# Patient Record
Sex: MALE | Race: ASIAN | NOT HISPANIC OR LATINO | URBAN - METROPOLITAN AREA
[De-identification: names, ages, dates, MRNs, and addresses within clinical notes are randomized per-mention and may not be internally consistent; named-entity substitution may affect disease eponyms.]

---

## 2020-08-14 ENCOUNTER — INPATIENT (INPATIENT)
Facility: HOSPITAL | Age: 75
LOS: 18 days | Discharge: NURSING, REHAB OR LONG-TERM CARE | DRG: 24 | End: 2020-09-02
Attending: NEUROLOGICAL SURGERY | Admitting: PSYCHIATRY & NEUROLOGY
Payer: MEDICAID

## 2020-08-14 VITALS
TEMPERATURE: 99 F | HEIGHT: 67 IN | DIASTOLIC BLOOD PRESSURE: 67 MMHG | HEART RATE: 73 BPM | WEIGHT: 139.99 LBS | SYSTOLIC BLOOD PRESSURE: 198 MMHG | OXYGEN SATURATION: 96 % | RESPIRATION RATE: 18 BRPM

## 2020-08-14 DIAGNOSIS — I63.9 CEREBRAL INFARCTION, UNSPECIFIED: ICD-10-CM

## 2020-08-14 LAB
ALBUMIN SERPL ELPH-MCNC: 4.6 G/DL — SIGNIFICANT CHANGE UP (ref 3.3–5)
ALP SERPL-CCNC: 64 U/L — SIGNIFICANT CHANGE UP (ref 40–120)
ALT FLD-CCNC: 15 U/L — SIGNIFICANT CHANGE UP (ref 10–45)
ANION GAP SERPL CALC-SCNC: 13 MMOL/L — SIGNIFICANT CHANGE UP (ref 5–17)
APPEARANCE UR: CLEAR — SIGNIFICANT CHANGE UP
APTT BLD: 33.6 SEC — SIGNIFICANT CHANGE UP (ref 27.5–35.5)
AST SERPL-CCNC: 15 U/L — SIGNIFICANT CHANGE UP (ref 10–40)
BACTERIA # UR AUTO: 0 — SIGNIFICANT CHANGE UP
BASOPHILS # BLD AUTO: 0.04 K/UL — SIGNIFICANT CHANGE UP (ref 0–0.2)
BASOPHILS NFR BLD AUTO: 0.4 % — SIGNIFICANT CHANGE UP (ref 0–2)
BILIRUB SERPL-MCNC: 0.9 MG/DL — SIGNIFICANT CHANGE UP (ref 0.2–1.2)
BILIRUB UR-MCNC: NEGATIVE — SIGNIFICANT CHANGE UP
BUN SERPL-MCNC: 14 MG/DL — SIGNIFICANT CHANGE UP (ref 7–23)
CALCIUM SERPL-MCNC: 10 MG/DL — SIGNIFICANT CHANGE UP (ref 8.4–10.5)
CHLORIDE SERPL-SCNC: 98 MMOL/L — SIGNIFICANT CHANGE UP (ref 96–108)
CO2 SERPL-SCNC: 22 MMOL/L — SIGNIFICANT CHANGE UP (ref 22–31)
COLOR SPEC: SIGNIFICANT CHANGE UP
CREAT SERPL-MCNC: 0.89 MG/DL — SIGNIFICANT CHANGE UP (ref 0.5–1.3)
DIFF PNL FLD: ABNORMAL
EOSINOPHIL # BLD AUTO: 0.04 K/UL — SIGNIFICANT CHANGE UP (ref 0–0.5)
EOSINOPHIL NFR BLD AUTO: 0.4 % — SIGNIFICANT CHANGE UP (ref 0–6)
EPI CELLS # UR: 0 /HPF — SIGNIFICANT CHANGE UP
GLUCOSE SERPL-MCNC: 115 MG/DL — HIGH (ref 70–99)
GLUCOSE UR QL: NEGATIVE — SIGNIFICANT CHANGE UP
HCT VFR BLD CALC: 46.8 % — SIGNIFICANT CHANGE UP (ref 39–50)
HGB BLD-MCNC: 15.8 G/DL — SIGNIFICANT CHANGE UP (ref 13–17)
IMM GRANULOCYTES NFR BLD AUTO: 0.3 % — SIGNIFICANT CHANGE UP (ref 0–1.5)
INR BLD: 1.04 RATIO — SIGNIFICANT CHANGE UP (ref 0.88–1.16)
KETONES UR-MCNC: NEGATIVE — SIGNIFICANT CHANGE UP
LEUKOCYTE ESTERASE UR-ACNC: NEGATIVE — SIGNIFICANT CHANGE UP
LYMPHOCYTES # BLD AUTO: 28.7 % — SIGNIFICANT CHANGE UP (ref 13–44)
LYMPHOCYTES # BLD AUTO: 3.19 K/UL — SIGNIFICANT CHANGE UP (ref 1–3.3)
MCHC RBC-ENTMCNC: 30 PG — SIGNIFICANT CHANGE UP (ref 27–34)
MCHC RBC-ENTMCNC: 33.8 GM/DL — SIGNIFICANT CHANGE UP (ref 32–36)
MCV RBC AUTO: 89 FL — SIGNIFICANT CHANGE UP (ref 80–100)
MONOCYTES # BLD AUTO: 0.95 K/UL — HIGH (ref 0–0.9)
MONOCYTES NFR BLD AUTO: 8.5 % — SIGNIFICANT CHANGE UP (ref 2–14)
NEUTROPHILS # BLD AUTO: 6.88 K/UL — SIGNIFICANT CHANGE UP (ref 1.8–7.4)
NEUTROPHILS NFR BLD AUTO: 61.7 % — SIGNIFICANT CHANGE UP (ref 43–77)
NITRITE UR-MCNC: NEGATIVE — SIGNIFICANT CHANGE UP
NRBC # BLD: 0 /100 WBCS — SIGNIFICANT CHANGE UP (ref 0–0)
PH UR: 6.5 — SIGNIFICANT CHANGE UP (ref 5–8)
PLATELET # BLD AUTO: 284 K/UL — SIGNIFICANT CHANGE UP (ref 150–400)
POTASSIUM SERPL-MCNC: 3.9 MMOL/L — SIGNIFICANT CHANGE UP (ref 3.5–5.3)
POTASSIUM SERPL-SCNC: 3.9 MMOL/L — SIGNIFICANT CHANGE UP (ref 3.5–5.3)
PROT SERPL-MCNC: 8.2 G/DL — SIGNIFICANT CHANGE UP (ref 6–8.3)
PROT UR-MCNC: NEGATIVE — SIGNIFICANT CHANGE UP
PROTHROM AB SERPL-ACNC: 12.4 SEC — SIGNIFICANT CHANGE UP (ref 10.6–13.6)
RBC # BLD: 5.26 M/UL — SIGNIFICANT CHANGE UP (ref 4.2–5.8)
RBC # FLD: 12.2 % — SIGNIFICANT CHANGE UP (ref 10.3–14.5)
RBC CASTS # UR COMP ASSIST: 1 /HPF — SIGNIFICANT CHANGE UP (ref 0–4)
SARS-COV-2 RNA SPEC QL NAA+PROBE: SIGNIFICANT CHANGE UP
SODIUM SERPL-SCNC: 133 MMOL/L — LOW (ref 135–145)
SP GR SPEC: 1.01 — LOW (ref 1.01–1.02)
UROBILINOGEN FLD QL: NEGATIVE — SIGNIFICANT CHANGE UP
WBC # BLD: 11.13 K/UL — HIGH (ref 3.8–10.5)
WBC # FLD AUTO: 11.13 K/UL — HIGH (ref 3.8–10.5)
WBC UR QL: 0 /HPF — SIGNIFICANT CHANGE UP (ref 0–5)

## 2020-08-14 PROCEDURE — 93010 ELECTROCARDIOGRAM REPORT: CPT

## 2020-08-14 PROCEDURE — 71045 X-RAY EXAM CHEST 1 VIEW: CPT | Mod: 26

## 2020-08-14 PROCEDURE — 70496 CT ANGIOGRAPHY HEAD: CPT | Mod: 26

## 2020-08-14 PROCEDURE — 70498 CT ANGIOGRAPHY NECK: CPT | Mod: 26

## 2020-08-14 PROCEDURE — 99285 EMERGENCY DEPT VISIT HI MDM: CPT

## 2020-08-14 RX ORDER — ATORVASTATIN CALCIUM 80 MG/1
80 TABLET, FILM COATED ORAL AT BEDTIME
Refills: 0 | Status: DISCONTINUED | OUTPATIENT
Start: 2020-08-14 | End: 2020-09-02

## 2020-08-14 NOTE — ED PROVIDER NOTE - NS ED ROS FT
CONST: no fevers, no chills  EYES: no pain, no vision changes  ENT: no sore throat, no ear pain, no change in hearing  CV: no chest pain, no leg swelling  RESP: no shortness of breath, no cough  ABD: no abdominal pain, no nausea, no vomiting, no diarrhea  : no dysuria, no flank pain, no hematuria  MSK: no back pain, no extremity pain  NEURO: + headache or additional neurologic complaints  HEME: no easy bleeding  SKIN:  no rash

## 2020-08-14 NOTE — ED PROVIDER NOTE - ATTENDING CONTRIBUTION TO CARE
Attending MD Chen: I personally have seen and examined this patient.  Resident note reviewed and agree on plan of care and except where noted.  See below for details.     Seen in [place] 1/16    75M with PMH/PSH including HTN not on meds presents to the ED with headache and     TO BE COMPLETED Attending MD Chen: I personally have seen and examined this patient.  Resident note reviewed and agree on plan of care and except where noted.  See below for details.     Seen in [place] 1/16    75M with PMH/PSH including HTN not on meds presents to the ED with headache and AMS.  Reports symptoms began yesterday.  Friend reported change in mental status, confusion at around 5pm yesterday.  Patient reports HA at top mid head without radiation. Patient reports weakness in RLE. Denies dysuria, hematuria, change in urinary habits including frequency, urgency. Denies abdominal pain, nausea, vomiting, diarrhea, blood in stools. Denies chest pain, shortness of breath, palpitations. Denies neck pain, back pain.  Denies change in vision, double vision, sudden loss of vision. A ten (10) point review of systems was negative other than as stated in the HPI or elsewhere in the chart.     Exam:   General: NAD, AAOx3  HENT: head NCAT, airway patent with moist mucous membranes  Eyes: PERRL  Lungs: lungs CTAB with good inspiratory effort, no wheezing, no rhonchi, no rales  Cardiac: +S1S2, no m/r/g  GI: abdomen soft with +BS, NT, ND  : no CVAT  MSK: FROM at neck, no tenderness to midline palpation, no stepoffs along length of spine, no calf tenderness, swelling, erythema or warmth  Neuro: mild dysarthria, CN 2-12 grossly intact, +L pronator drift, moving all extremities with 5/5 strength R upper and lower extremities, 4/5 on left, +L slower finger to nose, sensory grossly intact, no gross neuro deficits, ambulating independently  Psych: normal mood and affect      A/P: 75M with confusion, headache, with some neuro findings, DDx includes ICH, will send for CTH/CTAH/N, will obtain labs, EKG, will consult neuro,reassess

## 2020-08-14 NOTE — ED PROVIDER NOTE - CLINICAL SUMMARY MEDICAL DECISION MAKING FREE TEXT BOX
74 y/o male with hx of HTN resents to the ED c/o headache and AMS onset yesterday >24 hours PTA. Vitals stable. Exam with pertinent LUE weakness, +pronator drift on left, +left sided facial droop. Concerning for CVA however patient outside window and code stroke not called. Will obtain imaging of head including CT and CTA head/neck. Check labs and EKG. Consult Neuro. Mikayla Gorman DO

## 2020-08-14 NOTE — CONSULT NOTE ADULT - SUBJECTIVE AND OBJECTIVE BOX
HPI:      Home Medications:      PAST MEDICAL & SURGICAL HISTORY:  HTN (hypertension)      Review of Systems:   CONSTITUTIONAL: No fever  EYES: No eye pain or visual disturbances  ENMT:  No gross difficulty hearing,  NECK: No pain or stiffness  GENITOURINARY: No dysuria, frequency, hematuria, or incontinence  NEUROLOGICAL: No headaches, memory loss, loss of strength, numbness, or tremors  SKIN: No itching, burning, rashes, or lesions   LYMPH NODES: No enlarged glands  ENDOCRINE: No heat or cold intolerance; No hair loss  MUSCULOSKELETAL: No joint pain or swelling; No muscle, back, or extremity pain  PSYCHIATRIC: No depression, anxiety, mood swings, or difficulty sleeping  HEME/LYMPH: No easy bruising, or bleeding gums  ALLERY AND IMMUNOLOGIC: No hives or eczema    Allergies    No Known Allergies    Intolerances        Social History:     FAMILY HISTORY:   Noncontributory    MEDICATIONS  (STANDING):    MEDICATIONS  (PRN):      Vital Signs Last 24 Hrs  T(C): 36.7 (14 Aug 2020 21:07), Max: 37 (14 Aug 2020 19:27)  T(F): 98 (14 Aug 2020 21:07), Max: 98.6 (14 Aug 2020 19:27)  HR: 69 (14 Aug 2020 21:07) (69 - 73)  BP: 187/73 (14 Aug 2020 21:07) (187/73 - 198/67)  BP(mean): --  RR: 20 (14 Aug 2020 21:07) (18 - 20)  SpO2: 98% (14 Aug 2020 21:07) (96% - 98%)  CAPILLARY BLOOD GLUCOSE      POCT Blood Glucose.: 111 mg/dL (14 Aug 2020 19:31)        PHYSICAL EXAM:  GENERAL: NAD, well-developed  HEAD:  Atraumatic, Normocephalic  EYES: EOMI, PERRLA, conjunctiva and sclera clear  NECK: Supple, No JVD  CHEST/LUNG: Clear to auscultation bilaterally; No wheeze  HEART: Regular rate and rhythm; No murmurs, rubs, or gallops  ABDOMEN: Soft, Nontender, Nondistended; Bowel sounds present  EXTREMITIES:  2+ Peripheral Pulses, No clubbing, cyanosis, or edema  PSYCH: AAOx3  NEUROLOGY: non-focal  SKIN: No rashes or lesions    LABS:                        15.8   11.13 )-----------( 284      ( 14 Aug 2020 20:26 )             46.8     08-14    133<L>  |  98  |  14  ----------------------------<  115<H>  3.9   |  22  |  0.89    Ca    10.0      14 Aug 2020 20:26    TPro  8.2  /  Alb  4.6  /  TBili  0.9  /  DBili  x   /  AST  15  /  ALT  15  /  AlkPhos  64  08-14    PT/INR - ( 14 Aug 2020 20:26 )   PT: 12.4 sec;   INR: 1.04 ratio         PTT - ( 14 Aug 2020 20:26 )  PTT:33.6 sec      Urinalysis Basic - ( 14 Aug 2020 20:26 )    Color: Light Yellow / Appearance: Clear / S.008 / pH: x  Gluc: x / Ketone: Negative  / Bili: Negative / Urobili: Negative   Blood: x / Protein: Negative / Nitrite: Negative   Leuk Esterase: Negative / RBC: 1 /hpf / WBC 0 /HPF   Sq Epi: x / Non Sq Epi: 0 /hpf / Bacteria: 0.0          RADIOLOGY & ADDITIONAL TESTS:    Imaging Personally Reviewed:    Consultant(s) Notes Reviewed:      Care Discussed with Consultants/Other Providers: HPI: Opal Matthew is a 74 y/o man with a PMH of HTN who presented to the ED with an acute one day history of headache and altered mental status. Per his friend, the pt suddenly became confused yesterday around 5pm and complained of a new onset headache.The pt complains about some weakness in his right lower extremity and endorses a sharp headache localized to the top/middle of his hairline that does not radiate anywhere. The pt denies photophobia, recent fever or flu like illness, changes in hearing, changes in vision, recent falls or trauma, or pain other than the headache, difficulty swallowing, dysuria, diarrhea, numbness/tingling anywhere. No history of headaches.     Per ED,  The friend stated that the patient's speech was "off" and that his face "did not look normal". The pt did not want to go to the ER at the time, but came today because the headache persisted throughout the night and today he began to feel lightheaded. The pt stated that he ambulates and talks normally at baseline and that he had never experienced these symptoms before yesterday.       Home Medications: None, per patient      PAST MEDICAL & SURGICAL HISTORY:  HTN (hypertension)    FHx: no pertinent fhx    Social History: No history of smoking or alcohol use    Review of Systems:   CONSTITUTIONAL: No fever  EYES: No eye pain or visual disturbances  ENMT:  No gross difficulty hearing,  NECK: No pain or stiffness  GENITOURINARY: No dysuria, frequency, hematuria, or incontinence  NEUROLOGICAL: As stated in HPI  MUSCULOSKELETAL: No joint pain or swelling; No muscle, back, or extremity pain      Vital Signs Last 24 Hrs  T(C): 36.7 (14 Aug 2020 21:07), Max: 37 (14 Aug 2020 19:27)  T(F): 98 (14 Aug 2020 21:07), Max: 98.6 (14 Aug 2020 19:27)  HR: 69 (14 Aug 2020 21:07) (69 - 73)  BP: 187/73 (14 Aug 2020 21:07) (187/73 - 198/67)  BP(mean): --  RR: 20 (14 Aug 2020 21:07) (18 - 20)  SpO2: 98% (14 Aug 2020 21:07) (96% - 98%)  CAPILLARY BLOOD GLUCOSE      POCT Blood Glucose.: 111 mg/dL (14 Aug 2020 19:31)        PHYSICAL EXAM:  GENERAL: NAD, well-developed  MUSCULOSKELETAL: Negative Brudzinski and Kernig signs  Neurologic:  - Mental Status:  Alert, awake, oriented to person, place, and time; Speech is mildly dysarthric with otherwise fluent speech with intact naming, repetition, and comprehension; crosses midline, no extinction or neglect noted;  Immediate recall is 3/3 words; Able to spell WORLD forwards  - Cranial Nerves II-XII:  VFF, EOMI, PERRL (3mm OD, OS), V1-V3 intact, some nasolabial flattening on the left side of face, t/p midline, SCM/trap intact.  - Fundoscopic examination: upon fundoscopic examination grossly clear margins of optic disc & cup  - Motor: Pronator drift present on the left side. demonstrates orbiting around the left arm. Strength left arm 4+/5 with  strength 4/5. Left leg 4+/5 hip flexors/extensors with 5/5 knee flexion/ext dorsi/plantarflexion. right arm and leg 5/5. Normal muscle bulk and tone throughout. Neck flexion/extension 5/5 without neck stiffness  - Sensory:  Intact to light touch and PP bilaterally throughout.  - Coordination:  FTN demonstrated mild dysmetria in proportion to weakness on left arm, intact on right  - Gait: patient able to stand up on his own, ambulate several steps without wide based gait, not requiring assistance.    LABS:                        15.8   11.13 )-----------( 284      ( 14 Aug 2020 20:26 )             46.8     08-14    133<L>  |  98  |  14  ----------------------------<  115<H>  3.9   |  22  |  0.89    Ca    10.0      14 Aug 2020 20:26    TPro  8.2  /  Alb  4.6  /  TBili  0.9  /  DBili  x   /  AST  15  /  ALT  15  /  AlkPhos  64  08-14    PT/INR - ( 14 Aug 2020 20:26 )   PT: 12.4 sec;   INR: 1.04 ratio         PTT - ( 14 Aug 2020 20:26 )  PTT:33.6 sec    Urinalysis + Microscopic Examination (08.14.20 @ 20:26)    Urine Appearance: Clear    Urobilinogen: Negative    Specific Gravity: 1.008    Protein, Urine: Negative    Nitrite: Negative    Ketone - Urine: Negative    Bilirubin: Negative    pH Urine: 6.5    Leukocyte Esterase Concentration: Negative    Color: Light Yellow    Glucose Qualitative, Urine: Negative    Blood, Urine: Moderate    Red Blood Cell - Urine: 1 /hpf    White Blood Cell - Urine: 0 /HPF    Epithelial Cells: 0 /hpf    Bacteria: 0.0          RADIOLOGY & ADDITIONAL TESTS:    awaiting CT Head HPI: Opal Matthew is a 74 y/o RH man with a PMH of HTN not on antithrombotics who presented to the ED with an acute one day history of headache and altered mental status. Per his friend, the pt suddenly became confused yesterday around 5pm and complained of a new onset headache.The pt complains about some weakness in his right lower extremity and endorses a sharp headache localized to the top/middle of his hairline that does not radiate anywhere. The pt denies photophobia, recent fever or flu like illness, changes in hearing, changes in vision, recent falls or trauma, or pain other than the headache, difficulty swallowing, dysuria, diarrhea, numbness/tingling anywhere. No history of headaches.     Per ED,  The friend stated that the patient's speech was "off" and that his face "did not look normal". The pt did not want to go to the ER at the time, but came today because the headache persisted throughout the night and today he began to feel lightheaded. The pt stated that he ambulates and talks normally at baseline and that he had never experienced these symptoms before yesterday.       Home Medications: None, per patient      PAST MEDICAL & SURGICAL HISTORY:  HTN (hypertension)    FHx: no pertinent fhx    Social History: No history of smoking or alcohol use    Review of Systems:   CONSTITUTIONAL: No fever  EYES: No eye pain or visual disturbances  ENMT:  No gross difficulty hearing,  NECK: No pain or stiffness  GENITOURINARY: No dysuria, frequency, hematuria, or incontinence  NEUROLOGICAL: As stated in HPI  MUSCULOSKELETAL: No joint pain or swelling; No muscle, back, or extremity pain      Vital Signs Last 24 Hrs  T(C): 36.7 (14 Aug 2020 21:07), Max: 37 (14 Aug 2020 19:27)  T(F): 98 (14 Aug 2020 21:07), Max: 98.6 (14 Aug 2020 19:27)  HR: 69 (14 Aug 2020 21:07) (69 - 73)  BP: 187/73 (14 Aug 2020 21:07) (187/73 - 198/67)  BP(mean): --  RR: 20 (14 Aug 2020 21:07) (18 - 20)  SpO2: 98% (14 Aug 2020 21:07) (96% - 98%)  CAPILLARY BLOOD GLUCOSE      POCT Blood Glucose.: 111 mg/dL (14 Aug 2020 19:31)        PHYSICAL EXAM:  GENERAL: NAD, well-developed  MUSCULOSKELETAL: Negative Brudzinski and Kernig signs  Neurologic:  - Mental Status:  Alert, awake, oriented to person, place, and time; Speech is mildly dysarthric with otherwise fluent speech with intact naming, repetition, and comprehension; crosses midline, no extinction or neglect noted;  Immediate recall is 3/3 words; Able to spell WORLD forwards  - Cranial Nerves II-XII:  VFF, EOMI, PERRL (3mm OD, OS), V1-V3 intact, some nasolabial flattening on the left side of face, t/p midline, SCM/trap intact.  - Fundoscopic examination: upon fundoscopic examination grossly clear margins of optic disc & cup  - Motor: Pronator drift present on the left side. demonstrates orbiting around the left arm. Strength left arm 4+/5 with  strength 4/5. Left leg 4+/5 hip flexors/extensors with 5/5 knee flexion/ext dorsi/plantarflexion. right arm and leg 5/5. Normal muscle bulk and tone throughout. Neck flexion/extension 5/5 without neck stiffness  - Sensory:  Intact to light touch and PP bilaterally throughout.  - Coordination:  FTN demonstrated mild dysmetria in proportion to weakness on left arm, intact on right  - Gait: patient able to stand up on his own, ambulate several steps without wide based gait, not requiring assistance.    LABS:                        15.8   11.13 )-----------( 284      ( 14 Aug 2020 20:26 )             46.8     08-14    133<L>  |  98  |  14  ----------------------------<  115<H>  3.9   |  22  |  0.89    Ca    10.0      14 Aug 2020 20:26    TPro  8.2  /  Alb  4.6  /  TBili  0.9  /  DBili  x   /  AST  15  /  ALT  15  /  AlkPhos  64  08-14    PT/INR - ( 14 Aug 2020 20:26 )   PT: 12.4 sec;   INR: 1.04 ratio         PTT - ( 14 Aug 2020 20:26 )  PTT:33.6 sec    Urinalysis + Microscopic Examination (08.14.20 @ 20:26)    Urine Appearance: Clear    Urobilinogen: Negative    Specific Gravity: 1.008    Protein, Urine: Negative    Nitrite: Negative    Ketone - Urine: Negative    Bilirubin: Negative    pH Urine: 6.5    Leukocyte Esterase Concentration: Negative    Color: Light Yellow    Glucose Qualitative, Urine: Negative    Blood, Urine: Moderate    Red Blood Cell - Urine: 1 /hpf    White Blood Cell - Urine: 0 /HPF    Epithelial Cells: 0 /hpf    Bacteria: 0.0          RADIOLOGY & ADDITIONAL TESTS:    < from: CT Angio Neck w/ IV Cont (08.14.20 @ 21:54) >  IMPRESSION:    CT HEAD: No acute intracranial bleeding.    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    CTA NECK: Patent cervical vasculature. No flow limiting stenosis or occlusion.    < end of copied text >

## 2020-08-14 NOTE — ED PROVIDER NOTE - PHYSICAL EXAMINATION
GENERAL: Awake, alert, mildly confused  HEENT: NC/AT, moist mucous membranes, PERRL, EOMI, mild left sided facial droop  LUNGS: CTAB, no wheezes or crackles   CARDIAC: RRR, no m/r/g  ABDOMEN: Soft, normal BS, non tender, non distended, no rebound, no guarding  BACK: No midline spinal tenderness, no CVA tenderness  EXT: No edema, no calf tenderness, 2+ DP pulses bilaterally, no deformities.  NEURO: A&Ox3. CN 2-12 intact. 4/5 strength LUE, 5/5 strength RUE, 5/5 strength LLE and RLE, + pronator drift on the left, normal finger to nose, no DDK  SKIN: Warm and dry. No rash.

## 2020-08-14 NOTE — ED PROVIDER NOTE - OBJECTIVE STATEMENT
76 y/o male with hx of HTN presents to the ED c/o headache and AMS onset yesterday. Per friend, patient seemed confused around 5pm yesterday and was complaining of headache. They noticed his speech seemed off and his face "did not look normal"/ Patient did not want to go to the ER at that time. Patient states the headache lasted a few hours and today he began to feel lightheaded. Denies nausea, vomiting, fever, chills, chest pain, shortness of breath. No smoking or alcohol.

## 2020-08-14 NOTE — ED ADULT TRIAGE NOTE - CHIEF COMPLAINT QUOTE
c/o sudden headache w/ AMS per family friend. PT A&OX4, slow to respond, slight L arm drift. Last known well yesterday morning, symptom onset yesterday afternoon

## 2020-08-14 NOTE — ED ADULT NURSE NOTE - OBJECTIVE STATEMENT
2036 pt 75ym aox2 states starts w/ha since yesterday afternoon, bought in by family tonight, able to say his name and where he is, speaks English and understands at this time, noted w/ lft facial slight droop and minimal strength change on the left side, states he has htn but does not take meds, pt able to   verbalize concerns, pt noted very poor historian/

## 2020-08-14 NOTE — CONSULT NOTE ADULT - ASSESSMENT
Assessment: 74 y/o man with a PMH of HTN who presented to the ED with an acute one day history of headache and altered mental status. Neurological examination demonstrates mild dysarthria with mild left nasolabial flattening with left arm weakness. CT/CTA per Neuroradiology demonstrates stenosis vs occlusion R M1.     Impression: headache with change in mental status and left hemiparesis 2/2 right brain dysfunction with CT/CTA demonstrating ?chronic R frontal infarct with R M1 stenosis versus occlusion 2/2 ESUS (embolic stroke of unknown source) with elevated blood pressure and mental status change likely in the setting of hypertensive encephalopathy    Recommendations:  [ ] CT/CTA - follow up final read  [ ] blood pressure mgmt - modified permissive HTN - blood pressure should go no higher than 180/100, lower limit tolerable 160/90  [ ] rest of management to be put into note shortly    mgmt to be discussed with Stroke Fellow Dr. Beard, Stroke Attending Dr. Libman Assessment: 74 y/o RH man with a PMH of HTN not on antithrombotics who presented to the ED with an acute one day history of headache and altered mental status. Neurological examination demonstrates mild dysarthria with mild left nasolabial flattening with left arm weakness. CT/CTA per Neuroradiology demonstrates stenosis vs occlusion R M1. Patient out of window for tpa. NIHSS too low for mechanical thrombectomy.    Impression: headache with change in mental status and left hemiparesis 2/2 right brain dysfunction with CT/CTA demonstrating ?chronic R frontal infarct with R M1 stenosis versus occlusion 2/2 ESUS (embolic stroke of unknown source) with elevated blood pressure and mental status change likely in the setting of hypertensive encephalopathy    NIHSS: 2  pre-MRS: 0    Recommendations:  [x] CT/CTA - follow up final read  [ ] MRI head w/o contrast  [ ] blood pressure mgmt - modified permissive HTN - blood pressure should go no higher than 180/100, lower limit tolerable 160/90, given concern for HTN encephalopathy  [ ] asp 81mg daily & plavix 75mg daily per [facility] protocol  medications to be adjusted based off of MRI  [ ] atorvastatin 80mg daily to be titrated for LDL < 70  [ ] a1c, fasting lipid profile  [ ] telemetry monitoring  [ ] echo w/ bubble  [ ] q4h neuro checks  [ ] speech/swallow  [ ] PT/OT  [ ] Upon discharge, please follow up at [address] [place] [address] with Amy Gomez NP  [phone]  Please email [email or web address] to schedule an appointment with the stroke NP for 1 week after discharge    mgmt discussed with Stroke Fellow Dr. Beard, Stroke Attending Dr. Libman

## 2020-08-15 DIAGNOSIS — I63.9 CEREBRAL INFARCTION, UNSPECIFIED: ICD-10-CM

## 2020-08-15 DIAGNOSIS — I95.9 HYPOTENSION, UNSPECIFIED: ICD-10-CM

## 2020-08-15 LAB
A1C WITH ESTIMATED AVERAGE GLUCOSE RESULT: 5.8 % — HIGH (ref 4–5.6)
ANION GAP SERPL CALC-SCNC: 14 MMOL/L — SIGNIFICANT CHANGE UP (ref 5–17)
ANION GAP SERPL CALC-SCNC: 17 MMOL/L — SIGNIFICANT CHANGE UP (ref 5–17)
APTT BLD: 34.4 SEC — SIGNIFICANT CHANGE UP (ref 27.5–35.5)
BUN SERPL-MCNC: 12 MG/DL — SIGNIFICANT CHANGE UP (ref 7–23)
BUN SERPL-MCNC: 15 MG/DL — SIGNIFICANT CHANGE UP (ref 7–23)
CALCIUM SERPL-MCNC: 9.9 MG/DL — SIGNIFICANT CHANGE UP (ref 8.4–10.5)
CALCIUM SERPL-MCNC: 9.9 MG/DL — SIGNIFICANT CHANGE UP (ref 8.4–10.5)
CHLORIDE SERPL-SCNC: 100 MMOL/L — SIGNIFICANT CHANGE UP (ref 96–108)
CHLORIDE SERPL-SCNC: 99 MMOL/L — SIGNIFICANT CHANGE UP (ref 96–108)
CHOLEST SERPL-MCNC: 220 MG/DL — HIGH (ref 10–199)
CK MB BLD-MCNC: 1.8 % — SIGNIFICANT CHANGE UP (ref 0–3.5)
CK MB CFR SERPL CALC: 2.9 NG/ML — SIGNIFICANT CHANGE UP (ref 0–6.7)
CK SERPL-CCNC: 164 U/L — SIGNIFICANT CHANGE UP (ref 30–200)
CO2 SERPL-SCNC: 18 MMOL/L — LOW (ref 22–31)
CO2 SERPL-SCNC: 22 MMOL/L — SIGNIFICANT CHANGE UP (ref 22–31)
CREAT SERPL-MCNC: 0.82 MG/DL — SIGNIFICANT CHANGE UP (ref 0.5–1.3)
CREAT SERPL-MCNC: 1 MG/DL — SIGNIFICANT CHANGE UP (ref 0.5–1.3)
ESTIMATED AVERAGE GLUCOSE: 120 MG/DL — HIGH (ref 68–114)
GLUCOSE BLDC GLUCOMTR-MCNC: 111 MG/DL — HIGH (ref 70–99)
GLUCOSE SERPL-MCNC: 110 MG/DL — HIGH (ref 70–99)
GLUCOSE SERPL-MCNC: 130 MG/DL — HIGH (ref 70–99)
HCT VFR BLD CALC: 46.2 % — SIGNIFICANT CHANGE UP (ref 39–50)
HCT VFR BLD CALC: 46.7 % — SIGNIFICANT CHANGE UP (ref 39–50)
HDLC SERPL-MCNC: 44 MG/DL — SIGNIFICANT CHANGE UP
HGB BLD-MCNC: 15.5 G/DL — SIGNIFICANT CHANGE UP (ref 13–17)
HGB BLD-MCNC: 15.9 G/DL — SIGNIFICANT CHANGE UP (ref 13–17)
INR BLD: 1.02 RATIO — SIGNIFICANT CHANGE UP (ref 0.88–1.16)
LACTATE SERPL-SCNC: 2.1 MMOL/L — HIGH (ref 0.7–2)
LIPID PNL WITH DIRECT LDL SERPL: 156 MG/DL — HIGH
MCHC RBC-ENTMCNC: 30 PG — SIGNIFICANT CHANGE UP (ref 27–34)
MCHC RBC-ENTMCNC: 30.2 PG — SIGNIFICANT CHANGE UP (ref 27–34)
MCHC RBC-ENTMCNC: 33.5 GM/DL — SIGNIFICANT CHANGE UP (ref 32–36)
MCHC RBC-ENTMCNC: 34 GM/DL — SIGNIFICANT CHANGE UP (ref 32–36)
MCV RBC AUTO: 88.6 FL — SIGNIFICANT CHANGE UP (ref 80–100)
MCV RBC AUTO: 89.5 FL — SIGNIFICANT CHANGE UP (ref 80–100)
NRBC # BLD: 0 /100 WBCS — SIGNIFICANT CHANGE UP (ref 0–0)
NRBC # BLD: 0 /100 WBCS — SIGNIFICANT CHANGE UP (ref 0–0)
NT-PROBNP SERPL-SCNC: 43 PG/ML — SIGNIFICANT CHANGE UP (ref 0–300)
PLATELET # BLD AUTO: 252 K/UL — SIGNIFICANT CHANGE UP (ref 150–400)
PLATELET # BLD AUTO: 289 K/UL — SIGNIFICANT CHANGE UP (ref 150–400)
POTASSIUM SERPL-MCNC: 4 MMOL/L — SIGNIFICANT CHANGE UP (ref 3.5–5.3)
POTASSIUM SERPL-MCNC: 4.1 MMOL/L — SIGNIFICANT CHANGE UP (ref 3.5–5.3)
POTASSIUM SERPL-SCNC: 4 MMOL/L — SIGNIFICANT CHANGE UP (ref 3.5–5.3)
POTASSIUM SERPL-SCNC: 4.1 MMOL/L — SIGNIFICANT CHANGE UP (ref 3.5–5.3)
PROTHROM AB SERPL-ACNC: 12.1 SEC — SIGNIFICANT CHANGE UP (ref 10.6–13.6)
RBC # BLD: 5.16 M/UL — SIGNIFICANT CHANGE UP (ref 4.2–5.8)
RBC # BLD: 5.27 M/UL — SIGNIFICANT CHANGE UP (ref 4.2–5.8)
RBC # FLD: 12.2 % — SIGNIFICANT CHANGE UP (ref 10.3–14.5)
RBC # FLD: 12.3 % — SIGNIFICANT CHANGE UP (ref 10.3–14.5)
SARS-COV-2 IGG SERPL QL IA: NEGATIVE — SIGNIFICANT CHANGE UP
SARS-COV-2 IGM SERPL IA-ACNC: 0.4 RATIO — SIGNIFICANT CHANGE UP
SODIUM SERPL-SCNC: 134 MMOL/L — LOW (ref 135–145)
SODIUM SERPL-SCNC: 136 MMOL/L — SIGNIFICANT CHANGE UP (ref 135–145)
TOTAL CHOLESTEROL/HDL RATIO MEASUREMENT: 5 RATIO — SIGNIFICANT CHANGE UP (ref 3.4–9.6)
TRIGL SERPL-MCNC: 98 MG/DL — SIGNIFICANT CHANGE UP (ref 10–149)
TROPONIN T, HIGH SENSITIVITY RESULT: 7 NG/L — SIGNIFICANT CHANGE UP (ref 0–51)
WBC # BLD: 10.74 K/UL — HIGH (ref 3.8–10.5)
WBC # BLD: 9.43 K/UL — SIGNIFICANT CHANGE UP (ref 3.8–10.5)
WBC # FLD AUTO: 10.74 K/UL — HIGH (ref 3.8–10.5)
WBC # FLD AUTO: 9.43 K/UL — SIGNIFICANT CHANGE UP (ref 3.8–10.5)

## 2020-08-15 PROCEDURE — 93010 ELECTROCARDIOGRAM REPORT: CPT

## 2020-08-15 PROCEDURE — 99223 1ST HOSP IP/OBS HIGH 75: CPT

## 2020-08-15 PROCEDURE — 71045 X-RAY EXAM CHEST 1 VIEW: CPT | Mod: 26

## 2020-08-15 PROCEDURE — 70551 MRI BRAIN STEM W/O DYE: CPT | Mod: 26

## 2020-08-15 RX ORDER — SODIUM CHLORIDE 9 MG/ML
1000 INJECTION INTRAMUSCULAR; INTRAVENOUS; SUBCUTANEOUS
Refills: 0 | Status: DISCONTINUED | OUTPATIENT
Start: 2020-08-15 | End: 2020-08-15

## 2020-08-15 RX ORDER — SODIUM CHLORIDE 9 MG/ML
1000 INJECTION INTRAMUSCULAR; INTRAVENOUS; SUBCUTANEOUS ONCE
Refills: 0 | Status: COMPLETED | OUTPATIENT
Start: 2020-08-15 | End: 2020-08-15

## 2020-08-15 RX ORDER — HYDRALAZINE HCL 50 MG
5 TABLET ORAL EVERY 6 HOURS
Refills: 0 | Status: DISCONTINUED | OUTPATIENT
Start: 2020-08-15 | End: 2020-08-15

## 2020-08-15 RX ORDER — AMLODIPINE BESYLATE 2.5 MG/1
2.5 TABLET ORAL DAILY
Refills: 0 | Status: DISCONTINUED | OUTPATIENT
Start: 2020-08-15 | End: 2020-08-15

## 2020-08-15 RX ORDER — SODIUM CHLORIDE 9 MG/ML
500 INJECTION INTRAMUSCULAR; INTRAVENOUS; SUBCUTANEOUS ONCE
Refills: 0 | Status: DISCONTINUED | OUTPATIENT
Start: 2020-08-15 | End: 2020-08-15

## 2020-08-15 RX ORDER — CLOPIDOGREL BISULFATE 75 MG/1
75 TABLET, FILM COATED ORAL DAILY
Refills: 0 | Status: DISCONTINUED | OUTPATIENT
Start: 2020-08-14 | End: 2020-09-02

## 2020-08-15 RX ORDER — SODIUM CHLORIDE 9 MG/ML
1000 INJECTION INTRAMUSCULAR; INTRAVENOUS; SUBCUTANEOUS
Refills: 0 | Status: DISCONTINUED | OUTPATIENT
Start: 2020-08-15 | End: 2020-08-18

## 2020-08-15 RX ORDER — ASPIRIN/CALCIUM CARB/MAGNESIUM 324 MG
81 TABLET ORAL DAILY
Refills: 0 | Status: DISCONTINUED | OUTPATIENT
Start: 2020-08-14 | End: 2020-08-16

## 2020-08-15 RX ORDER — ACETAMINOPHEN 500 MG
650 TABLET ORAL EVERY 6 HOURS
Refills: 0 | Status: DISCONTINUED | OUTPATIENT
Start: 2020-08-15 | End: 2020-09-02

## 2020-08-15 RX ORDER — ENOXAPARIN SODIUM 100 MG/ML
40 INJECTION SUBCUTANEOUS DAILY
Refills: 0 | Status: DISCONTINUED | OUTPATIENT
Start: 2020-08-14 | End: 2020-08-29

## 2020-08-15 RX ADMIN — SODIUM CHLORIDE 1000 MILLILITER(S): 9 INJECTION INTRAMUSCULAR; INTRAVENOUS; SUBCUTANEOUS at 16:26

## 2020-08-15 RX ADMIN — SODIUM CHLORIDE 125 MILLILITER(S): 9 INJECTION INTRAMUSCULAR; INTRAVENOUS; SUBCUTANEOUS at 22:49

## 2020-08-15 RX ADMIN — Medication 650 MILLIGRAM(S): at 05:15

## 2020-08-15 RX ADMIN — SODIUM CHLORIDE 100 MILLILITER(S): 9 INJECTION INTRAMUSCULAR; INTRAVENOUS; SUBCUTANEOUS at 19:45

## 2020-08-15 RX ADMIN — Medication 81 MILLIGRAM(S): at 13:41

## 2020-08-15 RX ADMIN — SODIUM CHLORIDE 1000 MILLILITER(S): 9 INJECTION INTRAMUSCULAR; INTRAVENOUS; SUBCUTANEOUS at 14:45

## 2020-08-15 RX ADMIN — ENOXAPARIN SODIUM 40 MILLIGRAM(S): 100 INJECTION SUBCUTANEOUS at 13:40

## 2020-08-15 RX ADMIN — CLOPIDOGREL BISULFATE 75 MILLIGRAM(S): 75 TABLET, FILM COATED ORAL at 13:41

## 2020-08-15 NOTE — PHYSICAL THERAPY INITIAL EVALUATION ADULT - ADDITIONAL COMMENTS
RH Pt is RH and states he lives in a private house with 5-6 steps to enter; bedroom is on the main floor. Pt was fully independent, driving and was taking care of his aunt PTA.    CHEST XRAY 8/14: Clear lungs; CT HEAD: No acute intracranial bleeding. 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 CTA NECK 8/14: Patent cervical vasculature. No flow limiting stenosis or occlusion.

## 2020-08-15 NOTE — OCCUPATIONAL THERAPY INITIAL EVALUATION ADULT - PRECAUTIONS/LIMITATIONS, REHAB EVAL
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

## 2020-08-15 NOTE — H&P ADULT - HISTORY OF PRESENT ILLNESS
Opal Matthew is a 74 y/o RH man with a PMH of HTN not on antithrombotics who presented to the ED with an acute one day history of headache and altered mental status. Per his friend, the pt suddenly became confused yesterday around 5pm and complained of a new onset headache.The pt complains about some weakness in his right lower extremity and endorses a sharp headache localized to the top/middle of his hairline that does not radiate anywhere. The pt denies photophobia, recent fever or flu like illness, changes in hearing, changes in vision, recent falls or trauma, or pain other than the headache, difficulty swallowing, dysuria, diarrhea, numbness/tingling anywhere. No history of headaches.     Per ED,  The friend stated that the patient's speech was "off" and that his face "did not look normal". The pt did not want to go to the ER at the time, but came today because the headache persisted throughout the night and today he began to feel lightheaded. The pt stated that he ambulates and talks normally at baseline and that he had never experienced these symptoms before yesterday.

## 2020-08-15 NOTE — OCCUPATIONAL THERAPY INITIAL EVALUATION ADULT - PERSONAL SAFETY AND JUDGMENT, REHAB EVAL
Pt did not notive loss of balance while walking or significant deficits in cognition or motor planning/impaired

## 2020-08-15 NOTE — H&P ADULT - ATTENDING COMMENTS
75-year-old right-handed gentleman first evaluated at [facility] on 8/15/2020 with left hemiparesis.  History and exam as above, with minor changes.  ROS otherwise negative.  Alert; probably mild left hemineglect; left homonymous hemianopia; mild left central facial palsy; mild dysarthria; left arm and leg-drift; manual motor testing with variable effort probably due to motor neglect but overall left hemiparesis 4/5; no left-sided ataxia; sensory-extinction on left; gait not tested; remainder of of neurologic exam was nonfocal.  CT head (8/14/2020) to my eye showed moderate-severe periventricular chronic ischemic changes.  There was a possible subacute right corona radiata infarct, perhaps more likely part and parcel of the chronic ischemic changes.  CTA neck and head (8/14/2020) to my eye showed severe right M1 stenosis.  The left vertebral artery was dominant.  Incidentally noted were emphysematous lung changes.  Impression.  On 8/14/2020 he developed some type of change in mental status along with headache, and had been dropping objects, most likely due to left hemiparesis.  His presentation is consistent with right hemispheric dysfunction, most likely acute ischemic stroke, probably related to severe right M1 stenosis.  The right M1 stenosis is most likely due to large artery atherosclerosis, although an embolus with partial lysis cannot be ruled out with certainty (embolic stroke of undetermined source).  After my initial examination, his blood pressure and heart rate decreased with mildly decreased level of consciousness and significantly decreased verbal output and interaction with the examiner, along with worsening of his left hemiparesis, with minimal effort versus gravity in the left arm.  Most likely, this worsening was due to cerebral perfusion insufficiency related to decreased cardiac output in the setting of intracranial stenosis.  Plan.  Repeat CT head; MRI brain; TTE; ILR versus 30-day Holter; Plavix 75 mg daily for 3 months; aspirin 81 mg daily, indefinitely; atorvastatin 80 mg daily, target LDL less than 70; permissive hypertension with IV hydration and Trendelenburg position as needed; cardiology consultation; pulmonary consultation for emphysematous changes on imaging; PT/OT/speech therapy.

## 2020-08-15 NOTE — CONSULT NOTE ADULT - PROBLEM SELECTOR RECOMMENDATION 2
Gentle IVF   Check thyroid panel   TTE   Trops   If remains hypotensive, start Midodrine 10 tid   Check blood cultures

## 2020-08-15 NOTE — PHYSICAL THERAPY INITIAL EVALUATION ADULT - ACTIVE RANGE OF MOTION EXAMINATION, REHAB EVAL
Left Active assistive ROM was WNL/RLE Active ROM was WNL (within normal limits)/bilateral  lower extremity Active ROM was WFL (within functional limits)

## 2020-08-15 NOTE — OCCUPATIONAL THERAPY INITIAL EVALUATION ADULT - FINE MOTOR COORDINATION TRAINING, OT EVAL
GOAL: Pt will increase in hand manipulation via zipping task with LUE 3/4 trials to increase participation in ADLs in 4 weeks

## 2020-08-15 NOTE — PROVIDER CONTACT NOTE (CHANGE IN STATUS NOTIFICATION) - ACTION/TREATMENT ORDERED:
telemetry tech call with pt in junctional rhythm  1L fluid bolus given with pt in Trendelenburg position in bed  CBC, BMP, coag, lactate, EKG, FS performed    After further evaluation by Stroke attending, pt to be moved to stroke unit for further monitoring

## 2020-08-15 NOTE — PHYSICAL THERAPY INITIAL EVALUATION ADULT - DISCHARGE DISPOSITION, PT EVAL
Acute rehab Acute rehab, if home, pt would required 24/7 assist with all ADL's and functional mobility and home PT.

## 2020-08-15 NOTE — CONSULT NOTE ADULT - ASSESSMENT
74 y/o RH man with a PMH of HTN not on antithrombotics who presented to the ED with an acute one day history of headache and altered mental status. Neurological examination demonstrates mild dysarthria with mild left nasolabial flattening with left arm weakness. CT/CTA per Neuroradiology demonstrates stenosis vs occlusion R M1. Patient out of window for tpa. NIHSS too low for mechanical thrombectomy.

## 2020-08-15 NOTE — CONSULT NOTE ADULT - SUBJECTIVE AND OBJECTIVE BOX
CHIEF COMPLAINT:    HISTORY OF PRESENT ILLNESS:  74 y/o RH man with a PMH of HTN not on antithrombotics who presented to the ED with an acute one day history of headache and altered mental status. Per his friend, the pt suddenly became confused yesterday around 5pm and complained of a new onset headache.The pt complains about some weakness in his right lower extremity and endorses a sharp headache localized to the top/middle of his hairline that does not radiate anywhere. The pt denies photophobia, recent fever or flu like illness, changes in hearing, changes in vision, recent falls or trauma, or pain other than the headache, difficulty swallowing, dysuria, diarrhea, numbness/tingling anywhere. No history of headaches.     Per ED,  The friend stated that the patient's speech was "off" and that his face "did not look normal". The pt did not want to go to the ER at the time, but came today because the headache persisted throughout the night and today he began to feel lightheaded. The pt stated that he ambulates and talks normally at baseline and that he had never experienced these symptoms before yesterday.         PAST MEDICAL & SURGICAL HISTORY:  HTN (hypertension)          MEDICATIONS:  aspirin enteric coated 81 milliGRAM(s) Oral daily  clopidogrel Tablet 75 milliGRAM(s) Oral daily  enoxaparin Injectable 40 milliGRAM(s) SubCutaneous daily        acetaminophen   Tablet .. 650 milliGRAM(s) Oral every 6 hours PRN      atorvastatin 80 milliGRAM(s) Oral at bedtime    sodium chloride 0.9%. 1000 milliLiter(s) IV Continuous <Continuous>      FAMILY HISTORY:      SOCIAL HISTORY:    [ ] Non-smoker  [ ] Smoker  [ ] Alcohol    Allergies    No Known Allergies    Intolerances    	    REVIEW OF SYSTEMS:  CONSTITUTIONAL: No fever, weight loss, or fatigue  EYES: No eye pain, visual disturbances, or discharge  ENMT:  No difficulty hearing, tinnitus, vertigo; No sinus or throat pain  NECK: No pain or stiffness  RESPIRATORY: No cough, wheezing, chills or hemoptysis; No Shortness of Breath  CARDIOVASCULAR: No chest pain, palpitations, passing out, dizziness, or leg swelling  GASTROINTESTINAL: No abdominal or epigastric pain. No nausea, vomiting, or hematemesis; No diarrhea or constipation. No melena or hematochezia.  GENITOURINARY: No dysuria, frequency, hematuria, or incontinence  NEUROLOGICAL: No headaches, memory loss, loss of strength, numbness, or tremors  SKIN: No itching, burning, rashes, or lesions   LYMPH Nodes: No enlarged glands  ENDOCRINE: No heat or cold intolerance; No hair loss  MUSCULOSKELETAL: No joint pain or swelling; No muscle, back, or extremity pain  PSYCHIATRIC: No depression, anxiety, mood swings, or difficulty sleeping  HEME/LYMPH: No easy bruising, or bleeding gums  ALLERY AND IMMUNOLOGIC: No hives or eczema	    [ ] All others negative	  [ ] Unable to obtain    PHYSICAL EXAM:  T(C): 37.1 (08-15-20 @ 20:00), Max: 37.3 (08-15-20 @ 00:34)  HR: 83 (08-15-20 @ 21:00) (65 - 84)  BP: 150/80 (08-15-20 @ 21:00) (115/58 - 177/80)  RR: 20 (08-15-20 @ 21:00) (13 - 22)  SpO2: 96% (08-15-20 @ 21:00) (95% - 99%)  Wt(kg): --  I&O's Summary    15 Aug 2020 07:01  -  15 Aug 2020 21:51  --------------------------------------------------------  IN: 1300 mL / OUT: 700 mL / NET: 600 mL        Appearance: NAD	  HEENT:   Dry  oral mucosa, PERRL, EOMI	  Lymphatic: No lymphadenopathy  Cardiovascular: Normal S1 S2, No JVD, No murmurs, No edema  Respiratory: Lungs clear to auscultation	  Psychiatry: A & O x 3, Mood & affect appropriate  Gastrointestinal:  Soft, Non-tender, + BS	  Skin: No rashes, No ecchymoses, No cyanosis	  Extremities: Normal range of motion, No clubbing, cyanosis or edema  Vascular: Peripheral pulses palpable 2+ bilaterally  - Cranial Nerves II-XII:  VFF, EOMI, PERRL (3mm OD, OS), V1-V3 intact, some nasolabial flattening on the left side of face, t/p midline, SCM/trap intact.  	- Fundoscopic examination: upon fundoscopic examination grossly clear margins of optic disc & cup  	- Motor: Pronator drift present on the left side. demonstrates orbiting around the left arm. Strength left arm 4+/5 with  strength 4/5. Left leg 4+/5 hip flexors/extensors with 5/5 knee flexion/ext dorsi/plantarflexion. right arm and leg 5/5. Normal muscle bulk and tone throughout. Neck flexion/extension 5/5 without neck stiffness  	- Sensory:  Intact to light touch and PP bilaterally throughout.  	- Coordination:  FTN demonstrated mild dysmetria in proportion to weakness on left arm, intact on right  - Gait: patient able to stand up on his own, ambulate several steps without wide based gait, not requiring assistance.      TELEMETRY: 	SR    ECG:  	SR, first degree AVB   RADIOLOGY:  < from: CT Angio Neck w/ IV Cont (08.14.20 @ 21:54) >    EXAM:  CT BRAIN                          EXAM:  CT ANGIO NECK (W)AW IC                          EXAM:  CT ANGIO BRAIN (W)AW IC                            PROCEDURE DATE:  08/14/2020            INTERPRETATION:  HISTORY: Headache, altered mental status.    COMPARISON: None    TECHNIQUE: Noncontrast CT head and CT angiogram of the neck and brain was performed after administration of intravenous contrast. MIP and 3D reconstructions were performed.    Total of 70 cc Omnipaque 300 intravenous contrast were administered without complication.    FINDINGS:    CT HEAD:    There is no acute intracranial hemorrhage. No focal edema or evidence of acute, transcortical infarct. No hydrocephalus, midline shift or mass effect.    Senescent changes are in keeping with the patient's age.    Scalp and imaged facial soft tissues are unremarkable. Visualized paranasal sinuses and mastoid air cells are clear. Calvarium is intact.    CTA BRAIN    INTERNAL CAROTID ARTERIES:  The intracranial segments of the ICAare patent without hemodynamically significant stenosis, occlusion, or aneurysm. Mild to moderate atherosclerotic changes are noted in the cavernous segments. The ICA bifurcations are unremarkable.    ANTERIOR CEREBRAL ARTERIES: No flow-limiting stenosis or occlusion. There is an additional tiny vessel coming off the anterior communicating artery, anatomic variant.    MIDDLE CEREBRAL ARTERIES: A 6 mm in length severe stenosis/near occlusion of the M1 segment of the Right MCA with reconstitution at the bifurcation.([phone]). No other proximal flow-limiting stenosis or occlusion. MCA bifurcations are unremarkable without aneurysm.    POSTERIOR CEREBRAL ARTERIES: No proximal flow-limiting stenosis or occlusion. Posterior communicating arteriesare not well seen bilaterally.    VERTEBROBASILAR SYSTEM: No flow-limiting stenosis or occlusion. Basilar tip is unremarkable.    CTA NECK    RIGHT CAROTID SYSTEM: Normal in course and caliber without flow-limiting stenosis or occlusion. Atherosclerotic plaque at the carotid bulb extends into the origins of the internal and external carotid arteries.    LEFT CAROTID SYSTEM: Normal in course and caliber without flow-limiting stenosis or occlusion.    VERTEBRAL SYSTEM: Left dominant vertebral artery. Normal in course and caliber  without flow-limiting stenosis or occlusion. Origin of the vertebral arteries are unremarkable.    AORTIC ARCH: Atherosclerotic changes. 3 vessel aortic arch. Origin of the great vessels are unremarkable.    OTHER: Emphysema.    IMPRESSION:    CT HEAD: No acute intracranial bleeding.    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    CTA NECK: Patent cervical vasculature. No flow limiting stenosis or occlusion.    These findings were discussed with Dr. HILDA SEVERINO on 8/14/2020 at 10:01 PM by Dr. Martínez with read back confirmation.    RU MARTÍNEZ M.D., RADIOLOGY RESIDENT  This document has been electronically signed.  ALBINA FLORENTINO M.D., ATTENDING RADIOLOGIST  This document has been electronically signed. Aug 14 2020 10:13PM                OTHER: 	  	  LABS:	 	    CARDIAC MARKERS:                                  15.5   10.74 )-----------( 289      ( 15 Aug 2020 14:55 )             46.2     08-15    134<L>  |  99  |  15  ----------------------------<  130<H>  4.1   |  18<L>  |  1.00    Ca    9.9      15 Aug 2020 14:55    TPro  8.2  /  Alb  4.6  /  TBili  0.9  /  DBili  x   /  AST  15  /  ALT  15  /  AlkPhos  64  08-14    proBNP: Serum Pro-Brain Natriuretic Peptide: 43 pg/mL (08-15 @ 14:55)    Lipid Profile:   HgA1c:   TSH:

## 2020-08-15 NOTE — PROVIDER CONTACT NOTE (OTHER) - ASSESSMENT
Pt Aox3, denies dizziness/lightheadness. /58, HR 66, Bladder scan 650cc Pt Aox3, denies dizziness/lightheadness. /58, HR 66, currently in Trendelenburg position in bed. Bladder scan 650cc

## 2020-08-15 NOTE — H&P ADULT - NSHPLABSRESULTS_GEN_ALL_CORE
15.8   11.13 )-----------( 284      ( 14 Aug 2020 20:26 )             46.8     08-14    133<L>  |  98  |  14  ----------------------------<  115<H>  3.9   |  22  |  0.89    Ca    10.0      14 Aug 2020 20:26    TPro  8.2  /  Alb  4.6  /  TBili  0.9  /  DBili  x   /  AST  15  /  ALT  15  /  AlkPhos  64  08-14    < from: CT Angio Neck w/ IV Cont (08.14.20 @ 21:54) >    IMPRESSION:    CT HEAD: No acute intracranial bleeding.    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    CTA NECK: Patent cervical vasculature. No flow limiting stenosis or occlusion.    < end of copied text >

## 2020-08-15 NOTE — H&P ADULT - NSHPPHYSICALEXAM_GEN_ALL_CORE
PHYSICAL EXAM:  GENERAL: NAD, well-developed  MUSCULOSKELETAL: Negative Brudzinski and Kernig signs  Neurologic:  - Mental Status:  Alert, awake, oriented to person, place, and time; Speech is mildly dysarthric with otherwise fluent speech with intact naming, repetition, and comprehension; crosses midline, no extinction or neglect noted;  Immediate recall is 3/3 words; Able to spell WORLD forwards  - Cranial Nerves II-XII:  VFF, EOMI, PERRL (3mm OD, OS), V1-V3 intact, some nasolabial flattening on the left side of face, t/p midline, SCM/trap intact.  - Fundoscopic examination: upon fundoscopic examination grossly clear margins of optic disc & cup  - Motor: Pronator drift present on the left side. demonstrates orbiting around the left arm. Strength left arm 4+/5 with  strength 4/5. Left leg 4+/5 hip flexors/extensors with 5/5 knee flexion/ext dorsi/plantarflexion. right arm and leg 5/5. Normal muscle bulk and tone throughout. Neck flexion/extension 5/5 without neck stiffness  - Sensory:  Intact to light touch and PP bilaterally throughout.  - Coordination:  FTN demonstrated mild dysmetria in proportion to weakness on left arm, intact on right  - Gait: patient able to stand up on his own, ambulate several steps without wide based gait, not requiring assistance.

## 2020-08-15 NOTE — OCCUPATIONAL THERAPY INITIAL EVALUATION ADULT - DIAGNOSIS, OT EVAL
Pt p/w decreased balance, strength, motor planning, cognition, and safety awareness impacting all ADLs.

## 2020-08-15 NOTE — PHYSICAL THERAPY INITIAL EVALUATION ADULT - MANUAL MUSCLE TESTING RESULTS, REHAB EVAL
Muscles of LUE fair+; RUE good; BLE good at least Muscles of LUE fair+; RUE good; BLE fair+ to good-

## 2020-08-15 NOTE — PHYSICAL THERAPY INITIAL EVALUATION ADULT - PERTINENT HX OF CURRENT PROBLEM, REHAB EVAL
75M PMH of HTN not on antithrombotics p/w acute 1 day h/o headache & AMS. Per his friend, pt suddenly became confused yesterday around 5pm & c/o new onset headache. Pt c/o RLE weakness & endorses sharp headache localized to the top/middle of his hairline that does not radiate. Pt denies photophobia, recent fever or flu like illness, changes in hearing, changes in vision, recent falls or trauma, or pain other than the headache, difficulty swallowing, dysuria, diarrhea, numbness/tingling anywhere.

## 2020-08-15 NOTE — OCCUPATIONAL THERAPY INITIAL EVALUATION ADULT - PLANNED THERAPY INTERVENTIONS, OT EVAL
fine motor coordination training/cognitive, visual perceptual/motor coordination training/ADL retraining/balance training

## 2020-08-15 NOTE — OCCUPATIONAL THERAPY INITIAL EVALUATION ADULT - NS ASR FOLLOW COMMAND OT EVAL
Pt requiring additional verbal and visual cues to attend, presents with decreased insight into deficits, motor planning deficits, and increased processing time/75% of the time/able to follow single-step instructions

## 2020-08-15 NOTE — OCCUPATIONAL THERAPY INITIAL EVALUATION ADULT - LIVES WITH, PROFILE
Pt states he lives with friend in a pvt home, 5 NORM and none inside. Pt was I in all ADLs, +working and driving PTA.

## 2020-08-15 NOTE — H&P ADULT - ASSESSMENT
Assessment: 74 y/o RH man with a PMH of HTN not on antithrombotics who presented to the ED with an acute one day history of headache and altered mental status. Neurological examination demonstrates mild dysarthria with mild left nasolabial flattening with left arm weakness. CT/CTA per Neuroradiology demonstrates stenosis vs occlusion R M1. Patient out of window for tpa. NIHSS too low for mechanical thrombectomy.    Impression: headache with change in mental status and left hemiparesis 2/2 right brain dysfunction with CT/CTA demonstrating ?chronic R frontal infarct with R M1 stenosis versus occlusion 2/2 ESUS (embolic stroke of unknown source) with elevated blood pressure and mental status change likely in the setting of hypertensive encephalopathy    NIHSS: 2  pre-MRS: 0    Recommendations:  [x] CT/CTA - follow up final read  [ ] MRI head w/o contrast  [ ] blood pressure mgmt - modified permissive HTN - blood pressure should go no higher than 180/100, lower limit tolerable 160/90, given concern for HTN encephalopathy  [ ] asp 81mg daily & plavix 75mg daily per [facility] protocol  medications to be adjusted based off of MRI  [ ] atorvastatin 80mg daily to be titrated for LDL < 70  [ ] a1c, fasting lipid profile  [ ] telemetry monitoring  [ ] echo w/ bubble  [ ] q4h neuro checks  [ ] speech/swallow  [ ] PT/OT  [ ] Upon discharge, please follow up at [address] [place] [address] with Amy Gomez NP  [phone]  Please email [email or web address] to schedule an appointment with the stroke NP for 1 week after discharge    mgmt discussed with Stroke Fellow Dr. Beard, to d/w Stroke Attending Dr. Libman

## 2020-08-15 NOTE — OCCUPATIONAL THERAPY INITIAL EVALUATION ADULT - PERTINENT HX OF CURRENT PROBLEM, REHAB EVAL
75M PMH of HTN not on antithrombotics who presented to the ED with an acute one day history of headache and altered mental status. Change in speech and facial droop noted. The pt did not want to go to the ER at the time, but came today because the headache persisted throughout the night and today he began to feel lightheaded. The pt stated that he ambulates and talks normally at baseline and that he had never experienced these symptoms before yesterday.

## 2020-08-16 DIAGNOSIS — R49.0 DYSPHONIA: ICD-10-CM

## 2020-08-16 LAB
ANION GAP SERPL CALC-SCNC: 12 MMOL/L — SIGNIFICANT CHANGE UP (ref 5–17)
APPEARANCE UR: CLEAR — SIGNIFICANT CHANGE UP
BACTERIA # UR AUTO: NEGATIVE — SIGNIFICANT CHANGE UP
BILIRUB UR-MCNC: NEGATIVE — SIGNIFICANT CHANGE UP
BUN SERPL-MCNC: 11 MG/DL — SIGNIFICANT CHANGE UP (ref 7–23)
CALCIUM SERPL-MCNC: 8.6 MG/DL — SIGNIFICANT CHANGE UP (ref 8.4–10.5)
CHLORIDE SERPL-SCNC: 105 MMOL/L — SIGNIFICANT CHANGE UP (ref 96–108)
CK MB CFR SERPL CALC: 4.5 NG/ML — SIGNIFICANT CHANGE UP (ref 0–6.7)
CO2 SERPL-SCNC: 20 MMOL/L — LOW (ref 22–31)
COLOR SPEC: SIGNIFICANT CHANGE UP
CREAT SERPL-MCNC: 0.69 MG/DL — SIGNIFICANT CHANGE UP (ref 0.5–1.3)
DIFF PNL FLD: ABNORMAL
EPI CELLS # UR: 1 /HPF — SIGNIFICANT CHANGE UP
GLUCOSE SERPL-MCNC: 102 MG/DL — HIGH (ref 70–99)
GLUCOSE UR QL: NEGATIVE — SIGNIFICANT CHANGE UP
HCT VFR BLD CALC: 43.6 % — SIGNIFICANT CHANGE UP (ref 39–50)
HGB BLD-MCNC: 15.1 G/DL — SIGNIFICANT CHANGE UP (ref 13–17)
HYALINE CASTS # UR AUTO: 0 /LPF — SIGNIFICANT CHANGE UP (ref 0–2)
KETONES UR-MCNC: ABNORMAL
LACTATE SERPL-SCNC: 1.2 MMOL/L — SIGNIFICANT CHANGE UP (ref 0.7–2)
LEUKOCYTE ESTERASE UR-ACNC: ABNORMAL
MCHC RBC-ENTMCNC: 30.9 PG — SIGNIFICANT CHANGE UP (ref 27–34)
MCHC RBC-ENTMCNC: 34.6 GM/DL — SIGNIFICANT CHANGE UP (ref 32–36)
MCV RBC AUTO: 89.2 FL — SIGNIFICANT CHANGE UP (ref 80–100)
NITRITE UR-MCNC: NEGATIVE — SIGNIFICANT CHANGE UP
NRBC # BLD: 0 /100 WBCS — SIGNIFICANT CHANGE UP (ref 0–0)
NT-PROBNP SERPL-SCNC: 70 PG/ML — SIGNIFICANT CHANGE UP (ref 0–300)
PH UR: 6.5 — SIGNIFICANT CHANGE UP (ref 5–8)
PLATELET # BLD AUTO: 250 K/UL — SIGNIFICANT CHANGE UP (ref 150–400)
POTASSIUM SERPL-MCNC: 3.9 MMOL/L — SIGNIFICANT CHANGE UP (ref 3.5–5.3)
POTASSIUM SERPL-SCNC: 3.9 MMOL/L — SIGNIFICANT CHANGE UP (ref 3.5–5.3)
PROCALCITONIN SERPL-MCNC: 0.05 NG/ML — SIGNIFICANT CHANGE UP (ref 0.02–0.1)
PROT UR-MCNC: NEGATIVE — SIGNIFICANT CHANGE UP
RBC # BLD: 4.89 M/UL — SIGNIFICANT CHANGE UP (ref 4.2–5.8)
RBC # FLD: 12.2 % — SIGNIFICANT CHANGE UP (ref 10.3–14.5)
RBC CASTS # UR COMP ASSIST: 12 /HPF — HIGH (ref 0–4)
SODIUM SERPL-SCNC: 137 MMOL/L — SIGNIFICANT CHANGE UP (ref 135–145)
SP GR SPEC: 1.01 — SIGNIFICANT CHANGE UP (ref 1.01–1.02)
T3 SERPL-MCNC: 72 NG/DL — LOW (ref 80–200)
T4 AB SER-ACNC: 5.5 UG/DL — SIGNIFICANT CHANGE UP (ref 4.6–12)
TROPONIN T, HIGH SENSITIVITY RESULT: 8 NG/L — SIGNIFICANT CHANGE UP (ref 0–51)
TSH SERPL-MCNC: 2.55 UIU/ML — SIGNIFICANT CHANGE UP (ref 0.27–4.2)
UROBILINOGEN FLD QL: NEGATIVE — SIGNIFICANT CHANGE UP
WBC # BLD: 13.54 K/UL — HIGH (ref 3.8–10.5)
WBC # FLD AUTO: 13.54 K/UL — HIGH (ref 3.8–10.5)
WBC UR QL: 10 /HPF — HIGH (ref 0–5)

## 2020-08-16 PROCEDURE — 93010 ELECTROCARDIOGRAM REPORT: CPT

## 2020-08-16 PROCEDURE — 93306 TTE W/DOPPLER COMPLETE: CPT | Mod: 26

## 2020-08-16 PROCEDURE — 93010 ELECTROCARDIOGRAM REPORT: CPT | Mod: 77

## 2020-08-16 PROCEDURE — 99233 SBSQ HOSP IP/OBS HIGH 50: CPT

## 2020-08-16 PROCEDURE — 71250 CT THORAX DX C-: CPT | Mod: 26

## 2020-08-16 RX ORDER — SODIUM CHLORIDE 9 MG/ML
500 INJECTION INTRAMUSCULAR; INTRAVENOUS; SUBCUTANEOUS ONCE
Refills: 0 | Status: COMPLETED | OUTPATIENT
Start: 2020-08-16 | End: 2020-08-16

## 2020-08-16 RX ORDER — ASPIRIN/CALCIUM CARB/MAGNESIUM 324 MG
81 TABLET ORAL DAILY
Refills: 0 | Status: DISCONTINUED | OUTPATIENT
Start: 2020-08-16 | End: 2020-08-27

## 2020-08-16 RX ORDER — CEFTRIAXONE 500 MG/1
1000 INJECTION, POWDER, FOR SOLUTION INTRAMUSCULAR; INTRAVENOUS EVERY 24 HOURS
Refills: 0 | Status: DISCONTINUED | OUTPATIENT
Start: 2020-08-16 | End: 2020-08-20

## 2020-08-16 RX ORDER — ASPIRIN/CALCIUM CARB/MAGNESIUM 324 MG
300 TABLET ORAL DAILY
Refills: 0 | Status: DISCONTINUED | OUTPATIENT
Start: 2020-08-16 | End: 2020-08-16

## 2020-08-16 RX ORDER — MIDODRINE HYDROCHLORIDE 2.5 MG/1
10 TABLET ORAL THREE TIMES A DAY
Refills: 0 | Status: DISCONTINUED | OUTPATIENT
Start: 2020-08-16 | End: 2020-08-26

## 2020-08-16 RX ADMIN — CEFTRIAXONE 100 MILLIGRAM(S): 500 INJECTION, POWDER, FOR SOLUTION INTRAMUSCULAR; INTRAVENOUS at 09:41

## 2020-08-16 RX ADMIN — SODIUM CHLORIDE 125 MILLILITER(S): 9 INJECTION INTRAMUSCULAR; INTRAVENOUS; SUBCUTANEOUS at 12:22

## 2020-08-16 RX ADMIN — MIDODRINE HYDROCHLORIDE 10 MILLIGRAM(S): 2.5 TABLET ORAL at 12:21

## 2020-08-16 RX ADMIN — ATORVASTATIN CALCIUM 80 MILLIGRAM(S): 80 TABLET, FILM COATED ORAL at 22:22

## 2020-08-16 RX ADMIN — ENOXAPARIN SODIUM 40 MILLIGRAM(S): 100 INJECTION SUBCUTANEOUS at 12:21

## 2020-08-16 RX ADMIN — SODIUM CHLORIDE 1000 MILLILITER(S): 9 INJECTION INTRAMUSCULAR; INTRAVENOUS; SUBCUTANEOUS at 03:10

## 2020-08-16 RX ADMIN — CLOPIDOGREL BISULFATE 75 MILLIGRAM(S): 75 TABLET, FILM COATED ORAL at 12:20

## 2020-08-16 RX ADMIN — SODIUM CHLORIDE 1000 MILLILITER(S): 9 INJECTION INTRAMUSCULAR; INTRAVENOUS; SUBCUTANEOUS at 02:16

## 2020-08-16 RX ADMIN — MIDODRINE HYDROCHLORIDE 10 MILLIGRAM(S): 2.5 TABLET ORAL at 18:00

## 2020-08-16 RX ADMIN — Medication 81 MILLIGRAM(S): at 12:22

## 2020-08-16 RX ADMIN — SODIUM CHLORIDE 1000 MILLILITER(S): 9 INJECTION INTRAMUSCULAR; INTRAVENOUS; SUBCUTANEOUS at 23:27

## 2020-08-16 NOTE — CONSULT NOTE ADULT - SUBJECTIVE AND OBJECTIVE BOX
CC:    HPI:       PAST MEDICAL & SURGICAL HISTORY:  HTN (hypertension)    Allergies    No Known Allergies    Intolerances      MEDICATIONS  (STANDING):  aspirin  chewable 81 milliGRAM(s) Oral daily  atorvastatin 80 milliGRAM(s) Oral at bedtime  cefTRIAXone   IVPB 1000 milliGRAM(s) IV Intermittent every 24 hours  clopidogrel Tablet 75 milliGRAM(s) Oral daily  enoxaparin Injectable 40 milliGRAM(s) SubCutaneous daily  midodrine. 10 milliGRAM(s) Oral three times a day  sodium chloride 0.9%. 1000 milliLiter(s) (125 mL/Hr) IV Continuous <Continuous>    MEDICATIONS  (PRN):  acetaminophen   Tablet .. 650 milliGRAM(s) Oral every 6 hours PRN Temp greater or equal to 38C (100.4F), Mild Pain (1 - 3), Moderate Pain (4 - 6), Severe Pain (7 - 10)      Social History: **??**    Family history: **??**    ROS:   ENT: all negative except as noted in HPI   CV: denies palpitations  Pulm: denies SOB, cough, hemoptysis  GI: denies change in appetite, indigestion, n/v  : denies pertinent urinary symptoms, urgency  Neuro: denies numbness/tingling, loss of sensation  Psych: denies anxiety  MS: denies muscle weakness, instability  Heme: denies easy bruising or bleeding  Endo: denies heat/cold intolerance, excessive sweating  Vascular: denies LE edema    Vital Signs Last 24 Hrs  T(C): 37.1 (16 Aug 2020 08:00), Max: 37.2 (16 Aug 2020 06:00)  T(F): 98.8 (16 Aug 2020 08:00), Max: 99 (16 Aug 2020 06:00)  HR: 65 (16 Aug 2020 10:00) (57 - 89)  BP: 138/64 (16 Aug 2020 10:00) (115/58 - 167/78)  BP(mean): 86 (16 Aug 2020 10:00) (75 - 121)  RR: 16 (16 Aug 2020 10:00) (13 - 24)  SpO2: 96% (16 Aug 2020 10:00) (94% - 99%)                          15.1   13.54 )-----------( 250      ( 16 Aug 2020 05:23 )             43.6    08-16    137  |  105  |  11  ----------------------------<  102<H>  3.9   |  20<L>  |  0.69    Ca    8.6      16 Aug 2020 05:18    TPro  8.2  /  Alb  4.6  /  TBili  0.9  /  DBili  x   /  AST  15  /  ALT  15  /  AlkPhos  64  08-14   PT/INR - ( 15 Aug 2020 14:55 )   PT: 12.1 sec;   INR: 1.02 ratio         PTT - ( 15 Aug 2020 14:55 )  PTT:34.4 sec    PHYSICAL EXAM:  Gen: NAD  Skin: No rashes, bruises, or lesions  Head: Normocephalic, Atraumatic  Face: no edema, erythema, or fluctuance. Parotid glands soft without mass  Eyes: no scleral injection  Nose: Nares bilaterally patent, no discharge  Mouth: No Stridor / Drooling / Trismus.  Mucosa moist, tongue/uvula midline, oropharynx clear  Neck: Flat, supple, no lymphadenopathy, trachea midline, no masses  Lymphatic: No lymphadenopathy  Resp: breathing easily, no stridor  CV: no peripheral edema/cyanosis  GI: nondistended   Peripheral vascular: no JVD or edema  Neuro: facial nerve intact, no facial droop      Diagnostic Nasal Endoscopy: (Scope #2 used)    Fiberoptic Indirect laryngoscopy:  (Scope #2 used)    IMAGING/ADDITIONAL STUDIES: CC: Dysphonia    HPI: 76 y/o male with a PMH of HTN who presented to the ED with an acute one day history of headache and altered mental status. Per his friend, the pt suddenly became confused and complained of a new onset headache with some weakness in his right lower extremity, slurred speech and facial paralysis. He was ultimately found to have  acute ischemic stroke likely related to severe right M1 stenosis and was started on AC. He is note to have some residual left hemiparesis He ENT consulted to evaluate hoarseness and hypophonia. Pt and son report hoarseness preceded acute stroke and feel his voice has been hoarse for as long as they can remember. He passed bedside dysphagia screen but notes some difficulty swallowing solids and liquids- now on pureed diet. He hasn't seen an ENT in the past for the hoarseness. He denies any acute vocal changes, cough, SOB, throat pain, facial pain, nasal congestion, nausea, vomiting, fever and chills.       PAST MEDICAL & SURGICAL HISTORY:  HTN (hypertension)    Allergies    No Known Allergies    Intolerances      MEDICATIONS  (STANDING):  aspirin  chewable 81 milliGRAM(s) Oral daily  atorvastatin 80 milliGRAM(s) Oral at bedtime  cefTRIAXone   IVPB 1000 milliGRAM(s) IV Intermittent every 24 hours  clopidogrel Tablet 75 milliGRAM(s) Oral daily  enoxaparin Injectable 40 milliGRAM(s) SubCutaneous daily  midodrine. 10 milliGRAM(s) Oral three times a day  sodium chloride 0.9%. 1000 milliLiter(s) (125 mL/Hr) IV Continuous <Continuous>    MEDICATIONS  (PRN):  acetaminophen   Tablet .. 650 milliGRAM(s) Oral every 6 hours PRN Temp greater or equal to 38C (100.4F), Mild Pain (1 - 3), Moderate Pain (4 - 6), Severe Pain (7 - 10)      Social History: Non-smoker    Family history: No pertinent family history in first degree relative      ROS:   ENT: all negative except as noted in HPI   CV: denies palpitations  Pulm: denies SOB, cough, hemoptysis  GI: denies change in appetite, indigestion, n/v  : denies pertinent urinary symptoms, urgency  Neuro: denies numbness/tingling, loss of sensation  Psych: denies anxiety  MS: denies muscle weakness, instability  Heme: denies easy bruising or bleeding  Endo: denies heat/cold intolerance, excessive sweating  Vascular: denies LE edema    Vital Signs Last 24 Hrs  T(C): 37.1 (16 Aug 2020 08:00), Max: 37.2 (16 Aug 2020 06:00)  T(F): 98.8 (16 Aug 2020 08:00), Max: 99 (16 Aug 2020 06:00)  HR: 65 (16 Aug 2020 10:00) (57 - 89)  BP: 138/64 (16 Aug 2020 10:00) (115/58 - 167/78)  BP(mean): 86 (16 Aug 2020 10:00) (75 - 121)  RR: 16 (16 Aug 2020 10:00) (13 - 24)  SpO2: 96% (16 Aug 2020 10:00) (94% - 99%)                          15.1   13.54 )-----------( 250      ( 16 Aug 2020 05:23 )             43.6    08-16    137  |  105  |  11  ----------------------------<  102<H>  3.9   |  20<L>  |  0.69    Ca    8.6      16 Aug 2020 05:18    TPro  8.2  /  Alb  4.6  /  TBili  0.9  /  DBili  x   /  AST  15  /  ALT  15  /  AlkPhos  64  08-14   PT/INR - ( 15 Aug 2020 14:55 )   PT: 12.1 sec;   INR: 1.02 ratio         PTT - ( 15 Aug 2020 14:55 )  PTT:34.4 sec    PHYSICAL EXAM:  Gen: NAD  Skin: No rashes, bruises, or lesions  Head: Normocephalic, Atraumatic  Face: no edema, erythema, or fluctuance. Parotid glands soft without mass  Eyes: no scleral injection  Nose: Nares bilaterally patent, no discharge  Mouth: No Stridor / Drooling / Trismus.  Mucosa moist, tongue/uvula midline, oropharynx clear  Neck: Flat, supple, no lymphadenopathy, trachea midline, no masses  Lymphatic: No lymphadenopathy  Resp: breathing easily, no stridor  CV: no peripheral edema/cyanosis  GI: nondistended   Peripheral vascular: no JVD or edema  Neuro: facial nerve intact, no facial droop      Fiberoptic Indirect laryngoscopy:  (Scope #2 used)  Pending CC: Dysphonia    HPI: 74 y/o male with a PMH of HTN who presented to the ED with an acute one day history of headache and altered mental status. Per his friend, the pt suddenly became confused and complained of a new onset headache with some weakness in his right lower extremity, slurred speech and facial paralysis. He was ultimately found to have  acute ischemic stroke likely related to severe right M1 stenosis and was started on AC. He is note to have some residual left hemiparesis He ENT consulted to evaluate hoarseness and hypophonia. Pt and son report hoarseness preceded acute stroke and feel his voice has been hoarse for as long as they can remember. He passed bedside dysphagia screen but notes some difficulty swallowing solids and liquids- now on pureed diet. He hasn't seen an ENT in the past for the hoarseness. He denies any acute vocal changes, cough, SOB, throat pain, facial pain, nasal congestion, nausea, vomiting, fever and chills.       PAST MEDICAL & SURGICAL HISTORY:  HTN (hypertension)    Allergies    No Known Allergies    Intolerances      MEDICATIONS  (STANDING):  aspirin  chewable 81 milliGRAM(s) Oral daily  atorvastatin 80 milliGRAM(s) Oral at bedtime  cefTRIAXone   IVPB 1000 milliGRAM(s) IV Intermittent every 24 hours  clopidogrel Tablet 75 milliGRAM(s) Oral daily  enoxaparin Injectable 40 milliGRAM(s) SubCutaneous daily  midodrine. 10 milliGRAM(s) Oral three times a day  sodium chloride 0.9%. 1000 milliLiter(s) (125 mL/Hr) IV Continuous <Continuous>    MEDICATIONS  (PRN):  acetaminophen   Tablet .. 650 milliGRAM(s) Oral every 6 hours PRN Temp greater or equal to 38C (100.4F), Mild Pain (1 - 3), Moderate Pain (4 - 6), Severe Pain (7 - 10)      Social History: Non-smoker    Family history: No pertinent family history in first degree relative      ROS:   ENT: all negative except as noted in HPI   CV: denies palpitations  Pulm: denies SOB, cough, hemoptysis  GI: denies change in appetite, indigestion, n/v  : denies pertinent urinary symptoms, urgency  Neuro: denies numbness/tingling, loss of sensation  Psych: denies anxiety  MS: denies muscle weakness, instability  Heme: denies easy bruising or bleeding  Endo: denies heat/cold intolerance, excessive sweating  Vascular: denies LE edema    Vital Signs Last 24 Hrs  T(C): 37.1 (16 Aug 2020 08:00), Max: 37.2 (16 Aug 2020 06:00)  T(F): 98.8 (16 Aug 2020 08:00), Max: 99 (16 Aug 2020 06:00)  HR: 65 (16 Aug 2020 10:00) (57 - 89)  BP: 138/64 (16 Aug 2020 10:00) (115/58 - 167/78)  BP(mean): 86 (16 Aug 2020 10:00) (75 - 121)  RR: 16 (16 Aug 2020 10:00) (13 - 24)  SpO2: 96% (16 Aug 2020 10:00) (94% - 99%)                          15.1   13.54 )-----------( 250      ( 16 Aug 2020 05:23 )             43.6    08-16    137  |  105  |  11  ----------------------------<  102<H>  3.9   |  20<L>  |  0.69    Ca    8.6      16 Aug 2020 05:18    TPro  8.2  /  Alb  4.6  /  TBili  0.9  /  DBili  x   /  AST  15  /  ALT  15  /  AlkPhos  64  08-14   PT/INR - ( 15 Aug 2020 14:55 )   PT: 12.1 sec;   INR: 1.02 ratio         PTT - ( 15 Aug 2020 14:55 )  PTT:34.4 sec    PHYSICAL EXAM:  Gen: NAD  Skin: No rashes, bruises, or lesions  Head: Normocephalic, Atraumatic  Face: no edema, erythema, or fluctuance. Parotid glands soft without mass  Eyes: no scleral injection  Nose: Nares bilaterally patent, no discharge  Mouth: No Stridor / Drooling / Trismus.  Mucosa moist, tongue/uvula midline, oropharynx clear  Neck: Flat, supple, no lymphadenopathy, trachea midline, no masses  Lymphatic: No lymphadenopathy  Resp: breathing easily, no stridor  CV: no peripheral edema/cyanosis  GI: nondistended   Peripheral vascular: no JVD or edema  Neuro: facial nerve intact, no facial droop      Fiberoptic Indirect laryngoscopy:  (Scope #2 used)  Reason for Laryngoscopy:    Patient was unable to cooperate with mirror.  Nasopharynx, oropharynx, and hypopharynx clear, no bleeding. Tongue base, posterior pharyngeal wall, vallecula, epiglottis, and subglottis appear normal. No erythema, edema, pooling of secretions, masses or lesions. Airway patent, no foreign body visualized. No glottic/supraglottic edema. True vocal cords, arytenoids, vestibular folds, ventricles, pyriform sinuses, and aryepiglottic folds appear normal bilaterally. Vocal cords mobile with good contact b/l- possible small glottic gap inferiorly.

## 2020-08-16 NOTE — PROGRESS NOTE ADULT - ASSESSMENT
ASSESSMENT: 75-year-old right-handed gentleman first evaluated at [facility] on 8/15/2020 with left hemiparesis.  History and exam as above, with minor changes.  ROS otherwise negative.  Alert; probably mild left hemineglect; left homonymous hemianopia; mild left central facial palsy; mild dysarthria; left arm and leg-drift; manual motor testing with variable effort probably due to motor neglect but overall left hemiparesis 4/5; no left-sided ataxia; sensory-extinction on left; gait not tested; remainder of of neurologic exam was nonfocal.  CT head (8/14/2020) to my eye showed moderate-severe periventricular chronic ischemic changes.  There was a possible subacute right corona radiata infarct, perhaps more likely part and parcel of the chronic ischemic changes.  CTA neck and head (8/14/2020) to my eye showed severe right M1 stenosis.  The left vertebral artery was dominant.  Incidentally noted were emphysematous lung changes.     Impression: Headache, left hemiparesis likely due to right hemispheric dysfunction likely due to acute ischemic infarct 2/2 severe right M1 stenosis 2/2 large artery atherosclerosis (although an embolus with partial lysis cannot be ruled out with certainty - ESUS)     NEURO: No acute neurological change. Continue close monitoring for neurologic deterioration, permissive HTN with gentle IVF as tolerated, LDL - , titrate statin to goal <70, CTH (08/14/20) moderate to severe periventricular chronic ischemic changes, possible subacute right corona radiata infarct, likely chronic, MRI Brain w/o results noted above.  Physical therapy/OT recommended acute rehab.     ANTITHROMBOTIC THERAPY: ASA and Plavix for 3 months per SAMPRISS protocol, then ASA indefinitely     PULMONARY: CXR clear, protecting airway, saturating well on room air. Incidental finding on CTA H/N emphysematous changes. Will consult pulmonary for further evaluation     CARDIOVASCULAR: TTE pending, continue cardiac monitoring on telemetry, frequent episodes of hypotension with one time episode of diaphoresis assoicated with SBP 80s mmHg, STAT Troponin neg, Cardiac enzymes neg, EKG 8/16 prolonged LA interval possible First degree AV block otherwise normal sinus rhythm without evidence of STEMI or NSTEMI. Will repeat EKG 8/16, Cardiology, Dr. Aguirre consulted for hypotensive episodes and possible ILR. Per Dr. Aguirre - continue with gentle IVF hydration, if remains hypotensive start Midodrine 10 mg TID, trendelenburg position as needed.                              SBP goal: permissive HTN     GASTROINTESTINAL:  initial dysphagia screen passed but patient was witnessed to be drooling and pocketing meals while eating. Due to high risk of aspiration from possible hemineglect, made NPO. Consulted S&S, plan to evaluate 8/16     Diet: NPO    RENAL: BUN/Cr without acute change, good urine output, continue with adequate IV hydration      Na Goal: Greater than 135     Lee: no    HEMATOLOGY: H/H without acute change , Platelets 250     DVT ppx: LMWH [x]     ID: afebrile, mild leukocytosis currently uptrending without known source of possible infection. UA pending, CXR pending read, blood cultures pending, will continue to monitor for si/sx of infection     OTHER: Plan endorsed to patient and family member at bedside. Patient gives permission to disclose information to family member. All questions and concerns addressed.     DISPOSITION: Acute Rehab once stable and workup is complete      CORE MEASURES:        Admission NIHSS: 2     TPA: [] YES [x] NO      LDL/HDL:     Depression Screen: p     Statin Therapy: yes     Dysphagia Screen: [x] PASS [] FAIL     Smoking [] YES [x] NO      Afib [] YES [x] NO     Stroke Education [x] YES [] NO    Obtain screening lower extremity venous ultrasound in patients who meet 1 or more of the following criteria as patient is high risk for DVT/PE on admission:   [] History of DVT/PE  []Hypercoagulable states (Factor V Leiden, Cancer, OCP, etc. )  []Prolonged immobility (hemiplegia/hemiparesis/post operative or any other extended immobilization)  [] Transferred from outside facility (Rehab or Long term care)  [] Age </= to 50 ASSESSMENT: 75-year-old right-handed gentleman first evaluated at [facility] on 8/15/2020 with left hemiparesis.  History and exam as above, with minor changes.  ROS otherwise negative.  Alert; probably mild left hemineglect; left homonymous hemianopia; mild left central facial palsy; mild dysarthria; left arm and leg-drift; manual motor testing with variable effort probably due to motor neglect but overall left hemiparesis 4/5; no left-sided ataxia; sensory-extinction on left; gait not tested; remainder of of neurologic exam was nonfocal.  CT head (8/14/2020) to my eye showed moderate-severe periventricular chronic ischemic changes.  There was a possible subacute right corona radiata infarct, perhaps more likely part and parcel of the chronic ischemic changes.  CTA neck and head (8/14/2020) to my eye showed severe right M1 stenosis.  The left vertebral artery was dominant.  Incidentally noted were emphysematous lung changes.     Impression: Headache, left hemiparesis likely due to right hemispheric dysfunction likely due to acute ischemic infarct 2/2 severe right M1 stenosis 2/2 large artery atherosclerosis (although an embolus with partial lysis cannot be ruled out with certainty - ESUS)     NEURO: No acute neurological change, overall improved and back to baseline vs. change on 8/15/20. Continue close monitoring for neurologic deterioration, permissive HTN with gentle IVF as tolerated continue on midodrine to induce HTN, hyperperfusion >160 mmHg systolic for neurological stability, patient with hypotensive event on 8/15/20 resulting in worsening neurological deficits but improvement to baseline once given hydration and BP raised, LDL - , titrate statin to goal <70, CTH (08/14/20) moderate to severe periventricular chronic ischemic changes, possible subacute right corona radiata infarct, likely chronic, MRI Brain w/o results noted above.  Physical therapy/OT recommended acute rehab.     ANTITHROMBOTIC THERAPY: ASA and Plavix for 3 months per SAMPRISS protocol, then ASA indefinitely     PULMONARY: CXR clear, protecting airway, saturating well on room air. Incidental finding on CTA H/N emphysematous changes. Pulmonary consulted, Dr. Zelaya following - recommended CT Chest for further evaluation. CT Chest 8/16 showed apical paraseptal emphysema, can consider Duoneb for wheezing or shortness of breath as needed     CARDIOVASCULAR: TTE (08/16/20): EF 73%, minimal MR, negative PFO, continue cardiac monitoring on telemetry, frequent episodes of hypotension with one time episode of diaphoresis associated with SBP 80s mmHg, STAT Troponin neg, Cardiac enzymes neg, EKG 8/16 prolonged MO interval possible First degree AV block otherwise normal sinus rhythm without evidence of STEMI or NSTEMI. Will repeat EKG 8/16, Cardiology, Dr. Aguirre consulted for hypotensive episodes and possible ILR. Per Dr. Aguirre - continue with gentle IVF hydration, if remains hypotensive start Midodrine 10 mg TID, Trendelenburg position as needed.                              SBP goal: permissive HTN     GASTROINTESTINAL:  initial dysphagia screen passed but patient was witnessed to be drooling and pocketing meals while eating. Due to high risk of aspiration from possible hemineglect, made NPO. Consulted S&S, evaluation 8/16 recommended pureed diet with full assistance, recommend MBS 8/17 to r/o silent aspiration      Diet: Pureed diet     RENAL: BUN/Cr without acute change, continue with adequate IV hydration      Na Goal: Greater than 135     Lee: no    HEMATOLOGY: H/H without acute change , Platelets 250     DVT ppx: LMWH [x]     ID: afebrile, mild leukocytosis currently uptrending without known source of possible infection. UA positive UTI, started on ceftriaxone for 5 days, CXR clear lungs, blood cultures pending, will continue to monitor for si/sx of infection     OTHER: ENT consulted as recommendations per S&S and Pulm for hoarse voice r/o vocal cord paralysis. ENT bedside FOE revealed patent airway and vocal cords mobile with good contact b/l - possible small glottic endy inferiorly, no further recommendations, no further ENT intervention required. Plan endorsed to patient and family member at bedside. Patient gives permission to disclose information to family member. All questions and concerns addressed.     DISPOSITION: Acute Rehab once stable and workup is complete      CORE MEASURES:        Admission NIHSS: 2     TPA: [] YES [x] NO      LDL/HDL:     Depression Screen: p     Statin Therapy: yes     Dysphagia Screen: [x] PASS [] FAIL     Smoking [] YES [x] NO      Afib [] YES [x] NO     Stroke Education [x] YES [] NO    Obtain screening lower extremity venous ultrasound in patients who meet 1 or more of the following criteria as patient is high risk for DVT/PE on admission:   [] History of DVT/PE  []Hypercoagulable states (Factor V Leiden, Cancer, OCP, etc. )  []Prolonged immobility (hemiplegia/hemiparesis/post operative or any other extended immobilization)  [] Transferred from outside facility (Rehab or Long term care)  [] Age </= to 50 ASSESSMENT: 75-year-old right-handed gentleman first evaluated at [facility] on 8/15/2020 with left hemiparesis.  CT head (8/14/2020) to my eye showed moderate-severe periventricular chronic ischemic changes.  There was a possible subacute right corona radiata infarct, perhaps more likely part and parcel of the chronic ischemic changes.  CTA neck and head (8/14/2020) to my eye showed severe right M1 stenosis.  The left vertebral artery was dominant.  Incidentally noted were emphysematous lung changes.     Impression: Headache, left hemiparesis due to acute right hemisphere bordezone ischemic infarct 2/2 severe right M1 stenosis 2/2 large artery atherosclerosis (although an embolus with partial lysis cannot be ruled out with certainty - ESUS)     NEURO: No acute neurological change, overall improved and back to baseline vs. change on 8/15/20. Continue close monitoring for neurologic deterioration, permissive HTN with gentle IVF as tolerated continue on midodrine to induce HTN, SBP >160 mmHg systolic for neurological stability, patient with hypotensive event on 8/15/20 resulting in worsening neurological deficits but improvement to baseline once given hydration and BP raised, LDL - , titrate statin to goal <70, CTH (08/14/20) moderate to severe periventricular chronic ischemic changes, possible subacute right corona radiata infarct, likely chronic, MRI Brain w/o showed moderate sized right corona radiata/centrum semiovale internal borderzone infarct.  Physical therapy/OT recommended acute rehab.     ANTITHROMBOTIC THERAPY: ASA and Plavix for 3 months per SAMPRISS protocol, then ASA indefinitely     PULMONARY: CXR clear, protecting airway, saturating well on room air. Incidental finding on CTA H/N emphysematous changes. Pulmonary consulted, Dr. Zelaya following - recommended CT Chest for further evaluation. CT Chest 8/16 showed apical paraseptal emphysema, can consider Duoneb for wheezing or shortness of breath as needed     CARDIOVASCULAR: TTE (08/16/20): EF 73%, minimal MR, negative PFO, continue cardiac monitoring on telemetry, frequent episodes of hypotension with one time episode of diaphoresis associated with SBP 80s mmHg, STAT Troponin neg, Cardiac enzymes neg, EKG 8/16 prolonged NC interval possible First degree AV block otherwise normal sinus rhythm without evidence of STEMI or NSTEMI. Will repeat EKG 8/16, Cardiology, Dr. Aguirre consulted for hypotensive episodes and possible ILR. Per Dr. Aguirre - continue with gentle IVF hydration, if remains hypotensive start Midodrine 10 mg TID, Trendelenburg position as needed.                              SBP goal: permissive HTN     GASTROINTESTINAL:  initial dysphagia screen passed but patient was witnessed to be drooling and pocketing meals while eating. Due to high risk of aspiration from possible hemineglect, made NPO. Consulted S&S, evaluation 8/16 recommended pureed diet with full assistance, recommend MBS 8/17 to r/o silent aspiration      Diet: Pureed diet     RENAL: BUN/Cr without acute change, continue with adequate IV hydration      Na Goal: Greater than 135     Lee: no    HEMATOLOGY: H/H without acute change , Platelets 250     DVT ppx: LMWH [x]     ID: afebrile, mild leukocytosis currently uptrending without known source of possible infection. UA positive UTI, started on ceftriaxone for 5 days, CXR clear lungs, blood cultures pending, will continue to monitor for si/sx of infection     OTHER: ENT consulted as recommendations per S&S and Pulm for hoarse voice r/o vocal cord paralysis. ENT bedside FOE revealed patent airway and vocal cords mobile with good contact b/l - possible small glottic endy inferiorly, no further recommendations, no further ENT intervention required. Plan endorsed to patient and family member at bedside. Patient gives permission to disclose information to family member. All questions and concerns addressed.     DISPOSITION: Acute Rehab once stable and workup is complete      CORE MEASURES:        Admission NIHSS: 2     TPA: [] YES [x] NO      LDL/HDL:     Depression Screen: p     Statin Therapy: yes     Dysphagia Screen: [x] PASS [] FAIL     Smoking [] YES [x] NO      Afib [] YES [x] NO     Stroke Education [x] YES [] NO    Obtain screening lower extremity venous ultrasound in patients who meet 1 or more of the following criteria as patient is high risk for DVT/PE on admission:   [] History of DVT/PE  []Hypercoagulable states (Factor V Leiden, Cancer, OCP, etc. )  []Prolonged immobility (hemiplegia/hemiparesis/post operative or any other extended immobilization)  [] Transferred from outside facility (Rehab or Long term care)  [] Age </= to 50

## 2020-08-16 NOTE — SPEECH LANGUAGE PATHOLOGY EVALUATION - SLP DIAGNOSIS
Pt presents with cognitive-linguistic deficits characterized by reduced insight and awareness into deficits, impaired short term/working memory, visual deficits, reduced mental flexibility, impaired organization, decreased task comprehension, reduced cognitive endurance. Speech dysarthric and vocal quality dysphonic.

## 2020-08-16 NOTE — CHART NOTE - NSCHARTNOTEFT_GEN_A_CORE
Obtain screening lower extremity ultrasound in patients who meet 1 or more of the following criteria as patient is high risk for DVT/PE on admission:   [] History of DVT/PE  []Hypercoagulable states (Factor V Leiden, Cancer, OCP, etc. )  []Prolonged immobility (hemiplegia/hemiparesis/post operative or any other extended immobilization  [] Transferred from outside facility (Rehab or Long term care)

## 2020-08-16 NOTE — PROVIDER CONTACT NOTE (OTHER) - ASSESSMENT
Pt neurologically similar, denies dizziness/lightheadedness; currently in Trendelenburg position. /62 HR 60, repeat /61 HR 61

## 2020-08-16 NOTE — SWALLOW BEDSIDE ASSESSMENT ADULT - SWALLOW EVAL: DIAGNOSIS
Pt presents with an oropharyngeal dysphagia. Pt presents with an oral and suspected pharyngeal dysphagia characterized by left anterior spillage of less viscous consistencies, reduced stripping of utensil, reduced A-P transport with moderate to severe oral residue (most notable in right lateral sulci) post swallow of puree textures. Pt with inconsistent sensation for oral residue and this is cleared with cued and uncued repeat swallow and liquid wash. Pt with suspected delay in trigger of the pharyngeal swallow. No overt s/s laryngeal penetration/aspiration with administered consistencies. Chewables were not attempted 2/2 oral and cognitive deficits at this time.  Pt also noted to have dysphonic vocal quality. Pt presents with an oral and suspected pharyngeal dysphagia characterized by left anterior spillage of less viscous consistencies, reduced stripping of utensil, reduced A-P transport with moderate to severe oral residue (most notable in right lateral sulci) post swallow of puree textures. Pt with inconsistent sensation for oral residue and this is cleared with cued and uncued repeat swallow and liquid wash. Pt with suspected delay in trigger of the pharyngeal swallow. No overt s/s laryngeal penetration/aspiration with administered consistencies. Chewables were not attempted 2/2 oral and cognitive deficits at this time.  Pt also noted to have dysphonic vocal quality. Speech dysarthric.

## 2020-08-16 NOTE — CONSULT NOTE ADULT - PROBLEM SELECTOR RECOMMENDATION 9
Orders per Stroke unit team   Neuro checks   Check TTE '  Monitor on Tele
- Case and laryngoscopy findings discussed with Dr. Benjie Montez  - Diet and recommendations per S/S  - No further ENT intervention required

## 2020-08-16 NOTE — PROGRESS NOTE ADULT - ASSESSMENT
76 y/o RH man with a PMH of HTN not on antithrombotics who presented to the ED with an acute one day history of headache and altered mental status. Neurological examination demonstrates mild dysarthria with mild left nasolabial flattening with left arm weakness. CT/CTA per Neuroradiology demonstrates stenosis vs occlusion R M1. Patient out of window for tpa. NIHSS too low for mechanical thrombectomy.

## 2020-08-16 NOTE — SWALLOW BEDSIDE ASSESSMENT ADULT - COMMENTS
HX cont: Per ED,  The friend stated that the patient's speech was "off" and that his face "did not look normal". The pt did not want to go to the ER at the time, but came today because the headache persisted throughout the night and today he began to feel lightheaded. The pt stated that he ambulates and talks normally at baseline and that he had never experienced these symptoms before yesterday.   Neuro exam: Mental Status:  Alert, awake, oriented to person, place, and time; Speech is mildly dysarthric with otherwise fluent speech with intact naming, repetition, and comprehension; crosses midline, no extinction or neglect noted;  Immediate recall is 3/3 words; Able to spell WORLD forwards. Cranial Nerves II-XII:  VFF, EOMI, PERRL (3mm OD, OS), V1-V3 intact, some nasolabial flattening on the left side of face, t/p midline, SCM/trap intact. CT/CTA per Neuroradiology demonstrates stenosis vs occlusion R M1. Patient out of window for tpa. NIHSS too low for mechanical thrombectomy. Impression: headache with change in mental status and left hemiparesis 2/2 right brain dysfunction with CT/CTA demonstrating ?chronic R frontal infarct with R M1 stenosis versus occlusion 2/2 ESUS (embolic stroke of unknown source) with elevated blood pressure and mental status change likely in the setting of hypertensive encephalopathy. NIHSS: 2. Per neuro attending, Alert; probably mild left hemineglect; left homonymous hemianopia; mild left central facial palsy; mild dysarthria; left arm and leg-drift; manual motor testing with variable effort probably due to motor neglect but overall left hemiparesis 4/5; no left-sided ataxia; sensory-extinction on left; HX cont:    Neuro exam: Mental Status:  Alert, awake, oriented to person, place, and time; Speech is mildly dysarthric with otherwise fluent speech with intact naming, repetition, and comprehension; crosses midline, no extinction or neglect noted;  Immediate recall is 3/3 words; Able to spell WORLD forwards. Cranial Nerves II-XII:  VFF, EOMI, PERRL (3mm OD, OS), V1-V3 intact, some nasolabial flattening on the left side of face, t/p midline, SCM/trap intact. CT/CTA per Neuroradiology demonstrates stenosis vs occlusion R M1. Patient out of window for tpa. NIHSS too low for mechanical thrombectomy. Impression: headache with change in mental status and left hemiparesis 2/2 right brain dysfunction with CT/CTA demonstrating ?chronic R frontal infarct with R M1 stenosis versus occlusion 2/2 ESUS (embolic stroke of unknown source) with elevated blood pressure and mental status change likely in the setting of hypertensive encephalopathy. NIHSS: 2. Per neuro attending--> Alert; probably mild left hemineglect; left homonymous hemianopia; mild left central facial palsy; mild dysarthria; left arm and leg-drift; manual motor testing with variable effort probably due to motor neglect but overall left hemiparesis 4/5; no left-sided ataxia; sensory-extinction on left.  CT head (8/14/2020) to my eye showed moderate-severe periventricular chronic ischemic changes.  There was a possible subacute right corona radiata infarct, perhaps more likely part and parcel of the chronic ischemic changes.  CTA neck and head (8/14/2020) showed severe right M1 stenosis.  The left vertebral artery was dominant.  Incidentally noted were emphysematous lung changes->pulmonary consult for further evaluation. Initial dysphagia screen passed but patient was witnessed to be drooling and pocketing meals while eating. Due to high risk of aspiration from possible hemineglect, made NPO. Consulted S&S. Mild leukocytosis currently uptrending without known source of possible infection.   Hx cont below:

## 2020-08-16 NOTE — SWALLOW BEDSIDE ASSESSMENT ADULT - ORAL PREPARATORY PHASE
Reduced stripping of utensil anterior spillage of material during vocalization significant amount of left anterior loss via cup, improved davina presented via spoon or straw significant amount of left anterior loss via cup, improved when presented via spoon or straw

## 2020-08-16 NOTE — CONSULT NOTE ADULT - ASSESSMENT
76 y/o male with recent acute ischemic stroke with residual left hemiparesis with hoarseness and hypophonia which preceded stroke. Bedside FOE revealed--pending 74 y/o male with recent acute ischemic stroke with residual left hemiparesis with hoarseness and hypophonia which preceded stroke. Bedside FOE revealed patent airway and vocal cords mobile with good contact b/l- possible small glottic gap inferiorly.

## 2020-08-16 NOTE — SWALLOW BEDSIDE ASSESSMENT ADULT - SWALLOW EVAL: RECOMMENDED FEEDING/EATING TECHNIQUES
liquid via straw/check mouth frequently for oral residue/pocketing/position upright (90 degrees)/allow for swallow between intakes/crush medication (when feasible)/small sips/bites/alternate food with liquid/maintain upright posture during/after eating for 30 mins

## 2020-08-16 NOTE — SWALLOW BEDSIDE ASSESSMENT ADULT - SLP PERTINENT HISTORY OF CURRENT PROBLEM
Opal Matthew is a 76 y/o RH man with a PMH of HTN not on antithrombotics who presented to the ED with an acute one day history of headache and altered mental status. Per his friend, the pt suddenly became confused yesterday around 5pm and complained of a new onset headache.The pt complains about some weakness in his right lower extremity and endorses a sharp headache localized to the top/middle of his hairline that does not radiate anywhere. The pt denies photophobia, recent fever or flu like illness, changes in hearing, changes in vision, recent falls or trauma, or pain other than the headache, difficulty swallowing, dysuria, diarrhea, numbness/tingling anywhere. No history of headaches. Opal Matthew is a 74 y/o RH man with a PMH of HTN not on antithrombotics who presented to the ED with an acute one day history of headache and altered mental status. Per his friend, the pt suddenly became confused yesterday around 5pm and complained of a new onset headache.The pt complains about some weakness in his right lower extremity and endorses a sharp headache localized to the top/middle of his hairline that does not radiate anywhere. Per ED, The friend stated that the patient's speech was "off" and that his face "did not look normal". The pt did not want to go to the ER at the time, but came today because the headache persisted throughout the night and today he began to feel lightheaded. The pt stated that he ambulates and talks normally at baseline and that he had never experienced these symptoms before yesterday.

## 2020-08-16 NOTE — CONSULT NOTE ADULT - SUBJECTIVE AND OBJECTIVE BOX
Patient is a 75y old  Male who presents with a chief complaint of R M1 occlusion vs stenosis (16 Aug 2020 06:29)      HPI:  Opal Matthew is a 76 y/o RH man with a PMH of HTN not on antithrombotics who presented to the ED with an acute one day history of headache and altered mental status. Per his friend, the pt suddenly became confused yesterday around 5pm and complained of a new onset headache.The pt complains about some weakness in his right lower extremity and endorses a sharp headache localized to the top/middle of his hairline that does not radiate anywhere. The pt denies photophobia, recent fever or flu like illness, changes in hearing, changes in vision, recent falls or trauma, or pain other than the headache, difficulty swallowing, dysuria, diarrhea, numbness/tingling anywhere. No history of headaches.     Per ED,  The friend stated that the patient's speech was "off" and that his face "did not look normal". The pt did not want to go to the ER at the time, but came today because the headache persisted throughout the night and today he began to feel lightheaded. The pt stated that he ambulates and talks normally at baseline and that he had never experienced these symptoms before yesterday. (15 Aug 2020 00:11)    pt does understand english : but called his friend too:   but no response:    to me pt says he never smoked        ?FOLLOWING PRESENT  [unk ] Hx of PE/DVT, [ unk ] Hx COPD, [unk  ] Hx of Asthma, [ unk ] Hx of Hospitalization, [ unk ]  Hx of BiPAP/CPAP use, [unk  ] Hx of ADITYA    Allergies    No Known Allergies    Intolerances        PAST MEDICAL & SURGICAL HISTORY:  HTN (hypertension)      FAMILY HISTORY:      Social History: [ never ] TOBACCO                  [ unk  ] ETOH                                 [ unk  ] IVDA/DRUGS    REVIEW OF SYSTEMS    cant obtain much   General:	    Skin/Breast:  	  Ophthalmologic:  	  ENMT:	    Respiratory and Thorax: no obvious resp distress  	  Cardiovascular:	    Gastrointestinal:	    Genitourinary:	    Musculoskeletal:	    Neurological:	    Psychiatric:	    Hematology/Lymphatics:	    Endocrine:	    Allergic/Immunologic:	    MEDICATIONS  (STANDING):  aspirin Suppository 300 milliGRAM(s) Rectal daily  atorvastatin 80 milliGRAM(s) Oral at bedtime  cefTRIAXone   IVPB 1000 milliGRAM(s) IV Intermittent every 24 hours  clopidogrel Tablet 75 milliGRAM(s) Oral daily  enoxaparin Injectable 40 milliGRAM(s) SubCutaneous daily  midodrine. 10 milliGRAM(s) Oral three times a day  sodium chloride 0.9%. 1000 milliLiter(s) (125 mL/Hr) IV Continuous <Continuous>    MEDICATIONS  (PRN):  acetaminophen   Tablet .. 650 milliGRAM(s) Oral every 6 hours PRN Temp greater or equal to 38C (100.4F), Mild Pain (1 - 3), Moderate Pain (4 - 6), Severe Pain (7 - 10)       Vital Signs Last 24 Hrs  T(C): 37.1 (16 Aug 2020 08:00), Max: 37.2 (16 Aug 2020 06:00)  T(F): 98.8 (16 Aug 2020 08:00), Max: 99 (16 Aug 2020 06:00)  HR: 78 (16 Aug 2020 09:00) (57 - 89)  BP: 160/68 (16 Aug 2020 09:00) (115/58 - 167/78)  BP(mean): 87 (16 Aug 2020 09:00) (75 - 121)  RR: 15 (16 Aug 2020 09:00) (13 - 24)  SpO2: 94% (16 Aug 2020 09:00) (94% - 99%)        I&O's Summary    15 Aug 2020 07:01  -  16 Aug 2020 07:00  --------------------------------------------------------  IN: 3425 mL / OUT: 3100 mL / NET: 325 mL        Physical Exam:   GENERAL: NAD, well-groomed, well-developed  HEENT: DAMIAN/   Atraumatic, Normocephalic  ENMT: No tonsillar erythema, exudates, or enlargement; Moist mucous membranes, Good dentition, No lesions  NECK: Supple, No JVD, Normal thyroid  CHEST/LUNG: Clear to auscultation bilaterally  CVS: Regular rate and rhythm; No murmurs, rubs, or gallops  GI: : Soft, Nontender, Nondistended; Bowel sounds present  NERVOUS SYSTEM:  Alert & Oriented X3  EXTREMITIES:  - edema  LYMPH: No lymphadenopathy noted  SKIN: No rashes or lesions  ENDOCRINOLOGY: No Thyromegaly  PSYCH: Appropriate    Labs:    CARDIAC MARKERS ( 16 Aug 2020 05:18 )  x     / x     / x     / x     / 4.5 ng/mL  CARDIAC MARKERS ( 15 Aug 2020 14:55 )  x     / x     / 164 U/L / x     / 2.9 ng/mL                            15.1   13.54 )-----------( 250      ( 16 Aug 2020 05:23 )             43.6                         15.5   10.74 )-----------( 289      ( 15 Aug 2020 14:55 )             46.2                         15.9   9.43  )-----------( 252      ( 15 Aug 2020 05:26 )             46.7                         15.8   11.13 )-----------( 284      ( 14 Aug 2020 20:26 )             46.8     08-16    137  |  105  |  11  ----------------------------<  102<H>  3.9   |  20<L>  |  0.69  08-15    134<L>  |  99  |  15  ----------------------------<  130<H>  4.1   |  18<L>  |  1.00  08-15    136  |  100  |  12  ----------------------------<  110<H>  4.0   |  22  |  0.82  08-14    133<L>  |  98  |  14  ----------------------------<  115<H>  3.9   |  22  |  0.89    Ca    8.6      16 Aug 2020 05:18  Ca    9.9      15 Aug 2020 14:55  Ca    9.9      15 Aug 2020 05:26  Ca    10.0      14 Aug 2020 20:26    TPro  8.2  /  Alb  4.6  /  TBili  0.9  /  DBili  x   /  AST  15  /  ALT  15  /  AlkPhos  64  08-14    CAPILLARY BLOOD GLUCOSE      POCT Blood Glucose.: 111 mg/dL (15 Aug 2020 14:31)    LIVER FUNCTIONS - ( 14 Aug 2020 20:26 )  Alb: 4.6 g/dL / Pro: 8.2 g/dL / ALK PHOS: 64 U/L / ALT: 15 U/L / AST: 15 U/L / GGT: x           PT/INR - ( 15 Aug 2020 14:55 )   PT: 12.1 sec;   INR: 1.02 ratio         PTT - ( 15 Aug 2020 14:55 )  PTT:34.4 sec  Urinalysis Basic - ( 16 Aug 2020 08:09 )    Color: Light Yellow / Appearance: Clear / S.014 / pH: x  Gluc: x / Ketone: Moderate  / Bili: Negative / Urobili: Negative   Blood: x / Protein: Negative / Nitrite: Negative   Leuk Esterase: Large / RBC: 12 /hpf / WBC 10 /HPF   Sq Epi: x / Non Sq Epi: 1 /hpf / Bacteria: Negative      D DImerLactate, Blood: 2.1 mmol/L (08-15 @ 14:55)    Procalcitonin, Serum: 0.05 ng/mL ( @ 05:23)  Serum Pro-Brain Natriuretic Peptide: 70 pg/mL ( @ 05:23)  Serum Pro-Brain Natriuretic Peptide: 43 pg/mL (08-15 @ 14:55)      Studies  Chest X-RAY  CT SCAN Chest   CT Abdomen  Venous Dopplers: LE:   Others      < from: Xray Chest 1 View- PORTABLE-Urgent (08.15.20 @ 15:50) >  EXAM:  XR CHEST PORTABLE URGENT 1V                            PROCEDURE DATE:  08/15/2020            INTERPRETATION:  Indication: Shortness of breath.    Technique: Single portable view of the chest.    Comparison: 2020    Findings: The cardiac silhouette is normal in size. There are no focal consolidations or pleural effusions. The hilar and mediastinal structures appear unremarkable.    Impression: Clear lungs.          < from: CT Angio Neck w/ IV Cont (20 @ 21:54) >  Origin of the vertebral arteries are unremarkable.    AORTIC ARCH: Atherosclerotic changes. 3 vessel aortic arch. Origin of the great vessels are unremarkable.    OTHER: Emphysema.    IMPRESSION:    CT HEAD: No acute intracranial bleeding.    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    CTA NECK: Patent cervical vasculature. No flow limiting stenosis or occlusion.    These findings were discussed with Dr. HILDA SEVERINO on 2020 at 10:01 PM by Dr. Martínez with read back confirmation.                RU MARTÍNEZ M.D., RADIOLOGY RESIDENT  This document has been electronically signed.  ALBINA FLORENTINO M.D., ATTENDING RADIOLOGIST  This document has been electronically signed. Aug 14 2020 10:13PM        < end of copied text >          PAUL NESS M.D., ATTENDING RADIOLOGIST  This document has been electronically signed. Aug 16 2020  8:48AM              < end of copied text >

## 2020-08-16 NOTE — SWALLOW BEDSIDE ASSESSMENT ADULT - SLP GENERAL OBSERVATIONS
Pt received in bed. Grossly A&Ox2 (to self, month, year and place). +cognitive deficits. +left sided facial droop. + command following. Able to make wants/needs known. Speech dysarthric. Vocal quality dysphonic. Pt received in bed. Grossly A&Ox2 (to self, month, year and place). +cognitive deficits. +left sided facial droop. + command following. Able to make wants/needs known. Speech dysarthric. Vocal quality dysphonic-->pt denies change in vocal quality.

## 2020-08-16 NOTE — CONSULT NOTE ADULT - ASSESSMENT
Assessment: 76 y/o RH man with a PMH of HTN not on antithrombotics who presented to the ED with an acute one day history of headache and altered mental status. Neurological examination demonstrates mild dysarthria with mild left nasolabial flattening with left arm weakness. CT/CTA per Neuroradiology demonstrates stenosis vs occlusion R M1. Patient out of window for tpa. NIHSS too low for mechanical thrombectomy.    Impression: headache with change in mental status and left hemiparesis 2/2 right brain dysfunction with CT/CTA demonstrating ?chronic R frontal infarct with R M1 stenosis versus occlusion 2/2 ESUS (embolic stroke of unknown source) with elevated blood pressure and mental status change likely in the setting of hypertensive encephalopathy    NIHSS: 2  pre-MRS: 0    stroke:  Emphysema: incidentally noted on ct neck :  HTN    Patient has no clear history of smoking:   I was not able to contact his friend   to me the pt told me he never smoked and was not using any inhalers at home:   incidentally noted to have emphysema on ct neck :  would do ct chest non contrast: can use Duoneb prn for wheezing or SOB : he is satting at 95% on room air:   DVT prophylaxis: Stroke care per neurology :   ANGELA neurology PA

## 2020-08-16 NOTE — SWALLOW BEDSIDE ASSESSMENT ADULT - ASR SWALLOW ASPIRATION MONITOR
fever/upper respiratory infection/Monitor for s/s aspiration/laryngeal penetration. If noted:  D/C p.o. intake, provide non-oral nutrition/hydration/meds, and contact this service @ [phone]/throat clearing/pneumonia/change of breathing pattern/cough/gurgly voice

## 2020-08-16 NOTE — CONSULT NOTE ADULT - SUBJECTIVE AND OBJECTIVE BOX
Opal Matthew is a 76 y/o RH man with a PMH of HTN not on antithrombotics who presented to the ED with an acute one day history of headache and altered mental status. Per his friend, the pt suddenly became confused yesterday around 5pm and complained of a new onset headache.The pt complains about some weakness in his right lower extremity and endorses a sharp headache localized to the top/middle of his hairline that does not radiate anywhere. The pt denies photophobia, recent fever or flu like illness, changes in hearing, changes in vision, recent falls or trauma, or pain other than the headache, difficulty swallowing, dysuria, diarrhea, numbness/tingling anywhere. No history of headaches.     Per ED,  The friend stated that the patient's speech was "off" and that his face "did not look normal". The pt did not want to go to the ER at the time, but came today because the headache persisted throughout the night and today he began to feel lightheaded. The pt stated that he ambulates and talks normally at baseline and that he had never experienced these symptoms before yesterday. (15 Aug 2020 00:11)      PAST MEDICAL & SURGICAL HISTORY:  HTN (hypertension)      Review of Systems:   CONSTITUTIONAL: No fever, weight loss, or fatigue  EYES: No eye pain, visual disturbances, or discharge  ENMT:  No difficulty hearing, tinnitus, vertigo; No sinus or throat pain  NECK: No pain or stiffness  BREASTS: No pain, masses, or nipple discharge  RESPIRATORY: No cough, wheezing, chills or hemoptysis; No shortness of breath  CARDIOVASCULAR: No chest pain, palpitations, dizziness, or leg swelling  GASTROINTESTINAL: No abdominal or epigastric pain. No nausea, vomiting, or hematemesis; No diarrhea or constipation. No melena or hematochezia.  GENITOURINARY: No dysuria, frequency, hematuria, or incontinence  NEUROLOGICAL: as above  SKIN: No itching, burning, rashes, or lesions   LYMPH NODES: No enlarged glands  ENDOCRINE: No heat or cold intolerance; No hair loss  MUSCULOSKELETAL: No joint pain or swelling; No muscle, back, or extremity pain  PSYCHIATRIC: No depression, anxiety, mood swings, or difficulty sleeping  HEME/LYMPH: No easy bruising, or bleeding gums  ALLERY AND IMMUNOLOGIC: No hives or eczema    Allergies    No Known Allergies    Intolerances        Social History:     FAMILY HISTORY:      MEDICATIONS  (STANDING):  aspirin Suppository 300 milliGRAM(s) Rectal daily  atorvastatin 80 milliGRAM(s) Oral at bedtime  cefTRIAXone   IVPB 1000 milliGRAM(s) IV Intermittent every 24 hours  clopidogrel Tablet 75 milliGRAM(s) Oral daily  enoxaparin Injectable 40 milliGRAM(s) SubCutaneous daily  midodrine. 10 milliGRAM(s) Oral three times a day  sodium chloride 0.9%. 1000 milliLiter(s) (125 mL/Hr) IV Continuous <Continuous>    MEDICATIONS  (PRN):  acetaminophen   Tablet .. 650 milliGRAM(s) Oral every 6 hours PRN Temp greater or equal to 38C (100.4F), Mild Pain (1 - 3), Moderate Pain (4 - 6), Severe Pain (7 - 10)      Vital Signs Last 24 Hrs  T(C): 37.1 (16 Aug 2020 08:00), Max: 37.2 (16 Aug 2020 06:00)  T(F): 98.8 (16 Aug 2020 08:00), Max: 99 (16 Aug 2020 06:00)  HR: 65 (16 Aug 2020 10:00) (57 - 89)  BP: 138/64 (16 Aug 2020 10:00) (115/58 - 167/78)  BP(mean): 86 (16 Aug 2020 10:00) (75 - 121)  RR: 16 (16 Aug 2020 10:00) (13 - 24)  SpO2: 96% (16 Aug 2020 10:00) (94% - 99%)  CAPILLARY BLOOD GLUCOSE      POCT Blood Glucose.: 111 mg/dL (15 Aug 2020 14:31)    I&O's Summary    15 Aug 2020 07:01  -  16 Aug 2020 07:00  --------------------------------------------------------  IN: 3425 mL / OUT: 3100 mL / NET: 325 mL        PHYSICAL EXAM:  GENERAL: NAD, well-developed  HEAD:  Atraumatic, Normocephalic  EYES: EOMI, PERRLA, conjunctiva and sclera clear  NECK: Supple, No JVD  CHEST/LUNG: Clear to auscultation bilaterally; No wheeze  HEART: Regular rate and rhythm; No murmurs, rubs, or gallops  ABDOMEN: Soft, Nontender, Nondistended; Bowel sounds present  EXTREMITIES:  2+ Peripheral Pulses, No clubbing, cyanosis, or edema  PSYCH: AAOx3  NEUROLOGY: left facial droop, left sided weakness  SKIN: No rashes or lesions    LABS:                        15.1   13.54 )-----------( 250      ( 16 Aug 2020 05:23 )             43.6     08-16    137  |  105  |  11  ----------------------------<  102<H>  3.9   |  20<L>  |  0.69    Ca    8.6      16 Aug 2020 05:18    TPro  8.2  /  Alb  4.6  /  TBili  0.9  /  DBili  x   /  AST  15  /  ALT  15  /  AlkPhos  64  08-14    PT/INR - ( 15 Aug 2020 14:55 )   PT: 12.1 sec;   INR: 1.02 ratio         PTT - ( 15 Aug 2020 14:55 )  PTT:34.4 sec  CARDIAC MARKERS ( 16 Aug 2020 05:18 )  x     / x     / x     / x     / 4.5 ng/mL  CARDIAC MARKERS ( 15 Aug 2020 14:55 )  x     / x     / 164 U/L / x     / 2.9 ng/mL      Urinalysis Basic - ( 16 Aug 2020 08:09 )    Color: Light Yellow / Appearance: Clear / S.014 / pH: x  Gluc: x / Ketone: Moderate  / Bili: Negative / Urobili: Negative   Blood: x / Protein: Negative / Nitrite: Negative   Leuk Esterase: Large / RBC: 12 /hpf / WBC 10 /HPF   Sq Epi: x / Non Sq Epi: 1 /hpf / Bacteria: Negative        RADIOLOGY & ADDITIONAL TESTS:    Imaging Personally Reviewed:    Consultant(s) Notes Reviewed:      Care Discussed with Consultants/Other Providers:

## 2020-08-16 NOTE — SPEECH LANGUAGE PATHOLOGY EVALUATION - SLP CRITERIA FOR SKILLED THERAPEUTIC INTERVENTIONS MET
skilled criteria for intervention met This service will follow in house as schedule permits. Pt would benefit from continued tx post discharge./skilled criteria for intervention met

## 2020-08-16 NOTE — SWALLOW BEDSIDE ASSESSMENT ADULT - SWALLOW EVAL: PROGNOSIS
Hx cont: 8/14, pt blood pressure and heart rate decreased with mildly decreased level of consciousness and significantly decreased verbal output and interaction with the examiner, along with worsening of his left hemiparesis, with minimal effort versus gravity in the left arm.  Most likely, this worsening was due to cerebral perfusion insufficiency related to decreased cardiac output in the setting of intracranial stenosis per neuro. Hx cont: 8/14, pt blood pressure and heart rate decreased with mildly decreased level of consciousness and significantly decreased verbal output and interaction with the examiner, along with worsening of his left hemiparesis, with minimal effort versus gravity in the left arm.  Most likely, this worsening was due to cerebral perfusion insufficiency related to decreased cardiac output in the setting of intracranial stenosis per neuro. 8/15 MRI Head: Acute infarct in the right MCA territory somewhat patchy involving the right lateral temporal lobe as well as the right corona radiata/centrum semiovale ovale region in a somewhat junctional pattern, deep internal border zone type pattern. Clinical correlation recommended. No significant mass effect. No associated hemorrhage

## 2020-08-16 NOTE — SWALLOW BEDSIDE ASSESSMENT ADULT - SWALLOW EVAL: RECOMMENDED DIET
Puree texture diet with 100%supervision/assistance Puree texture diet with 100%supervision/assistance. Liquid via small, single straw sips

## 2020-08-16 NOTE — CONSULT NOTE ADULT - ASSESSMENT
74 y/o RH man with a PMH of HTN not on antithrombotics who presented to the ED with an acute one day history of headache and altered mental status. Neurological examination demonstrates mild dysarthria with mild left nasolabial flattening with left arm weakness. CT/CTA per Neuroradiology demonstrates stenosis vs occlusion R M1. Patient out of window for tpa.    CVA  - workup and plan as per neurology  - ASA/plavix  - lipitor  - PT/OT  - swallow eval  - permissive hypertension    hypotension  - r/o sepsis /UTI  - follow up urine and blood cultures  - cont ceftriaxone for now  - cont iv fluids  - midodrine    HLD  -   - c/w lipitor    emphysema  - duonebs prn  - CT chest without contrast  - pulm following    DVT px  - lovenox

## 2020-08-16 NOTE — PROGRESS NOTE ADULT - SUBJECTIVE AND OBJECTIVE BOX
Subjective: Patient seen and examined. No new events except as noted.   REmains in Stroke unit   no cp or sob  Labile BP   REVIEW OF SYSTEMS:    CONSTITUTIONAL: + weakness, fevers or chills  EYES/ENT: No visual changes;  No vertigo or throat pain   NECK: No pain or stiffness  RESPIRATORY: No cough, wheezing, hemoptysis; No shortness of breath  CARDIOVASCULAR: No chest pain or palpitations  GASTROINTESTINAL: No abdominal or epigastric pain. No nausea, vomiting, or hematemesis; No diarrhea or constipation. No melena or hematochezia.  GENITOURINARY: No dysuria, frequency or hematuria  NEUROLOGICAL: +numbness or weakness  SKIN: No itching, burning, rashes, or lesions   All other review of systems is negative unless indicated above.    MEDICATIONS:  MEDICATIONS  (STANDING):  aspirin Suppository 300 milliGRAM(s) Rectal daily  atorvastatin 80 milliGRAM(s) Oral at bedtime  cefTRIAXone   IVPB 1000 milliGRAM(s) IV Intermittent every 24 hours  clopidogrel Tablet 75 milliGRAM(s) Oral daily  enoxaparin Injectable 40 milliGRAM(s) SubCutaneous daily  midodrine. 10 milliGRAM(s) Oral three times a day  sodium chloride 0.9%. 1000 milliLiter(s) (125 mL/Hr) IV Continuous <Continuous>      PHYSICAL EXAM:  T(C): 37.1 (08-16-20 @ 08:00), Max: 37.2 (08-16-20 @ 06:00)  HR: 65 (08-16-20 @ 10:00) (57 - 89)  BP: 138/64 (08-16-20 @ 10:00) (115/58 - 167/78)  RR: 16 (08-16-20 @ 10:00) (13 - 24)  SpO2: 96% (08-16-20 @ 10:00) (94% - 99%)  Wt(kg): --  I&O's Summary    15 Aug 2020 07:01  -  16 Aug 2020 07:00  --------------------------------------------------------  IN: 3425 mL / OUT: 3100 mL / NET: 325 mL          Appearance: NAD	  HEENT:   Dry  oral mucosa, PERRL, EOMI	  Lymphatic: No lymphadenopathy  Cardiovascular: Normal S1 S2, No JVD, No murmurs, No edema  Respiratory: Lungs clear to auscultation	  Psychiatry: A & O x 3, Mood & affect appropriate  Gastrointestinal:  Soft, Non-tender, + BS	  Skin: No rashes, No ecchymoses, No cyanosis	  Extremities: Normal range of motion, No clubbing, cyanosis or edema  Vascular: Peripheral pulses palpable 2+ bilaterally  - Cranial Nerves II-XII:  VFF, EOMI, PERRL (3mm OD, OS), V1-V3 intact, some nasolabial flattening on the left side of face, t/p midline, SCM/trap intact.  	- Fundoscopic examination: upon fundoscopic examination grossly clear margins of optic disc & cup  	- Motor: Pronator drift present on the left side. demonstrates orbiting around the left arm. Strength left arm 4+/5 with  strength 4/5. Left leg 4+/5 hip flexors/extensors with 5/5 knee flexion/ext dorsi/plantarflexion. right arm and leg 5/5. Normal muscle bulk and tone throughout. Neck flexion/extension 5/5 without neck stiffness  	- Sensory:  Intact to light touch and PP bilaterally throughout.  	- Coordination:  FTN demonstrated mild dysmetria in proportion to weakness on left arm, intact on right  - Gait: patient able to stand up on his own, ambulate several steps without wide based gait, not requiring assistance.  LABS:    CARDIAC MARKERS:  CARDIAC MARKERS ( 16 Aug 2020 05:18 )  x     / x     / x     / x     / 4.5 ng/mL  CARDIAC MARKERS ( 15 Aug 2020 14:55 )  x     / x     / 164 U/L / x     / 2.9 ng/mL                                15.1   13.54 )-----------( 250      ( 16 Aug 2020 05:23 )             43.6     08-16    137  |  105  |  11  ----------------------------<  102<H>  3.9   |  20<L>  |  0.69    Ca    8.6      16 Aug 2020 05:18    TPro  8.2  /  Alb  4.6  /  TBili  0.9  /  DBili  x   /  AST  15  /  ALT  15  /  AlkPhos  64  08-14    proBNP: Serum Pro-Brain Natriuretic Peptide: 70 pg/mL (08-16 @ 05:23)  Serum Pro-Brain Natriuretic Peptide: 43 pg/mL (08-15 @ 14:55)    Lipid Profile:   HgA1c:   TSH: Thyroid Stimulating Hormone, Serum: 2.55 uIU/mL (08-16 @ 07:53)              TELEMETRY: 	SR    ECG:  	  RADIOLOGY: < from: MR Head No Cont (08.15.20 @ 18:42) >    EXAM:  MR BRAIN                            PROCEDURE DATE:  08/15/2020            INTERPRETATION:  INDICATIONS:  cva    TECHNIQUE:  Multiplanar imaging was performed using T1 weighted, T2 weighted and FLAIR sequences.  Diffusion weighted and SWI images were obtained.    COMPARISON EXAMINATION: CT CTA 8/14/2020    FINDINGS:  Ventricles and sulci: Ventricles and sulci within normal limits for age  Intra-axial:  Subtle mass effect on the right lateral ventricle on the right related to the acute right MCA territory infarct. Extensive microvascular ischemic changes involving the periventricular and subcortical white matter. Susceptibility reveals question of thrombus in association with the right MCA. No associated hemorrhage with the infarct.  Extra-axial:  No mass or collection.  Visualized sinuses: Ethmoid mucosal thickening.  Visualized mastoids:  Normal.  Calvarium:  Normal.  Carotid flow voids:  Present.  Miscellaneous:  None.    IMPRESSION:  Acute infarct in the right MCA territory somewhat patchy involving the right lateral temporal lobe as well as the right corona radiata/centrum semiovale ovale region in a somewhat junctional pattern, deep internal border zone type pattern. Clinical correlation recommended. No significant mass effect. No associated hemorrhage                    ALLIE MINOR M.D., ATTENDING RADIOLOGIST  This document has been electronically signed. Aug 16 2020 10:39AM                < end of copied text >    DIAGNOSTIC TESTING:  [ ] Echocardiogram:  [ ]  Catheterization:  [ ] Stress Test:    OTHER: 	    Urinalysis (08.16.20 @ 08:09)    pH Urine: 6.5    Glucose Qualitative, Urine: Negative    Blood, Urine: Small    Color: Light Yellow    Urine Appearance: Clear    Bilirubin: Negative    Ketone - Urine: Moderate    Specific Gravity: 1.014    Protein, Urine: Negative    Urobilinogen: Negative    Nitrite: Negative    Leukocyte Esterase Concentration: Large

## 2020-08-16 NOTE — SPEECH LANGUAGE PATHOLOGY EVALUATION - SLP PERTINENT HISTORY OF CURRENT PROBLEM
Opal Matthew is a 76 y/o RH man with a PMH of HTN not on antithrombotics who presented to the ED with an acute one day history of headache and altered mental status. Per his friend, the pt suddenly became confused yesterday around 5pm and complained of a new onset headache.The pt complains about some weakness in his right lower extremity and endorses a sharp headache localized to the top/middle of his hairline that does not radiate anywhere. Per ED, The friend stated that the patient's speech was "off" and that his face "did not look normal". The pt did not want to go to the ER at the time, but came today because the headache persisted throughout the night and today he began to feel lightheaded. The pt stated that he ambulates and talks normally at baseline and that he had never experienced these symptoms before yesterday.

## 2020-08-16 NOTE — SWALLOW BEDSIDE ASSESSMENT ADULT - ASR SWALLOW RECOMMEND DIAG
Defer at this time. This service will continue to follow. VFSS/MBS/Given site of lesion and dysphonic vocal quality would recommend objective swallow assessment to r/o silent aspiration. MD, please write order for Modified Barium Swallow Study. Will schedule exam upon receipt of order in Radiology.

## 2020-08-16 NOTE — SWALLOW BEDSIDE ASSESSMENT ADULT - ORAL PHASE
moderate to severe oral residue post swallow, diffuse throughout oral cavity but most notable on right side->possibly behavioral component confounding oral weakness as pt with aversion to taste. Oral residue eliminated with cued and uncued repeat swallows (pt with inconsistent awareness) and a liquid wash/Decreased anterior-posterior movement of the bolus oral residue post swallow most notable on the right side, eliminated with cued and uncued repeat swallows/Decreased anterior-posterior movement of the bolus Stasis in lateral sulci/Decreased anterior-posterior movement of the bolus moderate to severe oral residue post swallow, diffuse throughout oral cavity but most notable on right side->2/2 oral weakness but possible confounded by behavioral component as pt with aversion to taste and facial grimacing. Oral residue eliminated with cued and uncued repeat swallows (pt with inconsistent awareness) and a liquid wash/Decreased anterior-posterior movement of the bolus oral residue post swallow most notable on the right side, eliminated with cued and uncued repeat swallows. Possible behavioral component, + facial grimacing and report of aversion to taste/Decreased anterior-posterior movement of the bolus

## 2020-08-16 NOTE — SPEECH LANGUAGE PATHOLOGY EVALUATION - SLP SHORT TERM MEMORY
impaired/Immediate repetition of digits in sequence: 2/4xs (breakdown as complexity increases); General Wh- questions: 2/3xs

## 2020-08-16 NOTE — SPEECH LANGUAGE PATHOLOGY EVALUATION - COMMENTS
HX cont:    Neuro exam: Mental Status:  Alert, awake, oriented to person, place, and time; Speech is mildly dysarthric with otherwise fluent speech with intact naming, repetition, and comprehension; crosses midline, no extinction or neglect noted;  Immediate recall is 3/3 words; Able to spell WORLD forwards. Cranial Nerves II-XII:  VFF, EOMI, PERRL (3mm OD, OS), V1-V3 intact, some nasolabial flattening on the left side of face, t/p midline, SCM/trap intact. CT/CTA per Neuroradiology demonstrates stenosis vs occlusion R M1. Patient out of window for tpa. NIHSS too low for mechanical thrombectomy. Impression: headache with change in mental status and left hemiparesis 2/2 right brain dysfunction with CT/CTA demonstrating ?chronic R frontal infarct with R M1 stenosis versus occlusion 2/2 ESUS (embolic stroke of unknown source) with elevated blood pressure and mental status change likely in the setting of hypertensive encephalopathy. NIHSS: 2. Per neuro attending--> Alert; probably mild left hemineglect; left homonymous hemianopia; mild left central facial palsy; mild dysarthria; left arm and leg-drift; manual motor testing with variable effort probably due to motor neglect but overall left hemiparesis 4/5; no left-sided ataxia; sensory-extinction on left.  CT head (8/14/2020) to my eye showed moderate-severe periventricular chronic ischemic changes.  There was a possible subacute right corona radiata infarct, perhaps more likely part and parcel of the chronic ischemic changes.  CTA neck and head (8/14/2020) showed severe right M1 stenosis.  The left vertebral artery was dominant.  Incidentally noted were emphysematous lung changes->pulmonary consult for further evaluation. Initial dysphagia screen passed but patient was witnessed to be drooling and pocketing meals while eating. Due to high risk of aspiration from possible hemineglect, made NPO. Consulted S&S. Mild leukocytosis currently uptrending without known source of possible infection.   Hx cont below: Hx cont: 8/14, pt blood pressure and heart rate decreased with mildly decreased level of consciousness and significantly decreased verbal output and interaction with the examiner, along with worsening of his left hemiparesis, with minimal effort versus gravity in the left arm.  Most likely, this worsening was due to cerebral perfusion insufficiency related to decreased cardiac output in the setting of intracranial stenosis per neuro. 8/15 MRI Head: Acute infarct in the right MCA territory somewhat patchy involving the right lateral temporal lobe as well as the right corona radiata/centrum semiovale ovale region in a somewhat junctional pattern, deep internal border zone type pattern. Clinical correlation recommended. No significant mass effect. No associated hemorrhage not assessed - pt + fatigue as assessment continued Word fluency: 6 words for related category in one minute, of note, pt with eye closing during this task   Convergent namin/2xs Consider ENT consult to assess dysphonia.

## 2020-08-16 NOTE — PROGRESS NOTE ADULT - SUBJECTIVE AND OBJECTIVE BOX
THE PATIENT WAS SEEN AND EXAMINED BY ME WITH THE HOUSESTAFF AND STROKE TEAM DURING MORNING ROUNDS.   HPI:  Opal Matthew is a 76 y/o RH man with a PMH of HTN not on antithrombotics who presented to the ED with an acute one day history of headache and altered mental status. Per his friend, the pt suddenly became confused yesterday around 5pm and complained of a new onset headache.The pt complains about some weakness in his right lower extremity and endorses a sharp headache localized to the top/middle of his hairline that does not radiate anywhere. The pt denies photophobia, recent fever or flu like illness, changes in hearing, changes in vision, recent falls or trauma, or pain other than the headache, difficulty swallowing, dysuria, diarrhea, numbness/tingling anywhere. No history of headaches.     Per ED,  The friend stated that the patient's speech was "off" and that his face "did not look normal". The pt did not want to go to the ER at the time, but came today because the headache persisted throughout the night and today he began to feel lightheaded. The pt stated that he ambulates and talks normally at baseline and that he had never experienced these symptoms before yesterday. (15 Aug 2020 00:11)      SUBJECTIVE: No events overnight.  No new neurologic complaints.      acetaminophen   Tablet .. 650 milliGRAM(s) Oral every 6 hours PRN  aspirin Suppository 300 milliGRAM(s) Rectal daily  atorvastatin 80 milliGRAM(s) Oral at bedtime  clopidogrel Tablet 75 milliGRAM(s) Oral daily  enoxaparin Injectable 40 milliGRAM(s) SubCutaneous daily  sodium chloride 0.9%. 1000 milliLiter(s) IV Continuous <Continuous>      PHYSICAL EXAM:   Vital Signs Last 24 Hrs  T(C): 37.2 (16 Aug 2020 06:00), Max: 37.2 (16 Aug 2020 06:00)  T(F): 99 (16 Aug 2020 06:00), Max: 99 (16 Aug 2020 06:00)  HR: 57 (16 Aug 2020 06:00) (57 - 89)  BP: 146/65 (16 Aug 2020 06:00) (115/58 - 168/72)  BP(mean): 87 (16 Aug 2020 06:00) (75 - 121)  RR: 16 (16 Aug 2020 06:00) (13 - 24)  SpO2: 96% (16 Aug 2020 06:00) (94% - 99%)    General: No acute distress, sitting in chair   HEENT: EOM intact, Left homonymous hemianopia   Abdomen: Soft, nontender, nondistended   Extremities: No edema    NEUROLOGICAL EXAM:  Mental status: Awake, alert, oriented to name, place and time, speech fluent, mild left hemineglect, following commands   Cranial Nerves: Mild left central facial palsy, no nystagmus, mild dysarthria,  tongue midline  Motor exam: Normal tone, Right no drift, Left arm and leg drift, left hemiparesis 4/5 drift, right side 5/5   Sensation: intact to light touch on right but sensory-extinction on left  Coordination/ Gait: no ataxia bilaterally, gait not tested     LABS:                        15.1   13.54 )-----------( 250      ( 16 Aug 2020 05:23 )             43.6    08-16    137  |  105  |  11  ----------------------------<  102<H>  3.9   |  20<L>  |  0.69    Ca    8.6      16 Aug 2020 05:18    TPro  8.2  /  Alb  4.6  /  TBili  0.9  /  DBili  x   /  AST  15  /  ALT  15  /  AlkPhos  64  08-14  PT/INR - ( 15 Aug 2020 14:55 )   PT: 12.1 sec;   INR: 1.02 ratio         PTT - ( 15 Aug 2020 14:55 )  PTT:34.4 sec      IMAGING: Reviewed by me.     MRI Brain w/o (08/15/20):     CT Head No Cont. (08/14/20): There is no acute intracranial hemorrhage. No focal edema or evidence of acute, transcortical infarct. No hydrocephalus, midline shift or mass effect.    CT Angio Head w/IV Cont. (08/14/20): 6 mm in length severe stenosis/near occlusion of the M1 segment of the Right MCA with reconstitution at the bifurcation.    CT Angio Neck w/IV Cont. (08/14/20): Patent cervical vasculature. No flow limiting stenosis or occlusion. THE PATIENT WAS SEEN AND EXAMINED BY ME WITH THE HOUSESTAFF AND STROKE TEAM DURING MORNING ROUNDS.   HPI:  Oapl Matthew is a 74 y/o RH man with a PMH of HTN not on antithrombotics who presented to the ED with an acute one day history of headache and altered mental status. Per his friend, the pt suddenly became confused yesterday around 5pm and complained of a new onset headache.The pt complains about some weakness in his right lower extremity and endorses a sharp headache localized to the top/middle of his hairline that does not radiate anywhere. The pt denies photophobia, recent fever or flu like illness, changes in hearing, changes in vision, recent falls or trauma, or pain other than the headache, difficulty swallowing, dysuria, diarrhea, numbness/tingling anywhere. No history of headaches.     Per ED,  The friend stated that the patient's speech was "off" and that his face "did not look normal". The pt did not want to go to the ER at the time, but came today because the headache persisted throughout the night and today he began to feel lightheaded. The pt stated that he ambulates and talks normally at baseline and that he had never experienced these symptoms before yesterday. (15 Aug 2020 00:11)      SUBJECTIVE: Per patient, "feeling better today." No events overnight.  No new neurologic complaints.      acetaminophen   Tablet .. 650 milliGRAM(s) Oral every 6 hours PRN  aspirin Suppository 300 milliGRAM(s) Rectal daily  atorvastatin 80 milliGRAM(s) Oral at bedtime  clopidogrel Tablet 75 milliGRAM(s) Oral daily  enoxaparin Injectable 40 milliGRAM(s) SubCutaneous daily  sodium chloride 0.9%. 1000 milliLiter(s) IV Continuous <Continuous>      PHYSICAL EXAM:   Vital Signs Last 24 Hrs  T(C): 37.2 (16 Aug 2020 06:00), Max: 37.2 (16 Aug 2020 06:00)  T(F): 99 (16 Aug 2020 06:00), Max: 99 (16 Aug 2020 06:00)  HR: 57 (16 Aug 2020 06:00) (57 - 89)  BP: 146/65 (16 Aug 2020 06:00) (115/58 - 168/72)  BP(mean): 87 (16 Aug 2020 06:00) (75 - 121)  RR: 16 (16 Aug 2020 06:00) (13 - 24)  SpO2: 96% (16 Aug 2020 06:00) (94% - 99%)    General: No acute distress  HEENT: EOM intact, Left homonymous hemianopia   Abdomen: Soft, nontender, nondistended   Extremities: No edema    NEUROLOGICAL EXAM:  Mental status: Awake, alert, oriented to name, place and time, speech fluent, mild left hemineglect but looks over readily when prompted, following commands, unable to recall professional employment in the past   Cranial Nerves: Mild to moderate left facial palsy, no nystagmus, LHH, gaze crosses midline, mild dysarthria,  tongue midline  Motor exam: Normal tone, Right no drift, left motor neglect - requires prompting for motor assessment, Left arm and leg mild drift, left hemiparesis 4/5 drift, right side 5/5   Sensation: intact to light touch on right but sensory-extinction on left  Coordination/ Gait: no ataxia bilaterally, gait not tested     LABS:                        15.1   13.54 )-----------( 250      ( 16 Aug 2020 05:23 )             43.6    08-16    137  |  105  |  11  ----------------------------<  102<H>  3.9   |  20<L>  |  0.69    Ca    8.6      16 Aug 2020 05:18    TPro  8.2  /  Alb  4.6  /  TBili  0.9  /  DBili  x   /  AST  15  /  ALT  15  /  AlkPhos  64  08-14  PT/INR - ( 15 Aug 2020 14:55 )   PT: 12.1 sec;   INR: 1.02 ratio         PTT - ( 15 Aug 2020 14:55 )  PTT:34.4 sec      IMAGING: Reviewed by me.     MRI Brain w/o (08/15/20):     CT Head No Cont. (08/14/20): There is no acute intracranial hemorrhage. No focal edema or evidence of acute, transcortical infarct. No hydrocephalus, midline shift or mass effect.    CT Angio Head w/IV Cont. (08/14/20): 6 mm in length severe stenosis/near occlusion of the M1 segment of the Right MCA with reconstitution at the bifurcation.    CT Angio Neck w/IV Cont. (08/14/20): Patent cervical vasculature. No flow limiting stenosis or occlusion. THE PATIENT WAS SEEN AND EXAMINED BY ME WITH THE HOUSESTAFF AND STROKE TEAM DURING MORNING ROUNDS.   HPI:  Opal Matthew is a 76 y/o RH man with a PMH of HTN not on antithrombotics who presented to the ED with an acute one day history of headache and altered mental status. Per his friend, the pt suddenly became confused yesterday around 5pm and complained of a new onset headache.The pt complains about some weakness in his right lower extremity and endorses a sharp headache localized to the top/middle of his hairline that does not radiate anywhere. The pt denies photophobia, recent fever or flu like illness, changes in hearing, changes in vision, recent falls or trauma, or pain other than the headache, difficulty swallowing, dysuria, diarrhea, numbness/tingling anywhere. No history of headaches.     Per ED,  The friend stated that the patient's speech was "off" and that his face "did not look normal". The pt did not want to go to the ER at the time, but came today because the headache persisted throughout the night and today he began to feel lightheaded. The pt stated that he ambulates and talks normally at baseline and that he had never experienced these symptoms before yesterday. (15 Aug 2020 00:11)      SUBJECTIVE: Per patient, "feeling better today." No events overnight.  No new neurologic complaints.      acetaminophen   Tablet .. 650 milliGRAM(s) Oral every 6 hours PRN  aspirin Suppository 300 milliGRAM(s) Rectal daily  atorvastatin 80 milliGRAM(s) Oral at bedtime  clopidogrel Tablet 75 milliGRAM(s) Oral daily  enoxaparin Injectable 40 milliGRAM(s) SubCutaneous daily  sodium chloride 0.9%. 1000 milliLiter(s) IV Continuous <Continuous>      PHYSICAL EXAM:   Vital Signs Last 24 Hrs  T(C): 37.2 (16 Aug 2020 06:00), Max: 37.2 (16 Aug 2020 06:00)  T(F): 99 (16 Aug 2020 06:00), Max: 99 (16 Aug 2020 06:00)  HR: 57 (16 Aug 2020 06:00) (57 - 89)  BP: 146/65 (16 Aug 2020 06:00) (115/58 - 168/72)  BP(mean): 87 (16 Aug 2020 06:00) (75 - 121)  RR: 16 (16 Aug 2020 06:00) (13 - 24)  SpO2: 96% (16 Aug 2020 06:00) (94% - 99%)    General: No acute distress  HEENT: EOM intact, Left homonymous hemianopia  Abdomen: Soft, nontender, nondistended   Extremities: No edema    NEUROLOGICAL EXAM:  Mental status: Awake, alert, oriented to name, place and time, speech fluent, mild left hemineglect but looks over readily when prompted, following commands, unable to recall professional employment in the past   Cranial Nerves: Mild to moderate left facial palsy, no nystagmus, LHH, gaze crosses midline, mild dysarthria,  tongue midline  Motor exam: Normal tone, Right no drift, left motor neglect - requires prompting for motor assessment, Left arm and leg mild drift, left hemiparesis 4/5 drift, right side 5/5   Sensation: intact to light touch on right but sensory-extinction on left  Coordination/ Gait: no ataxia bilaterally, gait not tested     LABS:                        15.1   13.54 )-----------( 250      ( 16 Aug 2020 05:23 )             43.6    08-16    137  |  105  |  11  ----------------------------<  102<H>  3.9   |  20<L>  |  0.69    Ca    8.6      16 Aug 2020 05:18    TPro  8.2  /  Alb  4.6  /  TBili  0.9  /  DBili  x   /  AST  15  /  ALT  15  /  AlkPhos  64  08-14  PT/INR - ( 15 Aug 2020 14:55 )   PT: 12.1 sec;   INR: 1.02 ratio         PTT - ( 15 Aug 2020 14:55 )  PTT:34.4 sec      IMAGING: Reviewed by me.     MRI Brain w/o (08/15/20):     CT Head No Cont. (08/14/20): There is no acute intracranial hemorrhage. No focal edema or evidence of acute, transcortical infarct. No hydrocephalus, midline shift or mass effect.    CT Angio Head w/IV Cont. (08/14/20): 6 mm in length severe stenosis/near occlusion of the M1 segment of the Right MCA with reconstitution at the bifurcation.    CT Angio Neck w/IV Cont. (08/14/20): Patent cervical vasculature. No flow limiting stenosis or occlusion.

## 2020-08-17 LAB
ANION GAP SERPL CALC-SCNC: 7 MMOL/L — SIGNIFICANT CHANGE UP (ref 5–17)
BUN SERPL-MCNC: 9 MG/DL — SIGNIFICANT CHANGE UP (ref 7–23)
CALCIUM SERPL-MCNC: 8.8 MG/DL — SIGNIFICANT CHANGE UP (ref 8.4–10.5)
CHLORIDE SERPL-SCNC: 108 MMOL/L — SIGNIFICANT CHANGE UP (ref 96–108)
CO2 SERPL-SCNC: 19 MMOL/L — LOW (ref 22–31)
CREAT SERPL-MCNC: 0.73 MG/DL — SIGNIFICANT CHANGE UP (ref 0.5–1.3)
CULTURE RESULTS: NO GROWTH — SIGNIFICANT CHANGE UP
GLUCOSE SERPL-MCNC: 98 MG/DL — SIGNIFICANT CHANGE UP (ref 70–99)
HCT VFR BLD CALC: 41.6 % — SIGNIFICANT CHANGE UP (ref 39–50)
HGB BLD-MCNC: 14.1 G/DL — SIGNIFICANT CHANGE UP (ref 13–17)
MCHC RBC-ENTMCNC: 30.4 PG — SIGNIFICANT CHANGE UP (ref 27–34)
MCHC RBC-ENTMCNC: 33.9 GM/DL — SIGNIFICANT CHANGE UP (ref 32–36)
MCV RBC AUTO: 89.7 FL — SIGNIFICANT CHANGE UP (ref 80–100)
NRBC # BLD: 0 /100 WBCS — SIGNIFICANT CHANGE UP (ref 0–0)
PLATELET # BLD AUTO: 236 K/UL — SIGNIFICANT CHANGE UP (ref 150–400)
POTASSIUM SERPL-MCNC: 3.7 MMOL/L — SIGNIFICANT CHANGE UP (ref 3.5–5.3)
POTASSIUM SERPL-SCNC: 3.7 MMOL/L — SIGNIFICANT CHANGE UP (ref 3.5–5.3)
RBC # BLD: 4.64 M/UL — SIGNIFICANT CHANGE UP (ref 4.2–5.8)
RBC # FLD: 12.1 % — SIGNIFICANT CHANGE UP (ref 10.3–14.5)
SODIUM SERPL-SCNC: 134 MMOL/L — LOW (ref 135–145)
SPECIMEN SOURCE: SIGNIFICANT CHANGE UP
WBC # BLD: 12.31 K/UL — HIGH (ref 3.8–10.5)
WBC # FLD AUTO: 12.31 K/UL — HIGH (ref 3.8–10.5)

## 2020-08-17 PROCEDURE — 99222 1ST HOSP IP/OBS MODERATE 55: CPT | Mod: GC

## 2020-08-17 PROCEDURE — 99233 SBSQ HOSP IP/OBS HIGH 50: CPT

## 2020-08-17 PROCEDURE — 70496 CT ANGIOGRAPHY HEAD: CPT | Mod: 26

## 2020-08-17 RX ORDER — SODIUM CHLORIDE 9 MG/ML
250 INJECTION INTRAMUSCULAR; INTRAVENOUS; SUBCUTANEOUS ONCE
Refills: 0 | Status: COMPLETED | OUTPATIENT
Start: 2020-08-17 | End: 2020-08-17

## 2020-08-17 RX ADMIN — ATORVASTATIN CALCIUM 80 MILLIGRAM(S): 80 TABLET, FILM COATED ORAL at 21:48

## 2020-08-17 RX ADMIN — CEFTRIAXONE 100 MILLIGRAM(S): 500 INJECTION, POWDER, FOR SOLUTION INTRAMUSCULAR; INTRAVENOUS at 12:50

## 2020-08-17 RX ADMIN — MIDODRINE HYDROCHLORIDE 10 MILLIGRAM(S): 2.5 TABLET ORAL at 02:16

## 2020-08-17 RX ADMIN — SODIUM CHLORIDE 1000 MILLILITER(S): 9 INJECTION INTRAMUSCULAR; INTRAVENOUS; SUBCUTANEOUS at 00:24

## 2020-08-17 RX ADMIN — Medication 81 MILLIGRAM(S): at 12:50

## 2020-08-17 RX ADMIN — CLOPIDOGREL BISULFATE 75 MILLIGRAM(S): 75 TABLET, FILM COATED ORAL at 12:50

## 2020-08-17 RX ADMIN — MIDODRINE HYDROCHLORIDE 10 MILLIGRAM(S): 2.5 TABLET ORAL at 18:08

## 2020-08-17 RX ADMIN — ENOXAPARIN SODIUM 40 MILLIGRAM(S): 100 INJECTION SUBCUTANEOUS at 12:50

## 2020-08-17 RX ADMIN — MIDODRINE HYDROCHLORIDE 10 MILLIGRAM(S): 2.5 TABLET ORAL at 12:50

## 2020-08-17 NOTE — PROGRESS NOTE ADULT - ASSESSMENT
ASSESSMENT: 75-year-old right-handed gentleman first evaluated at [facility] on 8/15/2020 with left hemiparesis.  CT head (8/14/2020) to my eye showed moderate-severe periventricular chronic ischemic changes.  There was a possible subacute right corona radiata infarct, perhaps more likely part and parcel of the chronic ischemic changes.  CTA neck and head (8/14/2020) to my eye showed severe right M1 stenosis.  The left vertebral artery was dominant.  Incidentally noted were emphysematous lung changes.     Impression.  On 8/14/2020 he developed some type of change in mental status along with headache, and had been dropping objects, most likely due to left hemiparesis.  His presentation is consistent with right MCA infarction, most likely acute ischemic stroke, probably related to severe right M1 stenosis with borderzone pattern.  The right M1 stenosis is most likely due to large artery atherosclerosis, although an embolus with partial lysis cannot be ruled out with certainty (embolic stroke of undetermined source).       NEURO: No acute neurological change, overall improved and back to baseline vs. change on 8/15/20. Continue close monitoring for neurologic deterioration, permissive HTN with gentle IVF as tolerated continue on midodrine to induce HTN, SBP >160 mmHg systolic for neurological stability, patient with hypotensive event on 8/15/20 resulting in worsening neurological deficits but improvement to baseline once given hydration and BP raised, LDL - , titrate statin to goal <70, CTH (08/14/20) moderate to severe periventricular chronic ischemic changes, possible subacute right corona radiata infarct, likely chronic, MRI Brain w/o showed moderate sized right corona radiata/centrum semiovale internal borderzone infarct.  Physical therapy/OT recommended acute rehab.     ANTITHROMBOTIC THERAPY: ASA and Plavix for 3 months per SAMPRISS protocol, then ASA indefinitely from neurological standpoint     PULMONARY: CXR clear, protecting airway, saturating well on room air. Incidental finding on CTA H/N emphysematous changes. Pulmonary consulted, Dr. Zelaya following - recommended CT Chest for further evaluation. CT Chest 8/16 showed apical paraseptal emphysema, can consider Duoneb for wheezing or shortness of breath as needed     CARDIOVASCULAR: TTE (08/16/20): EF 73%, minimal MR, negative PFO, continue cardiac monitoring on telemetry, Cardiology, Dr. Aguirre consult appreciated - continue with gentle IVF hydration,  Midodrine  continued      SBP goal: permissive HTN     GASTROINTESTINAL:  initial dysphagia screen passed but patient was witnessed to be drooling and pocketing meals while eating. Due to high risk of aspiration from possible hemineglect, made NPO.  S&S, evaluation 8/16 recommended pureed diet with full assistance, recommend MBS 8/17 to r/o silent aspiration      Diet: Pureed diet     RENAL: BUN/Cr without acute change, continue with adequate IV hydration , continue close monitoring of hyponatremia which was present on admission as well      Na Goal: Greater than 135     Lee: no    HEMATOLOGY: H/H without acute change , Platelets 236     DVT ppx: LMWH [x]     ID: afebrile, mild leukocytosis currently with slight downtrend,  UA positive UTI, started on ceftriaxone for 5 days, CXR clear lungs, blood and urine cultures pending, will continue to monitor     OTHER: ENT consulted as recommendations per S&S and Pulm for hoarse voice r/o vocal cord paralysis. ENT bedside FOE revealed patent airway and vocal cords mobile with good contact b/l - possible small glottic endy inferiorly, no further recommendations, no further ENT intervention required. Plan endorsed to patient and family member at bedside. Patient gives permission to disclose information to family member. All questions and concerns addressed.     DISPOSITION: Acute Rehab once stable and workup is complete      CORE MEASURES:        Admission NIHSS: 2     TPA: [] YES [x] NO      LDL/HDL:     Depression Screen: p     Statin Therapy: yes     Dysphagia Screen: [x] PASS [] FAIL     Smoking [] YES [x] NO      Afib [] YES [x] NO     Stroke Education [x] YES [] NO    Obtain screening lower extremity venous ultrasound in patients who meet 1 or more of the following criteria as patient is high risk for DVT/PE on admission:   [] History of DVT/PE  []Hypercoagulable states (Factor V Leiden, Cancer, OCP, etc. )  []Prolonged immobility (hemiplegia/hemiparesis/post operative or any other extended immobilization)  [] Transferred from outside facility (Rehab or Long term care)  [] Age </= to 50 ASSESSMENT: 75-year-old right-handed gentleman first evaluated at [facility] on 8/15/2020 with left hemiparesis.  CT head (8/14/2020) to my eye showed moderate-severe periventricular chronic ischemic changes.  There was a possible subacute right corona radiata infarct, perhaps more likely part and parcel of the chronic ischemic changes.  CTA neck and head (8/14/2020) to my eye showed severe right M1 stenosis.  The left vertebral artery was dominant.  Incidentally noted were emphysematous lung changes.     Impression.  On 8/14/2020 he developed some type of change in mental status along with headache, and had been dropping objects, most likely due to left hemiparesis.  His presentation is consistent with right MCA infarction, most likely acute ischemic stroke, probably related to severe right M1 stenosis with borderzone pattern.  The right M1 stenosis is most likely due to large artery atherosclerosis, although an embolus with partial lysis cannot be ruled out with certainty (embolic stroke of undetermined source).       NEURO: No acute neurological change. Continue close monitoring for neurologic deterioration, permissive HTN with gentle IVF as tolerated continue on midodrine to induce HTN, SBP >160 mmHg systolic for neurological stability, patient with hypotensive event on 8/15/20 resulting in worsening neurological deficits but improvement to baseline once given hydration and BP raised, LDL - , titrate statin to goal <70, CTH (08/14/20) moderate to severe periventricular chronic ischemic changes, possible subacute right corona radiata infarct, likely chronic, MRI Brain w/o showed moderate sized right corona radiata/centrum semiovale internal borderzone infarct.  Physical therapy/OT recommended acute rehab.     ANTITHROMBOTIC THERAPY: ASA and Plavix for 3 months per SAMPRISS protocol, then ASA indefinitely from neurological standpoint     PULMONARY: CXR clear, protecting airway, saturating well on room air. Incidental finding on CTA H/N emphysematous changes. Pulmonary consulted, Dr. Zelaya following -. CT Chest 8/16 showed apical paraseptal emphysema,no need for bronchodilator at this time     CARDIOVASCULAR: TTE (08/16/20): EF 73%, minimal MR, negative PFO, continue cardiac monitoring on telemetry, Cardiology, Dr. Aguirre consult appreciated - continue with gentle IVF hydration,  Midodrine  continued , ILR to screen for occult arrhythmia.     SBP goal: permissive HTN     GASTROINTESTINAL:  initial dysphagia screen passed but patient was witnessed to be drooling and pocketing meals while eating. Due to high risk of aspiration from possible hemineglect, made NPO.  S&S, evaluation 8/16 recommended pureed diet with full assistance, recommend MBS 8/17 to r/o silent aspiration      Diet: Pureed diet     RENAL: BUN/Cr without acute change, continue with adequate IV hydration , continue close monitoring of hyponatremia which was present on admission as well      Na Goal: Greater than 135     Lee: no    HEMATOLOGY: H/H without acute change , Platelets 236, no si/sx of bleeding      DVT ppx: LMWH [x]     ID: afebrile, mild leukocytosis currently with slight downtrend,  UA positive UTI, started on ceftriaxone for 5 days, CXR clear lungs, blood and urine cultures pending, will continue to monitor     OTHER: ENT consulted as recommendations per S&S and Pulm for hoarse voice r/o vocal cord paralysis. ENT bedside FOE revealed patent airway and vocal cords mobile with good contact b/l - possible small glottic endy inferiorly, no further recommendations, no further ENT intervention required. Plan endorsed to patient and family member at bedside. Patient gives permission to disclose information to family member. All questions and concerns addressed.     DISPOSITION: Acute Rehab once stable and workup is complete      CORE MEASURES:        Admission NIHSS: 2     TPA: [] YES [x] NO      LDL/HDL:     Depression Screen: p     Statin Therapy: yes     Dysphagia Screen: [x] PASS [] FAIL     Smoking [] YES [x] NO      Afib [] YES [x] NO     Stroke Education [x] YES [] NO    Obtain screening lower extremity venous ultrasound in patients who meet 1 or more of the following criteria as patient is high risk for DVT/PE on admission:   [] History of DVT/PE  []Hypercoagulable states (Factor V Leiden, Cancer, OCP, etc. )  []Prolonged immobility (hemiplegia/hemiparesis/post operative or any other extended immobilization)  [] Transferred from outside facility (Rehab or Long term care)  [] Age </= to 50

## 2020-08-17 NOTE — PROVIDER CONTACT NOTE (CHANGE IN STATUS NOTIFICATION) - ASSESSMENT
pt with increase in dysarthria, L hemineglect, and increased drooling  NIHSS increase from 7 to 9  VSS / 59, HR 55, O2 96% on room air, RR 20

## 2020-08-17 NOTE — CONSULT NOTE ADULT - ATTENDING COMMENTS
Patient seen and examined and cased discussed with resident. Agree with recommendations.  75y Male with functional deficits after right MCA ischemic CVA.  transfers/ambulation today at mod assist, was previously at contact guard on 8/15.   recommend repeat CT  will continue to follow
Advanced care planning was discussed with patient and family.  Advanced care planning forms were reviewed and discussed as appropriate.  Differential diagnosis and plan of care discussed with patient after the evaluation.   Pain assessed and judicious use of narcotics when appropriate was discussed.  Importance of Fall prevention discussed.  Counseling on Smoking and Alcohol cessation was offered when appropriate.  Counseling on Diet, exercise, and medication compliance was done.
see attending attestation in H&P

## 2020-08-17 NOTE — CONSULT NOTE ADULT - SUBJECTIVE AND OBJECTIVE BOX
Patient is a 75y old  Male who presents with a chief complaint of R M1 occlusion vs stenosis (17 Aug 2020 12:35)      HPI:  Opal Matthew is a 76 y/o RH man with a PMH of HTN not on antithrombotics who presented to [facility] on 20 with a one day history of headache and altered mental status. Per his friend, the pt suddenly became confused yesterday around 5pm and complained of a new onset headache. The pt complained about some weakness in his right lower extremity and endorsed a sharp headache localized to the top/middle of his hairline that did not radiate anywhere.     Imaging revealed acute right MCA infarct, felt by neuro to be most likely as a result of large artery atherosclerosis, although an embolus with partial lysis could not be ruled out with certainty (embolic stroke of undetermined source).     Pt had hypotensive event on 8/10 and experienced worsening of neurological symptoms. Hypertension was induced with midodrine and IVF and neurological deterioration resolved.     Incidentally noted to have paraseptal emphysema on imaging. Seen by pulm who felt this was not clinically significant.     Pt also found to have UTI, treated with ceftriaxone -. Currently with evans.     Pt on pureed diet with full assistance at the time of this consult; MBS pending.     -------------------------------------------------    IMAGING:     MRI Brain w/o (08/15/20): Acute infarct in the right MCA territory somewhat patchy involving the right lateral temporal lobe as well as the right corona radiata/centrum semiovale ovale region in a somewhat junctional pattern, deep internal border zone type pattern. Clinical correlation recommended. No significant mass effect. No associated hemorrhage    CT Head No Cont. (20): There is no acute intracranial hemorrhage. No focal edema or evidence of acute, transcortical infarct. No hydrocephalus, midline shift or mass effect.    CT Angio Head w/IV Cont. (20): 6 mm in length severe stenosis/near occlusion of the M1 segment of the Right MCA with reconstitution at the bifurcation.    CT Angio Neck w/IV Cont. (20): Patent cervical vasculature. No flow limiting stenosis or occlusion        ------------------------------------      REVIEW OF SYSTEMS  Constitutional - No fever, No fatigue  HEENT - No visual disturbances, No difficulty hearing, No tinnitus, No vertigo  Respiratory - No cough, No wheezing, No shortness of breath  Cardiovascular - No chest pain, No palpitations  Gastrointestinal - No abdominal pain, No nausea, No vomiting, No diarrhea, No constipation  Genitourinary - No dysuria, No frequency, No hematuria, No incontinence  Neurological - No headaches, No memory loss, No loss of strength, No numbness  Skin -  No lesions   Musculoskeletal - No joint pain, No joint swelling, No muscle pain  Psychiatric - No depression, No anxiety    PAST MEDICAL & SURGICAL HISTORY  HTN (hypertension)      SOCIAL HISTORY  Smoking - Denied  EtOH - Denied   Drugs - Denied    FUNCTIONAL HISTORY  Pt is RH and states he lives in a private house with 5-6 steps to enter; bedroom is on the main floor. Pt was fully independent, driving and was taking care of his aunt PTA.    CURRENT FUNCTIONAL STATUS as of 8/15/20  Bed Mobility: Supine to Sit:     · Level of [place]	contact guard	  · Physical Assist/Nonphysical Assist	1 person assist; verbal cues	    Bed Mobility Analysis:     · Bed Mobility Limitations	decreased ability to use arms for pushing/pulling	  · Impairments Contributing to Impaired Bed Mobility	decreased strength	    Transfer: Sit to Stand:     · Level of [place]	contact guard	  · Physical Assist/Nonphysical Assist	1 person assist; verbal cues	  · Weight-Bearing Restrictions	full weight-bearing	    Transfer: Stand to Sit:     · Level of [place]	contact guard	  · Physical Assist/Nonphysical Assist	1 person assist; verbal cues	  · Weight-Bearing Restrictions	full weight-bearing	  · Assistive Device	rolling walker	    Sit/Stand Transfer Safety Analysis:     · Transfer Safety Concerns Noted	decreased safety awareness; losing balance	  · Impairments Contributing to Impaired Transfers	impaired balance; decreased strength	    Gait Skills:     · Level of [place]	minimum assist (75% patients effort)	  · Physical Assist/Nonphysical Assist	1 person assist; verbal cues	  · Weight-Bearing Restrictions	full weight-bearing	  · Assistive Device	rolling walker	  · Gait Distance	25 feet	    Gait Analysis:     · Gait Pattern Used	3-point gait	  · Gait Deviations Noted	decreased moises; footdrop; decreased velocity of limb motion; decreased step length	  · Impairments Contributing to Gait Deviations	impaired balance; decreased strength	    Stair Negotiation:     · Level of Export	TBA in rehab	      FAMILY HISTORY       ALLERGIES  No Known Allergies      MEDICATIONS   acetaminophen   Tablet .. 650 milliGRAM(s) Oral every 6 hours PRN  aspirin  chewable 81 milliGRAM(s) Oral daily  atorvastatin 80 milliGRAM(s) Oral at bedtime  cefTRIAXone   IVPB 1000 milliGRAM(s) IV Intermittent every 24 hours  clopidogrel Tablet 75 milliGRAM(s) Oral daily  enoxaparin Injectable 40 milliGRAM(s) SubCutaneous daily  midodrine. 10 milliGRAM(s) Oral three times a day  sodium chloride 0.9%. 1000 milliLiter(s) IV Continuous <Continuous>          VITALS  T(C): 36.8 (20 @ 07:00), Max: 37.2 (20 @ 20:00)  HR: 64 (20 @ 12:00) (51 - 79)  BP: 151/67 (20 @ 12:00) (110/44 - 172/67)  RR: 21 (20 @ 12:00) (15 - 25)  SpO2: 96% (20 @ 12:00) (94% - 97%)  Wt(kg): --    RECENT LABS/IMAGING  CBC Full  -  ( 17 Aug 2020 04:50 )  WBC Count : 12.31 K/uL  RBC Count : 4.64 M/uL  Hemoglobin : 14.1 g/dL  Hematocrit : 41.6 %  Platelet Count - Automated : 236 K/uL  Mean Cell Volume : 89.7 fl  Mean Cell Hemoglobin : 30.4 pg  Mean Cell Hemoglobin Concentration : 33.9 gm/dL  Auto Neutrophil # : x  Auto Lymphocyte # : x  Auto Monocyte # : x  Auto Eosinophil # : x  Auto Basophil # : x  Auto Neutrophil % : x  Auto Lymphocyte % : x  Auto Monocyte % : x  Auto Eosinophil % : x  Auto Basophil % : x        134<L>  |  108  |  9   ----------------------------<  98  3.7   |  19<L>  |  0.73    Ca    8.8      17 Aug 2020 04:50      Urinalysis Basic - ( 16 Aug 2020 08:09 )    Color: Light Yellow / Appearance: Clear / S.014 / pH: x  Gluc: x / Ketone: Moderate  / Bili: Negative / Urobili: Negative   Blood: x / Protein: Negative / Nitrite: Negative   Leuk Esterase: Large / RBC: 12 /hpf / WBC 10 /HPF   Sq Epi: x / Non Sq Epi: 1 /hpf / Bacteria: Negative      ----------------------------------------------------------------------------------------  PHYSICAL EXAM  Constitutional - NAD, Comfortable  HEENT - NCAT, EOMI  Neck - Supple, No limited ROM  Chest - Breathing comfortably, equal chest rise  Cardiovascular - well perfused  Abdomen - Soft   Extremities - No C/C/E, No calf tenderness   Neurologic Exam -                    Cognitive - Awake, Alert, AAO to self, place, date, year, situation     Communication - Fluent, No dysarthria     Cranial Nerves - +left facial droop     Motor -                     LEFT    UE - ShAB 5/5, EF 5/5, EE 5/5, WE 5/5,  5/5                    RIGHT UE - ShAB 5/5, EF 5/5, EE 5/5, WE 5/5,  5/5                    LEFT    LE - HF 5/5, KE 5/5, DF 5/5, PF 5/5                    RIGHT LE - HF 5/5, KE 5/5, DF 5/5, PF 5/5        Sensory - Intact to LT     Reflexes - DTR Intact, No primitive reflexive  Psychiatric - Mood stable, Affect WNL  ----------------------------------------------------------------------------------------  ASSESSMENT/PLAN  75y Male with functional deficits after right MCA ischemic CVA.  CVA - permissive HTN with midodrine/IVF per neuro. DAPT x3mos per SAMPRISS followed by ASA indefinitely.  c/w statin.   Dsyphagia - pureed diet for now; MBS pending  UTI - ceftriaxone; TOV when ambulating  Pain - tylenol   PT/OT - daily therapies for ROM, increased mobility, and decreased debility related to hospitalization.  Balance, gait training, ADLs, transfers, and bracing/assistive devices as needed.    DVT PPX - lovenox  Rehab -    When medically stable, recommend ACUTE inpatient rehabilitation for the functional deficits consisting of 3 hours of therapy/day & 24 hour RN/daily PMR physician for comorbid medical management. Will continue to follow for ongoing rehab needs and recommendations. Patient is a 75y old  Male who presents with a chief complaint of R M1 occlusion vs stenosis (17 Aug 2020 12:35)      HPI:  Opal Matthew is a 74 y/o RH man with a PMH of HTN not on antithrombotics who presented to [facility] on 20 with a one day history of headache and altered mental status. Per his friend, the pt suddenly became confused yesterday around 5pm and complained of a new onset headache. The pt complained about some weakness in his right lower extremity and endorsed a sharp headache localized to the top/middle of his hairline that did not radiate anywhere.     Imaging revealed acute right MCA infarct, felt by neuro to be most likely as a result of large artery atherosclerosis, although an embolus with partial lysis could not be ruled out with certainty (embolic stroke of undetermined source).     Pt had hypotensive event on 8/10 and experienced worsening of neurological symptoms. Hypertension was induced with midodrine and IVF and neurological deterioration resolved.     Incidentally noted to have paraseptal emphysema on imaging. Seen by pulm who felt this was not clinically significant.     Pt also found to have UTI, treated with ceftriaxone -. Currently with evans.     Pt on pureed diet with full assistance at the time of this consult; MBS scheduled for tomorrow.     -------------------------------------------------    IMAGING:     MRI Brain w/o (08/15/20): Acute infarct in the right MCA territory somewhat patchy involving the right lateral temporal lobe as well as the right corona radiata/centrum semiovale ovale region in a somewhat junctional pattern, deep internal border zone type pattern. Clinical correlation recommended. No significant mass effect. No associated hemorrhage    CT Head No Cont. (20): There is no acute intracranial hemorrhage. No focal edema or evidence of acute, transcortical infarct. No hydrocephalus, midline shift or mass effect.    CT Angio Head w/IV Cont. (20): 6 mm in length severe stenosis/near occlusion of the M1 segment of the Right MCA with reconstitution at the bifurcation.    CT Angio Neck w/IV Cont. (20): Patent cervical vasculature. No flow limiting stenosis or occlusion        ------------------------------------      REVIEW OF SYSTEMS  Constitutional - No fever, No fatigue  HEENT - No visual disturbances, No difficulty hearing, No tinnitus, No vertigo  Respiratory - No cough, No wheezing, No shortness of breath  Cardiovascular - No chest pain, No palpitations  Gastrointestinal - No abdominal pain, No nausea, No vomiting, No diarrhea, No constipation  Genitourinary - No dysuria, No frequency, No hematuria, No incontinence  Neurological - No headaches, No memory loss, No loss of strength, No numbness  Skin -  No lesions   Musculoskeletal - No joint pain, No joint swelling, No muscle pain  Psychiatric - No depression, No anxiety    PAST MEDICAL & SURGICAL HISTORY  HTN (hypertension)      SOCIAL HISTORY  Smoking - Denied  EtOH - Denied   Drugs - Denied    FUNCTIONAL HISTORY  Pt is RH and states he lives in a private house with 5-6 steps to enter; bedroom is on the main floor. Pt was fully independent, driving and was taking care of his aunt PTA. has lived in same home with his roommate/best friend x 20 years.     CURRENT FUNCTIONAL STATUS  SLP   Speech Language Pathology Recommendations: Puree texture diet with 100% supervision/assistanceaspiration precautions thin liquids via tsp or straw to minimize anterior loss    PT     Bed Mobility  Bed Mobility Training Supine-to-Sit: moderate assist (50% patient effort);  1 person assist;  nonverbal cues (demo/gestures);  verbal cues;  bed rails  Bed Mobility Training Limitations: decreased strength;  impaired balance;  decreased ability to use legs for bridging/pushing;  decreased ability to use arms for pushing/pulling;  impaired coordination;  impaired motor control;  cognitive, decreased safety awareness    Sit-Stand Transfer Training  Transfer Training Sit-to-Stand Transfer: moderate assist (50% patient effort);  1 person assist;  nonverbal cues (demo/gestures);  verbal cues;  full weight-bearing   rolling walker  Transfer Training Stand-to-Sit Transfer: moderate assist (50% patient effort);  1 person assist;  nonverbal cues (demo/gestures);  verbal cues;  full weight-bearing   rolling walker  Sit-to-Stand Transfer Training Transfer Safety Analysis: decreased balance;  decreased sequencing ability;  decreased step length;  decreased weight-shifting ability;  decreased strength;  impaired coordination;  impaired balance;  impaired motor control;  cognitive, decreased safety awareness;  rolling walker    Gait Training  Gait Training: moderate assist (50% patient effort);  1 person assist;  nonverbal cues (demo/gestures);  verbal cues;  full weight-bearing   rolling walker;  25 feet;  with chair follow  Gait Analysis: 3-point gait   decreased moises;  decreased step length;  decreased stride length;  decreased toe clearance;  shuffling;  decreased weight-shifting ability;  decreased strength;  impaired balance;  impaired coordination;  impaired motor control;  cognitive, decreased safety awareness;  25 feet;  rolling walker;  w/ chair follow    Therapeutic Exercise  Therapeutic Exercise Detail: Pt davy seated balance ~5 min with fair trunk control. Pt davy seated therex: marches, knee ext & calf raises, 1x10 each. Yellow foam given to pt by PT for LUE  strength.     8/15 OT    Bed Mobility: Supine to Sit:     · Level of [place]	contact guard    Bed Mobility Analysis:     · Impairments Contributing to Impaired Bed Mobility	cognition; impaired balance    Transfer: Bed to Chair:    Bed to Chair Transfer:    Transfer Skill: Bed to Chair   · Level of [place]	minimum assist (75% patients effort)  · Physical Assist/Nonphysical Assist	1 person assist; nonverbal cues (demo/gestures); verbal cues  · Assistive Device	rolling walker    Bed to Chair Safety Analysis:     · Impairments Contributing to Impaired Transfers	impaired balance; cognition; impaired coordination; impaired motor control  · Transfer Safety Concerns Noted	decreased weight-shifting ability; decreased safety awareness    Transfer: Sit to Stand:     · Level of [place]	contact guard  · Physical Assist/Nonphysical Assist	1 person assist; nonverbal cues (demo/gestures); verbal cues    Transfer: Stand to Sit:     · Level of [place]	contact guard  · Physical Assist/Nonphysical Assist	1 person assist; nonverbal cues (demo/gestures); verbal cues      FAMILY HISTORY   no pertinent history in primary relatives    ALLERGIES  No Known Allergies      MEDICATIONS   acetaminophen   Tablet .. 650 milliGRAM(s) Oral every 6 hours PRN  aspirin  chewable 81 milliGRAM(s) Oral daily  atorvastatin 80 milliGRAM(s) Oral at bedtime  cefTRIAXone   IVPB 1000 milliGRAM(s) IV Intermittent every 24 hours  clopidogrel Tablet 75 milliGRAM(s) Oral daily  enoxaparin Injectable 40 milliGRAM(s) SubCutaneous daily  midodrine. 10 milliGRAM(s) Oral three times a day  sodium chloride 0.9%. 1000 milliLiter(s) IV Continuous <Continuous>          VITALS  T(C): 36.8 (0817-20 @ 07:00), Max: 37.2 (20 @ 20:00)  HR: 64 (20 @ 12:00) (51 - 79)  BP: 151/67 (20 @ 12:00) (110/44 - 172/67)  RR: 21 (20 @ 12:00) (15 - 25)  SpO2: 96% (20 @ 12:00) (94% - 97%)  Wt(kg): --    RECENT LABS/IMAGING  CBC Full  -  ( 17 Aug 2020 04:50 )  WBC Count : 12.31 K/uL  RBC Count : 4.64 M/uL  Hemoglobin : 14.1 g/dL  Hematocrit : 41.6 %  Platelet Count - Automated : 236 K/uL  Mean Cell Volume : 89.7 fl  Mean Cell Hemoglobin : 30.4 pg  Mean Cell Hemoglobin Concentration : 33.9 gm/dL  Auto Neutrophil # : x  Auto Lymphocyte # : x  Auto Monocyte # : x  Auto Eosinophil # : x  Auto Basophil # : x  Auto Neutrophil % : x  Auto Lymphocyte % : x  Auto Monocyte % : x  Auto Eosinophil % : x  Auto Basophil % : x        134<L>  |  108  |  9   ----------------------------<  98  3.7   |  19<L>  |  0.73    Ca    8.8      17 Aug 2020 04:50      Urinalysis Basic - ( 16 Aug 2020 08:09 )    Color: Light Yellow / Appearance: Clear / S.014 / pH: x  Gluc: x / Ketone: Moderate  / Bili: Negative / Urobili: Negative   Blood: x / Protein: Negative / Nitrite: Negative   Leuk Esterase: Large / RBC: 12 /hpf / WBC 10 /HPF   Sq Epi: x / Non Sq Epi: 1 /hpf / Bacteria: Negative      ----------------------------------------------------------------------------------------  PHYSICAL EXAM  Constitutional - NAD, Comfortable, in bed   HEENT - NCAT, EOMI  Neck - Supple, No limited ROM  Chest - Breathing comfortably, equal chest rise  Cardiovascular - well perfused  Abdomen - Soft   Extremities - No C/C/E, No calf tenderness   Neurologic Exam -                    Cognitive - Awake, Alert, AAO to self, place, date, year, situation     Communication - Fluent, No dysarthria     Cranial Nerves - +left facial droop     Motor -                     LEFT    UE - ShAB 5/5, EF 5/5, EE 5/5, WE 5/5,  5/5                    RIGHT UE - ShAB 5/5, EF 5/5, EE 5/5, WE 5/5,  5/5                    LEFT    LE - HF 5/5, KE 5/5, DF 5/5, PF 5/5                    RIGHT LE - HF 5/5, KE 5/5, DF 5/5, PF 5/5        Sensory - Intact to LT     Reflexes - DTR Intact, No primitive reflexive  Psychiatric - Mood stable, Affect WNL  ----------------------------------------------------------------------------------------  ASSESSMENT/PLAN  75y Male with functional deficits after right MCA ischemic CVA.  CVA - permissive HTN with midodrine/IVF per neuro. DAPT x3mos per SAMPRISS followed by ASA indefinitely.  c/w statin.   Dsyphagia - pureed diet for now; MBS pending  UTI - ceftriaxone; TOV when ambulating  Pain - tylenol   PT/OT - daily therapies for ROM, increased mobility, and decreased debility related to hospitalization.  Balance, gait training, ADLs, transfers, and bracing/assistive devices as needed.    DVT PPX - lovenox  Rehab -    When medically stable, recommend ACUTE inpatient rehabilitation for the functional deficits consisting of 3 hours of therapy/day & 24 hour RN/daily PMR physician for comorbid medical management. Will continue to follow for ongoing rehab needs and recommendations.

## 2020-08-17 NOTE — PROGRESS NOTE ADULT - SUBJECTIVE AND OBJECTIVE BOX
THE PATIENT WAS SEEN AND EXAMINED BY ME WITH THE HOUSESTAFF AND STROKE TEAM DURING MORNING ROUNDS.   HPI:  Opal Matthew is a 74 y/o RH man with a PMH of HTN not on antithrombotics who presented to the ED with an acute one day history of headache and altered mental status. Per his friend, the pt suddenly became confused day prior to admission around 5pm and complained of a new onset headache. The pt complained about some weakness in his right lower extremity and endorses a sharp headache localized to the top/middle of his hairline that did not radiate anywhere. The pt denied  photophobia, recent fever or flu like illness, changes in hearing, changes in vision, recent falls or trauma, or pain other than the headache, difficulty swallowing, dysuria, diarrhea, numbness/tingling anywhere. No history of headaches.  Per ED,  The friend stated that the patient's speech was "off" and that his face "did not look normal". The pt did not want to go to the ER at the time, but came day of admission because the headache persisted throughout the night and today he began to feel lightheaded. The pt stated that he ambulates and talks normally at baseline and that he had never experienced these symptoms before day prior to admission.      SUBJECTIVE: No events overnight.  No new neurologic complaints.  ROS reported negative unless otherwise noted.    acetaminophen   Tablet .. 650 milliGRAM(s) Oral every 6 hours PRN  aspirin  chewable 81 milliGRAM(s) Oral daily  atorvastatin 80 milliGRAM(s) Oral at bedtime  cefTRIAXone   IVPB 1000 milliGRAM(s) IV Intermittent every 24 hours  clopidogrel Tablet 75 milliGRAM(s) Oral daily  enoxaparin Injectable 40 milliGRAM(s) SubCutaneous daily  midodrine. 10 milliGRAM(s) Oral three times a day  sodium chloride 0.9%. 1000 milliLiter(s) IV Continuous <Continuous>      PHYSICAL EXAM:   Vital Signs Last 24 Hrs  T(C): 36.8 (17 Aug 2020 07:00), Max: 37.2 (16 Aug 2020 20:00)  T(F): 98.2 (17 Aug 2020 07:00), Max: 98.9 (16 Aug 2020 20:00)  HR: 65 (17 Aug 2020 08:00) (51 - 79)  BP: 154/62 (17 Aug 2020 08:00) (110/44 - 172/67)  BP(mean): 88 (17 Aug 2020 08:00) (63 - 97)  RR: 21 (17 Aug 2020 08:00) (15 - 25)  SpO2: 95% (17 Aug 2020 08:00) (94% - 97%)    General: No acute distress  HEENT: EOM intact, Left homonymous hemianopia  Abdomen: Soft, nontender, nondistended   Extremities: No edema    NEUROLOGICAL EXAM:  Mental status: Awake, alert, oriented to name, place and time, speech fluent, mild left hemineglect but looks over readily when prompted, following commands, unable to recall professional employment in the past   Cranial Nerves: Mild to moderate left facial palsy, no nystagmus, LHH, gaze crosses midline, mild dysarthria,  tongue midline  Motor exam: Normal tone, Right no drift, left motor neglect - requires prompting for motor assessment, Left arm and leg mild drift, left hemiparesis 4/5 drift, right side 5/5   Sensation: intact to light touch on right but sensory-extinction on left  Coordination/ Gait: no ataxia bilaterally, gait not tested     LABS:                        14.1   12.31 )-----------( 236      ( 17 Aug 2020 04:50 )             41.6    08-17    134<L>  |  108  |  9   ----------------------------<  98  3.7   |  19<L>  |  0.73    Ca    8.8      17 Aug 2020 04:50    PT/INR - ( 15 Aug 2020 14:55 )   PT: 12.1 sec;   INR: 1.02 ratio         PTT - ( 15 Aug 2020 14:55 )  PTT:34.4 sec      IMAGING: Reviewed by me.   MRI Brain w/o (08/15/20): Acute infarct in the right MCA territory somewhat patchy involving the right lateral temporal lobe as well as the right corona radiata/centrum semiovale ovale region in a somewhat junctional pattern, deep internal border zone type pattern. Clinical correlation recommended. No significant mass effect. No associated hemorrhage    CT Head No Cont. (08/14/20): There is no acute intracranial hemorrhage. No focal edema or evidence of acute, transcortical infarct. No hydrocephalus, midline shift or mass effect.    CT Angio Head w/IV Cont. (08/14/20): 6 mm in length severe stenosis/near occlusion of the M1 segment of the Right MCA with reconstitution at the bifurcation.    CT Angio Neck w/IV Cont. (08/14/20): Patent cervical vasculature. No flow limiting stenosis or occlusion. THE PATIENT WAS SEEN AND EXAMINED BY ME WITH THE HOUSESTAFF AND STROKE TEAM DURING MORNING ROUNDS.   HPI:  Opal Matthew is a 76 y/o RH man with a PMH of HTN not on antithrombotics who presented to the ED with an acute one day history of headache and altered mental status. Per his friend, the pt suddenly became confused day prior to admission around 5pm and complained of a new onset headache. The pt complained about some weakness in his right lower extremity and endorses a sharp headache localized to the top/middle of his hairline that did not radiate anywhere. The pt denied  photophobia, recent fever or flu like illness, changes in hearing, changes in vision, recent falls or trauma, or pain other than the headache, difficulty swallowing, dysuria, diarrhea, numbness/tingling anywhere. No history of headaches.  Per ED,  The friend stated that the patient's speech was "off" and that his face "did not look normal". The pt did not want to go to the ER at the time, but came day of admission because the headache persisted throughout the night and today he began to feel lightheaded. The pt stated that he ambulates and talks normally at baseline and that he had never experienced these symptoms before day prior to admission.      SUBJECTIVE: No events overnight.  No new neurologic complaints.  ROS reported negative unless otherwise noted.    acetaminophen   Tablet .. 650 milliGRAM(s) Oral every 6 hours PRN  aspirin  chewable 81 milliGRAM(s) Oral daily  atorvastatin 80 milliGRAM(s) Oral at bedtime  cefTRIAXone   IVPB 1000 milliGRAM(s) IV Intermittent every 24 hours  clopidogrel Tablet 75 milliGRAM(s) Oral daily  enoxaparin Injectable 40 milliGRAM(s) SubCutaneous daily  midodrine. 10 milliGRAM(s) Oral three times a day  sodium chloride 0.9%. 1000 milliLiter(s) IV Continuous <Continuous>      PHYSICAL EXAM:   Vital Signs Last 24 Hrs  T(C): 36.8 (17 Aug 2020 07:00), Max: 37.2 (16 Aug 2020 20:00)  T(F): 98.2 (17 Aug 2020 07:00), Max: 98.9 (16 Aug 2020 20:00)  HR: 65 (17 Aug 2020 08:00) (51 - 79)  BP: 154/62 (17 Aug 2020 08:00) (110/44 - 172/67)  BP(mean): 88 (17 Aug 2020 08:00) (63 - 97)  RR: 21 (17 Aug 2020 08:00) (15 - 25)  SpO2: 95% (17 Aug 2020 08:00) (94% - 97%)    General: No acute distress  HEENT: EOM intact, Left homonymous hemianopia  Abdomen: Soft, nontender, nondistended   Extremities: No edema    NEUROLOGICAL EXAM:  Mental status: Awake, alert, oriented to name, place and time, speech fluent, mild left hemineglect but looks over readily when prompted, following commands, unable to recall professional employment in the past   Cranial Nerves: Mild to moderate left facial palsy, no nystagmus, LHH, gaze crosses midline, mild dysarthria,  tongue midline  Motor exam: Normal tone, right side moves against gravity, left when tested independent from right LUE 4/5, LLE 4+/5, when tested together appears 3/5 with drift  Sensation: intact to light touch on right but sensory-extinction on left  Coordination/ Gait: no ataxia bilaterally, gait not tested     LABS:                        14.1   12.31 )-----------( 236      ( 17 Aug 2020 04:50 )             41.6    08-17    134<L>  |  108  |  9   ----------------------------<  98  3.7   |  19<L>  |  0.73    Ca    8.8      17 Aug 2020 04:50    PT/INR - ( 15 Aug 2020 14:55 )   PT: 12.1 sec;   INR: 1.02 ratio         PTT - ( 15 Aug 2020 14:55 )  PTT:34.4 sec      IMAGING: Reviewed by me.   MRI Brain w/o (08/15/20): Acute infarct in the right MCA territory somewhat patchy involving the right lateral temporal lobe as well as the right corona radiata/centrum semiovale ovale region in a somewhat junctional pattern, deep internal border zone type pattern. Clinical correlation recommended. No significant mass effect. No associated hemorrhage    CT Head No Cont. (08/14/20): There is no acute intracranial hemorrhage. No focal edema or evidence of acute, transcortical infarct. No hydrocephalus, midline shift or mass effect.    CT Angio Head w/IV Cont. (08/14/20): 6 mm in length severe stenosis/near occlusion of the M1 segment of the Right MCA with reconstitution at the bifurcation.    CT Angio Neck w/IV Cont. (08/14/20): Patent cervical vasculature. No flow limiting stenosis or occlusion.

## 2020-08-17 NOTE — PROGRESS NOTE ADULT - ASSESSMENT
76 y/o RH man with a PMH of HTN not on antithrombotics who presented to the ED with an acute one day history of headache and altered mental status. Neurological examination demonstrates mild dysarthria with mild left nasolabial flattening with left arm weakness. CT/CTA per Neuroradiology demonstrates stenosis vs occlusion R M1. Patient out of window for tpa.    CVA  - workup and plan as per neurology  - ASA/plavix  - lipitor  - PT/OT  - swallow eval  - permissive hypertension    hypotension  - r/o sepsis /UTI  - follow up urine and blood cultures  - cont ceftriaxone for now  - cont iv fluids  - midodrine    HLD  -   - c/w lipitor    emphysema  - duonebs prn  - CT chest without contrast  - pulm following    DVT px  - lovenox 74 y/o RH man with a PMH of HTN not on antithrombotics who presented to the ED with an acute one day history of headache and altered mental status. Neurological examination demonstrates mild dysarthria with mild left nasolabial flattening with left arm weakness. CT/CTA per Neuroradiology demonstrates stenosis vs occlusion R M1. Patient out of window for tpa.    CVA  - workup and plan as per neurology  - ASA/plavix  - lipitor  - PT/OT  - swallow eval - puree diet  - permissive hypertension    hypotension  - r/o sepsis /UTI  - follow up urine and blood cultures  - cont ceftriaxone for now  - cont iv fluids  - midodrine    HLD  -   - c/w lipitor    emphysema  - duonebs prn  - CT chest without contrast done  - pulm following    DVT px  - lovenox

## 2020-08-17 NOTE — PROGRESS NOTE ADULT - SUBJECTIVE AND OBJECTIVE BOX
Patient is a 75y old  Male who presents with a chief complaint of R M1 occlusion vs stenosis (17 Aug 2020 12:35)      SUBJECTIVE / OVERNIGHT EVENTS:  No chest pain. No shortness of breath. No complaints. No events overnight.     MEDICATIONS  (STANDING):  aspirin  chewable 81 milliGRAM(s) Oral daily  atorvastatin 80 milliGRAM(s) Oral at bedtime  cefTRIAXone   IVPB 1000 milliGRAM(s) IV Intermittent every 24 hours  clopidogrel Tablet 75 milliGRAM(s) Oral daily  enoxaparin Injectable 40 milliGRAM(s) SubCutaneous daily  midodrine. 10 milliGRAM(s) Oral three times a day  sodium chloride 0.9%. 1000 milliLiter(s) (125 mL/Hr) IV Continuous <Continuous>    MEDICATIONS  (PRN):  acetaminophen   Tablet .. 650 milliGRAM(s) Oral every 6 hours PRN Temp greater or equal to 38C (100.4F), Mild Pain (1 - 3), Moderate Pain (4 - 6), Severe Pain (7 - 10)      Vital Signs Last 24 Hrs  T(C): 37.3 (17 Aug 2020 15:15), Max: 37.3 (17 Aug 2020 15:15)  T(F): 99.1 (17 Aug 2020 15:15), Max: 99.1 (17 Aug 2020 15:15)  HR: 62 (17 Aug 2020 15:07) (51 - 68)  BP: 157/62 (17 Aug 2020 15:07) (110/44 - 172/67)  BP(mean): 93 (17 Aug 2020 14:00) (63 - 97)  RR: 22 (17 Aug 2020 15:07) (15 - 25)  SpO2: 95% (17 Aug 2020 15:07) (93% - 97%)  CAPILLARY BLOOD GLUCOSE        I&O's Summary    16 Aug 2020 07:01  -  17 Aug 2020 07:00  --------------------------------------------------------  IN: 3745 mL / OUT: 3700 mL / NET: 45 mL    17 Aug 2020 07:01  -  17 Aug 2020 15:54  --------------------------------------------------------  IN: 0 mL / OUT: 1650 mL / NET: -1650 mL        PHYSICAL EXAM:  GENERAL: NAD, well-developed  HEAD:  Atraumatic, Normocephalic  EYES: EOMI, PERRLA, conjunctiva and sclera clear  NECK: Supple, No JVD  CHEST/LUNG: Clear to auscultation bilaterally; No wheeze  HEART: Regular rate and rhythm; No murmurs, rubs, or gallops  ABDOMEN: Soft, Nontender, Nondistended; Bowel sounds present  EXTREMITIES:  2+ Peripheral Pulses, No clubbing, cyanosis, or edema  PSYCH: AAOx3  NEUROLOGY: left sided weakness  SKIN: No rashes or lesions    LABS:                        14.1   12.31 )-----------( 236      ( 17 Aug 2020 04:50 )             41.6     08-17    134<L>  |  108  |  9   ----------------------------<  98  3.7   |  19<L>  |  0.73    Ca    8.8      17 Aug 2020 04:50        CARDIAC MARKERS ( 16 Aug 2020 05:18 )  x     / x     / x     / x     / 4.5 ng/mL      Urinalysis Basic - ( 16 Aug 2020 08:09 )    Color: Light Yellow / Appearance: Clear / S.014 / pH: x  Gluc: x / Ketone: Moderate  / Bili: Negative / Urobili: Negative   Blood: x / Protein: Negative / Nitrite: Negative   Leuk Esterase: Large / RBC: 12 /hpf / WBC 10 /HPF   Sq Epi: x / Non Sq Epi: 1 /hpf / Bacteria: Negative        RADIOLOGY & ADDITIONAL TESTS:    Imaging Personally Reviewed:    Consultant(s) Notes Reviewed:      Care Discussed with Consultants/Other Providers: Patient is a 75y old  Male who presents with a chief complaint of R M1 occlusion vs stenosis (17 Aug 2020 12:35)      SUBJECTIVE / OVERNIGHT EVENTS:  No chest pain. No shortness of breath. No complaints. No events overnight.     MEDICATIONS  (STANDING):  aspirin  chewable 81 milliGRAM(s) Oral daily  atorvastatin 80 milliGRAM(s) Oral at bedtime  cefTRIAXone   IVPB 1000 milliGRAM(s) IV Intermittent every 24 hours  clopidogrel Tablet 75 milliGRAM(s) Oral daily  enoxaparin Injectable 40 milliGRAM(s) SubCutaneous daily  midodrine. 10 milliGRAM(s) Oral three times a day  sodium chloride 0.9%. 1000 milliLiter(s) (125 mL/Hr) IV Continuous <Continuous>    MEDICATIONS  (PRN):  acetaminophen   Tablet .. 650 milliGRAM(s) Oral every 6 hours PRN Temp greater or equal to 38C (100.4F), Mild Pain (1 - 3), Moderate Pain (4 - 6), Severe Pain (7 - 10)      Vital Signs Last 24 Hrs  T(C): 37.3 (17 Aug 2020 15:15), Max: 37.3 (17 Aug 2020 15:15)  T(F): 99.1 (17 Aug 2020 15:15), Max: 99.1 (17 Aug 2020 15:15)  HR: 62 (17 Aug 2020 15:07) (51 - 68)  BP: 157/62 (17 Aug 2020 15:07) (110/44 - 172/67)  BP(mean): 93 (17 Aug 2020 14:00) (63 - 97)  RR: 22 (17 Aug 2020 15:07) (15 - 25)  SpO2: 95% (17 Aug 2020 15:07) (93% - 97%)  CAPILLARY BLOOD GLUCOSE        I&O's Summary    16 Aug 2020 07:01  -  17 Aug 2020 07:00  --------------------------------------------------------  IN: 3745 mL / OUT: 3700 mL / NET: 45 mL    17 Aug 2020 07:01  -  17 Aug 2020 15:54  --------------------------------------------------------  IN: 0 mL / OUT: 1650 mL / NET: -1650 mL        PHYSICAL EXAM:  GENERAL: NAD, well-developed  HEAD:  Atraumatic, Normocephalic  EYES: EOMI, PERRLA, conjunctiva and sclera clear  NECK: Supple, No JVD  CHEST/LUNG: Clear to auscultation bilaterally; No wheeze  HEART: Regular rate and rhythm; No murmurs, rubs, or gallops  ABDOMEN: Soft, Nontender, Nondistended; Bowel sounds present  EXTREMITIES:  2+ Peripheral Pulses, No clubbing, cyanosis, or edema  PSYCH: AAOx3  NEUROLOGY: left sided weakness  SKIN: No rashes or lesions    LABS:                        14.1   12. )-----------( 236      ( 17 Aug 2020 04:50 )             41.6     08-17    134<L>  |  108  |  9   ----------------------------<  98  3.7   |  19<L>  |  0.73    Ca    8.8      17 Aug 2020 04:50        CARDIAC MARKERS ( 16 Aug 2020 05:18 )  x     / x     / x     / x     / 4.5 ng/mL      Urinalysis Basic - ( 16 Aug 2020 08:09 )    Color: Light Yellow / Appearance: Clear / S.014 / pH: x  Gluc: x / Ketone: Moderate  / Bili: Negative / Urobili: Negative   Blood: x / Protein: Negative / Nitrite: Negative   Leuk Esterase: Large / RBC: 12 /hpf / WBC 10 /HPF   Sq Epi: x / Non Sq Epi: 1 /hpf / Bacteria: Negative        RADIOLOGY & ADDITIONAL TESTS:    Imaging Personally Reviewed:  < from: CT Chest No Cont (20 @ 14:54) >  PROCEDURE:  CT of the Chest was performed without intravenous contrast.  Sagittal and coronal reformats were performed.    FINDINGS:    LUNGS AND AIRWAYS: Patent central airways.  Dependent atelectasis. Biapical paraseptal emphysematous change. Subcentimeter bleb in the right lower lobe.  PLEURA: No pleural effusion.  MEDIASTINUM AND RODOLFO: No lymphadenopathy.  VESSELS: Within normal limits.  HEART: Heart size is normal. No pericardial effusion.  CHEST WALL AND LOWER NECK: Within normal limits.  VISUALIZED UPPER ABDOMEN: Large calcified stones in the gallbladder.  BONES: Within normal limits.    IMPRESSION:    Apical paraseptal emphysema.          < end of copied text >      Consultant(s) Notes Reviewed:      Care Discussed with Consultants/Other Providers:

## 2020-08-17 NOTE — DIETITIAN INITIAL EVALUATION ADULT. - ENERGY NEEDS
Ht: 67"  Wt: 140  BMI: 21.6 kg/m2   IBW: 148 (+/-10%)     within IBW  Edema: none     Skin: no pressure injuries documented

## 2020-08-17 NOTE — PROGRESS NOTE ADULT - SUBJECTIVE AND OBJECTIVE BOX
Follow-up Pulm Progress Note  Demarco Junior MD  [phone]  FOR  DR ZALDIVAR    No new respiratory events overnight.    Afebrile with stable resp status: breathing comofrtably supine on RA  CT Chest reviewed: mild apical paraspetal emphysema with bibasilar linear atelectasis    Medications:  Vital Signs Last 24 Hrs  T(C): 36.8 (17 Aug 2020 07:00), Max: 37.2 (16 Aug 2020 20:00)  T(F): 98.2 (17 Aug 2020 07:00), Max: 98.9 (16 Aug 2020 20:00)  HR: 59 (17 Aug 2020 10:00) (51 - 79)  BP: 142/65 (17 Aug 2020 10:00) (110/44 - 172/67)  BP(mean): 88 (17 Aug 2020 10:00) (63 - 97)  RR: 21 (17 Aug 2020 10:00) (15 - 25)  SpO2: 95% (17 Aug 2020 10:00) (94% - 97%)      08-16 @ 07:01  -  08-17 @ 07:00  --------------------------------------------------------  IN: 3745 mL / OUT: 3700 mL / NET: 45 mL        LABS:                        14.1   12.31 )-----------( 236      ( 17 Aug 2020 04:50 )             41.6     08-17    134<L>  |  108  |  9   ----------------------------<  98  3.7   |  19<L>  |  0.73    Ca    8.8      17 Aug 2020 04:50        PT/INR - ( 15 Aug 2020 14:55 )   PT: 12.1 sec;   INR: 1.02 ratio         PTT - ( 15 Aug 2020 14:55 )  PTT:34.4 sec  Procalcitonin, Serum: 0.05 ng/mL (08-16-20 @ 05:23)    Serum Pro-Brain Natriuretic Peptide: 70 pg/mL (08-16-20 @ 05:23)  Serum Pro-Brain Natriuretic Peptide: 43 pg/mL (08-15-20 @ 14:55)      CULTURES:  Culture Results:   No growth (08-16 @ 11:33)  Culture Results:   No growth to date. (08-16 @ 08:05)  Culture Results:   No growth to date. (08-16 @ 08:05)    Most recent blood culture -- 08-16 @ 11:33   -- -- .Urine Clean Catch (Midstream) 08-16 @ 11:33  Most recent blood culture -- 08-16 @ 08:05   -- -- .Blood Blood 08-16 @ 08:05      Physical Examination:  PULM: Clear without wheeze or rhonchi  CVS: Regular rate and rhythm, no murmurs, rubs, or gallops  ABD: Soft, non-tender  EXT:  No clubbing, cyanosis, or edema    RADIOLOGY REVIEWED  CXR:    CT chest:    PROCEDURE DATE:  08/16/2020        INTERPRETATION:  CLINICAL INFORMATION: Cough. Abnormality seen on chest CT scan. Possible emphysema. Stroke.    COMPARISON: None.    PROCEDURE:  CT of the Chest was performed without intravenous contrast.  Sagittal and coronal reformats were performed.    FINDINGS:    LUNGS AND AIRWAYS: Patent central airways.  Dependent atelectasis. Biapical paraseptal emphysematous change. Subcentimeter bleb in the right lower lobe.  PLEURA: No pleural effusion.  MEDIASTINUM AND RODOLFO: No lymphadenopathy.  VESSELS: Within normal limits.  HEART: Heart size is normal. No pericardial effusion.  CHEST WALL AND LOWER NECK: Within normal limits.  VISUALIZED UPPER ABDOMEN: Large calcified stones in the gallbladder.  BONES: Within normal limits.    IMPRESSION:    Apical paraseptal emphysema.    TTE:    PROCEDURE: Transthoracic echocardiogram with 2-D, M-Mode  and complete spectral and color flow Doppler. Patient was  injected with 10 cc's of aerosolized saline. Patient was  injected with 10 cc's of aerosolized saline.  INDICATION: Cerebral infarction, unspecified (I63.9)  ------------------------------------------------------------------------  Dimensions:    Normal Values:  LA:     3.6    2.0 - 4.0 cm  Ao:     3.3    2.0 - 3.8 cm  SEPTUM: 0.7    0.6 - 1.2 cm  PWT:    0.9    0.6 - 1.1 cm  LVIDd:  4.9    3.0 - 5.6 cm  LVIDs:  2.7    1.8 - 4.0 cm  Derived variables:  LVMI: 76 g/m2  RWT: 0.36  Fractional short: 45 %  EF (Alejandra Rule): 73 %  ------------------------------------------------------------------------  Observations:  Mitral Valve: Normal mitral valve. Minimal mitral  regurgitation.  Aortic Valve/Aorta: Normal trileaflet aortic valve. No  aortic valve regurgitation seen.  Aortic Root: 3.3 cm.  Left Atrium: Normal left atrium.  LA volume index = 17  cc/m2.  Left Ventricle: Normal left ventricular systolic function.  No segmental wall motion abnormalities. Normal left  ventricular internal dimensions and wall thicknesses.  Normal diastolic function  Right Heart: Normal right atrium. Normal right ventricular  size and function. Normal tricuspid valve. Minimal  tricuspid regurgitation. Pulmonic valve not well  visualized, probably normal. No pulmonic regurgitation.  Pericardium/Pleura: Normal pericardium with no pericardial  effusion.  Hemodynamic: Estimated right atrial pressure is 8 mm Hg.  Agitated saline injection demonstrates no evidence of a  patent foramen ovale.  ------------------------------------------------------------------------  Conclusions:  1. Normal mitral valve. Minimal mitral regurgitation.  2. Normal trileaflet aortic valve. No aortic valve  regurgitation seen.  3. Normal left ventricular systolic function. No segmental  wall motion abnormalities.  4. Normal diastolic function

## 2020-08-17 NOTE — PROGRESS NOTE ADULT - ASSESSMENT
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

## 2020-08-17 NOTE — PROGRESS NOTE ADULT - SUBJECTIVE AND OBJECTIVE BOX
Subjective: Patient seen and examined. No new events except as noted.   remains in Stroke unit     REVIEW OF SYSTEMS:    CONSTITUTIONAL: +weakness, fevers or chills  EYES/ENT: No visual changes;  No vertigo or throat pain   NECK: No pain or stiffness  RESPIRATORY: No cough, wheezing, hemoptysis; No shortness of breath  CARDIOVASCULAR: No chest pain or palpitations  GASTROINTESTINAL: No abdominal or epigastric pain. No nausea, vomiting, or hematemesis; No diarrhea or constipation. No melena or hematochezia.  GENITOURINARY: No dysuria, frequency or hematuria  NEUROLOGICAL: No numbness or weakness  SKIN: No itching, burning, rashes, or lesions   All other review of systems is negative unless indicated above.    MEDICATIONS:  MEDICATIONS  (STANDING):  aspirin  chewable 81 milliGRAM(s) Oral daily  atorvastatin 80 milliGRAM(s) Oral at bedtime  cefTRIAXone   IVPB 1000 milliGRAM(s) IV Intermittent every 24 hours  clopidogrel Tablet 75 milliGRAM(s) Oral daily  enoxaparin Injectable 40 milliGRAM(s) SubCutaneous daily  midodrine. 10 milliGRAM(s) Oral three times a day  sodium chloride 0.9%. 1000 milliLiter(s) (125 mL/Hr) IV Continuous <Continuous>      PHYSICAL EXAM:  T(C): 36.8 (08-17-20 @ 07:00), Max: 37.2 (08-16-20 @ 20:00)  HR: 65 (08-17-20 @ 08:00) (51 - 79)  BP: 154/62 (08-17-20 @ 08:00) (110/44 - 172/67)  RR: 21 (08-17-20 @ 08:00) (15 - 25)  SpO2: 95% (08-17-20 @ 08:00) (94% - 97%)  Wt(kg): --  I&O's Summary    16 Aug 2020 07:01  -  17 Aug 2020 07:00  --------------------------------------------------------  IN: 3745 mL / OUT: 3700 mL / NET: 45 mL          Appearance: NAD	  HEENT:   Dry  oral mucosa, PERRL, EOMI	  Lymphatic: No lymphadenopathy  Cardiovascular: Normal S1 S2, No JVD, No murmurs, No edema  Respiratory: Lungs clear to auscultation	  Psychiatry: A & O x 3, Mood & affect appropriate  Gastrointestinal:  Soft, Non-tender, + BS	  Skin: No rashes, No ecchymoses, No cyanosis	  Extremities: Normal range of motion, No clubbing, cyanosis or edema  Vascular: Peripheral pulses palpable 2+ bilaterally  - Cranial Nerves II-XII:  VFF, EOMI, PERRL (3mm OD, OS), V1-V3 intact, some nasolabial flattening on the left side of face, t/p midline, SCM/trap intact.  	- Fundoscopic examination: upon fundoscopic examination grossly clear margins of optic disc & cup  	- Motor: Pronator drift present on the left side. demonstrates orbiting around the left arm. Strength left arm 4+/5 with  strength 4/5. Left leg 4+/5 hip flexors/extensors with 5/5 knee flexion/ext dorsi/plantarflexion. right arm and leg 5/5. Normal muscle bulk and tone throughout. Neck flexion/extension 5/5 without neck stiffness  	- Sensory:  Intact to light touch and PP bilaterally throughout.  	- Coordination:  FTN demonstrated mild dysmetria in proportion to weakness on left arm, intact on right  - Gait: patient able to stand up on his own, ambulate several steps without wide based gait, not requiring assistance.        LABS:    CARDIAC MARKERS:  CARDIAC MARKERS ( 16 Aug 2020 05:18 )  x     / x     / x     / x     / 4.5 ng/mL  CARDIAC MARKERS ( 15 Aug 2020 14:55 )  x     / x     / 164 U/L / x     / 2.9 ng/mL                                14.1   12.31 )-----------( 236      ( 17 Aug 2020 04:50 )             41.6     08-17    134<L>  |  108  |  9   ----------------------------<  98  3.7   |  19<L>  |  0.73    Ca    8.8      17 Aug 2020 04:50      proBNP:   Lipid Profile:   HgA1c:   TSH:           TELEMETRY: 	SR    ECG:  	  RADIOLOGY: < from: MR Head No Cont (08.15.20 @ 18:42) >    EXAM:  MR BRAIN                            PROCEDURE DATE:  08/15/2020            INTERPRETATION:  INDICATIONS:  cva    TECHNIQUE:  Multiplanar imaging was performed using T1 weighted, T2 weighted and FLAIR sequences.  Diffusion weighted and SWI images were obtained.    COMPARISON EXAMINATION: CT CTA 8/14/2020    FINDINGS:  Ventricles and sulci: Ventricles and sulci within normal limits for age  Intra-axial:  Subtle mass effect on the right lateral ventricle on the right related to the acute right MCA territory infarct. Extensive microvascular ischemic changes involving the periventricular and subcortical white matter. Susceptibility reveals question of thrombus in association with the right MCA. No associated hemorrhage with the infarct.  Extra-axial:  No mass or collection.  Visualized sinuses: Ethmoid mucosal thickening.  Visualized mastoids:  Normal.  Calvarium:  Normal.  Carotid flow voids:  Present.  Miscellaneous:  None.    IMPRESSION:  Acute infarct in the right MCA territory somewhat patchy involving the right lateral temporal lobe as well as the right corona radiata/centrum semiovale ovale region in a somewhat junctional pattern, deep internal border zone type pattern. Clinical correlation recommended. No significant mass effect. No associated hemorrhage                    ALLIE MINOR M.D., ATTENDING RADIOLOGIST  This document has been electronically signed. Aug 16 2020 10:39AM                < end of copied text >    DIAGNOSTIC TESTING:  [ ] Echocardiogram:  < from: Transthoracic Echocardiogram (08.16.20 @ 11:09) >    Patient name: RAFY KIRKPATRICK  YOB: 1945   Age: 75 (M)   MR#: [id number]  Study Date: 8/16/2020  [facility] Bobbi Rangel RDCS  Study quality: Technically fair  Referring Physician: Richard B Libman, MD  Blood Pressure: 155/59 mmHg  Height: 168 cm  Weight: 64 kg  BSA: 1.7 m2  ------------------------------------------------------------------------  PROCEDURE: Transthoracic echocardiogram with 2-D, M-Mode  and complete spectral and color flow Doppler. Patient was  injected with 10 cc's of aerosolized saline. Patient was  injected with 10 cc's of aerosolized saline.  INDICATION: Cerebral infarction, unspecified (I63.9)  ------------------------------------------------------------------------  Dimensions:    Normal Values:  LA:     3.6    2.0 - 4.0 cm  Ao:     3.3    2.0 - 3.8 cm  SEPTUM: 0.7    0.6 - 1.2 cm  PWT:    0.9    0.6 - 1.1 cm  LVIDd:  4.9    3.0 - 5.6 cm  LVIDs:  2.7    1.8 - 4.0 cm  Derived variables:  LVMI: 76 g/m2  RWT: 0.36  Fractional short: 45 %  EF (Alejandra Rule): 73 %  ------------------------------------------------------------------------  Observations:  Mitral Valve: Normal mitral valve. Minimal mitral  regurgitation.  Aortic Valve/Aorta: Normal trileaflet aortic valve. No  aortic valve regurgitation seen.  Aortic Root: 3.3 cm.  Left Atrium: Normal left atrium.  LA volume index = 17  cc/m2.  Left Ventricle: Normal left ventricular systolic function.  No segmental wall motion abnormalities. Normal left  ventricular internal dimensions and wall thicknesses.  Normal diastolic function  Right Heart: Normal right atrium. Normal right ventricular  size and function. Normal tricuspid valve. Minimal  tricuspid regurgitation. Pulmonic valve not well  visualized, probably normal. No pulmonic regurgitation.  Pericardium/Pleura: Normal pericardium with no pericardial  effusion.  Hemodynamic: Estimated right atrial pressure is 8 mm Hg.  Agitated saline injection demonstrates no evidence of a  patent foramen ovale.  ------------------------------------------------------------------------  Conclusions:  1. Normal mitral valve. Minimal mitral regurgitation.  2. Normal trileaflet aortic valve. No aortic valve  regurgitation seen.  3. Normal left ventricular systolic function. No segmental  wall motion abnormalities.  4. Normal diastolic function  5. Normal right ventricular size and function.  6. Normal pericardium with no pericardial effusion.  *** No previous Echo exam.  ------------------------------------------------------------------------  Confirmed on  8/16/2020 - 16:05:05 by Maciej Hernandez M.D.  ------------------------------------------------------------------------    < end of copied text >    [ ]  Catheterization:  [ ] Stress Test:    OTHER:

## 2020-08-17 NOTE — DIETITIAN INITIAL EVALUATION ADULT. - OTHER INFO
Pt seen for: Stroke Unit length of stay   Adm dx: CVA    GI issues: none   Last BM: 8/16     Food allergies/Intolerances: NKFA   Vit/supplement PTA: none    Diet PTA: pt reports no diet restrictions     Subjective/Objective information: pt reports good appetite PTA, usual wt 140 lb, no wt changes in past year.    Education: Not indicated at this time

## 2020-08-18 LAB
ANION GAP SERPL CALC-SCNC: 12 MMOL/L — SIGNIFICANT CHANGE UP (ref 5–17)
BUN SERPL-MCNC: 8 MG/DL — SIGNIFICANT CHANGE UP (ref 7–23)
CALCIUM SERPL-MCNC: 8.4 MG/DL — SIGNIFICANT CHANGE UP (ref 8.4–10.5)
CHLORIDE SERPL-SCNC: 104 MMOL/L — SIGNIFICANT CHANGE UP (ref 96–108)
CO2 SERPL-SCNC: 20 MMOL/L — LOW (ref 22–31)
CREAT SERPL-MCNC: 0.66 MG/DL — SIGNIFICANT CHANGE UP (ref 0.5–1.3)
GLUCOSE SERPL-MCNC: 90 MG/DL — SIGNIFICANT CHANGE UP (ref 70–99)
HCT VFR BLD CALC: 40.2 % — SIGNIFICANT CHANGE UP (ref 39–50)
HGB BLD-MCNC: 13.9 G/DL — SIGNIFICANT CHANGE UP (ref 13–17)
MCHC RBC-ENTMCNC: 30.6 PG — SIGNIFICANT CHANGE UP (ref 27–34)
MCHC RBC-ENTMCNC: 34.6 GM/DL — SIGNIFICANT CHANGE UP (ref 32–36)
MCV RBC AUTO: 88.5 FL — SIGNIFICANT CHANGE UP (ref 80–100)
NRBC # BLD: 0 /100 WBCS — SIGNIFICANT CHANGE UP (ref 0–0)
PA ADP PRP-ACNC: 126 PRU — LOW (ref 194–417)
PLATELET # BLD AUTO: 235 K/UL — SIGNIFICANT CHANGE UP (ref 150–400)
PLATELET RESPONSE ASPIRIN RESULT: 534 ARU — SIGNIFICANT CHANGE UP (ref 350–700)
POTASSIUM SERPL-MCNC: 3.4 MMOL/L — LOW (ref 3.5–5.3)
POTASSIUM SERPL-SCNC: 3.4 MMOL/L — LOW (ref 3.5–5.3)
RBC # BLD: 4.54 M/UL — SIGNIFICANT CHANGE UP (ref 4.2–5.8)
RBC # FLD: 12.2 % — SIGNIFICANT CHANGE UP (ref 10.3–14.5)
SODIUM SERPL-SCNC: 136 MMOL/L — SIGNIFICANT CHANGE UP (ref 135–145)
WBC # BLD: 9.36 K/UL — SIGNIFICANT CHANGE UP (ref 3.8–10.5)
WBC # FLD AUTO: 9.36 K/UL — SIGNIFICANT CHANGE UP (ref 3.8–10.5)

## 2020-08-18 PROCEDURE — 74230 X-RAY XM SWLNG FUNCJ C+: CPT | Mod: 26

## 2020-08-18 PROCEDURE — 70551 MRI BRAIN STEM W/O DYE: CPT | Mod: 26

## 2020-08-18 PROCEDURE — 99233 SBSQ HOSP IP/OBS HIGH 50: CPT

## 2020-08-18 RX ORDER — SODIUM CHLORIDE 9 MG/ML
500 INJECTION INTRAMUSCULAR; INTRAVENOUS; SUBCUTANEOUS ONCE
Refills: 0 | Status: COMPLETED | OUTPATIENT
Start: 2020-08-18 | End: 2020-08-18

## 2020-08-18 RX ORDER — POTASSIUM CHLORIDE 20 MEQ
10 PACKET (EA) ORAL ONCE
Refills: 0 | Status: COMPLETED | OUTPATIENT
Start: 2020-08-18 | End: 2020-08-18

## 2020-08-18 RX ORDER — BACLOFEN 100 %
5 POWDER (GRAM) MISCELLANEOUS THREE TIMES A DAY
Refills: 0 | Status: DISCONTINUED | OUTPATIENT
Start: 2020-08-18 | End: 2020-08-22

## 2020-08-18 RX ORDER — SODIUM CHLORIDE 9 MG/ML
1000 INJECTION INTRAMUSCULAR; INTRAVENOUS; SUBCUTANEOUS
Refills: 0 | Status: DISCONTINUED | OUTPATIENT
Start: 2020-08-18 | End: 2020-08-20

## 2020-08-18 RX ADMIN — SODIUM CHLORIDE 500 MILLILITER(S): 9 INJECTION INTRAMUSCULAR; INTRAVENOUS; SUBCUTANEOUS at 03:00

## 2020-08-18 RX ADMIN — Medication 10 MILLIEQUIVALENT(S): at 05:42

## 2020-08-18 RX ADMIN — ENOXAPARIN SODIUM 40 MILLIGRAM(S): 100 INJECTION SUBCUTANEOUS at 11:22

## 2020-08-18 RX ADMIN — Medication 81 MILLIGRAM(S): at 11:22

## 2020-08-18 RX ADMIN — Medication 5 MILLIGRAM(S): at 23:57

## 2020-08-18 RX ADMIN — MIDODRINE HYDROCHLORIDE 10 MILLIGRAM(S): 2.5 TABLET ORAL at 05:20

## 2020-08-18 RX ADMIN — SODIUM CHLORIDE 125 MILLILITER(S): 9 INJECTION INTRAMUSCULAR; INTRAVENOUS; SUBCUTANEOUS at 05:20

## 2020-08-18 RX ADMIN — MIDODRINE HYDROCHLORIDE 10 MILLIGRAM(S): 2.5 TABLET ORAL at 11:22

## 2020-08-18 RX ADMIN — CEFTRIAXONE 100 MILLIGRAM(S): 500 INJECTION, POWDER, FOR SOLUTION INTRAMUSCULAR; INTRAVENOUS at 08:39

## 2020-08-18 RX ADMIN — ATORVASTATIN CALCIUM 80 MILLIGRAM(S): 80 TABLET, FILM COATED ORAL at 21:48

## 2020-08-18 RX ADMIN — CLOPIDOGREL BISULFATE 75 MILLIGRAM(S): 75 TABLET, FILM COATED ORAL at 11:22

## 2020-08-18 NOTE — PROGRESS NOTE ADULT - SUBJECTIVE AND OBJECTIVE BOX
Subjective: Patient seen and examined. No new events except as noted.   remains in Stroke unit   feels ok     REVIEW OF SYSTEMS:    CONSTITUTIONAL:+ weakness, fevers or chills  EYES/ENT: No visual changes;  No vertigo or throat pain   NECK: No pain or stiffness  RESPIRATORY: No cough, wheezing, hemoptysis; No shortness of breath  CARDIOVASCULAR: No chest pain or palpitations  GASTROINTESTINAL: No abdominal or epigastric pain. No nausea, vomiting, or hematemesis; No diarrhea or constipation. No melena or hematochezia.  GENITOURINARY: No dysuria, frequency or hematuria  NEUROLOGICAL: No numbness or weakness  SKIN: No itching, burning, rashes, or lesions   All other review of systems is negative unless indicated above.    MEDICATIONS:  MEDICATIONS  (STANDING):  aspirin  chewable 81 milliGRAM(s) Oral daily  atorvastatin 80 milliGRAM(s) Oral at bedtime  cefTRIAXone   IVPB 1000 milliGRAM(s) IV Intermittent every 24 hours  clopidogrel Tablet 75 milliGRAM(s) Oral daily  enoxaparin Injectable 40 milliGRAM(s) SubCutaneous daily  midodrine. 10 milliGRAM(s) Oral three times a day  sodium chloride 0.9%. 1000 milliLiter(s) (125 mL/Hr) IV Continuous <Continuous>      PHYSICAL EXAM:  T(C): 36.8 (08-18-20 @ 07:14), Max: 37.3 (08-17-20 @ 15:15)  HR: 54 (08-18-20 @ 10:00) (48 - 69)  BP: 153/58 (08-18-20 @ 10:00) (131/70 - 184/62)  RR: 18 (08-18-20 @ 10:00) (17 - 25)  SpO2: 96% (08-18-20 @ 10:00) (93% - 97%)  Wt(kg): --  I&O's Summary    17 Aug 2020 07:01  -  18 Aug 2020 07:00  --------------------------------------------------------  IN: 2605 mL / OUT: 4050 mL / NET: -1445 mL    18 Aug 2020 07:01  -  18 Aug 2020 11:20  --------------------------------------------------------  IN: 470 mL / OUT: 200 mL / NET: 270 mL            Appearance: NAD	  HEENT:   Dry  oral mucosa, PERRL, EOMI	  Lymphatic: No lymphadenopathy  Cardiovascular: Normal S1 S2, No JVD, No murmurs, No edema  Respiratory: Lungs clear to auscultation	  Psychiatry: A & O x 3, Mood & affect appropriate  Gastrointestinal:  Soft, Non-tender, + BS	  Skin: No rashes, No ecchymoses, No cyanosis	  Extremities: Normal range of motion, No clubbing, cyanosis or edema  Vascular: Peripheral pulses palpable 2+ bilaterally  - Cranial Nerves II-XII:  VFF, EOMI, PERRL (3mm OD, OS), V1-V3 intact, some nasolabial flattening on the left side of face, t/p midline, SCM/trap intact.  	- Fundoscopic examination: upon fundoscopic examination grossly clear margins of optic disc & cup  	- Motor: Pronator drift present on the left side. demonstrates orbiting around the left arm. Strength left arm 4+/5 with  strength 4/5. Left leg 4+/5 hip flexors/extensors with 5/5 knee flexion/ext dorsi/plantarflexion. right arm and leg 5/5. Normal muscle bulk and tone throughout. Neck flexion/extension 5/5 without neck stiffness  	- Sensory:  Intact to light touch and PP bilaterally throughout.  	- Coordination:  FTN demonstrated mild dysmetria in proportion to weakness on left arm, intact on right  - Gait: patient able to stand up on his own, ambulate several steps without wide based gait, not requiring assistance.      LABS:    CARDIAC MARKERS:  CARDIAC MARKERS ( 16 Aug 2020 05:18 )  x     / x     / x     / x     / 4.5 ng/mL  CARDIAC MARKERS ( 15 Aug 2020 14:55 )  x     / x     / 164 U/L / x     / 2.9 ng/mL                                13.9   9.36  )-----------( 235      ( 18 Aug 2020 04:35 )             40.2     08-18    136  |  104  |  8   ----------------------------<  90  3.4<L>   |  20<L>  |  0.66    Ca    8.4      18 Aug 2020 04:35      proBNP:   Lipid Profile:   HgA1c:   TSH:               TELEMETRY: 	SR   ECG:  	  RADIOLOGY:   DIAGNOSTIC TESTING:  [ ] Echocardiogram:  [ ]  Catheterization:  [ ] Stress Test:    OTHER:

## 2020-08-18 NOTE — SWALLOW VFSS/MBS ASSESSMENT ADULT - ROSENBEK'S PENETRATION ASPIRATION SCALE
(1) no aspiration, contrast does not enter airway (2) contrast enters airway, remains above the vocal cords, no residue remains (penetration) (7) contrast passes glottis, visible subglottic residue remains despite patient’s response (aspiration)

## 2020-08-18 NOTE — SWALLOW VFSS/MBS ASSESSMENT ADULT - ORAL PHASE
+ Tongue pumping, piecemeal deglutition, mild lingual residue Delayed oral transit time/discoordinated lingual rocking/pumping movements; mild lingual residue Delayed oral transit time/up to max spillover to the pyriform sinuses prior to swallow trigger; trace to mild lingual residue mild lingual residue that appears to clear with latent reswallow/Reduced anterior - posterior transport/Delayed oral transit time

## 2020-08-18 NOTE — PROGRESS NOTE ADULT - ASSESSMENT
74 y/o RH man with a PMH of HTN not on antithrombotics who presented to the ED with an acute one day history of headache and altered mental status. Neurological examination demonstrates mild dysarthria with mild left nasolabial flattening with left arm weakness. CT/CTA per Neuroradiology demonstrates stenosis vs occlusion R M1. Patient out of window for tpa.    CVA  - workup and plan as per neurology  - ASA/plavix  - lipitor  - PT/OT  - swallow eval - puree diet  - permissive hypertension    hypotension  - r/o sepsis /UTI  - urine and blood cultures NGTD  - cont ceftriaxone for now . limit 3 to 5 days.  - cont iv fluids  - midodrine    HLD  -   - c/w lipitor    emphysema  - CT chest without contrast done  - pulm following    DVT px  - lovenox

## 2020-08-18 NOTE — PROGRESS NOTE ADULT - SUBJECTIVE AND OBJECTIVE BOX
Follow-up Pulm Progress Note  Demarco Junior MD  [phone]  FOR  DR ZALDIVAR    No new respiratory events overnight.    Remains afebrile with stable resp status: breathing comofrtably supine on RA  CT Chest reviewed: mild apical paraspetal emphysema with bibasilar linear atelectasis    Vital Signs Last 24 Hrs  T(C): 36.8 (18 Aug 2020 07:14), Max: 37.3 (17 Aug 2020 15:15)  T(F): 98.3 (18 Aug 2020 07:14), Max: 99.1 (17 Aug 2020 15:15)  HR: 56 (18 Aug 2020 12:00) (48 - 69)  BP: 163/66 (18 Aug 2020 12:00) (131/70 - 184/62)  BP(mean): 95 (18 Aug 2020 12:00) (81 - 97)  RR: 15 (18 Aug 2020 12:00) (15 - 25)  SpO2: 96% (18 Aug 2020 12:00) (93% - 97%)                          13.9   9.36  )-----------( 235      ( 18 Aug 2020 04:35 )             40.2     08-18    136  |  104  |  8   ----------------------------<  90  3.4<L>   |  20<L>  |  0.66    Ca    8.4      18 Aug 2020 04:35      Physical Examination:  PULM: Clear without wheeze or rhonchi  CVS: Regular rate and rhythm, no murmurs, rubs, or gallops  ABD: Soft, non-tender  EXT:  No clubbing, cyanosis, or edema    RADIOLOGY REVIEWED  CXR:    CT chest:    PROCEDURE DATE:  08/16/2020        INTERPRETATION:  CLINICAL INFORMATION: Cough. Abnormality seen on chest CT scan. Possible emphysema. Stroke.    COMPARISON: None.    PROCEDURE:  CT of the Chest was performed without intravenous contrast.  Sagittal and coronal reformats were performed.    FINDINGS:    LUNGS AND AIRWAYS: Patent central airways.  Dependent atelectasis. Biapical paraseptal emphysematous change. Subcentimeter bleb in the right lower lobe.  PLEURA: No pleural effusion.  MEDIASTINUM AND RODOLFO: No lymphadenopathy.  VESSELS: Within normal limits.  HEART: Heart size is normal. No pericardial effusion.  CHEST WALL AND LOWER NECK: Within normal limits.  VISUALIZED UPPER ABDOMEN: Large calcified stones in the gallbladder.  BONES: Within normal limits.    IMPRESSION:    Apical paraseptal emphysema.    TTE:    PROCEDURE: Transthoracic echocardiogram with 2-D, M-Mode  and complete spectral and color flow Doppler. Patient was  injected with 10 cc's of aerosolized saline. Patient was  injected with 10 cc's of aerosolized saline.  INDICATION: Cerebral infarction, unspecified (I63.9)  ------------------------------------------------------------------------  Dimensions:    Normal Values:  LA:     3.6    2.0 - 4.0 cm  Ao:     3.3    2.0 - 3.8 cm  SEPTUM: 0.7    0.6 - 1.2 cm  PWT:    0.9    0.6 - 1.1 cm  LVIDd:  4.9    3.0 - 5.6 cm  LVIDs:  2.7    1.8 - 4.0 cm  Derived variables:  LVMI: 76 g/m2  RWT: 0.36  Fractional short: 45 %  EF (Alejandra Rule): 73 %  ------------------------------------------------------------------------  Observations:  Mitral Valve: Normal mitral valve. Minimal mitral  regurgitation.  Aortic Valve/Aorta: Normal trileaflet aortic valve. No  aortic valve regurgitation seen.  Aortic Root: 3.3 cm.  Left Atrium: Normal left atrium.  LA volume index = 17  cc/m2.  Left Ventricle: Normal left ventricular systolic function.  No segmental wall motion abnormalities. Normal left  ventricular internal dimensions and wall thicknesses.  Normal diastolic function  Right Heart: Normal right atrium. Normal right ventricular  size and function. Normal tricuspid valve. Minimal  tricuspid regurgitation. Pulmonic valve not well  visualized, probably normal. No pulmonic regurgitation.  Pericardium/Pleura: Normal pericardium with no pericardial  effusion.  Hemodynamic: Estimated right atrial pressure is 8 mm Hg.  Agitated saline injection demonstrates no evidence of a  patent foramen ovale.  ------------------------------------------------------------------------  Conclusions:  1. Normal mitral valve. Minimal mitral regurgitation.  2. Normal trileaflet aortic valve. No aortic valve  regurgitation seen.  3. Normal left ventricular systolic function. No segmental  wall motion abnormalities.  4. Normal diastolic function

## 2020-08-18 NOTE — PROGRESS NOTE ADULT - SUBJECTIVE AND OBJECTIVE BOX
THE PATIENT WAS SEEN AND EXAMINED BY ME WITH THE HOUSESTAFF AND STROKE TEAM DURING MORNING ROUNDS.   HPI:  Opal Matthew is a 76 y/o RH man with a PMH of HTN not on antithrombotics who presented to the ED with an acute one day history of headache and altered mental status. Per his friend, the pt suddenly became confused day prior to admission around 5pm and complained of a new onset headache. The pt complained about some weakness in his right lower extremity and endorses a sharp headache localized to the top/middle of his hairline that did not radiate anywhere. The pt denied  photophobia, recent fever or flu like illness, changes in hearing, changes in vision, recent falls or trauma, or pain other than the headache, difficulty swallowing, dysuria, diarrhea, numbness/tingling anywhere. No history of headaches.  Per ED,  The friend stated that the patient's speech was "off" and that his face "did not look normal". The pt did not want to go to the ER at the time, but came day of admission because the headache persisted throughout the night and today he began to feel lightheaded. The pt stated that he ambulates and talks normally at baseline and that he had never experienced these symptoms before day prior to admission.      SUBJECTIVE: No events overnight.  No new neurologic complaints.  ROS reported negative unless otherwise noted.    acetaminophen   Tablet .. 650 milliGRAM(s) Oral every 6 hours PRN  aspirin  chewable 81 milliGRAM(s) Oral daily  atorvastatin 80 milliGRAM(s) Oral at bedtime  cefTRIAXone   IVPB 1000 milliGRAM(s) IV Intermittent every 24 hours  clopidogrel Tablet 75 milliGRAM(s) Oral daily  enoxaparin Injectable 40 milliGRAM(s) SubCutaneous daily  midodrine. 10 milliGRAM(s) Oral three times a day  sodium chloride 0.9%. 1000 milliLiter(s) IV Continuous <Continuous>      PHYSICAL EXAM:   Vital Signs Last 24 Hrs  T(C): 36.8 (18 Aug 2020 07:14), Max: 37.3 (17 Aug 2020 15:15)  T(F): 98.3 (18 Aug 2020 07:14), Max: 99.1 (17 Aug 2020 15:15)  HR: 60 (18 Aug 2020 06:00) (48 - 69)  BP: 184/62 (18 Aug 2020 06:00) (140/58 - 184/62)  BP(mean): 97 (18 Aug 2020 06:00) (81 - 97)  RR: 19 (18 Aug 2020 06:00) (17 - 25)  SpO2: 97% (18 Aug 2020 06:00) (93% - 97%)    General: No acute distress  HEENT: EOM intact, Left homonymous hemianopia  Abdomen: Soft, nontender, nondistended   Extremities: No edema    NEUROLOGICAL EXAM:  Mental status: Awake, alert, oriented to name, place and time, speech fluent, mild left hemineglect but looks over readily when prompted, following commands, unable to recall professional employment in the past   Cranial Nerves: Mild to moderate left facial palsy, no nystagmus, LHH, gaze crosses midline, mild dysarthria,  tongue midline  Motor exam: Normal tone, right side moves against gravity, left when tested independent from right LUE 4/5, LLE 4+/5, when tested together appears 3/5 with drift  Sensation: intact to light touch on right but sensory-extinction on left  Coordination/ Gait: no ataxia bilaterally, gait not tested       LABS:                        13.9   9.36  )-----------( 235      ( 18 Aug 2020 04:35 )             40.2    08-18    136  |  104  |  8   ----------------------------<  90  3.4<L>   |  20<L>  |  0.66    Ca    8.4      18 Aug 2020 04:35          IMAGING: Reviewed by me.   MRI Brain w/o (08/15/20): Acute infarct in the right MCA territory somewhat patchy involving the right lateral temporal lobe as well as the right corona radiata/centrum semiovale ovale region in a somewhat junctional pattern, deep internal border zone type pattern. Clinical correlation recommended. No significant mass effect. No associated hemorrhage    CT Head No Cont. (08/14/20): There is no acute intracranial hemorrhage. No focal edema or evidence of acute, transcortical infarct. No hydrocephalus, midline shift or mass effect.    CT Angio Head w/IV Cont. (08/14/20): 6 mm in length severe stenosis/near occlusion of the M1 segment of the Right MCA with reconstitution at the bifurcation.    CT Angio Neck w/IV Cont. (08/14/20): Patent cervical vasculature. No flow limiting stenosis or occlusion. THE PATIENT WAS SEEN AND EXAMINED BY ME WITH THE HOUSESTAFF AND STROKE TEAM DURING MORNING ROUNDS.   HPI:  Opal Matthew is a 76 y/o RH man with a PMH of HTN not on antithrombotics who presented to the ED with an acute one day history of headache and altered mental status. Per his friend, the pt suddenly became confused day prior to admission around 5pm and complained of a new onset headache. The pt complained about some weakness in his right lower extremity and endorses a sharp headache localized to the top/middle of his hairline that did not radiate anywhere. The pt denied  photophobia, recent fever or flu like illness, changes in hearing, changes in vision, recent falls or trauma, or pain other than the headache, difficulty swallowing, dysuria, diarrhea, numbness/tingling anywhere. No history of headaches.  Per ED,  The friend stated that the patient's speech was "off" and that his face "did not look normal". The pt did not want to go to the ER at the time, but came day of admission because the headache persisted throughout the night and today he began to feel lightheaded. The pt stated that he ambulates and talks normally at baseline and that he had never experienced these symptoms before day prior to admission.      SUBJECTIVE: No events overnight.  No new neurologic complaints. States he is the same and it is going good, ROS reported negative unless otherwise noted.    acetaminophen   Tablet .. 650 milliGRAM(s) Oral every 6 hours PRN  aspirin  chewable 81 milliGRAM(s) Oral daily  atorvastatin 80 milliGRAM(s) Oral at bedtime  cefTRIAXone   IVPB 1000 milliGRAM(s) IV Intermittent every 24 hours  clopidogrel Tablet 75 milliGRAM(s) Oral daily  enoxaparin Injectable 40 milliGRAM(s) SubCutaneous daily  midodrine. 10 milliGRAM(s) Oral three times a day  sodium chloride 0.9%. 1000 milliLiter(s) IV Continuous <Continuous>      PHYSICAL EXAM:   Vital Signs Last 24 Hrs  T(C): 36.8 (18 Aug 2020 07:14), Max: 37.3 (17 Aug 2020 15:15)  T(F): 98.3 (18 Aug 2020 07:14), Max: 99.1 (17 Aug 2020 15:15)  HR: 60 (18 Aug 2020 06:00) (48 - 69)  BP: 184/62 (18 Aug 2020 06:00) (140/58 - 184/62)  BP(mean): 97 (18 Aug 2020 06:00) (81 - 97)  RR: 19 (18 Aug 2020 06:00) (17 - 25)  SpO2: 97% (18 Aug 2020 06:00) (93% - 97%)    General: No acute distress  HEENT: left gaze palsy,  Left homonymous hemianopia  Abdomen: Soft, nontender, nondistended   Extremities: No edema    NEUROLOGICAL EXAM:  Mental status: Awake, alert, oriented to name, place and time, speech fluent,  left hemineglect , following commands    Cranial Nerves: Mild to moderate left facial palsy, no nystagmus, LHH, mild left gaze palsy, mild dysarthria,  tongue midline  Motor exam: Normal tone, right side moves against gravity, left when tested independent from right LUE 4/5, LLE 4-/5, when tested together appears 3/5 with drift  Sensation: intact to light touch on right but sensory-extinction on left  Coordination/ Gait: no ataxia bilaterally, gait not tested       LABS:                        13.9   9.36  )-----------( 235      ( 18 Aug 2020 04:35 )             40.2    08-18    136  |  104  |  8   ----------------------------<  90  3.4<L>   |  20<L>  |  0.66    Ca    8.4      18 Aug 2020 04:35          IMAGING: Reviewed by me.     CT Angio Head w/ IV Cont (08.17.20)  1.  Redemonstration of near complete occlusion of the M1 segment of the right MCA.  2.  2 mm conical outpouching arising from the left supraclinoid ICA directed inferiorly suggesting an infundibulum versus small aneurysm (8:39).     08.17.20   CT head:  Redemonstration of acute/subacute right MCA territory infarct.    MRI Brain w/o (08/15/20): Acute infarct in the right MCA territory somewhat patchy involving the right lateral temporal lobe as well as the right corona radiata/centrum semiovale ovale region in a somewhat junctional pattern, deep internal border zone type pattern. Clinical correlation recommended. No significant mass effect. No associated hemorrhage    CT Head No Cont. (08/14/20): There is no acute intracranial hemorrhage. No focal edema or evidence of acute, transcortical infarct. No hydrocephalus, midline shift or mass effect.    CT Angio Head w/IV Cont. (08/14/20): 6 mm in length severe stenosis/near occlusion of the M1 segment of the Right MCA with reconstitution at the bifurcation.    CT Angio Neck w/IV Cont. (08/14/20): Patent cervical vasculature. No flow limiting stenosis or occlusion. THE PATIENT WAS SEEN AND EXAMINED BY ME WITH THE HOUSESTAFF AND STROKE TEAM DURING MORNING ROUNDS.   HPI:  Opal Matthew is a 74 y/o RH man with a PMH of HTN not on antithrombotics who presented to the ED with an acute one day history of headache and altered mental status. Per his friend, the pt suddenly became confused day prior to admission around 5pm and complained of a new onset headache. The pt complained about some weakness in his right lower extremity and endorses a sharp headache localized to the top/middle of his hairline that did not radiate anywhere. The pt denied  photophobia, recent fever or flu like illness, changes in hearing, changes in vision, recent falls or trauma, or pain other than the headache, difficulty swallowing, dysuria, diarrhea, numbness/tingling anywhere. No history of headaches.  Per ED,  The friend stated that the patient's speech was "off" and that his face "did not look normal". The pt did not want to go to the ER at the time, but came day of admission because the headache persisted throughout the night and today he began to feel lightheaded. The pt stated that he ambulates and talks normally at baseline and that he had never experienced these symptoms before day prior to admission.      SUBJECTIVE: No events overnight.  No new neurologic complaints. States he is the same and it is going good, ROS reported negative unless otherwise noted.    acetaminophen   Tablet .. 650 milliGRAM(s) Oral every 6 hours PRN  aspirin  chewable 81 milliGRAM(s) Oral daily  atorvastatin 80 milliGRAM(s) Oral at bedtime  cefTRIAXone   IVPB 1000 milliGRAM(s) IV Intermittent every 24 hours  clopidogrel Tablet 75 milliGRAM(s) Oral daily  enoxaparin Injectable 40 milliGRAM(s) SubCutaneous daily  midodrine. 10 milliGRAM(s) Oral three times a day  sodium chloride 0.9%. 1000 milliLiter(s) IV Continuous <Continuous>      PHYSICAL EXAM:   Vital Signs Last 24 Hrs  T(C): 36.8 (18 Aug 2020 07:14), Max: 37.3 (17 Aug 2020 15:15)  T(F): 98.3 (18 Aug 2020 07:14), Max: 99.1 (17 Aug 2020 15:15)  HR: 60 (18 Aug 2020 06:00) (48 - 69)  BP: 184/62 (18 Aug 2020 06:00) (140/58 - 184/62)  BP(mean): 97 (18 Aug 2020 06:00) (81 - 97)  RR: 19 (18 Aug 2020 06:00) (17 - 25)  SpO2: 97% (18 Aug 2020 06:00) (93% - 97%)    General: No acute distress  HEENT: left gaze palsy,  Left homonymous hemianopia  Abdomen: Soft, nontender, nondistended   Extremities: No edema    NEUROLOGICAL EXAM:  Mental status: Awake, alert, oriented to name, place and time, speech fluent,  left hemineglect , following commands    Cranial Nerves: Mild to moderate left facial palsy, no nystagmus, LHH, mild left gaze palsy, mild dysarthria,  tongue midline  Motor exam: Normal tone, right side moves against gravity, left side with motor neglect: when tested independent from right LUE 4/5, LLE 4-/5, when tested together appears 3/5 with drift  Sensation: intact to light touch on right but sensory-extinction on left  Coordination/ Gait: no ataxia bilaterally, gait not tested       LABS:                        13.9   9.36  )-----------( 235      ( 18 Aug 2020 04:35 )             40.2    08-18    136  |  104  |  8   ----------------------------<  90  3.4<L>   |  20<L>  |  0.66    Ca    8.4      18 Aug 2020 04:35          IMAGING: Reviewed by me.     CT Angio Head w/ IV Cont (08.17.20)  1.  Redemonstration of near complete occlusion of the M1 segment of the right MCA.  2.  2 mm conical outpouching arising from the left supraclinoid ICA directed inferiorly suggesting an infundibulum versus small aneurysm (8:39).     08.17.20   CT head:  Redemonstration of acute/subacute right MCA territory infarct.    MRI Brain w/o (08/15/20): Acute infarct in the right MCA territory somewhat patchy involving the right lateral temporal lobe as well as the right corona radiata/centrum semiovale ovale region in a somewhat junctional pattern, deep internal border zone type pattern. Clinical correlation recommended. No significant mass effect. No associated hemorrhage    CT Head No Cont. (08/14/20): There is no acute intracranial hemorrhage. No focal edema or evidence of acute, transcortical infarct. No hydrocephalus, midline shift or mass effect.    CT Angio Head w/IV Cont. (08/14/20): 6 mm in length severe stenosis/near occlusion of the M1 segment of the Right MCA with reconstitution at the bifurcation.    CT Angio Neck w/IV Cont. (08/14/20): Patent cervical vasculature. No flow limiting stenosis or occlusion.

## 2020-08-18 NOTE — PROGRESS NOTE ADULT - SUBJECTIVE AND OBJECTIVE BOX
Patient is a 75y old  Male who presents with a chief complaint of R M1 occlusion vs stenosis (18 Aug 2020 12:58)      SUBJECTIVE / OVERNIGHT EVENTS:  No chest pain. No shortness of breath. No complaints. No events overnight.     MEDICATIONS  (STANDING):  aspirin  chewable 81 milliGRAM(s) Oral daily  atorvastatin 80 milliGRAM(s) Oral at bedtime  cefTRIAXone   IVPB 1000 milliGRAM(s) IV Intermittent every 24 hours  clopidogrel Tablet 75 milliGRAM(s) Oral daily  enoxaparin Injectable 40 milliGRAM(s) SubCutaneous daily  midodrine. 10 milliGRAM(s) Oral three times a day  sodium chloride 0.9%. 1000 milliLiter(s) (125 mL/Hr) IV Continuous <Continuous>    MEDICATIONS  (PRN):  acetaminophen   Tablet .. 650 milliGRAM(s) Oral every 6 hours PRN Temp greater or equal to 38C (100.4F), Mild Pain (1 - 3), Moderate Pain (4 - 6), Severe Pain (7 - 10)      Vital Signs Last 24 Hrs  T(C): 36.8 (18 Aug 2020 07:14), Max: 37.3 (17 Aug 2020 15:15)  T(F): 98.3 (18 Aug 2020 07:14), Max: 99.1 (17 Aug 2020 15:15)  HR: 56 (18 Aug 2020 12:00) (48 - 69)  BP: 163/66 (18 Aug 2020 12:00) (131/70 - 184/62)  BP(mean): 95 (18 Aug 2020 12:00) (81 - 97)  RR: 15 (18 Aug 2020 12:00) (15 - 25)  SpO2: 96% (18 Aug 2020 12:00) (93% - 97%)  CAPILLARY BLOOD GLUCOSE        I&O's Summary    17 Aug 2020 07:01  -  18 Aug 2020 07:00  --------------------------------------------------------  IN: 2605 mL / OUT: 4050 mL / NET: -1445 mL    18 Aug 2020 07:01  -  18 Aug 2020 13:08  --------------------------------------------------------  IN: 720 mL / OUT: 550 mL / NET: 170 mL        PHYSICAL EXAM:  GENERAL: NAD, well-developed  HEAD:  Atraumatic, Normocephalic  EYES: EOMI, PERRLA, conjunctiva and sclera clear  NECK: Supple, No JVD  CHEST/LUNG: Clear to auscultation bilaterally; No wheeze  HEART: Regular rate and rhythm; No murmurs, rubs, or gallops  ABDOMEN: Soft, Nontender, Nondistended; Bowel sounds present  EXTREMITIES:  2+ Peripheral Pulses, No clubbing, cyanosis, or edema  PSYCH: AAOx3  NEUROLOGY: left sided weakness  SKIN: No rashes or lesions    LABS:                        13.9   9.36  )-----------( 235      ( 18 Aug 2020 04:35 )             40.2     08-18    136  |  104  |  8   ----------------------------<  90  3.4<L>   |  20<L>  |  0.66    Ca    8.4      18 Aug 2020 04:35                RADIOLOGY & ADDITIONAL TESTS:    Imaging Personally Reviewed:    Consultant(s) Notes Reviewed:      Care Discussed with Consultants/Other Providers:

## 2020-08-18 NOTE — SWALLOW VFSS/MBS ASSESSMENT ADULT - PHARYNGEAL PHASE COMMENTS
no significant pharyngeal residue Pt with one episode of moderate to severe laryngeal penetration with mild over aspiration during an episode of hiccuping while swallowing. This severity of airway invasion was not noted on any other trials.

## 2020-08-18 NOTE — SWALLOW VFSS/MBS ASSESSMENT ADULT - LARYNGEAL PENETRATION DURING THE SWALLOW - SILENT
shallow, appears retrieved during swallow or upon reswallow/Trace over the arytenoids to the level of the vocal folds, with incomplete retrieval/Mild

## 2020-08-18 NOTE — SWALLOW VFSS/MBS ASSESSMENT ADULT - ADDITIONAL INFORMATION
Hx cont: 8/14, pt blood pressure and heart rate decreased with mildly decreased level of consciousness and significantly decreased verbal output and interaction with the examiner, along with worsening of his left hemiparesis, with minimal effort versus gravity in the left arm.  Most likely, this worsening was due to cerebral perfusion insufficiency related to decreased cardiac output in the setting of intracranial stenosis per neuro. 8/15 MRI Head: Acute infarct in the right MCA territory somewhat patchy involving the right lateral temporal lobe as well as the right corona radiata/centrum semiovale ovale region in a somewhat junctional pattern, deep internal border zone type pattern. Clinical correlation recommended. No significant mass effect. No associated hemorrhage    + slight calcifications of laryngeal surface of epiglottis, thyroid and arytenoid cartilages. Hx cont: 8/14, pt blood pressure and heart rate decreased with mildly decreased level of consciousness and significantly decreased verbal output and interaction with the examiner, along with worsening of his left hemiparesis, with minimal effort versus gravity in the left arm.  Most likely, this worsening was due to cerebral perfusion insufficiency related to decreased cardiac output in the setting of intracranial stenosis per neuro. 8/15 MRI Head: Acute infarct in the right MCA territory somewhat patchy involving the right lateral temporal lobe as well as the right corona radiata/centrum semiovale ovale region in a somewhat junctional pattern, deep internal border zone type pattern. Clinical correlation recommended. No significant mass effect. No associated hemorrhage    + slight calcifications of laryngeal surface of epiglottis, thyroid and arytenoid cartilages.  + multiple small nonobstructive anterior osteophytes

## 2020-08-18 NOTE — PROGRESS NOTE ADULT - ASSESSMENT
ASSESSMENT: 75-year-old right-handed gentleman first evaluated at [facility] on 8/15/2020 with left hemiparesis.  CT head (8/14/2020) to my eye showed moderate-severe periventricular chronic ischemic changes.  There was a possible subacute right corona radiata infarct, perhaps more likely part and parcel of the chronic ischemic changes.  CTA neck and head (8/14/2020) to my eye showed severe right M1 stenosis.  The left vertebral artery was dominant.  Incidentally noted were emphysematous lung changes.     Impression.  On 8/14/2020 he developed some type of change in mental status along with headache, and had been dropping objects, most likely due to left hemiparesis.  His presentation is consistent with right MCA infarction, most likely acute ischemic stroke, probably related to severe right M1 stenosis with borderzone pattern.  The right M1 stenosis is most likely due to large artery atherosclerosis, although an embolus with partial lysis cannot be ruled out with certainty (embolic stroke of undetermined source).       NEURO: No acute neurological change. Continue close monitoring for neurologic deterioration, permissive HTN with gentle IVF as tolerated continue on midodrine to induce HTN, SBP >160 mmHg systolic for neurological stability, patient with hypotensive event on 8/15/20 resulting in worsening neurological deficits but improvement to baseline once given hydration and BP raised, LDL - , titrate statin to goal <70, CTH (08/14/20) moderate to severe periventricular chronic ischemic changes, possible subacute right corona radiata infarct, likely chronic, MRI Brain w/o showed moderate sized right corona radiata/centrum semiovale internal borderzone infarct.  Physical therapy/OT recommended acute rehab.     ANTITHROMBOTIC THERAPY: ASA and Plavix for 3 months per SAMPRISS protocol, then ASA indefinitely from neurological standpoint     PULMONARY: CXR clear, protecting airway, saturating well on room air. Incidental finding on CTA H/N emphysematous changes. Pulmonary consulted, Dr. Zelaya following -. CT Chest 8/16 showed apical paraseptal emphysema,no need for bronchodilator at this time     CARDIOVASCULAR: TTE (08/16/20): EF 73%, minimal MR, negative PFO, continue cardiac monitoring on telemetry, Cardiology, Dr. Aguirre consult appreciated - continue with gentle IVF hydration,  Midodrine  continued , ILR to screen for occult arrhythmia.     SBP goal: permissive HTN     GASTROINTESTINAL:  initial dysphagia screen passed but patient was witnessed to be drooling and pocketing meals while eating. Due to high risk of aspiration from possible hemineglect, made NPO.  S&S, evaluation 8/16 recommended pureed diet with full assistance, recommend MBS 8/17 to r/o silent aspiration      Diet: Pureed diet     RENAL: BUN/Cr without acute change, continue with adequate IV hydration , continue close monitoring of hyponatremia resolved, KCl for hypokalemia      Na Goal: Greater than 135     Lee: no    HEMATOLOGY: H/H without acute change , Platelets 235, no si/sx of bleeding      DVT ppx: LMWH [x]     ID: afebrile, mild leukocytosis resolved,  UA positive UTI, started on ceftriaxone for 3 days, CXR clear lungs, blood and urine cultures NGTD- short course, will continue to monitor     OTHER: ENT consulted as recommendations per S&S and Pulm for hoarse voice r/o vocal cord paralysis. ENT bedside FOE revealed patent airway and vocal cords mobile with good contact b/l - possible small glottic endy inferiorly, no further recommendations, no further ENT intervention required. Plan endorsed to patient and family member at bedside. Patient gives permission to disclose information to family member. All questions and concerns addressed.     DISPOSITION: Acute Rehab once stable and workup is complete      CORE MEASURES:        Admission NIHSS: 2     TPA: [] YES [x] NO      LDL/HDL:     Depression Screen: p     Statin Therapy: yes     Dysphagia Screen: [x] PASS [] FAIL     Smoking [] YES [x] NO      Afib [] YES [x] NO     Stroke Education [x] YES [] NO    Obtain screening lower extremity venous ultrasound in patients who meet 1 or more of the following criteria as patient is high risk for DVT/PE on admission:   [] History of DVT/PE  []Hypercoagulable states (Factor V Leiden, Cancer, OCP, etc. )  []Prolonged immobility (hemiplegia/hemiparesis/post operative or any other extended immobilization)  [] Transferred from outside facility (Rehab or Long term care)  [] Age </= to 50 ASSESSMENT: 75-year-old right-handed gentleman first evaluated at [facility] on 8/15/2020 with left hemiparesis.  CT head (8/14/2020) to my eye showed moderate-severe periventricular chronic ischemic changes.  There was a possible subacute right corona radiata infarct, perhaps more likely part and parcel of the chronic ischemic changes.  CTA neck and head (8/14/2020) to my eye showed severe right M1 stenosis.  The left vertebral artery was dominant.  Incidentally noted were emphysematous lung changes.     Impression.  On 8/14/2020 he developed some type of change in mental status along with headache, and had been dropping objects, most likely due to left hemiparesis.  His presentation is consistent with right MCA infarction, most likely acute ischemic stroke, probably related to severe right M1 stenosis with borderzone pattern.  The right M1 stenosis is most likely due to large artery atherosclerosis, although an embolus with partial lysis cannot be ruled out with certainty (embolic stroke of undetermined source).       NEURO: No acute neurological change though last night reported with some left neglect and dysarthria , repeat CTH and CTA without acute change. Continue close monitoring for neurologic deterioration, permissive HTN now with gradual titration down on IVF as tolerated continue on midodrine to induce HTN, SBP >140 mmHg systolic given neurological stability, patient with hypotensive event on 8/15/20 resulting in worsening neurological deficits but improvement to baseline once given hydration and BP raised, LDL - , titrate statin to goal <70, CTH (08/14/20) moderate to severe periventricular chronic ischemic changes, possible subacute right corona radiata infarct, likely chronic, MRI Brain w/o showed moderate sized right corona radiata/centrum semiovale internal borderzone infarct.  Physical therapy/OT recommended acute rehab.     ANTITHROMBOTIC THERAPY: ASA and Plavix for 3 months per SAMPRISS protocol, then ASA indefinitely from neurological standpoint, monitor PRU/P2Y12 (results pending)    PULMONARY: CXR clear, protecting airway, saturating well on room air. Incidental finding on CTA H/N emphysematous changes. Pulmonary consulted, Dr. Zelaya following -. CT Chest 8/16 showed apical paraseptal emphysema,no need for bronchodilator at this time     CARDIOVASCULAR: TTE (08/16/20): EF 73%, minimal MR, negative PFO, continue cardiac monitoring on telemetry, Cardiology, Dr. Aguirre consult appreciated - continue with gentle IVF hydration,  Midodrine  continued , ILR to screen for occult arrhythmia.     SBP goal: 140-180mmHg as currently tolerating     GASTROINTESTINAL:  initial dysphagia screen passed but patient was witnessed to be drooling and pocketing meals while eating. Due to high risk of aspiration from possible hemineglect, made NPO.  S&S, evaluation 8/16 recommended pureed diet with full assistance, recommend MBS 8/18 which showed D2 Nectar diet tolerance       Diet: D2 nectar     RENAL: BUN/Cr without acute change, continue with adequate IV hydration with very gradual titration permitting remains neurologically stable , continue close monitoring of hyponatremia resolved, KCl for hypokalemia      Na Goal: Greater than 135     Lee: no    HEMATOLOGY: H/H without acute change , Platelets 235, no si/sx of bleeding      DVT ppx: LMWH [x]     ID: afebrile, mild leukocytosis resolved,  UA positive UTI, started on ceftriaxone for 3 days, CXR clear lungs, blood and urine cultures NGTD- short course as noted,  continue to monitor     OTHER: ENT consulted as recommendations per S&S and Pulm for hoarse voice r/o vocal cord paralysis. ENT bedside FOE revealed patent airway and vocal cords mobile with good contact b/l - possible small glottic endy inferiorly, no further recommendations, no further ENT intervention required. Plan endorsed to patient bedside. Patient gives permission to disclose information to family member. All questions and concerns addressed.     DISPOSITION: Acute Rehab once stable and workup is complete      CORE MEASURES:        Admission NIHSS: 2     TPA: [] YES [x] NO      LDL/HDL:     Depression Screen: 0     Statin Therapy: yes     Dysphagia Screen: [x] PASS [] FAIL     Smoking [] YES [x] NO      Afib [] YES [x] NO     Stroke Education [x] YES [] NO    Obtain screening lower extremity venous ultrasound in patients who meet 1 or more of the following criteria as patient is high risk for DVT/PE on admission:   [] History of DVT/PE  []Hypercoagulable states (Factor V Leiden, Cancer, OCP, etc. )  []Prolonged immobility (hemiplegia/hemiparesis/post operative or any other extended immobilization)  [] Transferred from outside facility (Rehab or Long term care)  [] Age </= to 50

## 2020-08-19 LAB
ANION GAP SERPL CALC-SCNC: 11 MMOL/L — SIGNIFICANT CHANGE UP (ref 5–17)
BUN SERPL-MCNC: 8 MG/DL — SIGNIFICANT CHANGE UP (ref 7–23)
CALCIUM SERPL-MCNC: 9.1 MG/DL — SIGNIFICANT CHANGE UP (ref 8.4–10.5)
CHLORIDE SERPL-SCNC: 104 MMOL/L — SIGNIFICANT CHANGE UP (ref 96–108)
CO2 SERPL-SCNC: 23 MMOL/L — SIGNIFICANT CHANGE UP (ref 22–31)
CREAT SERPL-MCNC: 0.69 MG/DL — SIGNIFICANT CHANGE UP (ref 0.5–1.3)
GLUCOSE SERPL-MCNC: 104 MG/DL — HIGH (ref 70–99)
HCT VFR BLD CALC: 42.6 % — SIGNIFICANT CHANGE UP (ref 39–50)
HGB BLD-MCNC: 14.8 G/DL — SIGNIFICANT CHANGE UP (ref 13–17)
MCHC RBC-ENTMCNC: 30.6 PG — SIGNIFICANT CHANGE UP (ref 27–34)
MCHC RBC-ENTMCNC: 34.7 GM/DL — SIGNIFICANT CHANGE UP (ref 32–36)
MCV RBC AUTO: 88 FL — SIGNIFICANT CHANGE UP (ref 80–100)
NRBC # BLD: 0 /100 WBCS — SIGNIFICANT CHANGE UP (ref 0–0)
PLATELET # BLD AUTO: 259 K/UL — SIGNIFICANT CHANGE UP (ref 150–400)
POTASSIUM SERPL-MCNC: 3.6 MMOL/L — SIGNIFICANT CHANGE UP (ref 3.5–5.3)
POTASSIUM SERPL-SCNC: 3.6 MMOL/L — SIGNIFICANT CHANGE UP (ref 3.5–5.3)
RBC # BLD: 4.84 M/UL — SIGNIFICANT CHANGE UP (ref 4.2–5.8)
RBC # FLD: 12.1 % — SIGNIFICANT CHANGE UP (ref 10.3–14.5)
SODIUM SERPL-SCNC: 138 MMOL/L — SIGNIFICANT CHANGE UP (ref 135–145)
WBC # BLD: 9.76 K/UL — SIGNIFICANT CHANGE UP (ref 3.8–10.5)
WBC # FLD AUTO: 9.76 K/UL — SIGNIFICANT CHANGE UP (ref 3.8–10.5)

## 2020-08-19 PROCEDURE — 99233 SBSQ HOSP IP/OBS HIGH 50: CPT

## 2020-08-19 RX ADMIN — Medication 5 MILLIGRAM(S): at 06:42

## 2020-08-19 RX ADMIN — SODIUM CHLORIDE 85 MILLILITER(S): 9 INJECTION INTRAMUSCULAR; INTRAVENOUS; SUBCUTANEOUS at 05:26

## 2020-08-19 RX ADMIN — MIDODRINE HYDROCHLORIDE 10 MILLIGRAM(S): 2.5 TABLET ORAL at 14:55

## 2020-08-19 RX ADMIN — ATORVASTATIN CALCIUM 80 MILLIGRAM(S): 80 TABLET, FILM COATED ORAL at 20:32

## 2020-08-19 RX ADMIN — ENOXAPARIN SODIUM 40 MILLIGRAM(S): 100 INJECTION SUBCUTANEOUS at 14:55

## 2020-08-19 RX ADMIN — MIDODRINE HYDROCHLORIDE 10 MILLIGRAM(S): 2.5 TABLET ORAL at 18:14

## 2020-08-19 RX ADMIN — Medication 5 MILLIGRAM(S): at 14:56

## 2020-08-19 RX ADMIN — MIDODRINE HYDROCHLORIDE 10 MILLIGRAM(S): 2.5 TABLET ORAL at 05:26

## 2020-08-19 RX ADMIN — Medication 5 MILLIGRAM(S): at 22:51

## 2020-08-19 RX ADMIN — CLOPIDOGREL BISULFATE 75 MILLIGRAM(S): 75 TABLET, FILM COATED ORAL at 14:55

## 2020-08-19 RX ADMIN — Medication 81 MILLIGRAM(S): at 14:56

## 2020-08-19 RX ADMIN — CEFTRIAXONE 100 MILLIGRAM(S): 500 INJECTION, POWDER, FOR SOLUTION INTRAMUSCULAR; INTRAVENOUS at 09:00

## 2020-08-19 NOTE — PROGRESS NOTE ADULT - ASSESSMENT
ASSESSMENT: 75-year-old right-handed gentleman first evaluated at [facility] on 8/15/2020 with left hemiparesis.  CT head (8/14/2020) to my eye showed moderate-severe periventricular chronic ischemic changes.  There was a possible subacute right corona radiata infarct, perhaps more likely part and parcel of the chronic ischemic changes.  CTA neck and head (8/14/2020) to my eye showed severe right M1 stenosis.  The left vertebral artery was dominant.  Incidentally noted were emphysematous lung changes.     Impression.  On 8/14/2020 he developed some type of change in mental status along with headache, and had been dropping objects, most likely due to left hemiparesis.  His presentation is consistent with right MCA infarction, most likely acute ischemic stroke, probably related to severe right M1 stenosis with borderzone pattern.  The right M1 stenosis is most likely due to large artery atherosclerosis, although an embolus with partial lysis cannot be ruled out with certainty (embolic stroke of undetermined source).       NEURO: No acute neurological change though last night reported with some left neglect and dysarthria , repeat CTH and CTA without acute change. Continue close monitoring for neurologic deterioration, permissive HTN now with gradual titration down on IVF as tolerated continue on midodrine to induce HTN, SBP >140 mmHg systolic given neurological stability, patient with hypotensive event on 8/15/20 resulting in worsening neurological deficits but improvement to baseline once given hydration and BP raised, LDL - , titrate statin to goal <70, CTH (08/14/20) moderate to severe periventricular chronic ischemic changes, possible subacute right corona radiata infarct, likely chronic, MRI Brain w/o showed moderate sized right corona radiata/centrum semiovale internal borderzone infarct.  Physical therapy/OT recommended acute rehab.     ANTITHROMBOTIC THERAPY: ASA and Plavix for 3 months per SAMPRISS protocol, then ASA indefinitely from neurological standpoint, monitor PRU/P2Y12 (results pending)    PULMONARY: CXR clear, protecting airway, saturating well on room air. Incidental finding on CTA H/N emphysematous changes. Pulmonary consulted, Dr. Zelaya following -. CT Chest 8/16 showed apical paraseptal emphysema,no need for bronchodilator at this time     CARDIOVASCULAR: TTE (08/16/20): EF 73%, minimal MR, negative PFO, continue cardiac monitoring on telemetry, Cardiology, Dr. Aguirre consult appreciated - continue with gentle IVF hydration,  Midodrine  continued , ILR to screen for occult arrhythmia.     SBP goal: 140-180mmHg as currently tolerating     GASTROINTESTINAL:  initial dysphagia screen passed but patient was witnessed to be drooling and pocketing meals while eating. Due to high risk of aspiration from possible hemineglect, made NPO.  S&S, evaluation 8/16 recommended pureed diet with full assistance, recommend MBS 8/18 which showed D2 Nectar diet tolerance       Diet: D2 nectar     RENAL: BUN/Cr without acute change, continue with adequate IV hydration with very gradual titration permitting remains neurologically stable , continue close monitoring of hyponatremia resolved, KCl for hypokalemia      Na Goal: Greater than 135     Lee: no    HEMATOLOGY: H/H without acute change , Platelets 235, no si/sx of bleeding      DVT ppx: LMWH [x]     ID: afebrile, mild leukocytosis resolved,  UA positive UTI, started on ceftriaxone for 3 days, CXR clear lungs, blood and urine cultures NGTD- short course as noted,  continue to monitor     OTHER: ENT consulted as recommendations per S&S and Pulm for hoarse voice r/o vocal cord paralysis. ENT bedside FOE revealed patent airway and vocal cords mobile with good contact b/l - possible small glottic endy inferiorly, no further recommendations, no further ENT intervention required. Plan endorsed to patient bedside. Patient gives permission to disclose information to family member. All questions and concerns addressed.     DISPOSITION: Acute Rehab once stable and workup is complete      CORE MEASURES:        Admission NIHSS: 2     TPA: [] YES [x] NO      LDL/HDL:     Depression Screen: 0     Statin Therapy: yes     Dysphagia Screen: [x] PASS [] FAIL     Smoking [] YES [x] NO      Afib [] YES [x] NO     Stroke Education [x] YES [] NO    Obtain screening lower extremity venous ultrasound in patients who meet 1 or more of the following criteria as patient is high risk for DVT/PE on admission:   [] History of DVT/PE  []Hypercoagulable states (Factor V Leiden, Cancer, OCP, etc. )  []Prolonged immobility (hemiplegia/hemiparesis/post operative or any other extended immobilization)  [] Transferred from outside facility (Rehab or Long term care)  [] Age </= to 50 ASSESSMENT: 75-year-old right-handed gentleman first evaluated at [facility] on 8/15/2020 with left hemiparesis.  CT head (8/14/2020) to my eye showed moderate-severe periventricular chronic ischemic changes.  There was a possible subacute right corona radiata infarct, perhaps more likely part and parcel of the chronic ischemic changes.  CTA neck and head (8/14/2020) to my eye showed severe right M1 stenosis.  The left vertebral artery was dominant.  Incidentally noted were emphysematous lung changes.     Impression.  On 8/14/2020 he developed some type of change in mental status along with headache, and had been dropping objects, most likely due to left hemiparesis.  His presentation is consistent with right MCA infarction, most likely acute ischemic stroke, probably related to severe right M1 stenosis with borderzone pattern.  The right M1 stenosis is most likely due to large artery atherosclerosis, although an embolus with partial lysis cannot be ruled out with certainty (embolic stroke of undetermined source).       NEURO: No acute neurological change though last night reported with some left neglect and dysarthria, repeat CTH and CTA without acute change. Initial MRI Brain showed moderate sized right corona radiata/centrum semiovale internal borderzone infarct.  Repeat MRI with increase in RMCA infarcts. Continue close monitoring for neurologic deterioration, permissive HTN now with gradual titration down on IVF as tolerated continue on midodrine to induce HTN, SBP >140 mmHg systolic given neurological stability, patient with hypotensive event on 8/15/20 resulting in worsening neurological deficits but improvement to baseline once given hydration and BP raised, LDL -156- continue on high intensity statin, Physical therapy/OT recommended acute rehab.     ANTITHROMBOTIC THERAPY: ASA and Plavix for 3 months per SAMPRISS protocol, then ASA indefinitely from neurological standpoint. PRU/P2Y12 levels within goal range.    PULMONARY: CXR clear, protecting airway, saturating well on room air. Incidental finding on CTA H/N emphysematous changes. Pulmonary consulted, Dr. Zelaya following -. CT Chest 8/16 showed apical paraseptal emphysema, no need for bronchodilator at this time     CARDIOVASCULAR: TTE (08/16/20): EF 73%, minimal MR, negative PFO, continue cardiac monitoring on telemetry, Cardiology, Dr. Aguirre consult appreciated - continue with gentle IVF hydration, Midodrine continued, holter monitor vs loop recorder to screen for occult arrhythmia.     SBP goal: 140-180mmHg as currently tolerating     GASTROINTESTINAL:  initial dysphagia screen passed but patient was witnessed to be drooling and pocketing meals while eating. Due to high risk of aspiration from possible hemineglect, made NPO. S&S, recommend MBS 8/18 which showed D2 Nectar diet tolerance       Diet: D2 nectar     RENAL: BUN/Cr without acute change, continue with adequate IV hydration with very gradual titration permitting remains neurologically stable. hyponatremia and hypokalemia resolved.      Na Goal: Greater than 135     Lee: no    HEMATOLOGY: H/H without acute change , Platelets 259, no si/sx of bleeding      DVT ppx: LMWH [x]     ID: afebrile, no leukocytosis, UA positive UTI, started on ceftriaxone for 3 days, CXR clear lungs, blood and urine cultures NGTD- short course as noted, continue to monitor     OTHER: ENT consulted as recommendations per S&S and Pulm for hoarse voice r/o vocal cord paralysis. ENT bedside FOE revealed patent airway and vocal cords mobile with good contact b/l - possible small glottic endy inferiorly, no further recommendations, no further ENT intervention required. Plan endorsed to patient bedside. Patient gives permission to disclose information to family member. All questions and concerns addressed.     DISPOSITION: Acute Rehab once stable and workup is complete    CORE MEASURES:        Admission NIHSS: 2     TPA: [] YES [x] NO      LDL/HDL: 156/44     Depression Screen: 0     Statin Therapy: yes     Dysphagia Screen: [x] PASS [] FAIL     Smoking [] YES [x] NO      Afib [] YES [x] NO     Stroke Education [x] YES [] NO    Obtain screening lower extremity venous ultrasound in patients who meet 1 or more of the following criteria as patient is high risk for DVT/PE on admission:   [] History of DVT/PE  []Hypercoagulable states (Factor V Leiden, Cancer, OCP, etc. )  []Prolonged immobility (hemiplegia/hemiparesis/post operative or any other extended immobilization)  [] Transferred from outside facility (Rehab or Long term care)  [] Age </= to 50

## 2020-08-19 NOTE — PROGRESS NOTE ADULT - SUBJECTIVE AND OBJECTIVE BOX
THE PATIENT WAS SEEN AND EXAMINED BY ME WITH THE HOUSESTAFF AND STROKE TEAM DURING MORNING ROUNDS.   HPI:  Opal Matthew is a 76 y/o RH man with a PMH of HTN not on antithrombotics who presented to the ED with an acute one day history of headache and altered mental status. Per his friend, the pt suddenly became confused day prior to admission around 5pm and complained of a new onset headache. The pt complained about some weakness in his right lower extremity and endorses a sharp headache localized to the top/middle of his hairline that did not radiate anywhere. The pt denied  photophobia, recent fever or flu like illness, changes in hearing, changes in vision, recent falls or trauma, or pain other than the headache, difficulty swallowing, dysuria, diarrhea, numbness/tingling anywhere. No history of headaches.  Per ED,  The friend stated that the patient's speech was "off" and that his face "did not look normal". The pt did not want to go to the ER at the time, but came day of admission because the headache persisted throughout the night and today he began to feel lightheaded. The pt stated that he ambulates and talks normally at baseline and that he had never experienced these symptoms before day prior to admission.      SUBJECTIVE: No events overnight.  No new neurologic complaints.     ROS: Otherwise negative.    acetaminophen   Tablet .. 650 milliGRAM(s) Oral every 6 hours PRN  aspirin  chewable 81 milliGRAM(s) Oral daily  atorvastatin 80 milliGRAM(s) Oral at bedtime  baclofen 5 milliGRAM(s) Oral three times a day PRN  cefTRIAXone   IVPB 1000 milliGRAM(s) IV Intermittent every 24 hours  clopidogrel Tablet 75 milliGRAM(s) Oral daily  enoxaparin Injectable 40 milliGRAM(s) SubCutaneous daily  midodrine. 10 milliGRAM(s) Oral three times a day  sodium chloride 0.9%. 1000 milliLiter(s) IV Continuous <Continuous>    PHYSICAL EXAM:   Vital Signs Last 24 Hrs  T(C): 36.7 (19 Aug 2020 04:00), Max: 36.9 (18 Aug 2020 15:54)  T(F): 98 (19 Aug 2020 04:00), Max: 98.5 (18 Aug 2020 15:54)  HR: 54 (19 Aug 2020 06:00) (51 - 73)  BP: 165/72 (19 Aug 2020 06:00) (131/70 - 170/60)  BP(mean): 99 (19 Aug 2020 06:00) (85 - 101)  RR: 17 (19 Aug 2020 06:00) (15 - 24)  SpO2: 96% (19 Aug 2020 06:00) (95% - 97%)    General: No acute distress  HEENT: EOM intact, visual fields full  Abdomen: Soft, nontender, nondistended   Extremities: No edema    NEUROLOGICAL EXAM:  Mental status: Awake, alert, oriented x3, speech fluent, follows commands, no neglect, normal memory   Cranial Nerves: No facial asymmetry, no nystagmus, no dysarthria,  tongue midline  Motor exam: Normal tone, no drift, 5/5 RUE, 5/5 RLE, 5/5 LUE, 5/5 LLE, normal fine finger movements.  Sensation: Intact to light touch   Coordination/ Gait: No dysmetria, TOSHA intact and symmetric bilaterally, gait not tested    LABS:                        14.8   9.76  )-----------( 259      ( 19 Aug 2020 05:47 )             42.6    08-19    138  |  104  |  8   ----------------------------<  104<H>  3.6   |  23  |  0.69    Ca    9.1      19 Aug 2020 05:47          IMAGING: Reviewed by me. THE PATIENT WAS SEEN AND EXAMINED BY ME WITH THE HOUSESTAFF AND STROKE TEAM DURING MORNING ROUNDS.   HPI:  Opal Matthew is a 76 y/o RH man with a PMH of HTN not on antithrombotics who presented to the ED with an acute one day history of headache and altered mental status. Per his friend, the pt suddenly became confused day prior to admission around 5pm and complained of a new onset headache. The pt complained about some weakness in his right lower extremity and endorses a sharp headache localized to the top/middle of his hairline that did not radiate anywhere. The pt denied  photophobia, recent fever or flu like illness, changes in hearing, changes in vision, recent falls or trauma, or pain other than the headache, difficulty swallowing, dysuria, diarrhea, numbness/tingling anywhere. No history of headaches.  Per ED,  The friend stated that the patient's speech was "off" and that his face "did not look normal". The pt did not want to go to the ER at the time, but came day of admission because the headache persisted throughout the night and today he began to feel lightheaded. The pt stated that he ambulates and talks normally at baseline and that he had never experienced these symptoms before day prior to admission.      SUBJECTIVE: No events overnight.  No new neurologic complaints.     ROS: Otherwise negative.    acetaminophen   Tablet .. 650 milliGRAM(s) Oral every 6 hours PRN  aspirin  chewable 81 milliGRAM(s) Oral daily  atorvastatin 80 milliGRAM(s) Oral at bedtime  baclofen 5 milliGRAM(s) Oral three times a day PRN  cefTRIAXone   IVPB 1000 milliGRAM(s) IV Intermittent every 24 hours  clopidogrel Tablet 75 milliGRAM(s) Oral daily  enoxaparin Injectable 40 milliGRAM(s) SubCutaneous daily  midodrine. 10 milliGRAM(s) Oral three times a day  sodium chloride 0.9%. 1000 milliLiter(s) IV Continuous <Continuous>    PHYSICAL EXAM:   Vital Signs Last 24 Hrs  T(C): 36.7 (19 Aug 2020 04:00), Max: 36.9 (18 Aug 2020 15:54)  T(F): 98 (19 Aug 2020 04:00), Max: 98.5 (18 Aug 2020 15:54)  HR: 54 (19 Aug 2020 06:00) (51 - 73)  BP: 165/72 (19 Aug 2020 06:00) (131/70 - 170/60)  BP(mean): 99 (19 Aug 2020 06:00) (85 - 101)  RR: 17 (19 Aug 2020 06:00) (15 - 24)  SpO2: 96% (19 Aug 2020 06:00) (95% - 97%)    General: No acute distress  HEENT: left gaze palsy,  Left homonymous hemianopia  Abdomen: Soft, nontender, nondistended   Extremities: No edema    NEUROLOGICAL EXAM:  Mental status: Awake, alert, oriented to name, place and time, speech fluent, left hemineglect, following commands    Cranial Nerves: Mild to moderate left facial palsy, no nystagmus, LHH, mild left gaze palsy, mild dysarthria,  tongue midline  Motor exam: Normal tone, right side moves against gravity, left side with motor neglect: when tested independent from right LUE 4/5, LLE 4-/5, when tested together appears 3/5 with drift  Sensation: intact to light touch on right but sensory-extinction on left  Coordination/ Gait: no ataxia bilaterally, gait not tested     LABS:                        14.8   9.76  )-----------( 259      ( 19 Aug 2020 05:47 )             42.6    08-19    138  |  104  |  8   ----------------------------<  104<H>  3.6   |  23  |  0.69    Ca    9.1      19 Aug 2020 05:47    IMAGING: Reviewed by me.     MRI Head No Cont (08.18.2020)  Interval apparent increase in the infarcts in the right MCA territory involving the right centrum semiovale and corona radiata as well as the right temporal lobe compared to prior MRI exam 8/15/2020.  No acute intracranial hemorrhage.  Occlusion right MCA as seen on CTA exam    CT Angio Head w/ IV Cont (08.17.20)  1.  Redemonstration of near complete occlusion of the M1 segment of the right MCA.  2.  2 mm conical outpouching arising from the left supraclinoid ICA directed inferiorly suggesting an infundibulum versus small aneurysm (8:39).     08.17.20   CT head:  Redemonstration of acute/subacute right MCA territory infarct.    MRI Brain w/o (08/15/20): Acute infarct in the right MCA territory somewhat patchy involving the right lateral temporal lobe as well as the right corona radiata/centrum semiovale ovale region in a somewhat junctional pattern, deep internal border zone type pattern. Clinical correlation recommended. No significant mass effect. No associated hemorrhage    CT Head No Cont. (08/14/20): There is no acute intracranial hemorrhage. No focal edema or evidence of acute, transcortical infarct. No hydrocephalus, midline shift or mass effect.    CT Angio Head w/IV Cont. (08/14/20): 6 mm in length severe stenosis/near occlusion of the M1 segment of the Right MCA with reconstitution at the bifurcation.    CT Angio Neck w/IV Cont. (08/14/20): Patent cervical vasculature. No flow limiting stenosis or occlusion.

## 2020-08-19 NOTE — PROGRESS NOTE ADULT - SUBJECTIVE AND OBJECTIVE BOX
Patient is a 75y old  Male who presents with a chief complaint of R M1 occlusion vs stenosis (19 Aug 2020 13:10)      SUBJECTIVE / OVERNIGHT EVENTS:  No chest pain. No shortness of breath. No complaints. No events overnight.     MEDICATIONS  (STANDING):  aspirin  chewable 81 milliGRAM(s) Oral daily  atorvastatin 80 milliGRAM(s) Oral at bedtime  cefTRIAXone   IVPB 1000 milliGRAM(s) IV Intermittent every 24 hours  clopidogrel Tablet 75 milliGRAM(s) Oral daily  enoxaparin Injectable 40 milliGRAM(s) SubCutaneous daily  midodrine. 10 milliGRAM(s) Oral three times a day  sodium chloride 0.9%. 1000 milliLiter(s) (85 mL/Hr) IV Continuous <Continuous>    MEDICATIONS  (PRN):  acetaminophen   Tablet .. 650 milliGRAM(s) Oral every 6 hours PRN Temp greater or equal to 38C (100.4F), Mild Pain (1 - 3), Moderate Pain (4 - 6), Severe Pain (7 - 10)  baclofen 5 milliGRAM(s) Oral three times a day PRN hiccups      Vital Signs Last 24 Hrs  T(C): 36.7 (19 Aug 2020 04:00), Max: 36.8 (19 Aug 2020 00:00)  T(F): 98 (19 Aug 2020 04:00), Max: 98.2 (19 Aug 2020 00:00)  HR: 68 (19 Aug 2020 11:24) (54 - 73)  BP: 147/61 (19 Aug 2020 11:24) (143/61 - 170/60)  BP(mean): 99 (19 Aug 2020 06:00) (85 - 101)  RR: 20 (19 Aug 2020 11:24) (17 - 24)  SpO2: 97% (19 Aug 2020 11:24) (95% - 97%)  CAPILLARY BLOOD GLUCOSE        I&O's Summary    18 Aug 2020 07:01  -  19 Aug 2020 07:00  --------------------------------------------------------  IN: 2760 mL / OUT: 2650 mL / NET: 110 mL        PHYSICAL EXAM:  GENERAL: NAD, well-developed  HEAD:  Atraumatic, Normocephalic  EYES: EOMI, PERRLA, conjunctiva and sclera clear  NECK: Supple, No JVD  CHEST/LUNG: Clear to auscultation bilaterally; No wheeze  HEART: Regular rate and rhythm; No murmurs, rubs, or gallops  ABDOMEN: Soft, Nontender, Nondistended; Bowel sounds present  EXTREMITIES:  2+ Peripheral Pulses, No clubbing, cyanosis, or edema  PSYCH: AAOx3  NEUROLOGY: hemiplegia  SKIN: No rashes or lesions    LABS:                        14.8   9.76  )-----------( 259      ( 19 Aug 2020 05:47 )             42.6     08-19    138  |  104  |  8   ----------------------------<  104<H>  3.6   |  23  |  0.69    Ca    9.1      19 Aug 2020 05:47                RADIOLOGY & ADDITIONAL TESTS:    Imaging Personally Reviewed:    Consultant(s) Notes Reviewed:      Care Discussed with Consultants/Other Providers:

## 2020-08-19 NOTE — PROGRESS NOTE ADULT - ASSESSMENT
76 y/o RH man with a PMH of HTN not on antithrombotics who presented to the ED with an acute one day history of headache and altered mental status. Neurological examination demonstrates mild dysarthria with mild left nasolabial flattening with left arm weakness. CT/CTA per Neuroradiology demonstrates stenosis vs occlusion R M1. Patient out of window for tpa.    CVA  - workup and plan as per neurology  - ASA/plavix  - lipitor  - PT/OT  - swallow eval - puree diet  - permissive hypertension    hypotension  - r/o sepsis /UTI  - urine and blood cultures NGTD  - cont ceftriaxone for now . limit 3 to 5 days.  - cont iv fluids  - midodrine    HLD  -   - c/w lipitor    emphysema  - CT chest without contrast done  - pulm following    DVT px  - lovenox

## 2020-08-19 NOTE — PROGRESS NOTE ADULT - SUBJECTIVE AND OBJECTIVE BOX
Follow-up Pulm Progress Note  Demarco Junior MD  [phone]  FOR  DR ZALDIVAR    No new respiratory events overnight.    Remains afebrile with stable resp status: breathing comofrtably supine on RA    Vital Signs Last 24 Hrs  T(C): 36.8 (18 Aug 2020 07:14), Max: 37.3 (17 Aug 2020 15:15)  T(F): 98.3 (18 Aug 2020 07:14), Max: 99.1 (17 Aug 2020 15:15)  HR: 56 (18 Aug 2020 12:00) (48 - 69)  BP: 163/66 (18 Aug 2020 12:00) (131/70 - 184/62)  BP(mean): 95 (18 Aug 2020 12:00) (81 - 97)  RR: 15 (18 Aug 2020 12:00) (15 - 25)  SpO2: 96% (18 Aug 2020 12:00) (93% - 97%)                          13.9   9.36  )-----------( 235      ( 18 Aug 2020 04:35 )             40.2     08-18    136  |  104  |  8   ----------------------------<  90  3.4<L>   |  20<L>  |  0.66    Ca    8.4      18 Aug 2020 04:35      Physical Examination:  PULM: Clear without wheeze or rhonchi  CVS: Regular rate and rhythm, no murmurs, rubs, or gallops  ABD: Soft, non-tender  EXT:  No clubbing, cyanosis, or edema    RADIOLOGY REVIEWED  CXR:    CT chest:    PROCEDURE DATE:  08/16/2020        INTERPRETATION:  CLINICAL INFORMATION: Cough. Abnormality seen on chest CT scan. Possible emphysema. Stroke.    COMPARISON: None.    PROCEDURE:  CT of the Chest was performed without intravenous contrast.  Sagittal and coronal reformats were performed.    FINDINGS:    LUNGS AND AIRWAYS: Patent central airways.  Dependent atelectasis. Biapical paraseptal emphysematous change. Subcentimeter bleb in the right lower lobe.  PLEURA: No pleural effusion.  MEDIASTINUM AND RODOLFO: No lymphadenopathy.  VESSELS: Within normal limits.  HEART: Heart size is normal. No pericardial effusion.  CHEST WALL AND LOWER NECK: Within normal limits.  VISUALIZED UPPER ABDOMEN: Large calcified stones in the gallbladder.  BONES: Within normal limits.    IMPRESSION:    Apical paraseptal emphysema.    TTE:    PROCEDURE: Transthoracic echocardiogram with 2-D, M-Mode  and complete spectral and color flow Doppler. Patient was  injected with 10 cc's of aerosolized saline. Patient was  injected with 10 cc's of aerosolized saline.  INDICATION: Cerebral infarction, unspecified (I63.9)  ------------------------------------------------------------------------  Dimensions:    Normal Values:  LA:     3.6    2.0 - 4.0 cm  Ao:     3.3    2.0 - 3.8 cm  SEPTUM: 0.7    0.6 - 1.2 cm  PWT:    0.9    0.6 - 1.1 cm  LVIDd:  4.9    3.0 - 5.6 cm  LVIDs:  2.7    1.8 - 4.0 cm  Derived variables:  LVMI: 76 g/m2  RWT: 0.36  Fractional short: 45 %  EF (Alejandra Rule): 73 %  ------------------------------------------------------------------------  Observations:  Mitral Valve: Normal mitral valve. Minimal mitral  regurgitation.  Aortic Valve/Aorta: Normal trileaflet aortic valve. No  aortic valve regurgitation seen.  Aortic Root: 3.3 cm.  Left Atrium: Normal left atrium.  LA volume index = 17  cc/m2.  Left Ventricle: Normal left ventricular systolic function.  No segmental wall motion abnormalities. Normal left  ventricular internal dimensions and wall thicknesses.  Normal diastolic function  Right Heart: Normal right atrium. Normal right ventricular  size and function. Normal tricuspid valve. Minimal  tricuspid regurgitation. Pulmonic valve not well  visualized, probably normal. No pulmonic regurgitation.  Pericardium/Pleura: Normal pericardium with no pericardial  effusion.  Hemodynamic: Estimated right atrial pressure is 8 mm Hg.  Agitated saline injection demonstrates no evidence of a  patent foramen ovale.  ------------------------------------------------------------------------  Conclusions:  1. Normal mitral valve. Minimal mitral regurgitation.  2. Normal trileaflet aortic valve. No aortic valve  regurgitation seen.  3. Normal left ventricular systolic function. No segmental  wall motion abnormalities.  4. Normal diastolic function

## 2020-08-19 NOTE — PROGRESS NOTE ADULT - SUBJECTIVE AND OBJECTIVE BOX
Subjective: Patient seen and examined. No new events except as noted.   remains in Stroke unit     REVIEW OF SYSTEMS:    CONSTITUTIONAL: No weakness, fevers or chills  EYES/ENT: No visual changes;  No vertigo or throat pain   NECK: No pain or stiffness  RESPIRATORY: No cough, wheezing, hemoptysis; No shortness of breath  CARDIOVASCULAR: No chest pain or palpitations  GASTROINTESTINAL: No abdominal or epigastric pain. No nausea, vomiting, or hematemesis; No diarrhea or constipation. No melena or hematochezia.  GENITOURINARY: No dysuria, frequency or hematuria  NEUROLOGICAL: No numbness or weakness  SKIN: No itching, burning, rashes, or lesions   All other review of systems is negative unless indicated above.    MEDICATIONS:  MEDICATIONS  (STANDING):  aspirin  chewable 81 milliGRAM(s) Oral daily  atorvastatin 80 milliGRAM(s) Oral at bedtime  cefTRIAXone   IVPB 1000 milliGRAM(s) IV Intermittent every 24 hours  clopidogrel Tablet 75 milliGRAM(s) Oral daily  enoxaparin Injectable 40 milliGRAM(s) SubCutaneous daily  midodrine. 10 milliGRAM(s) Oral three times a day  sodium chloride 0.9%. 1000 milliLiter(s) (85 mL/Hr) IV Continuous <Continuous>      PHYSICAL EXAM:  T(C): 36.7 (08-19-20 @ 04:00), Max: 36.9 (08-18-20 @ 15:54)  HR: 54 (08-19-20 @ 06:00) (54 - 73)  BP: 165/72 (08-19-20 @ 06:00) (143/61 - 170/60)  RR: 17 (08-19-20 @ 06:00) (15 - 24)  SpO2: 96% (08-19-20 @ 06:00) (95% - 97%)  Wt(kg): --  I&O's Summary    18 Aug 2020 07:01  -  19 Aug 2020 07:00  --------------------------------------------------------  IN: 2760 mL / OUT: 2650 mL / NET: 110 mL            Appearance: NAD	  HEENT:   Dry  oral mucosa, PERRL, EOMI	  Lymphatic: No lymphadenopathy  Cardiovascular: Normal S1 S2, No JVD, No murmurs, No edema  Respiratory: Lungs clear to auscultation	  Psychiatry: A & O x 3, Mood & affect appropriate  Gastrointestinal:  Soft, Non-tender, + BS	  Skin: No rashes, No ecchymoses, No cyanosis	  Extremities: Normal range of motion, No clubbing, cyanosis or edema  Vascular: Peripheral pulses palpable 2+ bilaterally  - Cranial Nerves II-XII:  VFF, EOMI, PERRL (3mm OD, OS), V1-V3 intact, some nasolabial flattening on the left side of face, t/p midline, SCM/trap intact.  	- Fundoscopic examination: upon fundoscopic examination grossly clear margins of optic disc & cup  	- Motor: Pronator drift present on the left side. demonstrates orbiting around the left arm. Strength left arm 4+/5 with  strength 4/5. Left leg 4+/5 hip flexors/extensors with 5/5 knee flexion/ext dorsi/plantarflexion. right arm and leg 5/5. Normal muscle bulk and tone throughout. Neck flexion/extension 5/5 without neck stiffness  	- Sensory:  Intact to light touch and PP bilaterally throughout.  	- Coordination:  FTN demonstrated mild dysmetria in proportion to weakness on left arm, intact on right  - Gait: patient able to stand up on his own, ambulate several steps without wide based gait, not requiring assistance.    LABS:    CARDIAC MARKERS:                                14.8   9.76  )-----------( 259      ( 19 Aug 2020 05:47 )             42.6     08-19    138  |  104  |  8   ----------------------------<  104<H>  3.6   |  23  |  0.69    Ca    9.1      19 Aug 2020 05:47      proBNP:   Lipid Profile:   HgA1c:   TSH:             TELEMETRY: SR	    ECG:    RADIOLOGY:   DIAGNOSTIC TESTING:  [ ] Echocardiogram:  [ ]  Catheterization:  [ ] Stress Test:    OTHER:

## 2020-08-20 LAB
ANION GAP SERPL CALC-SCNC: 9 MMOL/L — SIGNIFICANT CHANGE UP (ref 5–17)
BUN SERPL-MCNC: 13 MG/DL — SIGNIFICANT CHANGE UP (ref 7–23)
CALCIUM SERPL-MCNC: 8.9 MG/DL — SIGNIFICANT CHANGE UP (ref 8.4–10.5)
CHLORIDE SERPL-SCNC: 107 MMOL/L — SIGNIFICANT CHANGE UP (ref 96–108)
CO2 SERPL-SCNC: 22 MMOL/L — SIGNIFICANT CHANGE UP (ref 22–31)
CREAT SERPL-MCNC: 0.67 MG/DL — SIGNIFICANT CHANGE UP (ref 0.5–1.3)
GLUCOSE SERPL-MCNC: 107 MG/DL — HIGH (ref 70–99)
HCT VFR BLD CALC: 40.3 % — SIGNIFICANT CHANGE UP (ref 39–50)
HGB BLD-MCNC: 13.8 G/DL — SIGNIFICANT CHANGE UP (ref 13–17)
MCHC RBC-ENTMCNC: 30.4 PG — SIGNIFICANT CHANGE UP (ref 27–34)
MCHC RBC-ENTMCNC: 34.2 GM/DL — SIGNIFICANT CHANGE UP (ref 32–36)
MCV RBC AUTO: 88.8 FL — SIGNIFICANT CHANGE UP (ref 80–100)
NRBC # BLD: 0 /100 WBCS — SIGNIFICANT CHANGE UP (ref 0–0)
PLATELET # BLD AUTO: 243 K/UL — SIGNIFICANT CHANGE UP (ref 150–400)
POTASSIUM SERPL-MCNC: 3.6 MMOL/L — SIGNIFICANT CHANGE UP (ref 3.5–5.3)
POTASSIUM SERPL-SCNC: 3.6 MMOL/L — SIGNIFICANT CHANGE UP (ref 3.5–5.3)
RBC # BLD: 4.54 M/UL — SIGNIFICANT CHANGE UP (ref 4.2–5.8)
RBC # FLD: 12.3 % — SIGNIFICANT CHANGE UP (ref 10.3–14.5)
SODIUM SERPL-SCNC: 138 MMOL/L — SIGNIFICANT CHANGE UP (ref 135–145)
WBC # BLD: 9.51 K/UL — SIGNIFICANT CHANGE UP (ref 3.8–10.5)
WBC # FLD AUTO: 9.51 K/UL — SIGNIFICANT CHANGE UP (ref 3.8–10.5)

## 2020-08-20 PROCEDURE — 99233 SBSQ HOSP IP/OBS HIGH 50: CPT

## 2020-08-20 RX ORDER — SODIUM CHLORIDE 9 MG/ML
1000 INJECTION INTRAMUSCULAR; INTRAVENOUS; SUBCUTANEOUS
Refills: 0 | Status: DISCONTINUED | OUTPATIENT
Start: 2020-08-20 | End: 2020-08-20

## 2020-08-20 RX ORDER — SODIUM CHLORIDE 9 MG/ML
1000 INJECTION INTRAMUSCULAR; INTRAVENOUS; SUBCUTANEOUS
Refills: 0 | Status: DISCONTINUED | OUTPATIENT
Start: 2020-08-20 | End: 2020-08-23

## 2020-08-20 RX ADMIN — MIDODRINE HYDROCHLORIDE 10 MILLIGRAM(S): 2.5 TABLET ORAL at 05:00

## 2020-08-20 RX ADMIN — Medication 650 MILLIGRAM(S): at 11:16

## 2020-08-20 RX ADMIN — MIDODRINE HYDROCHLORIDE 10 MILLIGRAM(S): 2.5 TABLET ORAL at 11:16

## 2020-08-20 RX ADMIN — Medication 650 MILLIGRAM(S): at 12:00

## 2020-08-20 RX ADMIN — SODIUM CHLORIDE 50 MILLILITER(S): 9 INJECTION INTRAMUSCULAR; INTRAVENOUS; SUBCUTANEOUS at 11:17

## 2020-08-20 RX ADMIN — CEFTRIAXONE 100 MILLIGRAM(S): 500 INJECTION, POWDER, FOR SOLUTION INTRAMUSCULAR; INTRAVENOUS at 11:16

## 2020-08-20 RX ADMIN — Medication 81 MILLIGRAM(S): at 11:16

## 2020-08-20 RX ADMIN — Medication 5 MILLIGRAM(S): at 03:33

## 2020-08-20 RX ADMIN — ENOXAPARIN SODIUM 40 MILLIGRAM(S): 100 INJECTION SUBCUTANEOUS at 11:16

## 2020-08-20 RX ADMIN — MIDODRINE HYDROCHLORIDE 10 MILLIGRAM(S): 2.5 TABLET ORAL at 17:35

## 2020-08-20 RX ADMIN — CLOPIDOGREL BISULFATE 75 MILLIGRAM(S): 75 TABLET, FILM COATED ORAL at 11:16

## 2020-08-20 RX ADMIN — ATORVASTATIN CALCIUM 80 MILLIGRAM(S): 80 TABLET, FILM COATED ORAL at 21:40

## 2020-08-20 RX ADMIN — Medication 1 DROP(S): at 21:41

## 2020-08-20 NOTE — PROGRESS NOTE ADULT - ASSESSMENT
75-year-old right-handed gentleman first evaluated at [facility] on 8/15/2020 with left hemiparesis.  CT head (8/14/2020) to my eye showed moderate-severe periventricular chronic ischemic changes.  There was a possible subacute right corona radiata infarct, perhaps more likely part and parcel of the chronic ischemic changes.  CTA neck and head (8/14/2020) to my eye showed severe right M1 stenosis.  The left vertebral artery was dominant.  Incidentally noted were emphysematous lung changes.     Impression.  On 8/14/2020 he developed some type of change in mental status along with headache, and had been dropping objects, most likely due to left hemiparesis.  His presentation is consistent with right MCA infarction, most likely acute ischemic stroke, probably related to severe right M1 stenosis with borderzone pattern.  The right M1 stenosis is most likely due to large artery atherosclerosis, although an embolus with partial lysis cannot be ruled out with certainty (embolic stroke of undetermined source).       NEURO: No acute neurological change though previously reported with some left neglect and dysarthria, repeat CTH and CTA without acute change. Initial MRI Brain showed moderate sized right corona radiata/centrum semiovale internal borderzone infarct.  Repeat MRI with increase in RMCA infarcts. Continue close monitoring for neurologic deterioration, permissive HTN now with gradual titration down on IVF as tolerated continue on midodrine to induce HTN, SBP >140 mmHg systolic given neurological stability, patient with hypotensive event on 8/15/20 resulting in worsening neurological deficits but improvement to baseline once given hydration and BP raised, LDL -156- continue on high intensity statin, Physical therapy/OT recommended acute rehab.     ANTITHROMBOTIC THERAPY: ASA and Plavix for 3 months per SAMPRISS protocol, then ASA indefinitely from neurological standpoint. PRU/P2Y12 levels within goal range.    PULMONARY: CXR clear, protecting airway, saturating well on room air. Incidental finding on CTA H/N emphysematous changes. Pulmonary consulted, Dr. Zelaya following -. CT Chest 8/16 showed apical paraseptal emphysema, no need for bronchodilator at this time     CARDIOVASCULAR: TTE (08/16/20): EF 73%, minimal MR, negative PFO, continue cardiac monitoring on telemetry, Cardiology, Dr. Aguirre consult appreciated - continue with gentle IVF hydration, Midodrine continued, holter monitor vs loop recorder to screen for occult arrhythmia.     SBP goal: 140-180mmHg as currently tolerating     GASTROINTESTINAL:  initial dysphagia screen passed but patient was witnessed to be drooling and pocketing meals while eating. Due to high risk of aspiration from possible hemineglect, made NPO. S&S, recommend MBS 8/18 which showed D2 Nectar diet. Noted with difficulty with overnight medications- requested S&S re-eval.      Diet: D2 nectar     RENAL: BUN/Cr without acute change, continue with adequate IV hydration with very gradual titration permitting remains neurologically stable. hyponatremia and hypokalemia resolved.      Na Goal: Greater than 135     Lee: no    HEMATOLOGY: H/H without acute change , Platelets 243, no si/sx of bleeding      DVT ppx: LMWH [x]     ID: afebrile, no leukocytosis, UA positive UTI, completed course of Abx, CXR clear lungs, blood and urine cultures NGTD- short course as noted, continue to monitor     OTHER: ENT consulted as recommendations per S&S and Pulm for hoarse voice r/o vocal cord paralysis. ENT bedside FOE revealed patent airway and vocal cords mobile with good contact b/l - possible small glottic endy inferiorly, no further recommendations, no further ENT intervention required. Plan endorsed to patient bedside. Patient gives permission to disclose information to family member. All questions and concerns addressed.     DISPOSITION: Acute Rehab once stable and workup is complete    CORE MEASURES:        Admission NIHSS: 2     TPA: [] YES [x] NO      LDL/HDL: 156/44     Depression Screen: 0     Statin Therapy: yes     Dysphagia Screen: [x] PASS [] FAIL     Smoking [] YES [x] NO      Afib [] YES [x] NO     Stroke Education [x] YES [] NO    Obtain screening lower extremity venous ultrasound in patients who meet 1 or more of the following criteria as patient is high risk for DVT/PE on admission:   [] History of DVT/PE  []Hypercoagulable states (Factor V Leiden, Cancer, OCP, etc. )  []Prolonged immobility (hemiplegia/hemiparesis/post operative or any other extended immobilization)  [] Transferred from outside facility (Rehab or Long term care)  [] Age </= to 50 75-year-old right-handed gentleman first evaluated at [facility] on 8/15/2020 with left hemiparesis.  CT head (8/14/2020) to my eye showed moderate-severe periventricular chronic ischemic changes.  There was a possible subacute right corona radiata infarct, perhaps more likely part and parcel of the chronic ischemic changes.  CTA neck and head (8/14/2020) to my eye showed severe right M1 stenosis.  The left vertebral artery was dominant.  Incidentally noted were emphysematous lung changes.     Impression.  On 8/14/2020 he developed some type of change in mental status along with headache, and had been dropping objects, most likely due to left hemiparesis.  His presentation is consistent with right MCA infarction, most likely acute ischemic stroke, probably related to severe right M1 stenosis with borderzone pattern.  The right M1 stenosis is most likely due to large artery atherosclerosis, although an embolus with partial lysis cannot be ruled out with certainty (embolic stroke of undetermined source).       NEURO: No acute neurological change though previously reported with some left neglect and dysarthria, started on baclofen PRN hiccups, repeat CTH and CTA without acute change. Initial MRI Brain showed moderate sized right corona radiata/centrum semiovale internal borderzone infarct.  Repeat MRI with increase in RMCA infarcts. Continue close monitoring for neurologic deterioration, permissive HTN now with gradual titration off IVF as tolerated continue on midodrine to maintain HTN, SBP >140 mmHg systolic given neurological stability, patient with hypotensive event on 8/15/20 resulting in worsening neurological deficits but improvement to baseline once given hydration and BP raised, LDL -156- continue on high intensity statin, Physical therapy/OT recommended acute rehab.     ANTITHROMBOTIC THERAPY: ASA and Plavix for 3 months per SAMPRISS protocol, then ASA indefinitely from neurological standpoint. PRU/P2Y12 levels within goal range.    PULMONARY: CXR clear, protecting airway, saturating well on room air. Incidental finding on CTA H/N emphysematous changes. Pulmonary consulted, Dr. Zelaya following -. CT Chest 8/16 showed apical paraseptal emphysema, no need for bronchodilator at this time     CARDIOVASCULAR: TTE (08/16/20): EF 73%, minimal MR, negative PFO, continue cardiac monitoring on telemetry, Cardiology, Dr. Aguirre consult appreciated - Midodrine continued, holter monitor vs loop recorder to screen for occult arrhythmia.     SBP goal: 140-180mmHg as currently tolerating     GASTROINTESTINAL:  initial dysphagia screen passed but patient was witnessed to be drooling and pocketing meals while eating. Due to high risk of aspiration from possible hemineglect, made NPO. S&S, recommend MBS 8/18 which showed D2 Nectar diet. Noted with difficulty with overnight medications- requested S&S re-eval.      Diet: D2 nectar     RENAL: BUN/Cr without acute change, continue with adequate IV hydration with very gradual titration permitting remains neurologically stable. hyponatremia and hypokalemia resolved.      Na Goal: Greater than 135     Lee: no    HEMATOLOGY: H/H without acute change , Platelets 243, no si/sx of bleeding      DVT ppx: LMWH [x]     ID: afebrile, no leukocytosis, UA positive UTI, completed course of Abx, CXR clear lungs, blood and urine cultures NGTD- short course as noted, continue to monitor     OTHER: ENT consulted as recommendations per S&S and Pulm for hoarse voice r/o vocal cord paralysis. ENT bedside FOE revealed patent airway and vocal cords mobile with good contact b/l - possible small glottic endy inferiorly, no further recommendations, no further ENT intervention required. Plan endorsed to patient bedside. Patient gives permission to disclose information to family member. All questions and concerns addressed.     DISPOSITION: Acute Rehab once stable and workup is complete. Uninsured- social work following.     CORE MEASURES:        Admission NIHSS: 2     TPA: [] YES [x] NO      LDL/HDL: 156/44     Depression Screen: 0     Statin Therapy: yes     Dysphagia Screen: [x] PASS [] FAIL     Smoking [] YES [x] NO      Afib [] YES [x] NO     Stroke Education [x] YES [] NO    Obtain screening lower extremity venous ultrasound in patients who meet 1 or more of the following criteria as patient is high risk for DVT/PE on admission:   [] History of DVT/PE  []Hypercoagulable states (Factor V Leiden, Cancer, OCP, etc. )  []Prolonged immobility (hemiplegia/hemiparesis/post operative or any other extended immobilization)  [] Transferred from outside facility (Rehab or Long term care)  [] Age </= to 50

## 2020-08-20 NOTE — CHART NOTE - NSCHARTNOTEFT_GEN_A_CORE
74 y/o RH man with a PMH of HTN not on antithrombotics who presented to the ED with an acute one day history of headache and altered mental status. Pt with R M1 occlusion vs stenosis. Pt with some left neglect and dysarthria, started on baclofen PRN hiccups, repeat CTH and CTA without acute change. Initial MRI Brain showed moderate sized right corona radiata/centrum semiovale internal borderzone infarct.  Repeat MRI with increase in RMCA infarcts. ENT consulted as recommendations per S&S and Pulm for hoarse voice r/o vocal cord paralysis. ENT bedside FOE revealed patent airway and vocal cords mobile with good contact b/l - possible small glottic endy inferiorly, no further recommendations, no further ENT intervention required.   MBS 8/18/20 revealed an oropharyngeal dysphagia notable for reduced L labial seal with intermittent anterior loss, reduced oral management, uncontrolled A-P spillover of liquids to the hypopharynx, laryngeal penetration with retrieval for nectar-thick liquids, and laryngeal penetration/aspiration with thin liquids. Chin tuck posture exacerbated. Disorders: Reduced L labial seal, reduced lingual strength/ROM/Rate of motion, reduced tongue to palate contact, reduced velopharyngeal closure, delay in trigger of the swallow reflex, sluggish epiglottic retroflexion, reduced laryngeal closure, reduced supraglottic sensation, reduced subglottic sensation. Recommendations included Dysphagia 2 with Nectar-thick liquids. 1) Close supervision with meals, 2) Crush meds or provide via alternate source, 3) Small single bites and sips at slow rate, 4) Aspiration precautions.   Call received from stroke PA today reporting pt not tolerating medications and requested re-evaluation of swallow. Discussed case with ONESIMO Patel. Per RN, overnight RN reported pt with dislike of taste of medications crushed in applesauce. ONESIMO Patel denies report of aspiration with medication. RN states pt tolerated medication crushed in nectar thick liquid today without difficulty. Reports pt tolerating recommended diet of Dysphagia 2 with nectar thick liquid without difficulty.  Pt seen for swallow re-assessment. Pt received OOB in chair. Reports tolerating diet without difficulty although unaware of need for nectar thick liquid. Pt continues to present with left sided facial droop and cognitive deficits. Pt noted with PO from previous meal in left lateral sulci of oral cavity prior to assessment. Consistencies of thick puree, mechanical soft and nectar thick liquid were administered. Pt with prolonged and inefficient mastication of mechanical soft texture with pieces of unchewed material in oral cavity most notable in left lateral sulci and posterior portion of oral cavity. Liquid wash inconsistently reduced oral residue. Pt with improved oral phase of the swallow with puree texture. Continued to present with mild to moderate oral residue post swallow but this was eliminated with cued repeat swallow and/or liquid wash. Would recommend downgrade to puree texture with nectar thick liquid at this time given oral deficits. Pt educated re: rationale for diet downgrade and safe swallowing guidelines (i.e. liquid wash). Pt demonstrated carryover of strategy with initial verbal reminder. Pt with delay in trigger of the pharyngeal swallow. No overt s/s laryngeal penetration/aspiration were noted during this assessment.    Recommendations:   Downgrade to Dysphagia 1 with Nectar-thick liquids  MD/team Please enter the following as notes to RN: 1) Close supervision with meals, 2) Crush meds or provide via alternate source, 3) Small single bites and sips at slow rate, 4) Alternate food and liquid to aid in clearance of oral residue. 5) Cues to repeat swallow to aid in clearance of oral residue. 6) Allow for swallows between intakes. 7) Upright posture (90 degrees) during meals and for 30 minutes after. 8) Aspiration precautions. Monitor for s/s aspiration/laryngeal penetration. If noted:  D/C p.o. intake, provide non-oral nutrition/hydration/meds  Strict aspiration and reflux precautions!  Monitor for s/s aspiration/laryngeal penetration: change of breathing pattern; cough; gurgly voice; fever; pneumonia; throat clearing; upper respiratory infection; If noted:  D/C p.o. intake, provide non-oral nutrition/hydration/meds, and contact this service @ [phone]  Maintain good oral hygiene.   This service will continue to follow.       Mel Tyler MA CCC/SLP  Speech Language Pathologist  [phone] 74 y/o RH man with a PMH of HTN not on antithrombotics who presented to the ED with an acute one day history of headache and altered mental status. Pt with R M1 occlusion vs stenosis. Pt with some left neglect and dysarthria, started on baclofen PRN hiccups, repeat CTH and CTA without acute change. Initial MRI Brain showed moderate sized right corona radiata/centrum semiovale internal borderzone infarct.  Repeat MRI with increase in RMCA infarcts. ENT consulted as recommendations per S&S and Pulm for hoarse voice r/o vocal cord paralysis. ENT bedside FOE revealed patent airway and vocal cords mobile with good contact b/l - possible small glottic endy inferiorly, no further recommendations, no further ENT intervention required.   MBS 8/18/20 revealed an oropharyngeal dysphagia notable for reduced L labial seal with intermittent anterior loss, reduced oral management, uncontrolled A-P spillover of liquids to the hypopharynx, laryngeal penetration with retrieval for nectar-thick liquids, and laryngeal penetration/aspiration with thin liquids. Chin tuck posture exacerbated. Disorders: Reduced L labial seal, reduced lingual strength/ROM/Rate of motion, reduced tongue to palate contact, reduced velopharyngeal closure, delay in trigger of the swallow reflex, sluggish epiglottic retroflexion, reduced laryngeal closure, reduced supraglottic sensation, reduced subglottic sensation. Recommendations included Dysphagia 2 with Nectar-thick liquids. 1) Close supervision with meals, 2) Crush meds or provide via alternate source, 3) Small single bites and sips at slow rate, 4) Aspiration precautions.   Call received from stroke PA today reporting pt not tolerating PO medications and requested re-evaluation of swallow. Discussed case with ONESIMO Patel. Per RN, overnight RN reported pt with dislike of taste of medications crushed in applesauce. ONESIMO Patel denies report of aspiration with medication. RN states pt tolerated medication crushed in nectar thick liquid today without difficulty. Reports pt tolerating recommended diet of Dysphagia 2 with nectar thick liquid without difficulty.  Pt seen for swallow re-assessment. Pt received OOB in chair. Reports tolerating diet without difficulty although unaware of need for nectar thick liquid. Pt continues to present with left sided facial droop and cognitive deficits. Pt noted with PO from previous meal in left lateral sulci of oral cavity prior to assessment. Consistencies of thick puree, mechanical soft and nectar thick liquid were administered. Pt with prolonged and inefficient mastication of mechanical soft texture with pieces of unchewed material in oral cavity most notable in left lateral sulci and posterior portion of oral cavity. Liquid wash inconsistently reduced oral residue. Pt with improved oral phase of the swallow with puree texture. Continued to present with mild to moderate oral residue post swallow but this was eliminated with cued repeat swallow and/or liquid wash. Would recommend downgrade to puree texture with nectar thick liquid at this time given oral deficits. Pt educated re: rationale for diet downgrade and safe swallowing guidelines (i.e. liquid wash). Pt demonstrated carryover of strategy with initial verbal reminder. Pt with delay in trigger of the pharyngeal swallow. No overt s/s laryngeal penetration/aspiration were noted during this assessment.    Recommendations:   Downgrade to Dysphagia 1 with Nectar-thick liquids  MD/team Please enter the following as notes to RN: 1) Close supervision with meals, 2) Crush meds or provide via alternate source, 3) Small single bites and sips at slow rate, 4) Alternate food and liquid to aid in clearance of oral residue. 5) Cues to repeat swallow to aid in clearance of oral residue. 6) Allow for swallows between intakes. 7) Upright posture (90 degrees) during meals and for 30 minutes after. 8) Aspiration precautions. Monitor for s/s aspiration/laryngeal penetration. If noted:  D/C p.o. intake, provide non-oral nutrition/hydration/meds  Strict aspiration and reflux precautions!  Monitor for s/s aspiration/laryngeal penetration: change of breathing pattern; cough; gurgly voice; fever; pneumonia; throat clearing; upper respiratory infection; If noted:  D/C p.o. intake, provide non-oral nutrition/hydration/meds, and contact this service @ [phone]  Maintain good oral hygiene.   This service will continue to follow.     Findings were discussed with patient, ONESIMO Patel, MARY Tyler MA CCC/SLP  Speech Language Pathologist  [phone] 74 y/o RH man with a PMH of HTN not on antithrombotics who presented to the ED with an acute one day history of headache and altered mental status. Pt with R M1 occlusion vs stenosis. Pt with some left neglect and dysarthria, started on baclofen PRN hiccups, repeat CTH and CTA without acute change. Initial MRI Brain showed moderate sized right corona radiata/centrum semiovale internal borderzone infarct.  Repeat MRI with increase in RMCA infarcts. ENT consulted as recommendations per S&S and Pulm for hoarse voice r/o vocal cord paralysis. ENT bedside FOE revealed patent airway and vocal cords mobile with good contact b/l - possible small glottic endy inferiorly, no further recommendations, no further ENT intervention required.   MBS 8/18/20 revealed an oropharyngeal dysphagia notable for reduced L labial seal with intermittent anterior loss, reduced oral management, uncontrolled A-P spillover of liquids to the hypopharynx, laryngeal penetration with retrieval for nectar-thick liquids, and laryngeal penetration/aspiration with thin liquids. Chin tuck posture exacerbated. Disorders: Reduced L labial seal, reduced lingual strength/ROM/Rate of motion, reduced tongue to palate contact, reduced velopharyngeal closure, delay in trigger of the swallow reflex, sluggish epiglottic retroflexion, reduced laryngeal closure, reduced supraglottic sensation, reduced subglottic sensation. Recommendations included Dysphagia 2 with Nectar-thick liquids. 1) Close supervision with meals, 2) Crush meds or provide via alternate source, 3) Small single bites and sips at slow rate, 4) Aspiration precautions.   Call received from stroke PA today reporting pt not tolerating PO medications and requested re-evaluation of swallow. Discussed case with ONESIMO Patel. Per RN, overnight RN reported pt with dislike of taste of medications crushed in applesauce. ONESIMO Patel denies report of aspiration with medication. RN states pt tolerated medication crushed in nectar thick liquid today without difficulty. Reports pt tolerating recommended diet of Dysphagia 2 with nectar thick liquid without difficulty.  Pt seen for swallow re-assessment. Pt received OOB in chair. Reports tolerating diet without difficulty although unaware of need for nectar thick liquid. Pt continues to present with left sided facial droop and cognitive deficits. Pt noted with PO from previous meal in left lateral sulci of oral cavity prior to assessment. Consistencies of thick puree, mechanical soft and nectar thick liquid were administered. Pt with prolonged and inefficient mastication of mechanical soft texture with pieces of unchewed material in oral cavity most notable in left lateral sulci and posterior portion of oral cavity. Liquid wash inconsistently reduced oral residue. Pt with improved oral phase of the swallow with puree texture. Continued to present with mild to moderate oral residue post swallow but this was eliminated with cued repeat swallow and/or liquid wash. Would recommend downgrade to puree texture with nectar thick liquid at this time given oral deficits. Pt educated re: rationale for diet downgrade and safe swallowing guidelines (i.e. liquid wash). Pt demonstrated carryover of strategy with initial verbal reminder. Pt with delay in trigger of the pharyngeal swallow. No overt s/s laryngeal penetration/aspiration were noted during this assessment.    Recommendations:   Downgrade to Dysphagia 1 with Nectar-thick liquids  MD/team Please enter the following as notes to RN: 1) Close supervision with meals, 2) Crush meds or provide via alternate source, 3) Small single bites and sips at slow rate, 4) Alternate food and liquid to aid in clearance of oral residue. 5) Cues to repeat swallow to aid in clearance of oral residue. 6) Allow for swallows between intakes. 7) Upright posture (90 degrees) during meals and for 30 minutes after. 8) Aspiration precautions. Monitor for s/s aspiration/laryngeal penetration. If noted:  D/C p.o. intake, provide non-oral nutrition/hydration/meds  Strict aspiration and reflux precautions!  Monitor for s/s aspiration/laryngeal penetration: change of breathing pattern; cough; gurgly voice; fever; pneumonia; throat clearing; upper respiratory infection; If noted:  D/C p.o. intake, provide non-oral nutrition/hydration/meds, and contact this service @ [phone]  Maintain good oral hygiene.   This service will continue to follow.     Findings were discussed with patient, RN Amanda, MARY Hernandez. Written safe swallowing guidelines left at the bedside.    Mel Tyler MA CCC/SLP  Speech Language Pathologist  [phone]

## 2020-08-20 NOTE — PROGRESS NOTE ADULT - SUBJECTIVE AND OBJECTIVE BOX
THE PATIENT WAS SEEN AND EXAMINED BY ME WITH THE HOUSESTAFF AND STROKE TEAM DURING MORNING ROUNDS.   HPI:  Opal Matthew is a 74 y/o RH man with a PMH of HTN not on antithrombotics who presented to the ED with an acute one day history of headache and altered mental status. Per his friend, the pt suddenly became confused day prior to admission around 5pm and complained of a new onset headache. The pt complained about some weakness in his right lower extremity and endorses a sharp headache localized to the top/middle of his hairline that did not radiate anywhere. The pt denied  photophobia, recent fever or flu like illness, changes in hearing, changes in vision, recent falls or trauma, or pain other than the headache, difficulty swallowing, dysuria, diarrhea, numbness/tingling anywhere. No history of headaches.  Per ED,  The friend stated that the patient's speech was "off" and that his face "did not look normal". The pt did not want to go to the ER at the time, but came day of admission because the headache persisted throughout the night and today he began to feel lightheaded. The pt stated that he ambulates and talks normally at baseline and that he had never experienced these symptoms before day prior to admission.      SUBJECTIVE: No events overnight.  No new neurologic complaints.     ROS: Otherwise negative.    acetaminophen   Tablet .. 650 milliGRAM(s) Oral every 6 hours PRN  aspirin  chewable 81 milliGRAM(s) Oral daily  atorvastatin 80 milliGRAM(s) Oral at bedtime  baclofen 5 milliGRAM(s) Oral three times a day PRN  cefTRIAXone   IVPB 1000 milliGRAM(s) IV Intermittent every 24 hours  clopidogrel Tablet 75 milliGRAM(s) Oral daily  enoxaparin Injectable 40 milliGRAM(s) SubCutaneous daily  midodrine. 10 milliGRAM(s) Oral three times a day  sodium chloride 0.9%. 1000 milliLiter(s) IV Continuous <Continuous>    PHYSICAL EXAM:   Vital Signs Last 24 Hrs  T(C): 36.7 (20 Aug 2020 07:25), Max: 36.9 (19 Aug 2020 16:12)  T(F): 98.1 (20 Aug 2020 07:25), Max: 98.5 (19 Aug 2020 16:12)  HR: 57 (20 Aug 2020 08:00) (50 - 68)  BP: 149/63 (20 Aug 2020 08:00) (133/56 - 162/65)  BP(mean): 89 (20 Aug 2020 08:00) (78 - 95)  RR: 19 (20 Aug 2020 08:00) (14 - 21)  SpO2: 95% (20 Aug 2020 08:00) (94% - 100%)    General: No acute distress  HEENT: left gaze palsy,  Left homonymous hemianopia  Abdomen: Soft, nontender, nondistended   Extremities: No edema    NEUROLOGICAL EXAM:  Mental status: Awake, alert, oriented to name, place and time, speech fluent, left hemineglect, following commands    Cranial Nerves: Mild to moderate left facial palsy, no nystagmus, LHH, mild left gaze palsy, mild dysarthria,  tongue midline  Motor exam: Normal tone, right side moves against gravity, left side with motor neglect: when tested independent from right LUE 4/5, LLE 4-/5, when tested together appears 3/5 with drift  Sensation: intact to light touch on right but sensory-extinction on left  Coordination/ Gait: no ataxia bilaterally, gait not tested     LABS:                        13.8   9.51  )-----------( 243      ( 20 Aug 2020 04:55 )             40.3    08-20    138  |  107  |  13  ----------------------------<  107<H>  3.6   |  22  |  0.67    Ca    8.9      20 Aug 2020 04:55    IMAGING: Reviewed by me.     MRI Head No Cont (08.18.2020)  Interval apparent increase in the infarcts in the right MCA territory involving the right centrum semiovale and corona radiata as well as the right temporal lobe compared to prior MRI exam 8/15/2020.  No acute intracranial hemorrhage.  Occlusion right MCA as seen on CTA exam    CT Angio Head w/ IV Cont (08.17.20)  1.  Redemonstration of near complete occlusion of the M1 segment of the right MCA.  2.  2 mm conical outpouching arising from the left supraclinoid ICA directed inferiorly suggesting an infundibulum versus small aneurysm (8:39).     08.17.20   CT head:  Redemonstration of acute/subacute right MCA territory infarct.    MRI Brain w/o (08/15/20): Acute infarct in the right MCA territory somewhat patchy involving the right lateral temporal lobe as well as the right corona radiata/centrum semiovale ovale region in a somewhat junctional pattern, deep internal border zone type pattern. Clinical correlation recommended. No significant mass effect. No associated hemorrhage    CT Head No Cont. (08/14/20): There is no acute intracranial hemorrhage. No focal edema or evidence of acute, transcortical infarct. No hydrocephalus, midline shift or mass effect.    CT Angio Head w/IV Cont. (08/14/20): 6 mm in length severe stenosis/near occlusion of the M1 segment of the Right MCA with reconstitution at the bifurcation.    CT Angio Neck w/IV Cont. (08/14/20): Patent cervical vasculature. No flow limiting stenosis or occlusion. THE PATIENT WAS SEEN AND EXAMINED BY ME WITH THE HOUSESTAFF AND STROKE TEAM DURING MORNING ROUNDS.   HPI:  Opal Matthew is a 76 y/o RH man with a PMH of HTN not on antithrombotics who presented to the ED with an acute one day history of headache and altered mental status. Per his friend, the pt suddenly became confused day prior to admission around 5pm and complained of a new onset headache. The pt complained about some weakness in his right lower extremity and endorses a sharp headache localized to the top/middle of his hairline that did not radiate anywhere. The pt denied  photophobia, recent fever or flu like illness, changes in hearing, changes in vision, recent falls or trauma, or pain other than the headache, difficulty swallowing, dysuria, diarrhea, numbness/tingling anywhere. No history of headaches.  Per ED,  The friend stated that the patient's speech was "off" and that his face "did not look normal". The pt did not want to go to the ER at the time, but came day of admission because the headache persisted throughout the night and today he began to feel lightheaded. The pt stated that he ambulates and talks normally at baseline and that he had never experienced these symptoms before day prior to admission.      SUBJECTIVE: No events overnight.  No new neurologic complaints.     ROS: Otherwise negative.    acetaminophen   Tablet .. 650 milliGRAM(s) Oral every 6 hours PRN  aspirin  chewable 81 milliGRAM(s) Oral daily  atorvastatin 80 milliGRAM(s) Oral at bedtime  baclofen 5 milliGRAM(s) Oral three times a day PRN  cefTRIAXone   IVPB 1000 milliGRAM(s) IV Intermittent every 24 hours  clopidogrel Tablet 75 milliGRAM(s) Oral daily  enoxaparin Injectable 40 milliGRAM(s) SubCutaneous daily  midodrine. 10 milliGRAM(s) Oral three times a day  sodium chloride 0.9%. 1000 milliLiter(s) IV Continuous <Continuous>    PHYSICAL EXAM:   Vital Signs Last 24 Hrs  T(C): 36.7 (20 Aug 2020 07:25), Max: 36.9 (19 Aug 2020 16:12)  T(F): 98.1 (20 Aug 2020 07:25), Max: 98.5 (19 Aug 2020 16:12)  HR: 57 (20 Aug 2020 08:00) (50 - 68)  BP: 149/63 (20 Aug 2020 08:00) (133/56 - 162/65)  BP(mean): 89 (20 Aug 2020 08:00) (78 - 95)  RR: 19 (20 Aug 2020 08:00) (14 - 21)  SpO2: 95% (20 Aug 2020 08:00) (94% - 100%)    General: No acute distress  HEENT: left gaze palsy,  Left homonymous hemianopia  Abdomen: Soft, nontender, nondistended   Extremities: No edema    NEUROLOGICAL EXAM:  Mental status: Awake, alert, oriented to name, place and time, speech fluent, left hemineglect, following commands  Cranial Nerves: Mild to moderate left facial palsy, no nystagmus, LHH, mild left gaze palsy, mild dysarthria,  tongue midline  Motor exam: Normal tone, right side moves against gravity, left side with motor neglect: when tested independent from right LUE 4/5, LLE 4/5  Sensation: intact to light touch on right but sensory-extinction on left  Coordination/ Gait: no ataxia bilaterally, gait not tested     LABS:                        13.8   9.51  )-----------( 243      ( 20 Aug 2020 04:55 )             40.3    08-20    138  |  107  |  13  ----------------------------<  107<H>  3.6   |  22  |  0.67    Ca    8.9      20 Aug 2020 04:55    IMAGING: Reviewed by me.     MRI Head No Cont (08.18.2020)  Interval apparent increase in the infarcts in the right MCA territory involving the right centrum semiovale and corona radiata as well as the right temporal lobe compared to prior MRI exam 8/15/2020.  No acute intracranial hemorrhage.  Occlusion right MCA as seen on CTA exam    CT Angio Head w/ IV Cont (08.17.20)  1.  Redemonstration of near complete occlusion of the M1 segment of the right MCA.  2.  2 mm conical outpouching arising from the left supraclinoid ICA directed inferiorly suggesting an infundibulum versus small aneurysm (8:39).     08.17.20   CT head:  Redemonstration of acute/subacute right MCA territory infarct.    MRI Brain w/o (08/15/20): Acute infarct in the right MCA territory somewhat patchy involving the right lateral temporal lobe as well as the right corona radiata/centrum semiovale ovale region in a somewhat junctional pattern, deep internal border zone type pattern. Clinical correlation recommended. No significant mass effect. No associated hemorrhage    CT Head No Cont. (08/14/20): There is no acute intracranial hemorrhage. No focal edema or evidence of acute, transcortical infarct. No hydrocephalus, midline shift or mass effect.    CT Angio Head w/IV Cont. (08/14/20): 6 mm in length severe stenosis/near occlusion of the M1 segment of the Right MCA with reconstitution at the bifurcation.    CT Angio Neck w/IV Cont. (08/14/20): Patent cervical vasculature. No flow limiting stenosis or occlusion.

## 2020-08-20 NOTE — PROGRESS NOTE ADULT - SUBJECTIVE AND OBJECTIVE BOX
Subjective: Patient seen and examined. No new events except as noted.     REVIEW OF SYSTEMS:    Unable to obtain       MEDICATIONS:  MEDICATIONS  (STANDING):  aspirin  chewable 81 milliGRAM(s) Oral daily  atorvastatin 80 milliGRAM(s) Oral at bedtime  cefTRIAXone   IVPB 1000 milliGRAM(s) IV Intermittent every 24 hours  clopidogrel Tablet 75 milliGRAM(s) Oral daily  enoxaparin Injectable 40 milliGRAM(s) SubCutaneous daily  midodrine. 10 milliGRAM(s) Oral three times a day  sodium chloride 0.9%. 1000 milliLiter(s) (50 mL/Hr) IV Continuous <Continuous>      PHYSICAL EXAM:  T(C): 36.7 (08-20-20 @ 07:25), Max: 36.9 (08-19-20 @ 16:12)  HR: 59 (08-20-20 @ 10:00) (50 - 68)  BP: 149/63 (08-20-20 @ 08:00) (136/71 - 162/65)  RR: 18 (08-20-20 @ 10:00) (14 - 21)  SpO2: 100% (08-20-20 @ 10:00) (94% - 100%)  Wt(kg): --  I&O's Summary    19 Aug 2020 07:01  -  20 Aug 2020 07:00  --------------------------------------------------------  IN: 850 mL / OUT: 400 mL / NET: 450 mL            Appearance: NAD	  HEENT:   Dry  oral mucosa, PERRL, EOMI	  Lymphatic: No lymphadenopathy  Cardiovascular: Normal S1 S2, No JVD, No murmurs, No edema  Respiratory: Lungs clear to auscultation	  Psychiatry: A & O x 3, Mood & affect appropriate  Gastrointestinal:  Soft, Non-tender, + BS	  Skin: No rashes, No ecchymoses, No cyanosis	  Extremities: Normal range of motion, No clubbing, cyanosis or edema  Vascular: Peripheral pulses palpable 2+ bilaterally  - Cranial Nerves II-XII:  VFF, EOMI, PERRL (3mm OD, OS), V1-V3 intact, some nasolabial flattening on the left side of face, t/p midline, SCM/trap intact.  	- Fundoscopic examination: upon fundoscopic examination grossly clear margins of optic disc & cup  	- Motor: Pronator drift present on the left side. demonstrates orbiting around the left arm. Strength left arm 4+/5 with  strength 4/5. Left leg 4+/5 hip flexors/extensors with 5/5 knee flexion/ext dorsi/plantarflexion. right arm and leg 5/5. Normal muscle bulk and tone throughout. Neck flexion/extension 5/5 without neck stiffness  	- Sensory:  Intact to light touch and PP bilaterally throughout.  	- Coordination:  FTN demonstrated mild dysmetria in proportion to weakness on left arm, intact on right  - Gait: patient able to stand up on his own, ambulate several steps without wide based gait, not requiring assistance.        LABS:    CARDIAC MARKERS:                                13.8   9.51  )-----------( 243      ( 20 Aug 2020 04:55 )             40.3     08-20    138  |  107  |  13  ----------------------------<  107<H>  3.6   |  22  |  0.67    Ca    8.9      20 Aug 2020 04:55      proBNP:   Lipid Profile:   HgA1c:   TSH:             TELEMETRY: 	 SR  RADIOLOGY:   DIAGNOSTIC TESTING:  [ ] Echocardiogram:  [ ]  Catheterization:  [ ] Stress Test:    OTHER:

## 2020-08-20 NOTE — PROGRESS NOTE ADULT - SUBJECTIVE AND OBJECTIVE BOX
Patient is a 75y old  Male who presents with a chief complaint of R M1 occlusion vs stenosis (20 Aug 2020 11:16)      SUBJECTIVE / OVERNIGHT EVENTS:  No chest pain. No shortness of breath. No complaints. No events overnight.     MEDICATIONS  (STANDING):  artificial tears (preservative free) Ophthalmic Solution 1 Drop(s) Both EYES daily  aspirin  chewable 81 milliGRAM(s) Oral daily  atorvastatin 80 milliGRAM(s) Oral at bedtime  clopidogrel Tablet 75 milliGRAM(s) Oral daily  enoxaparin Injectable 40 milliGRAM(s) SubCutaneous daily  midodrine. 10 milliGRAM(s) Oral three times a day  sodium chloride 0.9%. 1000 milliLiter(s) (50 mL/Hr) IV Continuous <Continuous>    MEDICATIONS  (PRN):  acetaminophen   Tablet .. 650 milliGRAM(s) Oral every 6 hours PRN Temp greater or equal to 38C (100.4F), Mild Pain (1 - 3), Moderate Pain (4 - 6), Severe Pain (7 - 10)  baclofen 5 milliGRAM(s) Oral three times a day PRN hiccups      Vital Signs Last 24 Hrs  T(C): 36.7 (20 Aug 2020 07:25), Max: 36.9 (19 Aug 2020 16:12)  T(F): 98.1 (20 Aug 2020 07:25), Max: 98.5 (19 Aug 2020 16:12)  HR: 55 (20 Aug 2020 12:00) (50 - 68)  BP: 139/67 (20 Aug 2020 12:00) (136/71 - 162/65)  BP(mean): 88 (20 Aug 2020 12:00) (78 - 95)  RR: 17 (20 Aug 2020 12:00) (14 - 21)  SpO2: 95% (20 Aug 2020 12:00) (94% - 100%)  CAPILLARY BLOOD GLUCOSE        I&O's Summary    19 Aug 2020 07:01  -  20 Aug 2020 07:00  --------------------------------------------------------  IN: 850 mL / OUT: 400 mL / NET: 450 mL        PHYSICAL EXAM:  GENERAL: NAD, well-developed  HEAD:  Atraumatic, Normocephalic  EYES: EOMI, PERRLA, conjunctiva and sclera clear  NECK: Supple, No JVD  CHEST/LUNG: Clear to auscultation bilaterally; No wheeze  HEART: Regular rate and rhythm; No murmurs, rubs, or gallops  ABDOMEN: Soft, Nontender, Nondistended; Bowel sounds present  EXTREMITIES:  2+ Peripheral Pulses, No clubbing, cyanosis, or edema  PSYCH: AAOx3  NEUROLOGY: non-focal  SKIN: No rashes or lesions    LABS:                        13.8   9.51  )-----------( 243      ( 20 Aug 2020 04:55 )             40.3     08-20    138  |  107  |  13  ----------------------------<  107<H>  3.6   |  22  |  0.67    Ca    8.9      20 Aug 2020 04:55                RADIOLOGY & ADDITIONAL TESTS:    Imaging Personally Reviewed:    Consultant(s) Notes Reviewed:      Care Discussed with Consultants/Other Providers:

## 2020-08-21 ENCOUNTER — TRANSCRIPTION ENCOUNTER (OUTPATIENT)
Age: 75
End: 2020-08-21

## 2020-08-21 LAB
ANION GAP SERPL CALC-SCNC: 8 MMOL/L — SIGNIFICANT CHANGE UP (ref 5–17)
BUN SERPL-MCNC: 12 MG/DL — SIGNIFICANT CHANGE UP (ref 7–23)
CALCIUM SERPL-MCNC: 9.3 MG/DL — SIGNIFICANT CHANGE UP (ref 8.4–10.5)
CHLORIDE SERPL-SCNC: 106 MMOL/L — SIGNIFICANT CHANGE UP (ref 96–108)
CO2 SERPL-SCNC: 22 MMOL/L — SIGNIFICANT CHANGE UP (ref 22–31)
CREAT SERPL-MCNC: 0.68 MG/DL — SIGNIFICANT CHANGE UP (ref 0.5–1.3)
CULTURE RESULTS: SIGNIFICANT CHANGE UP
CULTURE RESULTS: SIGNIFICANT CHANGE UP
GLUCOSE SERPL-MCNC: 103 MG/DL — HIGH (ref 70–99)
HCT VFR BLD CALC: 41.3 % — SIGNIFICANT CHANGE UP (ref 39–50)
HGB BLD-MCNC: 13.6 G/DL — SIGNIFICANT CHANGE UP (ref 13–17)
MCHC RBC-ENTMCNC: 29.7 PG — SIGNIFICANT CHANGE UP (ref 27–34)
MCHC RBC-ENTMCNC: 32.9 GM/DL — SIGNIFICANT CHANGE UP (ref 32–36)
MCV RBC AUTO: 90.2 FL — SIGNIFICANT CHANGE UP (ref 80–100)
NRBC # BLD: 0 /100 WBCS — SIGNIFICANT CHANGE UP (ref 0–0)
PLATELET # BLD AUTO: 257 K/UL — SIGNIFICANT CHANGE UP (ref 150–400)
POTASSIUM SERPL-MCNC: 3.7 MMOL/L — SIGNIFICANT CHANGE UP (ref 3.5–5.3)
POTASSIUM SERPL-SCNC: 3.7 MMOL/L — SIGNIFICANT CHANGE UP (ref 3.5–5.3)
RBC # BLD: 4.58 M/UL — SIGNIFICANT CHANGE UP (ref 4.2–5.8)
RBC # FLD: 12 % — SIGNIFICANT CHANGE UP (ref 10.3–14.5)
SODIUM SERPL-SCNC: 136 MMOL/L — SIGNIFICANT CHANGE UP (ref 135–145)
SPECIMEN SOURCE: SIGNIFICANT CHANGE UP
SPECIMEN SOURCE: SIGNIFICANT CHANGE UP
WBC # BLD: 8.67 K/UL — SIGNIFICANT CHANGE UP (ref 3.8–10.5)
WBC # FLD AUTO: 8.67 K/UL — SIGNIFICANT CHANGE UP (ref 3.8–10.5)

## 2020-08-21 PROCEDURE — 99233 SBSQ HOSP IP/OBS HIGH 50: CPT

## 2020-08-21 RX ORDER — SENNA PLUS 8.6 MG/1
2 TABLET ORAL AT BEDTIME
Refills: 0 | Status: DISCONTINUED | OUTPATIENT
Start: 2020-08-21 | End: 2020-09-02

## 2020-08-21 RX ORDER — POLYETHYLENE GLYCOL 3350 17 G/17G
17 POWDER, FOR SOLUTION ORAL DAILY
Refills: 0 | Status: DISCONTINUED | OUTPATIENT
Start: 2020-08-21 | End: 2020-09-02

## 2020-08-21 RX ORDER — LACTULOSE 10 G/15ML
20 SOLUTION ORAL ONCE
Refills: 0 | Status: COMPLETED | OUTPATIENT
Start: 2020-08-21 | End: 2020-08-21

## 2020-08-21 RX ADMIN — Medication 5 MILLIGRAM(S): at 17:39

## 2020-08-21 RX ADMIN — LACTULOSE 20 GRAM(S): 10 SOLUTION ORAL at 17:45

## 2020-08-21 RX ADMIN — ENOXAPARIN SODIUM 40 MILLIGRAM(S): 100 INJECTION SUBCUTANEOUS at 12:32

## 2020-08-21 RX ADMIN — Medication 5 MILLIGRAM(S): at 21:30

## 2020-08-21 RX ADMIN — ATORVASTATIN CALCIUM 80 MILLIGRAM(S): 80 TABLET, FILM COATED ORAL at 21:30

## 2020-08-21 RX ADMIN — Medication 5 MILLIGRAM(S): at 05:26

## 2020-08-21 RX ADMIN — MIDODRINE HYDROCHLORIDE 10 MILLIGRAM(S): 2.5 TABLET ORAL at 12:32

## 2020-08-21 RX ADMIN — POLYETHYLENE GLYCOL 3350 17 GRAM(S): 17 POWDER, FOR SOLUTION ORAL at 17:45

## 2020-08-21 RX ADMIN — SENNA PLUS 2 TABLET(S): 8.6 TABLET ORAL at 21:29

## 2020-08-21 RX ADMIN — MIDODRINE HYDROCHLORIDE 10 MILLIGRAM(S): 2.5 TABLET ORAL at 05:26

## 2020-08-21 RX ADMIN — Medication 81 MILLIGRAM(S): at 12:32

## 2020-08-21 RX ADMIN — CLOPIDOGREL BISULFATE 75 MILLIGRAM(S): 75 TABLET, FILM COATED ORAL at 12:32

## 2020-08-21 RX ADMIN — SODIUM CHLORIDE 25 MILLILITER(S): 9 INJECTION INTRAMUSCULAR; INTRAVENOUS; SUBCUTANEOUS at 17:45

## 2020-08-21 RX ADMIN — Medication 1 DROP(S): at 12:32

## 2020-08-21 RX ADMIN — MIDODRINE HYDROCHLORIDE 10 MILLIGRAM(S): 2.5 TABLET ORAL at 17:39

## 2020-08-21 NOTE — PROGRESS NOTE ADULT - ASSESSMENT
75-year-old right-handed gentleman first evaluated at [facility] on 8/15/2020 with left hemiparesis.  CT head (8/14/2020) to my eye showed moderate-severe periventricular chronic ischemic changes.  There was a possible subacute right corona radiata infarct, perhaps more likely part and parcel of the chronic ischemic changes.  CTA neck and head (8/14/2020) to my eye showed severe right M1 stenosis.  The left vertebral artery was dominant.  Incidentally noted were emphysematous lung changes.     Impression.  On 8/14/2020 he developed some type of change in mental status along with headache, and had been dropping objects, most likely due to left hemiparesis.  His presentation is consistent with right MCA infarction, most likely acute ischemic stroke, probably related to severe right M1 stenosis with borderzone pattern.  The right M1 stenosis is most likely due to large artery atherosclerosis, although an embolus with partial lysis cannot be ruled out with certainty (embolic stroke of undetermined source).       NEURO: No acute neurological change though previously reported with some left neglect and dysarthria, started on baclofen PRN hiccups, repeat CTH and CTA without acute change. Initial MRI Brain showed moderate sized right corona radiata/centrum semiovale internal borderzone infarct.  Repeat MRI with increase in RMCA infarcts. Continue close monitoring for neurologic deterioration, permissive HTN now with gradual titration off IVF as tolerated continue on midodrine to maintain HTN, SBP >140 mmHg systolic given neurological stability, patient with hypotensive event on 8/15/20 resulting in worsening neurological deficits but improvement to baseline once given hydration and BP raised, LDL -156- continue on high intensity statin, Physical therapy/OT recommended acute rehab.     ANTITHROMBOTIC THERAPY: ASA and Plavix for 3 months per [facility]PRISS protocol, then ASA indefinitely from neurological standpoint. PRU/P2Y12 levels within goal range.    PULMONARY: CXR clear, protecting airway, saturating well on room air. Incidental finding on CTA H/N emphysematous changes. Pulmonary consulted, Dr. Zelaya following -. CT Chest 8/16 showed apical paraseptal emphysema, no need for bronchodilator at this time     CARDIOVASCULAR: TTE (08/16/20): EF 73%, minimal MR, negative PFO, continue cardiac monitoring on telemetry, Cardiology, Dr. Aguirre consult appreciated - Midodrine continued. IVF attempted to be d/c'd however BP dropped so now restarted back on, holter monitor vs loop recorder to screen for occult arrhythmia.     SBP goal: 140-180mmHg as currently tolerating     GASTROINTESTINAL:  initial dysphagia screen passed but patient was witnessed to be drooling and pocketing meals while eating. Due to high risk of aspiration from possible hemineglect, made NPO. S&S, recommend MBS 8/18 which showed D2 Nectar diet. Noted with difficulty with overnight medications- repeat S&S re-eval on 8/20 recommended dysphagia 1 nectar.       Diet: D1 nectar     RENAL: BUN/Cr without acute change, continue with adequate IV hydration with very gradual titration permitting remains neurologically stable. hyponatremia and hypokalemia resolved.      Na Goal: Greater than 135     Lee: no    HEMATOLOGY: H/H without acute change , Platelets 257, no si/sx of bleeding      DVT ppx: LMWH [x]     ID: afebrile, no leukocytosis, UA positive UTI, completed course of Abx, CXR clear lungs, blood and urine cultures NGTD- short course as noted, continue to monitor     OTHER: ENT consulted as recommendations per S&S and Pulm for hoarse voice r/o vocal cord paralysis. ENT bedside FOE revealed patent airway and vocal cords mobile with good contact b/l - possible small glottic endy inferiorly, no further recommendations, no further ENT intervention required. Plan endorsed to patient bedside. Patient gives permission to disclose information to family member. All questions and concerns addressed.     DISPOSITION: Acute Rehab once stable and workup is complete. Uninsured- social work following.     CORE MEASURES:        Admission NIHSS: 2     TPA: [] YES [x] NO      LDL/HDL: 156/44     Depression Screen: 0     Statin Therapy: yes     Dysphagia Screen: [x] PASS [] FAIL     Smoking [] YES [x] NO      Afib [] YES [x] NO     Stroke Education [x] YES [] NO    Obtain screening lower extremity venous ultrasound in patients who meet 1 or more of the following criteria as patient is high risk for DVT/PE on admission:   [] History of DVT/PE  []Hypercoagulable states (Factor V Leiden, Cancer, OCP, etc. )  []Prolonged immobility (hemiplegia/hemiparesis/post operative or any other extended immobilization)  [] Transferred from outside facility (Rehab or Long term care)  [] Age </= to 50

## 2020-08-21 NOTE — PROGRESS NOTE ADULT - SUBJECTIVE AND OBJECTIVE BOX
THE PATIENT WAS SEEN AND EXAMINED BY ME WITH THE HOUSESTAFF AND STROKE TEAM DURING MORNING ROUNDS.   HPI:  Opal Matthew is a 74 y/o RH man with a PMH of HTN not on antithrombotics who presented to the ED with an acute one day history of headache and altered mental status. Per his friend, the pt suddenly became confused day prior to admission around 5pm and complained of a new onset headache. The pt complained about some weakness in his right lower extremity and endorses a sharp headache localized to the top/middle of his hairline that did not radiate anywhere. The pt denied  photophobia, recent fever or flu like illness, changes in hearing, changes in vision, recent falls or trauma, or pain other than the headache, difficulty swallowing, dysuria, diarrhea, numbness/tingling anywhere. No history of headaches.  Per ED,  The friend stated that the patient's speech was "off" and that his face "did not look normal". The pt did not want to go to the ER at the time, but came day of admission because the headache persisted throughout the night and today he began to feel lightheaded. The pt stated that he ambulates and talks normally at baseline and that he had never experienced these symptoms before day prior to admission.      SUBJECTIVE: No events overnight.  No new neurologic complaints.     ROS: Otherwise negative.    acetaminophen   Tablet .. 650 milliGRAM(s) Oral every 6 hours PRN  artificial tears (preservative free) Ophthalmic Solution 1 Drop(s) Both EYES daily  aspirin  chewable 81 milliGRAM(s) Oral daily  atorvastatin 80 milliGRAM(s) Oral at bedtime  baclofen 5 milliGRAM(s) Oral three times a day PRN  clopidogrel Tablet 75 milliGRAM(s) Oral daily  enoxaparin Injectable 40 milliGRAM(s) SubCutaneous daily  midodrine. 10 milliGRAM(s) Oral three times a day  sodium chloride 0.9%. 1000 milliLiter(s) IV Continuous <Continuous>    PHYSICAL EXAM:   Vital Signs Last 24 Hrs  T(C): 36.6 (21 Aug 2020 08:04), Max: 37 (20 Aug 2020 16:37)  T(F): 97.9 (21 Aug 2020 08:04), Max: 98.6 (20 Aug 2020 16:37)  HR: 57 (21 Aug 2020 10:00) (52 - 65)  BP: 126/52 (21 Aug 2020 10:00) (126/52 - 163/67)  BP(mean): 74 (21 Aug 2020 10:00) (74 - 95)  RR: 12 (21 Aug 2020 10:00) (12 - 23)  SpO2: 98% (21 Aug 2020 10:00) (96% - 98%)    General: No acute distress  HEENT: left gaze palsy,  Left homonymous hemianopia  Abdomen: Soft, nontender, nondistended   Extremities: No edema    NEUROLOGICAL EXAM:  Mental status: Awake, alert, oriented to name, place and time, speech fluent, left hemineglect, following commands  Cranial Nerves: Mild to moderate left facial palsy, no nystagmus, LHH, mild left gaze palsy, mild dysarthria,  tongue midline  Motor exam: Normal tone, right side moves against gravity, left side with motor neglect: when tested independent from right LUE 4/5, LLE 4/5  Sensation: intact to light touch on right but sensory-extinction on left  Coordination/ Gait: no ataxia bilaterally, gait not tested       LABS:                        13.6   8.67  )-----------( 257      ( 21 Aug 2020 05:02 )             41.3    08-21    136  |  106  |  12  ----------------------------<  103<H>  3.7   |  22  |  0.68    Ca    9.3      21 Aug 2020 05:02    IMAGING: Reviewed by me.     MRI Head No Cont (08.18.2020)  Interval apparent increase in the infarcts in the right MCA territory involving the right centrum semiovale and corona radiata as well as the right temporal lobe compared to prior MRI exam 8/15/2020.  No acute intracranial hemorrhage.  Occlusion right MCA as seen on CTA exam    CT Angio Head w/ IV Cont (08.17.20)  1.  Redemonstration of near complete occlusion of the M1 segment of the right MCA.  2.  2 mm conical outpouching arising from the left supraclinoid ICA directed inferiorly suggesting an infundibulum versus small aneurysm (8:39).     08.17.20   CT head:  Redemonstration of acute/subacute right MCA territory infarct.    MRI Brain w/o (08/15/20): Acute infarct in the right MCA territory somewhat patchy involving the right lateral temporal lobe as well as the right corona radiata/centrum semiovale ovale region in a somewhat junctional pattern, deep internal border zone type pattern. Clinical correlation recommended. No significant mass effect. No associated hemorrhage    CT Head No Cont. (08/14/20): There is no acute intracranial hemorrhage. No focal edema or evidence of acute, transcortical infarct. No hydrocephalus, midline shift or mass effect.    CT Angio Head w/IV Cont. (08/14/20): 6 mm in length severe stenosis/near occlusion of the M1 segment of the Right MCA with reconstitution at the bifurcation.    CT Angio Neck w/IV Cont. (08/14/20): Patent cervical vasculature. No flow limiting stenosis or occlusion.

## 2020-08-21 NOTE — DISCHARGE NOTE PROVIDER - NSDCFUADDAPPT_GEN_ALL_CORE_FT
Please call your Neurosurgeon for an appointment after Rehab for wound check and further recommendations.    Followup with your Laurie MD in 1 week/after Rehab

## 2020-08-21 NOTE — DISCHARGE NOTE PROVIDER - NSDCACTIVITY_GEN_ALL_CORE
Do not drive or operate machinery/Do not make important decisions Walking - Indoors allowed/Do not drive or operate machinery/Do not make important decisions/Stairs allowed/Showering allowed/No heavy lifting/straining/Walking - Outdoors allowed/Bathing allowed

## 2020-08-21 NOTE — DISCHARGE NOTE PROVIDER - PROVIDER TOKENS
PROVIDER:[TOKEN:[[id number]],FOLLOWUP:[2 weeks]] PROVIDER:[TOKEN:[[id number]],FOLLOWUP:[2 weeks]],PROVIDER:[TOKEN:[[id number]]]

## 2020-08-21 NOTE — DISCHARGE NOTE PROVIDER - NSDCMRMEDTOKEN_GEN_ALL_CORE_FT
acetaminophen 325 mg oral tablet: 2 tab(s) orally every 6 hours, As needed, Temp greater or equal to 38C (100.4F), Mild Pain (1 - 3), Moderate Pain (4 - 6), Severe Pain (7 - 10)  aspirin 325 mg oral tablet: 1 tab(s) orally once a day  atorvastatin 80 mg oral tablet: 1 tab(s) orally once a day (at bedtime)  clopidogrel 75 mg oral tablet: 1 tab(s) orally once a day  enoxaparin: 40 milligram(s) subcutaneous once a day (at bedtime)  lidocaine 5% topical film:  topically   ocular lubricant ophthalmic solution: 1 drop(s) to each affected eye once a day  ocular lubricant ophthalmic solution: 1 drop(s) to each affected eye every hour, As needed, Dry Eyes  polyethylene glycol 3350 oral powder for reconstitution: 17 gram(s) orally once a day  senna oral tablet: 2 tab(s) orally once a day (at bedtime)

## 2020-08-21 NOTE — DISCHARGE NOTE PROVIDER - CARE PROVIDER_API CALL
Pierre Singleton (MD)  Ophthalmology  [address] [place] [address], [address]  [place] [address]  Phone: [phone]  Fax: [phone]  Follow Up Time: 2 weeks Pierre Singleton)  Ophthalmology  [address] [place] [address], [address]  [place] [address]  Phone: [phone]  Fax: [phone]  Follow Up Time: 2 weeks    Otf Levi  Neurological Surgery  [address] [place] [address]  Phone: [phone]  Fax: [phone]  Follow Up Time:

## 2020-08-21 NOTE — PROGRESS NOTE ADULT - ASSESSMENT
74 y/o RH man with a PMH of HTN not on antithrombotics who presented to the ED with an acute one day history of headache and altered mental status. Neurological examination demonstrates mild dysarthria with mild left nasolabial flattening with left arm weakness. CT/CTA per Neuroradiology demonstrates stenosis vs occlusion R M1. Patient out of window for tpa.    CVA  - workup and plan as per neurology  - ASA/plavix  - lipitor  - PT/OT  - swallow eval - puree diet  - permissive hypertension    hypotension  - urine and blood cultures NGTD  - s/p  ceftriaxone   - midodrine    HLD  -   - c/w lipitor    emphysema  - CT chest without contrast done  - pulm following    constipation  - miralax daily  - senna qhs  - lactulose x 1    DVT px  - lovenox

## 2020-08-21 NOTE — PROGRESS NOTE ADULT - SUBJECTIVE AND OBJECTIVE BOX
no new complaints, participating with therapy     REVIEW OF SYSTEMS  Constitutional - No fever,  No fatigue  HEENT - No vertigo, No neck pain  Neurological - No headaches, No memory loss, + loss of strength, No numbness, No tremors  Skin - No rashes, No lesions   Musculoskeletal - No joint pain, No joint swelling, No muscle pain  Psychiatric - No depression, No anxiety    FUNCTIONAL PROGRESS  8/20 SLP  Pt seen for swallow re-assessment. Pt received OOB in chair. Reports tolerating diet without difficulty although unaware of need for nectar thick liquid. Pt continues to present with left sided facial droop and cognitive deficits. Pt noted with PO from previous meal in left lateral sulci of oral cavity prior to assessment. Consistencies of thick puree, mechanical soft and nectar thick liquid were administered. Pt with prolonged and inefficient mastication of mechanical soft texture with pieces of unchewed material in oral cavity most notable in left lateral sulci and posterior portion of oral cavity. Liquid wash inconsistently reduced oral residue. Pt with improved oral phase of the swallow with puree texture. Continued to present with mild to moderate oral residue post swallow but this was eliminated with cued repeat swallow and/or liquid wash. Would recommend downgrade to puree texture with nectar thick liquid at this time given oral deficits. Pt educated re: rationale for diet downgrade and safe swallowing guidelines (i.e. liquid wash). Pt demonstrated carryover of strategy with initial verbal reminder. Pt with delay in trigger of the pharyngeal swallow. No overt s/s laryngeal penetration/aspiration were noted during this assessment.    8/19 PT      Bed Mobility  Bed Mobility Training Supine-to-Sit: minimum assist (75% patient effort);  1 person assist;  nonverbal cues (demo/gestures);  verbal cues  Bed Mobility Training Limitations: decreased ability to use arms for pushing/pulling;  decreased ability to use legs for bridging/pushing;  decreased strength;  impaired balance;  impaired coordination;  impaired motor control;  cognitive, decreased safety awareness    Sit-Stand Transfer Training  Transfer Training Sit-to-Stand Transfer: minimum assist (75% patient effort);  1 person assist;  nonverbal cues (demo/gestures);  verbal cues;  full weight-bearing   dilia walker;  w/ chair follow  Transfer Training Stand-to-Sit Transfer: minimum assist (75% patient effort);  1 person assist;  nonverbal cues (demo/gestures);  verbal cues;  full weight-bearing   dilia walker;  w/ chair follow  Sit-to-Stand Transfer Training Transfer Safety Analysis: decreased balance;  decreased weight-shifting ability;  decreased step length;  decreased proprioception;  decreased strength;  impaired balance;  impaired coordination;  impaired motor control;  cognitive, decreased safety awareness;  dilia walker;  w/ chair follow    Gait Training  Gait Training: minimum assist (75% patient effort);  1 person assist;  nonverbal cues (demo/gestures);  verbal cues;  full weight-bearing   dilia walker;  w/ chair follow;  25 feet;  x2  Gait Analysis: 3-point gait   decreased moises;  shuffling;  decreased step length;  decreased stride length;  increased time in double stance;  decreased weight-shifting ability;  decreased strength;  impaired balance;  impaired coordination;  impaired motor control;  unable to maintain weight bearing status;  25 feet;  x2;  dilia walker;  w/ chair follow  Gait Number of Times:: x 1  Type of Rest Type of Rest: sitting    Therapeutic Exercise  Therapeutic Exercise Detail: Pt davy seated balance ~5 min with fair trunk control. Pt davy seated therex: marches, knee ext & calf raises, 1x10 each. Sit <> stand x3 performed with Madeline using dilia-walker on RUE.     8/19 OT  OT Cognitive Treatment  OT Treatment: Cognitive Charges: Pt scored a 12/30 on the MOCA, demonstrating severe cognitive impairment. Performance on MOCA: visuospatial/executive (1/5), naming (1/3), immediate recall (0/5), attention (4/6), language (0/3), abstraction (2/2), delayed recall (0/5), orientation (4/6).     8/18 OT  Therapeutic Exercise  Therapeutic Exercise Detail: Pt tolerated LUE AROM with hold at end range, coordination activities via reaching and functional tasks, wrist ROM and digit flexion extension     Fine Motor Coordination Training  Fine Motor Coordination Training Detail: Pt completed fine motor tasks via isolated digit flexion/extension, functional grasp and release, opposition, hold in extension     Eating/Self-Feeding Training  Eating/Self-Feeding Training Assistance: minimum assist (75% patient effort);  hand over hand;  1 person assist;  decreased strength;  decreased ROM;  impaired motor control    OT Cognitive Treatment  OT Treatment: Cognitive Charges: Pt completed short blessed test with good awareness of deficits     VITALS  T(C): 36.6 (08-21-20 @ 08:04), Max: 37 (08-20-20 @ 16:37)  HR: 58 (08-21-20 @ 14:00) (52 - 65)  BP: 122/54 (08-21-20 @ 14:00) (112/70 - 163/67)  RR: 20 (08-21-20 @ 14:00) (12 - 20)  SpO2: 98% (08-21-20 @ 14:00) (96% - 98%)  Wt(kg): --    MEDICATIONS   acetaminophen   Tablet .. 650 milliGRAM(s) every 6 hours PRN  artificial tears (preservative free) Ophthalmic Solution 1 Drop(s) daily  aspirin  chewable 81 milliGRAM(s) daily  atorvastatin 80 milliGRAM(s) at bedtime  baclofen 5 milliGRAM(s) three times a day PRN  clopidogrel Tablet 75 milliGRAM(s) daily  enoxaparin Injectable 40 milliGRAM(s) daily  lactulose Syrup 20 Gram(s) once  midodrine. 10 milliGRAM(s) three times a day  polyethylene glycol 3350 17 Gram(s) daily  senna 2 Tablet(s) at bedtime  sodium chloride 0.9%. 1000 milliLiter(s) <Continuous>      RECENT LABS - Reviewed                        13.6   8.67  )-----------( 257      ( 21 Aug 2020 05:02 )             41.3     08-21    136  |  106  |  12  ----------------------------<  103<H>  3.7   |  22  |  0.68    Ca    9.3      21 Aug 2020 05:02      ----------------------------------------------------------------------------------  PHYSICAL EXAM  Constitutional - NAD, Comfortable, in bed   HEENT - NCAT, EOMI  Neck - Supple, No limited ROM  Chest - Breathing comfortably, equal chest rise  Cardiovascular - well perfused  Abdomen - Soft   Extremities - No C/C/E, No calf tenderness   Neurologic Exam -                    Cognitive - Awake, Alert, AAO to self, place, date, year, situation     Communication - Fluent, No dysarthria     Cranial Nerves - +left facial droop, left hemineglect      Motor -                     LEFT    UE - ShAB 3/5, EF 2/5, EE 2/5, WE 2/5                    RIGHT UE - ShAB 5/5, EF 5/5, EE 5/5, WE 5/5,  5/5                    LEFT    LE - HF 5/5, KE 5/5, DF 5/5, PF 5/5                    RIGHT LE - HF 5/5, KE 5/5, DF 5/5, PF 5/5        Sensory - Intact to LT     Reflexes - DTR Intact, No primitive reflexes  Psychiatric - Mood stable, Affect WNL  ----------------------------------------------------------------------------------------  ASSESSMENT/PLAN  75y Male with functional deficits after right MCA ischemic CVA.  CVA - permissive HTN with midodrine/IVF per neuro. DAPT x3mos per SAMPRISS followed by ASA indefinitely.  c/w statin.   Dsyphagia - diet downgraded to puree texture with nectar thick liquids  Pain - tylenol   PT/OT - daily therapies for ROM, increased mobility, and decreased debility related to hospitalization.  Balance, gait training, ADLs, transfers, and bracing/assistive devices as needed.    DVT PPX - lovenox  Rehab -    When medically stable, recommend ACUTE inpatient rehabilitation for the functional deficits consisting of 3 hours of therapy/day & 24 hour RN/daily PMR physician for comorbid medical management. Will continue to follow for ongoing rehab needs and recommendations.  ELOS 14 days, LTG: supervision with transfers, ambulation

## 2020-08-21 NOTE — CHART NOTE - NSCHARTNOTEFT_GEN_A_CORE
Nutrition Follow Up Note   Patient seen for: nutrition follow up on Stroke Unit    Source: medical record, pt, team.     Chart reviewed, events noted. Adm dx: CVA. Noted ST eval 8/20 recommendation for down grade of diet.    Diet Order: Diet, Dysphagia 1 Pureed-Nectar Consistency Fluid (08-20-20 @ 16:23)    Nutrition Events: pt reports fair appetite, eating >50% of meals, willing to try might shakes.    GI: denies N/V  last BM 8/19    Anthropometric Measurements:   Height (cm): 170.18 (08-14-20 @ 19:27)  Weight (kg): 63.5 (08-14-20 @ 19:27)  BMI (kg/m2): 21.9 (08-14-20 @ 19:27)      Medications: MEDICATIONS  (STANDING):  artificial tears (preservative free) Ophthalmic Solution 1 Drop(s) Both EYES daily  aspirin  chewable 81 milliGRAM(s) Oral daily  atorvastatin 80 milliGRAM(s) Oral at bedtime  clopidogrel Tablet 75 milliGRAM(s) Oral daily  enoxaparin Injectable 40 milliGRAM(s) SubCutaneous daily  midodrine. 10 milliGRAM(s) Oral three times a day  sodium chloride 0.9%. 1000 milliLiter(s) (25 mL/Hr) IV Continuous <Continuous>    MEDICATIONS  (PRN):  acetaminophen   Tablet .. 650 milliGRAM(s) Oral every 6 hours PRN Temp greater or equal to 38C (100.4F), Mild Pain (1 - 3), Moderate Pain (4 - 6), Severe Pain (7 - 10)  baclofen 5 milliGRAM(s) Oral three times a day PRN hiccups    Labs: 08-21 @ 05:02: Sodium 136, Potassium 3.7, Calcium 9.3, Magnesium --, Phosphorus --, BUN 12, Creatinine 0.68, Glucose 103<H>, Alk Phos --, ALT/SGPT --, AST/SGOT --, Albumin --, Prealbumin --, Total Bilirubin --, Hemoglobin 13.6, Hematocrit 41.3, Ferritin --, C-Reactive Protein --, Creatine Kinase <<27>      Triglycerides, Serum: 98 mg/dL (08-15-20 @ 11:22)      Skin: no pressure injuries documented   Edema: none    Estimated Needs: based on dosing wt 63.5 kg  Energy: 0029-7667 (25-30 kcals/kg)  Protein: 63-76 gm (1.0-1.2 gm/kg)    Previous Nutrition Diagnosis: swallowing difficulty  Nutrition Diagnosis is: ongoing, addressed w/ mechanically altered diet    New Nutrition Diagnosis:      Recommended Interventions:   1. Continue current diet order per ST recommendation  2. Add mighty shakes w/ meals.      Monitoring and Evaluation:   Continue to monitor nutrition provision and tolerance, weights, labs, skin integrity.   RD remains available upon request and will follow up per protocol.

## 2020-08-21 NOTE — DISCHARGE NOTE PROVIDER - NSDCCPCAREPLAN_GEN_ALL_CORE_FT
PRINCIPAL DISCHARGE DIAGNOSIS  Diagnosis: Stroke  Assessment and Plan of Treatment: Plan:  [] Discharge to Acute Rehab  [] ASA 81MG PO daily and Plavix 75MG PO daily. Plavix to be discontinued after 3 months.  [] Take all medications as prescribed.  [] Outpatient primary care, neurology, cardiology, and neuro-opthalmology follow up.  [] Recommend yearly CTA Head to monitor left supraclinoid ICA infundibulum versus small aneurysm.  [] Recommend a heart healthy, low sodium, low fat diet with regular aerobic exercise.  [] Recommend avoiding tobacco and alcohol use.  [] If you develop severe headache, slurred speech, vision changes, weakness or numbness in the face, arm, or leg, then please return to the hospital.

## 2020-08-21 NOTE — DISCHARGE NOTE PROVIDER - CARE PROVIDERS DIRECT ADDRESSES
,[email or web address]

## 2020-08-21 NOTE — DISCHARGE NOTE PROVIDER - HOSPITAL COURSE
HPI: Opal Matthew is a 76 y/o RH man with a PMH of HTN not on antithrombotics who presented to the ED with an acute one day history of headache and altered mental status. Per his friend, the pt suddenly became confused yesterday around 5pm and complained of a new onset headache. The pt complains about some weakness in his right lower extremity and endorses a sharp headache localized to the top/middle of his hairline that does not radiate anywhere. The pt denies photophobia, recent fever or flu like illness, changes in hearing, changes in vision, recent falls or trauma, or pain other than the headache, difficulty swallowing, dysuria, diarrhea, numbness/tingling anywhere. No history of headaches.         Per ED,  The friend stated that the patient's speech was "off" and that his face "did not look normal". The pt did not want to go to the ER at the time, but came today because the headache persisted throughout the night and today he began to feel lightheaded. The pt stated that he ambulates and talks normally at baseline and that he had never experienced these symptoms before yesterday.        Imaging:    -08/14/20 CT HEAD: No acute intracranial bleeding.        -08/14/20 CTA HEAD:  6 mm in length severe stenosis/near occlusion of the M1 segment of the Right MCA with reconstitution at the bifurcation.        -08/14/20 CTA NECK: Patent cervical vasculature. No flow limiting stenosis or occlusion.        -08/15/20 MRI BRAIN: Acute infarct in the right MCA territory somewhat patchy involving the right lateral temporal lobe as well as the right corona radiata/centrum semiovale ovale region in a somewhat junctional pattern, deep internal border zone type pattern. Clinical correlation recommended. No significant mass effect. No associated hemorrhage        -08/16/20 CT CHEST: Apical paraseptal emphysema.        -08/17/20 CT HEAD: Redemonstration of acute/subacute right MCA territory infarct.        -08/17/20 CTA HEAD: 1. Redemonstration of near complete occlusion of the M1 segment of the right MCA. 2. 2 mm conical outpouching arising from the left supraclinoid ICA directed inferiorly suggesting an infundibulum versus small aneurysm (8:39).        -08/18/20 MRI BRAIN: Interval apparent increase in the infarcts in the right MCA territory involving the right centrum semiovale and corona radiata as well as the right temporal lobe compared to prior MRI exam 8/15/2020. No acute intracranial hemorrhage. Occlusion right MCA as seen on CTA exam.            Hospital Course:    Patient was found to have severe stenosis of R M1 on initial CTA Head and acute infarct in the R MCA territory on subsequent MRI Brain. Patient was admitted to the stroke unit. Patient's BP became very hypotensive after admission so he was started on Midodrine 10MG PO TID and 0.9%NS IVF to maintain his blood pressure between 140/90 - 180/100.     Patient had a positive UA but negative Urine culture. A short course of Ceftriaxone was given.    TTE showed EF of 73%, normal LA.    HbA1c 5.8, .    Patient placed on Aspirin 81MG PO daily and Plavix 75MG PO daily. Plavix to be discontinued after 3 months.    Patient will need cardiac monitoring (Holter monitor).    PT/OT assessed the patient and recommended acute rehab.        Impression: On 8/14/2020 he developed some type of change in mental status along with headache, and had been dropping objects, most likely due to left hemiparesis.  His presentation is consistent with right MCA infarction, most likely acute ischemic stroke, probably related to severe right M1 stenosis with borderzone pattern.  The right M1 stenosis is most likely due to large artery atherosclerosis, although an embolus with partial lysis cannot be ruled out with certainty (embolic stroke of undetermined source).        Plan:    [] Discharge to Acute Rehab    [] ASA 81MG PO daily and Plavix 75MG PO daily. Plavix to be discontinued after 3 months.    [] HPI: Opal Matthew is a 76 y/o RH man with a PMH of HTN not on antithrombotics who presented to the ED with an acute one day history of headache and altered mental status. Per his friend, the pt suddenly became confused yesterday around 5pm and complained of a new onset headache. The pt complains about some weakness in his right lower extremity and endorses a sharp headache localized to the top/middle of his hairline that does not radiate anywhere. The pt denies photophobia, recent fever or flu like illness, changes in hearing, changes in vision, recent falls or trauma, or pain other than the headache, difficulty swallowing, dysuria, diarrhea, numbness/tingling anywhere. No history of headaches.         Per ED,  The friend stated that the patient's speech was "off" and that his face "did not look normal". The pt did not want to go to the ER at the time, but came today because the headache persisted throughout the night and today he began to feel lightheaded. The pt stated that he ambulates and talks normally at baseline and that he had never experienced these symptoms before yesterday.        Imaging:    -08/14/20 CT HEAD: No acute intracranial bleeding.        -08/14/20 CTA HEAD:  6 mm in length severe stenosis/near occlusion of the M1 segment of the Right MCA with reconstitution at the bifurcation.        -08/14/20 CTA NECK: Patent cervical vasculature. No flow limiting stenosis or occlusion.        -08/15/20 MRI BRAIN: Acute infarct in the right MCA territory somewhat patchy involving the right lateral temporal lobe as well as the right corona radiata/centrum semiovale ovale region in a somewhat junctional pattern, deep internal border zone type pattern. Clinical correlation recommended. No significant mass effect. No associated hemorrhage        -08/16/20 CT CHEST: Apical paraseptal emphysema.        -08/17/20 CT HEAD: Redemonstration of acute/subacute right MCA territory infarct.        -08/17/20 CTA HEAD: 1. Redemonstration of near complete occlusion of the M1 segment of the right MCA. 2. 2 mm conical outpouching arising from the left supraclinoid ICA directed inferiorly suggesting an infundibulum versus small aneurysm (8:39).        -08/18/20 MRI BRAIN: Interval apparent increase in the infarcts in the right MCA territory involving the right centrum semiovale and corona radiata as well as the right temporal lobe compared to prior MRI exam 8/15/2020. No acute intracranial hemorrhage. Occlusion right MCA as seen on CTA exam.            Hospital Course:    Patient was found to have severe stenosis of R M1 on initial CTA Head and acute infarct in the R MCA territory on subsequent MRI Brain. Patient was admitted to the stroke unit. Patient's BP became very hypotensive after admission so he was started on Midodrine 10MG PO TID and 0.9%NS IVF to maintain his blood pressure between 140/90 - 180/100.     Patient had a positive UA but negative Urine culture. A short course of Ceftriaxone was given.    TTE showed EF of 73%, normal LA.    HbA1c 5.8, .    Patient placed on Aspirin 81MG PO daily and Plavix 75MG PO daily. Plavix to be discontinued after 3 months.    Patient will need cardiac monitoring (Holter monitor).    PT/OT assessed the patient and recommended acute rehab.        Impression: On 8/14/2020 he developed some type of change in mental status along with headache, and had been dropping objects, most likely due to left hemiparesis.  His presentation is consistent with right MCA infarction, most likely acute ischemic stroke, probably related to severe right M1 stenosis with borderzone pattern.  The right M1 stenosis is most likely due to large artery atherosclerosis, although an embolus with partial lysis cannot be ruled out with certainty (embolic stroke of undetermined source).        Plan:    [] Discharge to Acute Rehab    [] ASA 81MG PO daily and Plavix 75MG PO daily. Plavix to be discontinued after 3 months.    []     Take all medications as prescribed.    Outpatient neurology and cardiology follow up.     Recommend outpatient pulmonology follow up and sleep study to evaluate for obstructive sleep apnea.    Recommend a heart healthy, low sodium, low fat diet with regular aerobic exercise.    Recommend avoiding tobacco and alcohol use.    If you develop slurred speech, vision changes, weakness or numbness in the face, arm, or leg, then please return to the hospital. HPI: Opal Matthew is a 74 y/o RH man with a PMH of HTN not on antithrombotics who presented to the ED with an acute one day history of headache and altered mental status. Per his friend, the pt suddenly became confused yesterday around 5pm and complained of a new onset headache. The pt complains about some weakness in his right lower extremity and endorses a sharp headache localized to the top/middle of his hairline that does not radiate anywhere. The pt denies photophobia, recent fever or flu like illness, changes in hearing, changes in vision, recent falls or trauma, or pain other than the headache, difficulty swallowing, dysuria, diarrhea, numbness/tingling anywhere. No history of headaches.        Per ED,  The friend stated that the patient's speech was "off" and that his face "did not look normal". The pt did not want to go to the ER at the time, but came today because the headache persisted throughout the night and today he began to feel lightheaded. The pt stated that he ambulates and talks normally at baseline and that he had never experienced these symptoms before yesterday.        Imaging:    -08/14/20 CT HEAD: No acute intracranial bleeding.        -08/14/20 CTA HEAD:  6 mm in length severe stenosis/near occlusion of the M1 segment of the Right MCA with reconstitution at the bifurcation.        -08/14/20 CTA NECK: Patent cervical vasculature. No flow limiting stenosis or occlusion.        -08/15/20 MRI BRAIN: Acute infarct in the right MCA territory somewhat patchy involving the right lateral temporal lobe as well as the right corona radiata/centrum semiovale ovale region in a somewhat junctional pattern, deep internal border zone type pattern. Clinical correlation recommended. No significant mass effect. No associated hemorrhage        -08/16/20 CT CHEST: Apical paraseptal emphysema.        -08/17/20 CT HEAD: Redemonstration of acute/subacute right MCA territory infarct.        -08/17/20 CTA HEAD: 1. Redemonstration of near complete occlusion of the M1 segment of the right MCA. 2. 2 mm conical outpouching arising from the left supraclinoid ICA directed inferiorly suggesting an infundibulum versus small aneurysm (8:39).        -08/18/20 MRI BRAIN: Interval apparent increase in the infarcts in the right MCA territory involving the right centrum semiovale and corona radiata as well as the right temporal lobe compared to prior MRI exam 8/15/2020. No acute intracranial hemorrhage. Occlusion right MCA as seen on CTA exam.            Hospital Course:    Patient was found to have severe stenosis of R M1 on initial CTA Head and acute infarct in the R MCA territory on subsequent MRI Brain. Patient was admitted to the stroke unit. Patient's BP became very hypotensive after admission so he was started on Midodrine 10MG PO TID and 0.9%NS IVF to maintain his blood pressure between 140/90 - 180/100.    Patient had a positive UA but negative Urine culture. A short course of Ceftriaxone was given.    TTE showed EF of 73%, normal LA.    HbA1c 5.8, .    Patient placed on Aspirin 81MG PO daily and Plavix 75MG PO daily. Plavix to be discontinued after 3 months.    Patient will need long-term cardiac monitoring (Holter monitor).    8/21/20: Patient still on Midodrine and 25ml/hr IVF for blood pressure maintenance. Attempting to wean patient off IVF.    PT/OT assessed the patient and recommended acute rehab.        Impression: On 8/14/2020 he developed some type of change in mental status along with headache, and had been dropping objects, most likely due to left hemiparesis.  His presentation is consistent with right MCA infarction, most likely acute ischemic stroke, probably related to severe right M1 stenosis with borderzone pattern.  The right M1 stenosis is most likely due to large artery atherosclerosis, although an embolus with partial lysis cannot be ruled out with certainty (embolic stroke of undetermined source).        Plan:    [] Discharge to Acute Rehab    [] ASA 81MG PO daily and Plavix 75MG PO daily. Plavix to be discontinued after 3 months.    [] Take all medications as prescribed.    [] Outpatient primary care, neurology, cardiology, and neuro-opthalmology follow up.    [] Recommend yearly CTA Head to monitor left supraclinoid ICA infundibulum versus small aneurysm.    [] Recommend a heart healthy, low sodium, low fat diet with regular aerobic exercise.    [] Recommend avoiding tobacco and alcohol use.    [] If you develop severe headache, slurred speech, vision changes, weakness or numbness in the face, arm, or leg, then please return to the hospital. HPI: Opal Matthew is a 76 y/o RH man with a PMH of HTN not on antithrombotics who presented to the ED with an acute one day history of headache and altered mental status. Per his friend, the pt suddenly became confused yesterday around 5pm and complained of a new onset headache. The pt complains about some weakness in his right lower extremity and endorses a sharp headache localized to the top/middle of his hairline that does not radiate anywhere. The pt denies photophobia, recent fever or flu like illness, changes in hearing, changes in vision, recent falls or trauma, or pain other than the headache, difficulty swallowing, dysuria, diarrhea, numbness/tingling anywhere. No history of headaches.        Per ED,  The friend stated that the patient's speech was "off" and that his face "did not look normal". The pt did not want to go to the ER at the time, but came today because the headache persisted throughout the night and today he began to feel lightheaded. The pt stated that he ambulates and talks normally at baseline and that he had never experienced these symptoms before yesterday.        Imaging:    -08/14/20 CT HEAD: No acute intracranial bleeding.        -08/14/20 CTA HEAD:  6 mm in length severe stenosis/near occlusion of the M1 segment of the Right MCA with reconstitution at the bifurcation.        -08/14/20 CTA NECK: Patent cervical vasculature. No flow limiting stenosis or occlusion.        -08/15/20 MRI BRAIN: Acute infarct in the right MCA territory somewhat patchy involving the right lateral temporal lobe as well as the right corona radiata/centrum semiovale ovale region in a somewhat junctional pattern, deep internal border zone type pattern. Clinical correlation recommended. No significant mass effect. No associated hemorrhage        -08/16/20 CT CHEST: Apical paraseptal emphysema.        -08/17/20 CT HEAD: Redemonstration of acute/subacute right MCA territory infarct.        -08/17/20 CTA HEAD: 1. Redemonstration of near complete occlusion of the M1 segment of the right MCA. 2. 2 mm conical outpouching arising from the left supraclinoid ICA directed inferiorly suggesting an infundibulum versus small aneurysm (8:39).        -08/18/20 MRI BRAIN: Interval apparent increase in the infarcts in the right MCA territory involving the right centrum semiovale and corona radiata as well as the right temporal lobe compared to prior MRI exam 8/15/2020. No acute intracranial hemorrhage. Occlusion right MCA as seen on CTA exam.            Hospital Course:    Patient was found to have severe stenosis of R M1 on initial CTA Head and acute infarct in the R MCA territory on subsequent MRI Brain. Patient was admitted to the stroke unit. Patient's BP became very hypotensive after admission so he was started on Midodrine 10MG PO TID and 0.9%NS IVF to maintain his blood pressure between 140/90 - 180/100.    Patient had a positive UA but negative Urine culture. A short course of Ceftriaxone was given.    TTE showed EF of 73%, normal LA.    HbA1c 5.8, .    Patient placed on Aspirin 81MG PO daily and Plavix 75MG PO daily. Plavix to be discontinued after 3 months.    Patient will need long-term cardiac monitoring (Holter monitor).    8/21/20: Patient still on Midodrine and 25ml/hr IVF for blood pressure maintenance. Attempting to wean patient off IVF.    PT/OT assessed the patient and recommended acute rehab.        Impression: On 8/14/2020 he developed some type of change in mental status along with headache, and had been dropping objects, most likely due to left hemiparesis.  His presentation is consistent with right MCA infarction, most likely acute ischemic stroke, probably related to severe right M1 stenosis with borderzone pattern.  The right M1 stenosis is most likely due to large artery atherosclerosis, although an embolus with partial lysis cannot be ruled out with certainty (embolic stroke of undetermined source).        Opal Matthew is a 76 y/o RH man with a PMH of HTN not on antithrombotics who presented to the ED with an acute one day history of headache and altered mental status. Per his friend, the pt suddenly became confused yesterday around 5pm and complained of a new onset headache.The pt complains about some weakness in his right lower extremity and endorses a sharp headache localized to the top/middle of his hairline that does not radiate anywhere. The pt denies photophobia, recent fever or flu like illness, changes in hearing, changes in vision, recent falls or trauma, or pain other than the headache, difficulty swallowing, dysuria, diarrhea, numbness/tingling anywhere. No history of headaches.         Per ED,  The friend stated that the patient's speech was "off" and that his face "did not look normal". The pt did not want to go to the ER at the time, but came today because the headache persisted throughout the night and today he began to feel lightheaded. The pt stated that he ambulates and talks normally at baseline and that he had never experienced these symptoms before yesterday. (15 Aug 2020 00:11)        PROCEDURE:  Adm 8/14 . 8/26 Rt MCA stent for stenosis    POD#7        PLAN:    Neuro: Cont ASA, Plavix, SQL.  Inc activity/OOB. Awaiting Rehab at [facility]-possible has a bed on 9/3 as d/w , if denied at Rehab will go home with a friend. 9/2 Covid swab done FU.    Addendum:  Notified about 1300 that RN was stuck with a needle after pt was injected-Hepatitis panel and Rapid HIV ordered FU, also Covid PCR testing-this was d/w  as pt now has a bed at [facility] today for pickup at 5pm.   will ck with Rehab to see if these test need to be resulted prior to Rehab and update me.        Cardio note of 9/2-Problem: Stroke.  Plan: s/p R MCA stenting, treated with DAPT, Monitor on Tele, Problem: Hypotension.  Plan: Cont with Midodrine Likely neurogenic and possible infectious in etiology Gentle IVF TTE. Electronic Signatures: Stepan Aguirre)        Medicine NOTE OF 9/1-CVA - workup and plan as per neurology - s/p cerebral angiography and stenting - ASA/plavix - lipitor - swallow eval - puree diet -HLD -  - c/w lipitor, emphysema-- CT chest without contrast done. - pulm following, DVT px - lovenox        Electronic Signatures: Madeline Rob)         Pulm note ofF 8/28-APical paraseptal emphysema as incidental finding on CT Doubt COPD clinically, REC No need for bronchodilators at this time, Monitor resp status. 8/24: came after a week: Dr Junior was covering me: ct chest noted he is not SOB and not wheezy:    his oxygenation on room air is good: no acuter intervention from pulmonary side: stroke per neurology : dw pa: will follow prn if necessary 8/27 doing pretty good: events noted: on room air: has copd: but no wheezing or SOB : no intervention from pulm side:  8/28:    hei s doing very well: suprisingly he has emphysema on ct chest : but he never smoked: He did have exposure to second hand smoke: since he is aymptomatic: no need for BD at this time: Latest MRI brain noted: will follow prn now: saul pt  Electronic Signatures:    Filemon Zelaya)         Respiratory: Patient instructed to use incentive spirometer [ X] YES [ ] NO                  DVT ppx: [X ] SQL [ ] SQH and Venodynes [ ] Left [ ] Right [ X] Bilateral        Discharge Planning:  The patient was evaluated by PT/OT/PMR nd recommended Acute Rehab.   Pt is Undocumented uninsured .

## 2020-08-21 NOTE — PROGRESS NOTE ADULT - SUBJECTIVE AND OBJECTIVE BOX
Patient is a 75y old  Male who presents with a chief complaint of R M1 occlusion vs stenosis (21 Aug 2020 12:50)      SUBJECTIVE / OVERNIGHT EVENTS:  No chest pain. No shortness of breath. No complaints. No events overnight. no bm for over 1 week    MEDICATIONS  (STANDING):  artificial tears (preservative free) Ophthalmic Solution 1 Drop(s) Both EYES daily  aspirin  chewable 81 milliGRAM(s) Oral daily  atorvastatin 80 milliGRAM(s) Oral at bedtime  clopidogrel Tablet 75 milliGRAM(s) Oral daily  enoxaparin Injectable 40 milliGRAM(s) SubCutaneous daily  lactulose Syrup 20 Gram(s) Oral once  midodrine. 10 milliGRAM(s) Oral three times a day  polyethylene glycol 3350 17 Gram(s) Oral daily  senna 2 Tablet(s) Oral at bedtime  sodium chloride 0.9%. 1000 milliLiter(s) (25 mL/Hr) IV Continuous <Continuous>    MEDICATIONS  (PRN):  acetaminophen   Tablet .. 650 milliGRAM(s) Oral every 6 hours PRN Temp greater or equal to 38C (100.4F), Mild Pain (1 - 3), Moderate Pain (4 - 6), Severe Pain (7 - 10)  baclofen 5 milliGRAM(s) Oral three times a day PRN hiccups      Vital Signs Last 24 Hrs  T(C): 36.6 (21 Aug 2020 08:04), Max: 37 (20 Aug 2020 16:37)  T(F): 97.9 (21 Aug 2020 08:04), Max: 98.6 (20 Aug 2020 16:37)  HR: 58 (21 Aug 2020 14:00) (52 - 65)  BP: 122/54 (21 Aug 2020 14:00) (112/70 - 163/67)  BP(mean): 70 (21 Aug 2020 14:00) (70 - 95)  RR: 20 (21 Aug 2020 14:00) (12 - 20)  SpO2: 98% (21 Aug 2020 14:00) (96% - 98%)  CAPILLARY BLOOD GLUCOSE        I&O's Summary    20 Aug 2020 07:01  -  21 Aug 2020 07:00  --------------------------------------------------------  IN: 1570 mL / OUT: 1400 mL / NET: 170 mL    21 Aug 2020 07:01  -  21 Aug 2020 14:41  --------------------------------------------------------  IN: 340 mL / OUT: 0 mL / NET: 340 mL        PHYSICAL EXAM:  GENERAL: NAD, well-developed  HEAD:  Atraumatic, Normocephalic  EYES: EOMI, PERRLA, conjunctiva and sclera clear  NECK: Supple, No JVD  CHEST/LUNG: Clear to auscultation bilaterally; No wheeze  HEART: Regular rate and rhythm; No murmurs, rubs, or gallops  ABDOMEN: Soft, Nontender, Nondistended; Bowel sounds present  EXTREMITIES:  2+ Peripheral Pulses, No clubbing, cyanosis, or edema  PSYCH: AAOx3  NEUROLOGY: non-focal  SKIN: No rashes or lesions    LABS:                        13.6   8.67  )-----------( 257      ( 21 Aug 2020 05:02 )             41.3     08-21    136  |  106  |  12  ----------------------------<  103<H>  3.7   |  22  |  0.68    Ca    9.3      21 Aug 2020 05:02                RADIOLOGY & ADDITIONAL TESTS:    Imaging Personally Reviewed:    Consultant(s) Notes Reviewed:      Care Discussed with Consultants/Other Providers:

## 2020-08-21 NOTE — DISCHARGE NOTE PROVIDER - NSDCFUADDINST_GEN_ALL_CORE_FT
9/2 Hep panel and rapid HIV test done due to RN got stuck with needle-FU results.  9/2 Covid PCR done FU results.  Followup with your Private MD in 1-2 weeks.  Return to Emergency Department or contact your Neurosurgeon if any changes in mental status, weakness, numbness or tingling of extremities; difficulty swallowing; drainage or redness of wound, fever; pain in legs; difficulty urinating or constipation.  No strenous activity. No heavy lifting. Do not return to work until cleared by physician. No driving until cleared by physician.

## 2020-08-22 LAB
ANION GAP SERPL CALC-SCNC: 12 MMOL/L — SIGNIFICANT CHANGE UP (ref 5–17)
BUN SERPL-MCNC: 13 MG/DL — SIGNIFICANT CHANGE UP (ref 7–23)
CALCIUM SERPL-MCNC: 9.6 MG/DL — SIGNIFICANT CHANGE UP (ref 8.4–10.5)
CHLORIDE SERPL-SCNC: 104 MMOL/L — SIGNIFICANT CHANGE UP (ref 96–108)
CO2 SERPL-SCNC: 22 MMOL/L — SIGNIFICANT CHANGE UP (ref 22–31)
CREAT SERPL-MCNC: 0.7 MG/DL — SIGNIFICANT CHANGE UP (ref 0.5–1.3)
GLUCOSE SERPL-MCNC: 97 MG/DL — SIGNIFICANT CHANGE UP (ref 70–99)
HCT VFR BLD CALC: 40.4 % — SIGNIFICANT CHANGE UP (ref 39–50)
HGB BLD-MCNC: 13.3 G/DL — SIGNIFICANT CHANGE UP (ref 13–17)
MCHC RBC-ENTMCNC: 29.6 PG — SIGNIFICANT CHANGE UP (ref 27–34)
MCHC RBC-ENTMCNC: 32.9 GM/DL — SIGNIFICANT CHANGE UP (ref 32–36)
MCV RBC AUTO: 90 FL — SIGNIFICANT CHANGE UP (ref 80–100)
NRBC # BLD: 0 /100 WBCS — SIGNIFICANT CHANGE UP (ref 0–0)
PLATELET # BLD AUTO: 260 K/UL — SIGNIFICANT CHANGE UP (ref 150–400)
POTASSIUM SERPL-MCNC: 3.6 MMOL/L — SIGNIFICANT CHANGE UP (ref 3.5–5.3)
POTASSIUM SERPL-SCNC: 3.6 MMOL/L — SIGNIFICANT CHANGE UP (ref 3.5–5.3)
RBC # BLD: 4.49 M/UL — SIGNIFICANT CHANGE UP (ref 4.2–5.8)
RBC # FLD: 12 % — SIGNIFICANT CHANGE UP (ref 10.3–14.5)
SODIUM SERPL-SCNC: 138 MMOL/L — SIGNIFICANT CHANGE UP (ref 135–145)
WBC # BLD: 8.21 K/UL — SIGNIFICANT CHANGE UP (ref 3.8–10.5)
WBC # FLD AUTO: 8.21 K/UL — SIGNIFICANT CHANGE UP (ref 3.8–10.5)

## 2020-08-22 PROCEDURE — 99233 SBSQ HOSP IP/OBS HIGH 50: CPT

## 2020-08-22 RX ORDER — METOCLOPRAMIDE HCL 10 MG
10 TABLET ORAL DAILY
Refills: 0 | Status: DISCONTINUED | OUTPATIENT
Start: 2020-08-22 | End: 2020-08-23

## 2020-08-22 RX ADMIN — ENOXAPARIN SODIUM 40 MILLIGRAM(S): 100 INJECTION SUBCUTANEOUS at 11:04

## 2020-08-22 RX ADMIN — CLOPIDOGREL BISULFATE 75 MILLIGRAM(S): 75 TABLET, FILM COATED ORAL at 11:04

## 2020-08-22 RX ADMIN — SENNA PLUS 2 TABLET(S): 8.6 TABLET ORAL at 21:03

## 2020-08-22 RX ADMIN — MIDODRINE HYDROCHLORIDE 10 MILLIGRAM(S): 2.5 TABLET ORAL at 17:40

## 2020-08-22 RX ADMIN — MIDODRINE HYDROCHLORIDE 10 MILLIGRAM(S): 2.5 TABLET ORAL at 11:04

## 2020-08-22 RX ADMIN — POLYETHYLENE GLYCOL 3350 17 GRAM(S): 17 POWDER, FOR SOLUTION ORAL at 11:04

## 2020-08-22 RX ADMIN — Medication 81 MILLIGRAM(S): at 11:04

## 2020-08-22 RX ADMIN — ATORVASTATIN CALCIUM 80 MILLIGRAM(S): 80 TABLET, FILM COATED ORAL at 21:03

## 2020-08-22 RX ADMIN — MIDODRINE HYDROCHLORIDE 10 MILLIGRAM(S): 2.5 TABLET ORAL at 05:24

## 2020-08-22 NOTE — PROGRESS NOTE ADULT - ASSESSMENT
75-year-old right-handed gentleman first evaluated at [facility] on 8/15/2020 with left hemiparesis.  CT head (8/14/2020) to my eye showed moderate-severe periventricular chronic ischemic changes.  There was a possible subacute right corona radiata infarct, perhaps more likely part and parcel of the chronic ischemic changes.  CTA neck and head (8/14/2020) to my eye showed severe right M1 stenosis.  The left vertebral artery was dominant.  Incidentally noted were emphysematous lung changes.     Impression.  On 8/14/2020 he developed some type of change in mental status along with headache, and had been dropping objects, most likely due to left hemiparesis.  His presentation is consistent with right MCA infarction, most likely acute ischemic stroke, probably related to severe right M1 stenosis with borderzone pattern.  The right M1 stenosis is most likely due to large artery atherosclerosis, although an embolus with partial lysis cannot be ruled out with certainty (embolic stroke of undetermined source).       NEURO: No acute neurological change though previously reported with some left neglect and dysarthria, started on baclofen PRN hiccups, repeat CTH and CTA without acute change. Initial MRI Brain showed moderate sized right corona radiata/centrum semiovale internal borderzone infarct.  Repeat MRI with increase in RMCA infarcts. Continue close monitoring for neurologic deterioration, permissive HTN now with gradual titration off IVF as tolerated continue on midodrine to maintain HTN, SBP >140 mmHg systolic given neurological stability, patient with hypotensive event on 8/15/20 resulting in worsening neurological deficits but improvement to baseline once given hydration and BP raised, LDL -156- continue on high intensity statin, Physical therapy/OT recommended acute rehab.     ANTITHROMBOTIC THERAPY: ASA and Plavix for 3 months per SAMPRISS protocol, then ASA indefinitely from neurological standpoint. PRU/P2Y12 levels within goal range.    PULMONARY: CXR clear, protecting airway, saturating well on room air. Incidental finding on CTA H/N emphysematous changes. Pulmonary consulted, Dr. Zelaya following -. CT Chest 8/16 showed apical paraseptal emphysema, no need for bronchodilator at this time     CARDIOVASCULAR: TTE (08/16/20): EF 73%, minimal MR, negative PFO, continue cardiac monitoring on telemetry, Cardiology, Dr. Aguirre consult appreciated - Midodrine continued. IVF attempted to be d/c'd however BP dropped so now restarted back on, holter monitor vs loop recorder to screen for occult arrhythmia.     SBP goal: 140-180mmHg as currently tolerating     GASTROINTESTINAL:  initial dysphagia screen passed but patient was witnessed to be drooling and pocketing meals while eating. Due to high risk of aspiration from possible hemineglect, made NPO. S&S, recommend MBS 8/18 which showed D2 Nectar diet. Noted with difficulty with overnight medications- repeat S&S re-eval on 8/20 recommended dysphagia 1 nectar.       Diet: D1 nectar     RENAL: BUN/Cr without acute change, continue with adequate IV hydration with very gradual titration permitting remains neurologically stable. hyponatremia and hypokalemia resolved.      Na Goal: Greater than 135     Lee: no    HEMATOLOGY: H/H without acute change , Platelets 257, no si/sx of bleeding      DVT ppx: LMWH [x]     ID: afebrile, no leukocytosis, UA positive UTI, completed course of Abx, CXR clear lungs, blood and urine cultures NGTD- short course as noted, continue to monitor     OTHER: ENT consulted as recommendations per S&S and Pulm for hoarse voice r/o vocal cord paralysis. ENT bedside FOE revealed patent airway and vocal cords mobile with good contact b/l - possible small glottic endy inferiorly, no further recommendations, no further ENT intervention required. Plan endorsed to patient bedside. Patient gives permission to disclose information to family member. All questions and concerns addressed.     DISPOSITION: Acute Rehab once stable and workup is complete. Uninsured- social work following.     CORE MEASURES:        Admission NIHSS: 2     TPA: [] YES [x] NO      LDL/HDL: 156/44     Depression Screen: 0     Statin Therapy: yes     Dysphagia Screen: [x] PASS [] FAIL     Smoking [] YES [x] NO      Afib [] YES [x] NO     Stroke Education [x] YES [] NO    Obtain screening lower extremity venous ultrasound in patients who meet 1 or more of the following criteria as patient is high risk for DVT/PE on admission:   [] History of DVT/PE  []Hypercoagulable states (Factor V Leiden, Cancer, OCP, etc. )  []Prolonged immobility (hemiplegia/hemiparesis/post operative or any other extended immobilization)  [] Transferred from outside facility (Rehab or Long term care)  [] Age </= to 50 75-year-old right-handed gentleman first evaluated at [facility] on 8/15/2020 with left hemiparesis.  On 8/14/2020 he developed some type of change in mental status along with headache, and had been dropping objects, most likely due to left hemiparesis. CT head (8/14/2020) showed moderate-severe periventricular chronic ischemic changes.  There was a possible subacute right corona radiata infarct, perhaps more likely part and parcel of the chronic ischemic changes. CTA neck and head (8/14/2020) showed severe right M1 stenosis.  The left vertebral artery was dominant.  Incidentally noted were emphysematous lung changes.     Impression.  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  The right M1 stenosis is most likely due to large artery atherosclerosis, although an embolus with partial lysis cannot be ruled out with certainty (embolic stroke of undetermined source).       NEURO: No acute neurological change.  Continue close monitoring for neurologic deterioration, repeat CTH and CTA without acute change. Initial MRI Brain showed moderate sized right corona radiata/centrum semiovale internal borderzone infarct.  Repeat MRI with increase in RMCA infarcts. Gradual titration off IVF as tolerated, continue on midodrine to maintain SBP >140 mmHg given neurological stability, patient with hypotensive event on 8/15/20 resulting in worsening neurological deficits but improvement to baseline once given hydration and BP raised, LDL -156- continue on high intensity statin, D/c baclofen and will start Reglan PRN hiccups  Physical therapy/OT recommended acute rehab.     ANTITHROMBOTIC THERAPY: ASA and Plavix for 3 months per SAMPRISS protocol, then ASA indefinitely from neurological standpoint. PRU/P2Y12 levels within goal range.    PULMONARY: CXR clear, protecting airway, saturating well on room air. Incidental finding on CTA H/N emphysematous changes. Pulmonary consulted, Dr. Zelaya following -. CT Chest 8/16 showed apical paraseptal emphysema, no need for bronchodilator at this time     CARDIOVASCULAR: TTE (08/16/20): EF 73%, minimal MR, negative PFO, continue cardiac monitoring on telemetry, Cardiology, Dr. Aguirre consult appreciated - Midodrine continued. IVF attempted to be d/c'd however BP dropped so now restarted back on, holter monitor vs loop recorder to screen for occult arrhythmia.     SBP goal: 140-180mmHg as currently tolerating     GASTROINTESTINAL:  initial dysphagia screen passed but patient was witnessed to be drooling and pocketing meals while eating. Due to high risk of aspiration from possible hemineglect, made NPO. S&S, recommend MBS 8/18 which showed D2 Nectar diet. Noted with difficulty with overnight medications- repeat S&S re-eval on 8/20 recommended dysphagia 1 nectar.       Diet: D1 nectar     RENAL: BUN/Cr without acute change, continue with adequate IV hydration with very gradual titration permitting remains neurologically stable. hyponatremia and hypokalemia resolved.      Na Goal: Greater than 135     Lee: no    HEMATOLOGY: H/H without acute change , Platelets 257, no si/sx of bleeding      DVT ppx: LMWH [x]     ID: afebrile, no leukocytosis, UA positive UTI, completed course of Abx, CXR clear lungs, blood and urine cultures NGTD- short course as noted, continue to monitor     OTHER: ENT consulted as recommendations per S&S and Pulm for hoarse voice r/o vocal cord paralysis. ENT bedside FOE revealed patent airway and vocal cords mobile with good contact b/l - possible small glottic endy inferiorly, no further recommendations, no further ENT intervention required. Plan endorsed to patient bedside. Patient gives permission to disclose information to family member. All questions and concerns addressed.     DISPOSITION: Acute Rehab once stable and workup is complete. Uninsured- social work following.     CORE MEASURES:        Admission NIHSS: 2     TPA: [] YES [x] NO      LDL/HDL: 156/44     Depression Screen: 0     Statin Therapy: yes     Dysphagia Screen: [x] PASS [] FAIL     Smoking [] YES [x] NO      Afib [] YES [x] NO     Stroke Education [x] YES [] NO    Obtain screening lower extremity venous ultrasound in patients who meet 1 or more of the following criteria as patient is high risk for DVT/PE on admission:   [] History of DVT/PE  []Hypercoagulable states (Factor V Leiden, Cancer, OCP, etc. )  []Prolonged immobility (hemiplegia/hemiparesis/post operative or any other extended immobilization)  [] Transferred from outside facility (Rehab or Long term care)  [] Age </= to 50 75-year-old right-handed gentleman first evaluated at [facility] on 8/15/2020 with left hemiparesis.  On 8/14/2020 he developed some type of change in mental status along with headache, and had been dropping objects, most likely due to left hemiparesis. CT head (8/14/2020) showed moderate-severe periventricular chronic ischemic changes.  There was a possible subacute right corona radiata infarct, perhaps more likely part and parcel of the chronic ischemic changes. CTA neck and head (8/14/2020) showed severe right M1 stenosis.  The left vertebral artery was dominant.  Incidentally noted were emphysematous lung changes.     Impression.  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  The right M1 stenosis is most likely due to large artery atherosclerosis, although an embolus with partial lysis cannot be ruled out with certainty (embolic stroke of undetermined source).       NEURO: No acute neurological change.  Continue close monitoring for neurologic deterioration, repeat CTH and CTA without acute change. Initial MRI Brain showed moderate sized right corona radiata/centrum semiovale internal borderzone infarct.  Repeat MRI with increase in RMCA infarcts. Gradual titration off IVF as tolerated, continue on midodrine to maintain SBP >140 mmHg given neurological stability, patient with hypotensive event on 8/15/20 resulting in worsening neurological deficits but improvement to baseline once given hydration and BP raised, LDL -156- continue on high intensity statin, D/c baclofen and will start Reglan PRN hiccups  Physical therapy/OT recommended acute rehab.     ANTITHROMBOTIC THERAPY: ASA and Plavix for 3 months per SAMPRISS protocol, then ASA indefinitely from neurological standpoint. PRU/P2Y12 levels within goal range.    PULMONARY: CXR (08/15/20) clear, protecting airway, saturating well on room air. Incidental finding on CTA H/N emphysematous changes. Pulmonary consulted, Dr. Zelaya following -. CT Chest 8/16 showed apical paraseptal emphysema, no need for bronchodilator at this time     CARDIOVASCULAR: TTE (08/16/20): EF 73%, minimal MR, negative PFO, continue cardiac monitoring on telemetry, Cardiology, Dr. Aguirre consult appreciated - Midodrine continued. IVF attempted to be d/c'd however BP dropped so now restarted back on, holter monitor vs loop recorder to screen for occult arrhythmia.     SBP goal: 140-180mmHg as currently tolerating     GASTROINTESTINAL:  initial dysphagia screen passed but patient was witnessed to be drooling and pocketing meals while eating. Pt had MBS on 8/18 and was started on  D2 [place] diet. Then again noted with difficulty with medications- pt had repeat S&S eval on 8/20 recommended dysphagia 1 nectar, tolerating well. Continue bowel regimen     Diet: D1 nectar     RENAL: BUN/Cr without acute change, continue with adequate IV hydration with very gradual titration permitting remains neurologically stable.      Na Goal: Greater than 135     Lee: no    HEMATOLOGY: H/H without acute change , Platelets 260, no si/sx of bleeding      DVT ppx: LMWH [x]     ID: afebrile, no leukocytosis, completed course of Abx for UTI, blood and urine cultures NGTD- short course as noted, continue to monitor     OTHER: ENT consulted as recommendations per S&S and Pulm for hoarse voice r/o vocal cord paralysis. ENT bedside FOE revealed patent airway and vocal cords mobile with good contact b/l - possible small glottic endy inferiorly, no further recommendations, no further ENT intervention required. Plan endorsed to patient bedside. Patient gives permission to disclose information to family member. All questions and concerns addressed. Dr Rob (medicine) consult appreciated    DISPOSITION: Acute Rehab once stable and workup is complete. Uninsured- social work following.     CORE MEASURES:        Admission NIHSS: 2     TPA: [] YES [x] NO      LDL/HDL: 156/44     Depression Screen: 0     Statin Therapy: yes     Dysphagia Screen: [x] PASS [] FAIL     Smoking [] YES [x] NO      Afib [] YES [x] NO     Stroke Education [x] YES [] NO    Obtain screening lower extremity venous ultrasound in patients who meet 1 or more of the following criteria as patient is high risk for DVT/PE on admission:   [] History of DVT/PE  []Hypercoagulable states (Factor V Leiden, Cancer, OCP, etc. )  []Prolonged immobility (hemiplegia/hemiparesis/post operative or any other extended immobilization)  [] Transferred from outside facility (Rehab or Long term care)  [] Age </= to 50

## 2020-08-22 NOTE — PROGRESS NOTE ADULT - SUBJECTIVE AND OBJECTIVE BOX
Subjective: Patient seen and examined. No new events except as noted.   remains in Stroke unit       REVIEW OF SYSTEMS:    CONSTITUTIONAL:+ weakness, fevers or chills  EYES/ENT: No visual changes;  No vertigo or throat pain   NECK: No pain or stiffness  RESPIRATORY: No cough, wheezing, hemoptysis; No shortness of breath  CARDIOVASCULAR: No chest pain or palpitations  GASTROINTESTINAL: No abdominal or epigastric pain. No nausea, vomiting, or hematemesis; No diarrhea or constipation. No melena or hematochezia.  GENITOURINARY: No dysuria, frequency or hematuria  NEUROLOGICAL: No numbness or weakness  SKIN: No itching, burning, rashes, or lesions   All other review of systems is negative unless indicated above.    MEDICATIONS:  MEDICATIONS  (STANDING):  artificial tears (preservative free) Ophthalmic Solution 1 Drop(s) Both EYES daily  aspirin  chewable 81 milliGRAM(s) Oral daily  atorvastatin 80 milliGRAM(s) Oral at bedtime  clopidogrel Tablet 75 milliGRAM(s) Oral daily  enoxaparin Injectable 40 milliGRAM(s) SubCutaneous daily  midodrine. 10 milliGRAM(s) Oral three times a day  polyethylene glycol 3350 17 Gram(s) Oral daily  senna 2 Tablet(s) Oral at bedtime  sodium chloride 0.9%. 1000 milliLiter(s) (25 mL/Hr) IV Continuous <Continuous>      PHYSICAL EXAM:  T(C): 37 (08-22-20 @ 20:00), Max: 37 (08-22-20 @ 20:00)  HR: 52 (08-22-20 @ 20:00) (49 - 64)  BP: 136/52 (08-22-20 @ 20:00) (107/53 - 166/70)  RR: 14 (08-22-20 @ 20:00) (14 - 21)  SpO2: 96% (08-22-20 @ 20:00) (95% - 99%)  Wt(kg): --  I&O's Summary    21 Aug 2020 07:01  -  22 Aug 2020 07:00  --------------------------------------------------------  IN: 1375 mL / OUT: 1200 mL / NET: 175 mL          Appearance: Normal	  HEENT:   Normal oral mucosa, PERRL, EOMI	  Lymphatic: No lymphadenopathy , no edema  Cardiovascular: Normal S1 S2, No JVD, No murmurs , Peripheral pulses palpable 2+ bilaterally  Respiratory: Lungs clear to auscultation, normal effort 	  Gastrointestinal:  Soft, Non-tender, + BS	  Skin: No rashes, No ecchymoses, No cyanosis, warm to touch  Musculoskeletal: Normal range of motion, normal strength  Psychiatry:  Mood & affect appropriate  Ext: No edema      LABS:    CARDIAC MARKERS:                                13.3   8.21  )-----------( 260      ( 22 Aug 2020 05:32 )             40.4     08-22    138  |  104  |  13  ----------------------------<  97  3.6   |  22  |  0.70    Ca    9.6      22 Aug 2020 05:32      proBNP:   Lipid Profile:   HgA1c:   TSH:             TELEMETRY: 	    ECG:  	  RADIOLOGY:   DIAGNOSTIC TESTING:  [ ] Echocardiogram:  [ ]  Catheterization:  [ ] Stress Test:    OTHER:

## 2020-08-22 NOTE — PROGRESS NOTE ADULT - SUBJECTIVE AND OBJECTIVE BOX
Patient is a 75y old  Male who presents with a chief complaint of R M1 occlusion vs stenosis (21 Aug 2020 14:54)      SUBJECTIVE / OVERNIGHT EVENTS:  No chest pain. No shortness of breath. No complaints. No events overnight.     MEDICATIONS  (STANDING):  artificial tears (preservative free) Ophthalmic Solution 1 Drop(s) Both EYES daily  aspirin  chewable 81 milliGRAM(s) Oral daily  atorvastatin 80 milliGRAM(s) Oral at bedtime  clopidogrel Tablet 75 milliGRAM(s) Oral daily  enoxaparin Injectable 40 milliGRAM(s) SubCutaneous daily  midodrine. 10 milliGRAM(s) Oral three times a day  polyethylene glycol 3350 17 Gram(s) Oral daily  senna 2 Tablet(s) Oral at bedtime  sodium chloride 0.9%. 1000 milliLiter(s) (25 mL/Hr) IV Continuous <Continuous>    MEDICATIONS  (PRN):  acetaminophen   Tablet .. 650 milliGRAM(s) Oral every 6 hours PRN Temp greater or equal to 38C (100.4F), Mild Pain (1 - 3), Moderate Pain (4 - 6), Severe Pain (7 - 10)  baclofen 5 milliGRAM(s) Oral three times a day PRN hiccups      Vital Signs Last 24 Hrs  T(C): 36.9 (22 Aug 2020 08:19), Max: 37.1 (21 Aug 2020 20:20)  T(F): 98.4 (22 Aug 2020 08:19), Max: 98.7 (21 Aug 2020 20:20)  HR: 64 (22 Aug 2020 12:00) (49 - 64)  BP: 138/54 (22 Aug 2020 12:00) (107/53 - 166/70)  BP(mean): 75 (22 Aug 2020 12:00) (60 - 96)  RR: 19 (22 Aug 2020 12:00) (16 - 20)  SpO2: 99% (22 Aug 2020 12:00) (95% - 99%)  CAPILLARY BLOOD GLUCOSE        I&O's Summary    21 Aug 2020 07:01  -  22 Aug 2020 07:00  --------------------------------------------------------  IN: 1375 mL / OUT: 1200 mL / NET: 175 mL        PHYSICAL EXAM:  GENERAL: NAD, well-developed  HEAD:  Atraumatic, Normocephalic  EYES: EOMI, PERRLA, conjunctiva and sclera clear  NECK: Supple, No JVD  CHEST/LUNG: Clear to auscultation bilaterally; No wheeze  HEART: Regular rate and rhythm; No murmurs, rubs, or gallops  ABDOMEN: Soft, Nontender, Nondistended; Bowel sounds present  EXTREMITIES:  2+ Peripheral Pulses, No clubbing, cyanosis, or edema  PSYCH: AAOx3  NEUROLOGY: non-focal  SKIN: No rashes or lesions    LABS:                        13.3   8.21  )-----------( 260      ( 22 Aug 2020 05:32 )             40.4     08-22    138  |  104  |  13  ----------------------------<  97  3.6   |  22  |  0.70    Ca    9.6      22 Aug 2020 05:32                RADIOLOGY & ADDITIONAL TESTS:    Imaging Personally Reviewed:    Consultant(s) Notes Reviewed:      Care Discussed with Consultants/Other Providers:

## 2020-08-22 NOTE — PROGRESS NOTE ADULT - SUBJECTIVE AND OBJECTIVE BOX
THE PATIENT WAS SEEN AND EXAMINED BY ME WITH THE HOUSESTAFF AND STROKE TEAM DURING MORNING ROUNDS.   HPI:  74 y/o RH man with a PMH of HTN not on antithrombotics who presented to the ED with an acute one day history of headache and altered mental status. Per his friend, the pt suddenly became confused day prior to admission around 5pm and complained of a new onset headache. The pt complained about some weakness in his right lower extremity and endorses a sharp headache localized to the top/middle of his hairline that did not radiate anywhere. The pt denied  photophobia, recent fever or flu like illness, changes in hearing, changes in vision, recent falls or trauma, or pain other than the headache, difficulty swallowing, dysuria, diarrhea, numbness/tingling anywhere. No history of headaches.  Per ED,  The friend stated that the patient's speech was "off" and that his face "did not look normal". The pt did not want to go to the ER at the time, but came day of admission because the headache persisted throughout the night and today he began to feel lightheaded. The pt stated that he ambulates and talks normally at baseline and that he had never experienced these symptoms before day prior to admission.     SUBJECTIVE: No events overnight.  No new neurologic complaints.      acetaminophen   Tablet .. 650 milliGRAM(s) Oral every 6 hours PRN  artificial tears (preservative free) Ophthalmic Solution 1 Drop(s) Both EYES daily  aspirin  chewable 81 milliGRAM(s) Oral daily  atorvastatin 80 milliGRAM(s) Oral at bedtime  baclofen 5 milliGRAM(s) Oral three times a day PRN  clopidogrel Tablet 75 milliGRAM(s) Oral daily  enoxaparin Injectable 40 milliGRAM(s) SubCutaneous daily  midodrine. 10 milliGRAM(s) Oral three times a day  polyethylene glycol 3350 17 Gram(s) Oral daily  senna 2 Tablet(s) Oral at bedtime  sodium chloride 0.9%. 1000 milliLiter(s) IV Continuous <Continuous>      PHYSICAL EXAM:   Vital Signs Last 24 Hrs  T(C): 36.9 (22 Aug 2020 08:19), Max: 37.1 (21 Aug 2020 20:20)  T(F): 98.4 (22 Aug 2020 08:19), Max: 98.7 (21 Aug 2020 20:20)  HR: 64 (22 Aug 2020 12:00) (49 - 64)  BP: 138/54 (22 Aug 2020 12:00) (107/53 - 166/70)  BP(mean): 75 (22 Aug 2020 12:00) (60 - 96)  RR: 19 (22 Aug 2020 12:00) (16 - 20)  SpO2: 99% (22 Aug 2020 12:00) (95% - 99%)    General: No acute distress  HEENT: left gaze palsy,  Left homonymous hemianopia  Abdomen: Soft, nontender, nondistended   Extremities: No edema    NEUROLOGICAL EXAM:  Mental status: Awake, alert, oriented to name, place and time, speech fluent, left hemineglect, following commands  Cranial Nerves: Mild to moderate left facial palsy, no nystagmus, LHH, mild left gaze palsy, mild dysarthria,  tongue midline  Motor exam: Normal tone, right side moves against gravity, left side with motor neglect: when tested independent from right LUE 4/5, LLE 4/5  Sensation: intact to light touch on right but sensory-extinction on left  Coordination/ Gait: no ataxia bilaterally, gait not tested     LABS:                        13.3   8.21  )-----------( 260      ( 22 Aug 2020 05:32 )             40.4    08-22    138  |  104  |  13  ----------------------------<  97  3.6   |  22  |  0.70    Ca    9.6      22 Aug 2020 05:32          IMAGING: Reviewed by me.     MRI Head No Cont (08.18.2020)  Interval apparent increase in the infarcts in the right MCA territory involving the right centrum semiovale and corona radiata as well as the right temporal lobe compared to prior MRI exam 8/15/2020.  No acute intracranial hemorrhage.  Occlusion right MCA as seen on CTA exam    CT Angio Head w/ IV Cont (08.17.20)  1.  Redemonstration of near complete occlusion of the M1 segment of the right MCA.  2.  2 mm conical outpouching arising from the left supraclinoid ICA directed inferiorly suggesting an infundibulum versus small aneurysm (8:39).     08.17.20   CT head:  Redemonstration of acute/subacute right MCA territory infarct.    MRI Brain w/o (08/15/20): Acute infarct in the right MCA territory somewhat patchy involving the right lateral temporal lobe as well as the right corona radiata/centrum semiovale ovale region in a somewhat junctional pattern, deep internal border zone type pattern. Clinical correlation recommended. No significant mass effect. No associated hemorrhage    CT Head No Cont. (08/14/20): There is no acute intracranial hemorrhage. No focal edema or evidence of acute, transcortical infarct. No hydrocephalus, midline shift or mass effect.    CT Angio Head w/IV Cont. (08/14/20): 6 mm in length severe stenosis/near occlusion of the M1 segment of the Right MCA with reconstitution at the bifurcation.    CT Angio Neck w/IV Cont. (08/14/20): Patent cervical vasculature. No flow limiting stenosis or occlusion. THE PATIENT WAS SEEN AND EXAMINED BY ME WITH THE HOUSESTAFF AND STROKE TEAM DURING MORNING ROUNDS.   HPI:  74 y/o RH man with a PMH of HTN not on antithrombotics who presented to the ED with an acute one day history of headache and altered mental status. Per his friend, the pt suddenly became confused day prior to admission around 5pm and complained of a new onset headache. The pt complained about some weakness in his right lower extremity and endorses a sharp headache localized to the top/middle of his hairline that did not radiate anywhere. The pt denied  photophobia, recent fever or flu like illness, changes in hearing, changes in vision, recent falls or trauma, or pain other than the headache, difficulty swallowing, dysuria, diarrhea, numbness/tingling anywhere. No history of headaches.  Per ED,  The friend stated that the patient's speech was "off" and that his face "did not look normal". The pt did not want to go to the ER at the time, but came day of admission because the headache persisted throughout the night and today he began to feel lightheaded. The pt stated that he ambulates and talks normally at baseline and that he had never experienced these symptoms before day prior to admission.     SUBJECTIVE: No events overnight.  No new neurologic complaints.      acetaminophen   Tablet .. 650 milliGRAM(s) Oral every 6 hours PRN  artificial tears (preservative free) Ophthalmic Solution 1 Drop(s) Both EYES daily  aspirin  chewable 81 milliGRAM(s) Oral daily  atorvastatin 80 milliGRAM(s) Oral at bedtime  baclofen 5 milliGRAM(s) Oral three times a day PRN  clopidogrel Tablet 75 milliGRAM(s) Oral daily  enoxaparin Injectable 40 milliGRAM(s) SubCutaneous daily  midodrine. 10 milliGRAM(s) Oral three times a day  polyethylene glycol 3350 17 Gram(s) Oral daily  senna 2 Tablet(s) Oral at bedtime  sodium chloride 0.9%. 1000 milliLiter(s) IV Continuous <Continuous>      PHYSICAL EXAM:   Vital Signs Last 24 Hrs  T(C): 36.9 (22 Aug 2020 08:19), Max: 37.1 (21 Aug 2020 20:20)  T(F): 98.4 (22 Aug 2020 08:19), Max: 98.7 (21 Aug 2020 20:20)  HR: 64 (22 Aug 2020 12:00) (49 - 64)  BP: 138/54 (22 Aug 2020 12:00) (107/53 - 166/70)  BP(mean): 75 (22 Aug 2020 12:00) (60 - 96)  RR: 19 (22 Aug 2020 12:00) (16 - 20)  SpO2: 99% (22 Aug 2020 12:00) (95% - 99%)    General: No acute distress  HEENT: left gaze palsy,  Left homonymous hemianopia  Abdomen: Soft, nontender, nondistended   Extremities: No edema    NEUROLOGICAL EXAM:  Mental status: Eyes open, awake, alert, oriented to name, place and time, speech fluent, left hemineglect, following commands  Cranial Nerves: Mild to moderate left facial palsy, no nystagmus, LHH, mild left gaze palsy, mild dysarthria.  Motor exam: Normal tone, right side moves against gravity, left side with motor neglect: when tested independent from right LUE 3/5, LLE 4/5  Sensation: intact to light touch on right but sensory-extinction on left  Coordination/ Gait: no ataxia bilaterally, gait not tested     LABS:                        13.3   8.21  )-----------( 260      ( 22 Aug 2020 05:32 )             40.4    08-22    138  |  104  |  13  ----------------------------<  97  3.6   |  22  |  0.70    Ca    9.6      22 Aug 2020 05:32          IMAGING: Reviewed by me.     MRI Head No Cont (08.18.2020) Interval apparent increase in the infarcts in the right MCA territory involving the right centrum semiovale and corona radiata as well as the right temporal lobe compared to prior MRI exam 8/15/2020.  No acute intracranial hemorrhage. Occlusion right MCA as seen on CTA exam    CT Angio Head w/ IV Cont (08.17.20) Redemonstration of near complete occlusion of the M1 segment of the right MCA.  2 mm conical outpouching arising from the left supraclinoid ICA directed inferiorly suggesting an infundibulum versus small aneurysm (8:39).     CT head 08.17.20 : Redemonstration of acute/subacute right MCA territory infarct.    MRI Brain w/o (08/15/20): Acute infarct in the right MCA territory somewhat patchy involving the right lateral temporal lobe as well as the right corona radiata/centrum semiovale ovale region in a somewhat junctional pattern, deep internal border zone type pattern. No significant mass effect. No associated hemorrhage    CT Head No Cont. (08/14/20): There is no acute intracranial hemorrhage. No focal edema or evidence of acute, transcortical infarct. No hydrocephalus, midline shift or mass effect.    CT Angio Head w/IV Cont. (08/14/20): 6 mm in length severe stenosis/near occlusion of the M1 segment of the Right MCA with reconstitution at the bifurcation.    CT Angio Neck w/IV Cont. (08/14/20): Patent cervical vasculature. No flow limiting stenosis or occlusion.

## 2020-08-22 NOTE — PROGRESS NOTE ADULT - ASSESSMENT
76 y/o RH man with a PMH of HTN not on antithrombotics who presented to the ED with an acute one day history of headache and altered mental status. Neurological examination demonstrates mild dysarthria with mild left nasolabial flattening with left arm weakness. CT/CTA per Neuroradiology demonstrates stenosis vs occlusion R M1. Patient out of window for tpa.    CVA  - workup and plan as per neurology  - ASA/plavix  - lipitor  - PT/OT  - swallow eval - puree diet  - permissive hypertension    hypotension  - urine and blood cultures NGTD  - s/p  ceftriaxone   - midodrine    HLD  -   - c/w lipitor    emphysema  - CT chest without contrast done  - pulm following    constipation  - miralax daily  - senna qhs  - lactulose x 1    DVT px  - lovenox

## 2020-08-23 LAB
ANION GAP SERPL CALC-SCNC: 10 MMOL/L — SIGNIFICANT CHANGE UP (ref 5–17)
BUN SERPL-MCNC: 17 MG/DL — SIGNIFICANT CHANGE UP (ref 7–23)
CALCIUM SERPL-MCNC: 9.3 MG/DL — SIGNIFICANT CHANGE UP (ref 8.4–10.5)
CHLORIDE SERPL-SCNC: 104 MMOL/L — SIGNIFICANT CHANGE UP (ref 96–108)
CO2 SERPL-SCNC: 24 MMOL/L — SIGNIFICANT CHANGE UP (ref 22–31)
CREAT SERPL-MCNC: 0.69 MG/DL — SIGNIFICANT CHANGE UP (ref 0.5–1.3)
GLUCOSE SERPL-MCNC: 102 MG/DL — HIGH (ref 70–99)
HCT VFR BLD CALC: 41.1 % — SIGNIFICANT CHANGE UP (ref 39–50)
HGB BLD-MCNC: 14 G/DL — SIGNIFICANT CHANGE UP (ref 13–17)
MCHC RBC-ENTMCNC: 30.6 PG — SIGNIFICANT CHANGE UP (ref 27–34)
MCHC RBC-ENTMCNC: 34.1 GM/DL — SIGNIFICANT CHANGE UP (ref 32–36)
MCV RBC AUTO: 89.9 FL — SIGNIFICANT CHANGE UP (ref 80–100)
NRBC # BLD: 0 /100 WBCS — SIGNIFICANT CHANGE UP (ref 0–0)
PLATELET # BLD AUTO: 265 K/UL — SIGNIFICANT CHANGE UP (ref 150–400)
POTASSIUM SERPL-MCNC: 3.9 MMOL/L — SIGNIFICANT CHANGE UP (ref 3.5–5.3)
POTASSIUM SERPL-SCNC: 3.9 MMOL/L — SIGNIFICANT CHANGE UP (ref 3.5–5.3)
RBC # BLD: 4.57 M/UL — SIGNIFICANT CHANGE UP (ref 4.2–5.8)
RBC # FLD: 12 % — SIGNIFICANT CHANGE UP (ref 10.3–14.5)
SODIUM SERPL-SCNC: 138 MMOL/L — SIGNIFICANT CHANGE UP (ref 135–145)
WBC # BLD: 8.38 K/UL — SIGNIFICANT CHANGE UP (ref 3.8–10.5)
WBC # FLD AUTO: 8.38 K/UL — SIGNIFICANT CHANGE UP (ref 3.8–10.5)

## 2020-08-23 PROCEDURE — 99233 SBSQ HOSP IP/OBS HIGH 50: CPT

## 2020-08-23 RX ORDER — METOCLOPRAMIDE HCL 10 MG
10 TABLET ORAL THREE TIMES A DAY
Refills: 0 | Status: DISCONTINUED | OUTPATIENT
Start: 2020-08-23 | End: 2020-08-28

## 2020-08-23 RX ORDER — BACLOFEN 100 %
5 POWDER (GRAM) MISCELLANEOUS ONCE
Refills: 0 | Status: COMPLETED | OUTPATIENT
Start: 2020-08-23 | End: 2020-08-23

## 2020-08-23 RX ORDER — BACLOFEN 100 %
5 POWDER (GRAM) MISCELLANEOUS THREE TIMES A DAY
Refills: 0 | Status: DISCONTINUED | OUTPATIENT
Start: 2020-08-23 | End: 2020-08-23

## 2020-08-23 RX ADMIN — SENNA PLUS 2 TABLET(S): 8.6 TABLET ORAL at 21:26

## 2020-08-23 RX ADMIN — MIDODRINE HYDROCHLORIDE 10 MILLIGRAM(S): 2.5 TABLET ORAL at 17:27

## 2020-08-23 RX ADMIN — Medication 81 MILLIGRAM(S): at 11:51

## 2020-08-23 RX ADMIN — Medication 1 DROP(S): at 21:25

## 2020-08-23 RX ADMIN — ENOXAPARIN SODIUM 40 MILLIGRAM(S): 100 INJECTION SUBCUTANEOUS at 11:51

## 2020-08-23 RX ADMIN — MIDODRINE HYDROCHLORIDE 10 MILLIGRAM(S): 2.5 TABLET ORAL at 05:08

## 2020-08-23 RX ADMIN — MIDODRINE HYDROCHLORIDE 10 MILLIGRAM(S): 2.5 TABLET ORAL at 11:51

## 2020-08-23 RX ADMIN — Medication 5 MILLIGRAM(S): at 12:40

## 2020-08-23 RX ADMIN — Medication 10 MILLIGRAM(S): at 08:39

## 2020-08-23 RX ADMIN — CLOPIDOGREL BISULFATE 75 MILLIGRAM(S): 75 TABLET, FILM COATED ORAL at 11:51

## 2020-08-23 RX ADMIN — ATORVASTATIN CALCIUM 80 MILLIGRAM(S): 80 TABLET, FILM COATED ORAL at 21:26

## 2020-08-23 NOTE — PROGRESS NOTE ADULT - ASSESSMENT
75-year-old right-handed gentleman first evaluated at [facility] on 8/15/2020 with left hemiparesis.  On 8/14/2020 he developed some type of change in mental status along with headache, and had been dropping objects, most likely due to left hemiparesis. CT head (8/14/2020) showed moderate-severe periventricular chronic ischemic changes.  There was a possible subacute right corona radiata infarct, perhaps more likely part and parcel of the chronic ischemic changes. CTA neck and head (8/14/2020) showed severe right M1 stenosis.  The left vertebral artery was dominant.  Incidentally noted were emphysematous lung changes.     Impression.  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  The right M1 stenosis is most likely due to large artery atherosclerosis, although an embolus with partial lysis cannot be ruled out with certainty (embolic stroke of undetermined source).       NEURO: No acute neurological change.  Continue close monitoring for neurologic deterioration, repeat CTH and CTA without acute change. Initial MRI Brain showed moderate sized right corona radiata/centrum semiovale internal borderzone infarct.  Repeat MRI with increase in RMCA infarcts. Gradual titration off IVF as tolerated, continue on midodrine to maintain SBP >140 mmHg given neurological stability, patient with hypotensive event on 8/15/20 resulting in worsening neurological deficits but improvement to baseline once given hydration and BP raised, LDL -156- continue on high intensity statin, D/c baclofen and continue Reglan PRN hiccups  Physical therapy/OT recommended acute rehab.     ANTITHROMBOTIC THERAPY: ASA and Plavix for 3 months per [facility]PRISS protocol, then ASA indefinitely from neurological standpoint. PRU/P2Y12 levels within goal range.    PULMONARY: CXR (08/15/20) clear, protecting airway, saturating well on room air. Incidental finding on CTA H/N emphysematous changes. Pulmonary consulted, Dr. Zelaya following -. CT Chest 8/16 showed apical paraseptal emphysema, no need for bronchodilator at this time     CARDIOVASCULAR: TTE (08/16/20): EF 73%, minimal MR, negative PFO, continue cardiac monitoring on telemetry, Cardiology, Dr. Aguirre consult appreciated - Midodrine continued. off IVF, Holter monitor vs loop recorder to screen for occult arrhythmia.     SBP goal: 140-180mmHg as currently tolerating     GASTROINTESTINAL:  initial dysphagia screen passed but patient was witnessed to be drooling and pocketing meals while eating. Pt had MBS on 8/18 and was started on  D2 [place] diet. Then again noted with difficulty with medications- pt had repeat S&S eval on 8/20 recommended dysphagia 1 nectar, tolerating well. Continue bowel regimen     Diet: D1 nectar     RENAL: BUN/Cr without acute change,      Na Goal: Greater than 135     Lee: no    HEMATOLOGY: H/H without acute change , Platelets 265, no si/sx of bleeding      DVT ppx: LMWH [x]     ID: afebrile, no leukocytosis, completed course of Abx for UTI, blood and urine cultures NGTD- short course as noted, continue to monitor     OTHER: ENT consulted as recommendations per S&S and Pulm for hoarse voice r/o vocal cord paralysis. ENT bedside FOE revealed patent airway and vocal cords mobile with good contact b/l - possible small glottic endy inferiorly, no further recommendations, no further ENT intervention required. Plan endorsed to patient bedside. Patient gives permission to disclose information to family member. All questions and concerns addressed. Dr Rob (medicine) consult appreciated    DISPOSITION: Acute Rehab once stable and workup is complete. Uninsured- social work following.     CORE MEASURES:        Admission NIHSS: 2     TPA: [] YES [x] NO      LDL/HDL: 156/44     Depression Screen: 0     Statin Therapy: yes     Dysphagia Screen: [x] PASS [] FAIL     Smoking [] YES [x] NO      Afib [] YES [x] NO     Stroke Education [x] YES [] NO    Obtain screening lower extremity venous ultrasound in patients who meet 1 or more of the following criteria as patient is high risk for DVT/PE on admission:   [] History of DVT/PE  []Hypercoagulable states (Factor V Leiden, Cancer, OCP, etc. )  []Prolonged immobility (hemiplegia/hemiparesis/post operative or any other extended immobilization)  [] Transferred from outside facility (Rehab or Long term care)  [] Age </= to 50

## 2020-08-23 NOTE — PROGRESS NOTE ADULT - SUBJECTIVE AND OBJECTIVE BOX
Subjective: Patient seen and examined. No new events except as noted.   remains in Stroke unit     REVIEW OF SYSTEMS:    CONSTITUTIONAL:+ weakness, fevers or chills  EYES/ENT: No visual changes;  No vertigo or throat pain   NECK: No pain or stiffness  RESPIRATORY: No cough, wheezing, hemoptysis; No shortness of breath  CARDIOVASCULAR: No chest pain or palpitations  GASTROINTESTINAL: No abdominal or epigastric pain. No nausea, vomiting, or hematemesis; No diarrhea or constipation. No melena or hematochezia.  GENITOURINARY: No dysuria, frequency or hematuria  NEUROLOGICAL: + numbness or weakness  SKIN: No itching, burning, rashes, or lesions   All other review of systems is negative unless indicated above.    MEDICATIONS:  MEDICATIONS  (STANDING):  artificial tears (preservative free) Ophthalmic Solution 1 Drop(s) Both EYES daily  aspirin  chewable 81 milliGRAM(s) Oral daily  atorvastatin 80 milliGRAM(s) Oral at bedtime  clopidogrel Tablet 75 milliGRAM(s) Oral daily  enoxaparin Injectable 40 milliGRAM(s) SubCutaneous daily  midodrine. 10 milliGRAM(s) Oral three times a day  polyethylene glycol 3350 17 Gram(s) Oral daily  senna 2 Tablet(s) Oral at bedtime      PHYSICAL EXAM:  T(C): 36.9 (08-23-20 @ 04:00), Max: 37 (08-22-20 @ 20:00)  HR: 56 (08-23-20 @ 08:00) (52 - 64)  BP: 122/60 (08-23-20 @ 08:00) (122/60 - 147/56)  RR: 17 (08-23-20 @ 08:00) (14 - 21)  SpO2: 96% (08-23-20 @ 08:00) (96% - 99%)  Wt(kg): --  I&O's Summary    22 Aug 2020 07:01  -  23 Aug 2020 07:00  --------------------------------------------------------  IN: 300 mL / OUT: 700 mL / NET: -400 mL          Appearance: NAD	  HEENT:   Dry  oral mucosa, PERRL, EOMI	  Lymphatic: No lymphadenopathy  Cardiovascular: Normal S1 S2, No JVD, No murmurs, No edema  Respiratory: Lungs clear to auscultation	  Psychiatry: A & O x 3, Mood & affect appropriate  Gastrointestinal:  Soft, Non-tender, + BS	  Skin: No rashes, No ecchymoses, No cyanosis	  Extremities: Normal range of motion, No clubbing, cyanosis or edema  Vascular: Peripheral pulses palpable 2+ bilaterally  - Cranial Nerves II-XII:  VFF, EOMI, PERRL (3mm OD, OS), V1-V3 intact, some nasolabial flattening on the left side of face, t/p midline, SCM/trap intact.  	- Fundoscopic examination: upon fundoscopic examination grossly clear margins of optic disc & cup  	- Motor: Pronator drift present on the left side. demonstrates orbiting around the left arm. Strength left arm 4+/5 with  strength 4/5. Left leg 4+/5 hip flexors/extensors with 5/5 knee flexion/ext dorsi/plantarflexion. right arm and leg 5/5. Normal muscle bulk and tone throughout. Neck flexion/extension 5/5 without neck stiffness  	- Sensory:  Intact to light touch and PP bilaterally throughout.  	- Coordination:  FTN demonstrated mild dysmetria in proportion to weakness on left arm, intact on right  - Gait: patient able to stand up on his own, ambulate several steps without wide based gait, not requiring assistance.      LABS:    CARDIAC MARKERS:                                13.3   8.21  )-----------( 260      ( 22 Aug 2020 05:32 )             40.4     08-23    138  |  104  |  17  ----------------------------<  102<H>  3.9   |  24  |  0.69    Ca    9.3      23 Aug 2020 05:09      proBNP:   Lipid Profile:   HgA1c:   TSH:             TELEMETRY: 	SR    ECG:  	  RADIOLOGY:   DIAGNOSTIC TESTING:  [ ] Echocardiogram:  [ ]  Catheterization:  [ ] Stress Test:    OTHER:

## 2020-08-23 NOTE — PROGRESS NOTE ADULT - SUBJECTIVE AND OBJECTIVE BOX
THE PATIENT WAS SEEN AND EXAMINED BY ME WITH THE HOUSESTAFF AND STROKE TEAM DURING MORNING ROUNDS.   HPI:  76 y/o RH man with a PMH of HTN not on antithrombotics who presented to the ED with an acute one day history of headache and altered mental status. Per his friend, the pt suddenly became confused day prior to admission around 5pm and complained of a new onset headache. The pt complained about some weakness in his right lower extremity and endorses a sharp headache localized to the top/middle of his hairline that did not radiate anywhere. The pt denied  photophobia, recent fever or flu like illness, changes in hearing, changes in vision, recent falls or trauma, or pain other than the headache, difficulty swallowing, dysuria, diarrhea, numbness/tingling anywhere. No history of headaches.  Per ED,  The friend stated that the patient's speech was "off" and that his face "did not look normal". The pt did not want to go to the ER at the time, but came day of admission because the headache persisted throughout the night and today he began to feel lightheaded. The pt stated that he ambulates and talks normally at baseline and that he had never experienced these symptoms before day prior to admission.       SUBJECTIVE: No events overnight.  No new neurologic complaints.      acetaminophen   Tablet .. 650 milliGRAM(s) Oral every 6 hours PRN  artificial tears (preservative free) Ophthalmic Solution 1 Drop(s) Both EYES daily  aspirin  chewable 81 milliGRAM(s) Oral daily  atorvastatin 80 milliGRAM(s) Oral at bedtime  clopidogrel Tablet 75 milliGRAM(s) Oral daily  enoxaparin Injectable 40 milliGRAM(s) SubCutaneous daily  metoclopramide Injectable 10 milliGRAM(s) IV Push three times a day PRN  midodrine. 10 milliGRAM(s) Oral three times a day  polyethylene glycol 3350 17 Gram(s) Oral daily  senna 2 Tablet(s) Oral at bedtime      PHYSICAL EXAM:   Vital Signs Last 24 Hrs  T(C): 36.9 (23 Aug 2020 04:00), Max: 37 (22 Aug 2020 20:00)  T(F): 98.5 (23 Aug 2020 04:00), Max: 98.6 (22 Aug 2020 20:00)  HR: 60 (23 Aug 2020 14:00) (52 - 73)  BP: 104/52 (23 Aug 2020 14:00) (104/52 - 147/56)  BP(mean): 67 (23 Aug 2020 14:00) (63 - 83)  RR: 15 (23 Aug 2020 14:00) (14 - 21)  SpO2: 95% (23 Aug 2020 14:00) (95% - 98%)    General: No acute distress  HEENT: left gaze palsy,  Left homonymous hemianopia  Abdomen: Soft, nontender, nondistended   Extremities: No edema    NEUROLOGICAL EXAM:  Mental status: Eyes open, awake, alert, oriented to name, place and time, speech fluent, left hemineglect, following commands  Cranial Nerves: Mild to moderate left facial palsy, no nystagmus, LHH, mild left gaze palsy, mild dysarthria.  Motor exam: Normal tone, right side moves against gravity, left side with motor neglect: when tested independent from right LUE 3/5, LLE 4/5  Sensation: intact to light touch on right but sensory-extinction on left  Coordination/ Gait: no ataxia bilaterally, gait not tested     LABS:                        14.0   8.38  )-----------( 265      ( 23 Aug 2020 11:10 )             41.1    08-23    138  |  104  |  17  ----------------------------<  102<H>  3.9   |  24  |  0.69    Ca    9.3      23 Aug 2020 05:09          IMAGING: Reviewed by me.     MRI Head No Cont (08.18.2020) Interval apparent increase in the infarcts in the right MCA territory involving the right centrum semiovale and corona radiata as well as the right temporal lobe compared to prior MRI exam 8/15/2020.  No acute intracranial hemorrhage. Occlusion right MCA as seen on CTA exam    CT Angio Head w/ IV Cont (08.17.20) Redemonstration of near complete occlusion of the M1 segment of the right MCA.  2 mm conical outpouching arising from the left supraclinoid ICA directed inferiorly suggesting an infundibulum versus small aneurysm (8:39).     CT head 08.17.20 : Redemonstration of acute/subacute right MCA territory infarct.    MRI Brain w/o (08/15/20): Acute infarct in the right MCA territory somewhat patchy involving the right lateral temporal lobe as well as the right corona radiata/centrum semiovale ovale region in a somewhat junctional pattern, deep internal border zone type pattern. No significant mass effect. No associated hemorrhage    CT Head No Cont. (08/14/20): There is no acute intracranial hemorrhage. No focal edema or evidence of acute, transcortical infarct. No hydrocephalus, midline shift or mass effect.    CT Angio Head w/IV Cont. (08/14/20): 6 mm in length severe stenosis/near occlusion of the M1 segment of the Right MCA with reconstitution at the bifurcation.    CT Angio Neck w/IV Cont. (08/14/20): Patent cervical vasculature. No flow limiting stenosis or occlusion.

## 2020-08-23 NOTE — PROGRESS NOTE ADULT - SUBJECTIVE AND OBJECTIVE BOX
Patient is a 75y old  Male who presents with a chief complaint of R M1 occlusion vs stenosis (23 Aug 2020 10:30)      SUBJECTIVE / OVERNIGHT EVENTS:  No chest pain. No shortness of breath. No complaints. No events overnight.     MEDICATIONS  (STANDING):  artificial tears (preservative free) Ophthalmic Solution 1 Drop(s) Both EYES daily  aspirin  chewable 81 milliGRAM(s) Oral daily  atorvastatin 80 milliGRAM(s) Oral at bedtime  baclofen 5 milliGRAM(s) Oral once  clopidogrel Tablet 75 milliGRAM(s) Oral daily  enoxaparin Injectable 40 milliGRAM(s) SubCutaneous daily  midodrine. 10 milliGRAM(s) Oral three times a day  polyethylene glycol 3350 17 Gram(s) Oral daily  senna 2 Tablet(s) Oral at bedtime    MEDICATIONS  (PRN):  acetaminophen   Tablet .. 650 milliGRAM(s) Oral every 6 hours PRN Temp greater or equal to 38C (100.4F), Mild Pain (1 - 3), Moderate Pain (4 - 6), Severe Pain (7 - 10)  metoclopramide Injectable 10 milliGRAM(s) IV Push three times a day PRN Hiccups      Vital Signs Last 24 Hrs  T(C): 36.9 (23 Aug 2020 04:00), Max: 37 (22 Aug 2020 20:00)  T(F): 98.5 (23 Aug 2020 04:00), Max: 98.6 (22 Aug 2020 20:00)  HR: 63 (23 Aug 2020 12:00) (52 - 73)  BP: 134/56 (23 Aug 2020 12:00) (122/60 - 147/56)  BP(mean): 78 (23 Aug 2020 12:00) (63 - 83)  RR: 20 (23 Aug 2020 12:00) (14 - 21)  SpO2: 96% (23 Aug 2020 12:00) (96% - 98%)  CAPILLARY BLOOD GLUCOSE        I&O's Summary    22 Aug 2020 07:01  -  23 Aug 2020 07:00  --------------------------------------------------------  IN: 300 mL / OUT: 700 mL / NET: -400 mL        PHYSICAL EXAM:  GENERAL: NAD, well-developed  HEAD:  Atraumatic, Normocephalic  EYES: EOMI, PERRLA, conjunctiva and sclera clear  NECK: Supple, No JVD  CHEST/LUNG: Clear to auscultation bilaterally; No wheeze  HEART: Regular rate and rhythm; No murmurs, rubs, or gallops  ABDOMEN: Soft, Nontender, Nondistended; Bowel sounds present  EXTREMITIES:  2+ Peripheral Pulses, No clubbing, cyanosis, or edema  PSYCH: AAOx3  NEUROLOGY: non-focal  SKIN: No rashes or lesions    LABS:                        14.0   8.38  )-----------( 265      ( 23 Aug 2020 11:10 )             41.1     08-23    138  |  104  |  17  ----------------------------<  102<H>  3.9   |  24  |  0.69    Ca    9.3      23 Aug 2020 05:09                RADIOLOGY & ADDITIONAL TESTS:    Imaging Personally Reviewed:    Consultant(s) Notes Reviewed:      Care Discussed with Consultants/Other Providers:

## 2020-08-24 ENCOUNTER — TRANSCRIPTION ENCOUNTER (OUTPATIENT)
Age: 75
End: 2020-08-24

## 2020-08-24 LAB
ANION GAP SERPL CALC-SCNC: 11 MMOL/L — SIGNIFICANT CHANGE UP (ref 5–17)
BUN SERPL-MCNC: 16 MG/DL — SIGNIFICANT CHANGE UP (ref 7–23)
CALCIUM SERPL-MCNC: 9.6 MG/DL — SIGNIFICANT CHANGE UP (ref 8.4–10.5)
CHLORIDE SERPL-SCNC: 103 MMOL/L — SIGNIFICANT CHANGE UP (ref 96–108)
CO2 SERPL-SCNC: 23 MMOL/L — SIGNIFICANT CHANGE UP (ref 22–31)
CREAT SERPL-MCNC: 0.72 MG/DL — SIGNIFICANT CHANGE UP (ref 0.5–1.3)
GLUCOSE SERPL-MCNC: 99 MG/DL — SIGNIFICANT CHANGE UP (ref 70–99)
HCT VFR BLD CALC: 41 % — SIGNIFICANT CHANGE UP (ref 39–50)
HGB BLD-MCNC: 13.9 G/DL — SIGNIFICANT CHANGE UP (ref 13–17)
MCHC RBC-ENTMCNC: 30.3 PG — SIGNIFICANT CHANGE UP (ref 27–34)
MCHC RBC-ENTMCNC: 33.9 GM/DL — SIGNIFICANT CHANGE UP (ref 32–36)
MCV RBC AUTO: 89.3 FL — SIGNIFICANT CHANGE UP (ref 80–100)
NRBC # BLD: 0 /100 WBCS — SIGNIFICANT CHANGE UP (ref 0–0)
PLATELET # BLD AUTO: 254 K/UL — SIGNIFICANT CHANGE UP (ref 150–400)
POTASSIUM SERPL-MCNC: 3.9 MMOL/L — SIGNIFICANT CHANGE UP (ref 3.5–5.3)
POTASSIUM SERPL-SCNC: 3.9 MMOL/L — SIGNIFICANT CHANGE UP (ref 3.5–5.3)
RBC # BLD: 4.59 M/UL — SIGNIFICANT CHANGE UP (ref 4.2–5.8)
RBC # FLD: 12 % — SIGNIFICANT CHANGE UP (ref 10.3–14.5)
SODIUM SERPL-SCNC: 137 MMOL/L — SIGNIFICANT CHANGE UP (ref 135–145)
WBC # BLD: 7.48 K/UL — SIGNIFICANT CHANGE UP (ref 3.8–10.5)
WBC # FLD AUTO: 7.48 K/UL — SIGNIFICANT CHANGE UP (ref 3.8–10.5)

## 2020-08-24 PROCEDURE — 93010 ELECTROCARDIOGRAM REPORT: CPT

## 2020-08-24 RX ORDER — ACETAMINOPHEN 500 MG
650 TABLET ORAL ONCE
Refills: 0 | Status: COMPLETED | OUTPATIENT
Start: 2020-08-24 | End: 2020-08-24

## 2020-08-24 RX ORDER — BUDESONIDE AND FORMOTEROL FUMARATE DIHYDRATE 160; 4.5 UG/1; UG/1
2 AEROSOL RESPIRATORY (INHALATION)
Refills: 0 | Status: DISCONTINUED | OUTPATIENT
Start: 2020-08-24 | End: 2020-08-24

## 2020-08-24 RX ADMIN — CLOPIDOGREL BISULFATE 75 MILLIGRAM(S): 75 TABLET, FILM COATED ORAL at 12:58

## 2020-08-24 RX ADMIN — Medication 1 DROP(S): at 21:04

## 2020-08-24 RX ADMIN — ATORVASTATIN CALCIUM 80 MILLIGRAM(S): 80 TABLET, FILM COATED ORAL at 21:04

## 2020-08-24 RX ADMIN — Medication 650 MILLIGRAM(S): at 20:50

## 2020-08-24 RX ADMIN — Medication 81 MILLIGRAM(S): at 12:58

## 2020-08-24 RX ADMIN — SENNA PLUS 2 TABLET(S): 8.6 TABLET ORAL at 21:04

## 2020-08-24 RX ADMIN — ENOXAPARIN SODIUM 40 MILLIGRAM(S): 100 INJECTION SUBCUTANEOUS at 12:58

## 2020-08-24 RX ADMIN — MIDODRINE HYDROCHLORIDE 10 MILLIGRAM(S): 2.5 TABLET ORAL at 17:50

## 2020-08-24 RX ADMIN — MIDODRINE HYDROCHLORIDE 10 MILLIGRAM(S): 2.5 TABLET ORAL at 12:58

## 2020-08-24 RX ADMIN — Medication 650 MILLIGRAM(S): at 21:20

## 2020-08-24 RX ADMIN — MIDODRINE HYDROCHLORIDE 10 MILLIGRAM(S): 2.5 TABLET ORAL at 05:36

## 2020-08-24 NOTE — CONSULT NOTE ADULT - REASON FOR ADMISSION
R M1 occlusion vs stenosis
R M1 occlusion vs stenosis
altered mental status and headache
R M1 occlusion vs stenosis

## 2020-08-24 NOTE — CONSULT NOTE ADULT - SUBJECTIVE AND OBJECTIVE BOX
p ([id number])     HPI:  Opal Matthew is a 74 y/o RH man with a PMH of HTN not on antithrombotics who presented to the ED with an acute one day history of headache and altered mental status. Per his friend, the pt suddenly became confused yesterday around 5pm and complained of a new onset headache.The pt complains about some weakness in his right lower extremity and endorses a sharp headache localized to the top/middle of his hairline that does not radiate anywhere. The pt denies photophobia, recent fever or flu like illness, changes in hearing, changes in vision, recent falls or trauma, or pain other than the headache, difficulty swallowing, dysuria, diarrhea, numbness/tingling anywhere. No history of headaches.     Per ED,  The friend stated that the patient's speech was "off" and that his face "did not look normal". The pt did not want to go to the ER at the time, but came today because the headache persisted throughout the night and today he began to feel lightheaded. The pt stated that he ambulates and talks normally at baseline and that he had never experienced these symptoms before yesterday. (15 Aug 2020 00:11)    Imaging:     MR Brain (8/18): interval inc in R MCA territory infarcts compared to prior MRI 8/15, no hemorrhage, occlusion of R MCA     CTA H/N (8/17): near complete R M1 occlusion, 2.2mm possible aneurysm from L supraclinoid ICA directed inferiorly    CTH (8/17): redemonstration acute/subacute R MCA infarct    Exam: AOx3, PERRL 3 b/l, EOMI, L facial, +dysarthria, RUE/RLE 5/5, LUE 3/5 proximally, 2/5 hg, LLE 4/5. No clonus. SILT. (cant participate VF)    --Anticoagulation:  aspirin  chewable 81 milliGRAM(s) Oral daily  clopidogrel Tablet 75 milliGRAM(s) Oral daily  enoxaparin Injectable 40 milliGRAM(s) SubCutaneous daily    =====================  PAST MEDICAL HISTORY   HTN (hypertension)    PAST SURGICAL HISTORY     apple (Vomiting; Nausea)      MEDICATIONS:  Antibiotics:    Neuro:  acetaminophen   Tablet .. 650 milliGRAM(s) Oral every 6 hours PRN  metoclopramide Injectable 10 milliGRAM(s) IV Push three times a day PRN    Other:  atorvastatin 80 milliGRAM(s) Oral at bedtime  midodrine. 10 milliGRAM(s) Oral three times a day  polyethylene glycol 3350 17 Gram(s) Oral daily  senna 2 Tablet(s) Oral at bedtime      SOCIAL HISTORY:   Occupation:   Marital Status:     FAMILY HISTORY:      ROS: Negative except per HPI    LABS:                          13.9   7.48  )-----------( 254      ( 24 Aug 2020 05:27 )             41.0     08-24    137  |  103  |  16  ----------------------------<  99  3.9   |  23  |  0.72    Ca    9.6      24 Aug 2020 05:28    COVID-19 PCR: Jack (14 Aug 2020 20:28)

## 2020-08-24 NOTE — DISCHARGE NOTE NURSING/CASE MANAGEMENT/SOCIAL WORK - PATIENT PORTAL LINK FT
You can access the FollowMyHealth Patient Portal offered by [facility] by registering at the following website: [email or web address]. By joining [employer]’s FollowMyHealth portal, you will also be able to view your health information using other applications (apps) compatible with our system.

## 2020-08-24 NOTE — PROGRESS NOTE ADULT - ASSESSMENT
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 stenosis    REC    No need for bronchodilators at this time  Monitor resp status    8/24:  came after a week:  Dr Junior was covering me:  ct chest noted  he is not SOB and not wheezy:  his oxygenation on room air is good:  no acuter intervention from pulmonary side:  stroke per neurology :  saul pa: will follow prn if necessary

## 2020-08-24 NOTE — CONSULT NOTE ADULT - CONSULT REASON
Hypotension
Hoarseness
MCA stenosis
R M1 occlusion vs stenosis
headache and left facial droop
medical issues
emphysema noted on ct neck

## 2020-08-24 NOTE — PROGRESS NOTE ADULT - SUBJECTIVE AND OBJECTIVE BOX
Subjective: Patient seen and examined. No new events except as noted.   remains in Stroke unit       REVIEW OF SYSTEMS:    CONSTITUTIONAL:+ weakness, fevers or chills  EYES/ENT: No visual changes;  No vertigo or throat pain   NECK: No pain or stiffness  RESPIRATORY: No cough, wheezing, hemoptysis; No shortness of breath  CARDIOVASCULAR: No chest pain or palpitations  GASTROINTESTINAL: No abdominal or epigastric pain. No nausea, vomiting, or hematemesis; No diarrhea or constipation. No melena or hematochezia.  GENITOURINARY: No dysuria, frequency or hematuria  NEUROLOGICAL:+ numbness or weakness  SKIN: No itching, burning, rashes, or lesions   All other review of systems is negative unless indicated above.    MEDICATIONS:  MEDICATIONS  (STANDING):  artificial tears (preservative free) Ophthalmic Solution 1 Drop(s) Both EYES daily  aspirin  chewable 81 milliGRAM(s) Oral daily  atorvastatin 80 milliGRAM(s) Oral at bedtime  clopidogrel Tablet 75 milliGRAM(s) Oral daily  enoxaparin Injectable 40 milliGRAM(s) SubCutaneous daily  midodrine. 10 milliGRAM(s) Oral three times a day  polyethylene glycol 3350 17 Gram(s) Oral daily  senna 2 Tablet(s) Oral at bedtime      PHYSICAL EXAM:  T(C): 36.8 (08-24-20 @ 08:00), Max: 37.2 (08-23-20 @ 20:00)  HR: 53 (08-24-20 @ 08:00) (52 - 73)  BP: 126/56 (08-24-20 @ 08:00) (104/52 - 151/72)  RR: 17 (08-24-20 @ 08:00) (15 - 27)  SpO2: 98% (08-24-20 @ 08:00) (95% - 98%)  Wt(kg): --  I&O's Summary    23 Aug 2020 07:01  -  24 Aug 2020 07:00  --------------------------------------------------------  IN: 0 mL / OUT: 300 mL / NET: -300 mL          Appearance: NAD	  HEENT:   Dry  oral mucosa, PERRL, EOMI	  Lymphatic: No lymphadenopathy  Cardiovascular: Normal S1 S2, No JVD, No murmurs, No edema  Respiratory: Lungs clear to auscultation	  Psychiatry: A & O x 3, Mood & affect appropriate  Gastrointestinal:  Soft, Non-tender, + BS	  Skin: No rashes, No ecchymoses, No cyanosis	  Extremities: Normal range of motion, No clubbing, cyanosis or edema  Vascular: Peripheral pulses palpable 2+ bilaterally  - Cranial Nerves II-XII:  VFF, EOMI, PERRL (3mm OD, OS), V1-V3 intact, some nasolabial flattening on the left side of face, t/p midline, SCM/trap intact.  	- Fundoscopic examination: upon fundoscopic examination grossly clear margins of optic disc & cup  	- Motor: Pronator drift present on the left side. demonstrates orbiting around the left arm. Strength left arm 4+/5 with  strength 4/5. Left leg 4+/5 hip flexors/extensors with 5/5 knee flexion/ext dorsi/plantarflexion. right arm and leg 5/5. Normal muscle bulk and tone throughout. Neck flexion/extension 5/5 without neck stiffness  	- Sensory:  Intact to light touch and PP bilaterally throughout.  	- Coordination:  FTN demonstrated mild dysmetria in proportion to weakness on left arm, intact on right  - Gait: patient able to stand up on his own, ambulate several steps without wide based gait, not requiring assistance.      LABS:    CARDIAC MARKERS:                                13.9   7.48  )-----------( 254      ( 24 Aug 2020 05:27 )             41.0     08-24    137  |  103  |  16  ----------------------------<  99  3.9   |  23  |  0.72    Ca    9.6      24 Aug 2020 05:28      proBNP:   Lipid Profile:   HgA1c:   TSH:             TELEMETRY: 	SRE    ECG:  	  RADIOLOGY:   DIAGNOSTIC TESTING:  [ ] Echocardiogram:  [ ]  Catheterization:  [ ] Stress Test:    OTHER:

## 2020-08-24 NOTE — PROGRESS NOTE ADULT - SUBJECTIVE AND OBJECTIVE BOX
Patient is a 75y old  Male who presents with a chief complaint of R M1 occlusion vs stenosis (24 Aug 2020 12:15)      Any change in ROS: Ptis alert and awake :  no SOB : on room air : at 97 %    MEDICATIONS  (STANDING):  artificial tears (preservative free) Ophthalmic Solution 1 Drop(s) Both EYES daily  aspirin  chewable 81 milliGRAM(s) Oral daily  atorvastatin 80 milliGRAM(s) Oral at bedtime  clopidogrel Tablet 75 milliGRAM(s) Oral daily  enoxaparin Injectable 40 milliGRAM(s) SubCutaneous daily  midodrine. 10 milliGRAM(s) Oral three times a day  polyethylene glycol 3350 17 Gram(s) Oral daily  senna 2 Tablet(s) Oral at bedtime    MEDICATIONS  (PRN):  acetaminophen   Tablet .. 650 milliGRAM(s) Oral every 6 hours PRN Temp greater or equal to 38C (100.4F), Mild Pain (1 - 3), Moderate Pain (4 - 6), Severe Pain (7 - 10)  metoclopramide Injectable 10 milliGRAM(s) IV Push three times a day PRN Hiccups    Vital Signs Last 24 Hrs  T(C): 36.8 (24 Aug 2020 08:00), Max: 37.2 (23 Aug 2020 20:00)  T(F): 98.3 (24 Aug 2020 08:00), Max: 99 (23 Aug 2020 20:00)  HR: 64 (24 Aug 2020 12:00) (52 - 67)  BP: 123/54 (24 Aug 2020 12:00) (104/52 - 151/72)  BP(mean): 74 (24 Aug 2020 12:00) (67 - 93)  RR: 15 (24 Aug 2020 12:00) (15 - 27)  SpO2: 97% (24 Aug 2020 12:00) (95% - 99%)    I&O's Summary    23 Aug 2020 07:01  -  24 Aug 2020 07:00  --------------------------------------------------------  IN: 0 mL / OUT: 300 mL / NET: -300 mL    24 Aug 2020 07:01  -  24 Aug 2020 13:17  --------------------------------------------------------  IN: 150 mL / OUT: 450 mL / NET: -300 mL          Physical Exam:   GENERAL: NAD, well-groomed, well-developed  HEENT: DAMIAN/   Atraumatic, Normocephalic  ENMT: No tonsillar erythema, exudates, or enlargement; Moist mucous membranes, Good dentition, No lesions  NECK: Supple, No JVD, Normal thyroid  CHEST/LUNG: Clear to auscultaion,  CVS: Regular rate and rhythm; No murmurs, rubs, or gallops  GI: : Soft, Nontender, Nondistended; Bowel sounds present  NERVOUS SYSTEM:  Alert & Oriented X3  EXTREMITIES:  edema  LYMPH: No lymphadenopathy noted  SKIN: No rashes or lesions  ENDOCRINOLOGY: No Thyromegaly  PSYCH: Appropriate    Labs:                              13.9   7.48  )-----------( 254      ( 24 Aug 2020 05:27 )             41.0                         14.0   8.38  )-----------( 265      ( 23 Aug 2020 11:10 )             41.1                         13.3   8.21  )-----------( 260      ( 22 Aug 2020 05:32 )             40.4                         13.6   8.67  )-----------( 257      ( 21 Aug 2020 05:02 )             41.3     08-24    137  |  103  |  16  ----------------------------<  99  3.9   |  23  |  0.72  08-23    138  |  104  |  17  ----------------------------<  102<H>  3.9   |  24  |  0.69  08-22    138  |  104  |  13  ----------------------------<  97  3.6   |  22  |  0.70  08-21    136  |  106  |  12  ----------------------------<  103<H>  3.7   |  22  |  0.68    Ca    9.6      24 Aug 2020 05:28  Ca    9.3      23 Aug 2020 05:09      CAPILLARY BLOOD GLUCOSE        < from: CT Chest No Cont (08.16.20 @ 14:54) >  PROCEDURE DATE:  08/16/2020            INTERPRETATION:  CLINICAL INFORMATION: Cough. Abnormality seen on chest CT scan. Possible emphysema. Stroke.    COMPARISON: None.    PROCEDURE:  CT of the Chest was performed without intravenous contrast.  Sagittal and coronal reformats were performed.    FINDINGS:    LUNGS AND AIRWAYS: Patent central airways.  Dependent atelectasis. Biapical paraseptal emphysematous change. Subcentimeter bleb in the right lower lobe.  PLEURA: No pleural effusion.  MEDIASTINUM AND RODOLFO: No lymphadenopathy.  VESSELS: Within normal limits.  HEART: Heart size is normal. No pericardial effusion.  CHEST WALL AND LOWER NECK: Within normal limits.  VISUALIZED UPPER ABDOMEN: Large calcified stones in the gallbladder.  BONES: Within normal limits.    IMPRESSION:    Apical paraseptal emphysema.                  ABDIFATAH KIRKPATRICK M.D., ATTENDING RADIOLOGIST  This document has been electronically signed. Aug 16 2020  4:44PM              < end of copied text >                RECENT CULTURES:        RESPIRATORY CULTURES:          Studies  Chest X-RAY  CT SCAN Chest   Venous Dopplers: LE:   CT Abdomen  Others

## 2020-08-24 NOTE — PROGRESS NOTE ADULT - ASSESSMENT
75-year-old right-handed gentleman first evaluated at [facility] on 8/15/2020 with left hemiparesis.  On 8/14/2020 he developed some type of change in mental status along with headache, and had been dropping objects, most likely due to left hemiparesis. CT head (8/14/2020) showed moderate-severe periventricular chronic ischemic changes.  There was a possible subacute right corona radiata infarct, perhaps more likely part and parcel of the chronic ischemic changes. CTA neck and head (8/14/2020) showed severe right M1 stenosis.  The left vertebral artery was dominant.  Incidentally noted were emphysematous lung changes.     Impression.  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  The right M1 stenosis is most likely due to large artery atherosclerosis, although an embolus with partial lysis cannot be ruled out with certainty (embolic stroke of undetermined source).       NEURO: No acute neurological change.  Continue close monitoring for neurologic deterioration, repeat CTH and CTA without acute change. Initial MRI Brain showed moderate sized right corona radiata/centrum semiovale internal borderzone infarct.  Repeat MRI with increase in RMCA infarcts.  continue on midodrine to maintain SBP >140 mmHg given neurological stability, patient with hypotensive event on 8/15/20 resulting in worsening neurological deficits but improvement to baseline once given hydration and BP raised, LDL -156- continue on high intensity statin,  Reglan PRN hiccups , 2 mm conical outpouching arising from the left supraclinoid ICA directed inferiorly suggesting an infundibulum versus small aneurysm should be followed with yearly vascular imaging,  Physical therapy/OT recommended acute rehab.     ANTITHROMBOTIC THERAPY: ASA and Plavix for 3 months per SAMPRISS protocol, then ASA indefinitely from neurological standpoint. PRU/P2Y12 126    PULMONARY: CXR (08/15/20) clear, protecting airway, saturating well on room air. Incidental finding on CTA H/N emphysematous changes. Pulmonary consulted, Dr. Zelaya following -. CT Chest 8/16 showed apical paraseptal emphysema, no need for bronchodilator at this time     CARDIOVASCULAR: TTE (08/16/20): EF 73%, minimal MR, negative PFO, continue cardiac monitoring on telemetry, Cardiology, Dr. Aguirre consult appreciated - Midodrine continued, Holter monitor vs loop recorder to screen for occult arrhythmia.     SBP goal: 140-180mmHg as currently tolerating     GASTROINTESTINAL:  initial dysphagia screen passed but patient was witnessed to be drooling and pocketing meals while eating. Pt had MBS on 8/18 and was started on  D2 [place] diet. Then again noted with difficulty with medications- pt had repeat S&S eval on 8/20 recommended dysphagia 1 nectar, tolerating well. Continue bowel regimen     Diet: D1 nectar     RENAL: BUN/Cr without acute change, maintain adequate hydration       Na Goal: Greater than 135     Lee: no    HEMATOLOGY: H/H without acute change , Platelets 254, no si/sx of bleeding      DVT ppx: LMWH [x]     ID: afebrile, no leukocytosis, completed course of Abx for UTI, blood and urine cultures NGTD- short course as noted, continue to monitor     OTHER: ENT consulted as recommendations per S&S and Pulm for hoarse voice r/o vocal cord paralysis. ENT bedside FOE revealed patent airway and vocal cords mobile with good contact b/l - possible small glottic endy inferiorly, no further recommendations, no further ENT intervention required. Plan endorsed to patient bedside. Patient gives permission to disclose information to family member/friend. All questions and concerns addressed. Dr Rob (medicine) consult appreciated    DISPOSITION: Acute Rehab once stable and workup is complete. Uninsured- social work following.     CORE MEASURES:        Admission NIHSS: 2     TPA: [] YES [x] NO      LDL/HDL: 156/44     Depression Screen: 0     Statin Therapy: yes     Dysphagia Screen: [x] PASS [] FAIL     Smoking [] YES [x] NO      Afib [] YES [x] NO     Stroke Education [x] YES [] NO    Obtain screening lower extremity venous ultrasound in patients who meet 1 or more of the following criteria as patient is high risk for DVT/PE on admission:   [] History of DVT/PE  []Hypercoagulable states (Factor V Leiden, Cancer, OCP, etc. )  []Prolonged immobility (hemiplegia/hemiparesis/post operative or any other extended immobilization)  [] Transferred from outside facility (Rehab or Long term care)  [] Age </= to 50

## 2020-08-24 NOTE — PROGRESS NOTE ADULT - ASSESSMENT
76 y/o RH man with a PMH of HTN not on antithrombotics who presented to the ED with an acute one day history of headache and altered mental status. Neurological examination demonstrates mild dysarthria with mild left nasolabial flattening with left arm weakness. CT/CTA per Neuroradiology demonstrates stenosis vs occlusion R M1. Patient out of window for tpa.    CVA  - workup and plan as per neurology  - ASA/plavix  - lipitor  - PT/OT  - swallow eval - puree diet    hypotension  - urine and blood cultures NGTD  - s/p  ceftriaxone   - midodrine    HLD  -   - c/w lipitor    emphysema  - CT chest without contrast done  - pulm following    constipation  - miralax daily  - senna qhs  - lactulose x 1    DVT px  - lovenox

## 2020-08-24 NOTE — PROGRESS NOTE ADULT - SUBJECTIVE AND OBJECTIVE BOX
Patient is a 75y old  Male who presents with a chief complaint of R M1 occlusion vs stenosis (24 Aug 2020 09:41)      SUBJECTIVE / OVERNIGHT EVENTS:  No chest pain. No shortness of breath. No complaints. No events overnight.     MEDICATIONS  (STANDING):  artificial tears (preservative free) Ophthalmic Solution 1 Drop(s) Both EYES daily  aspirin  chewable 81 milliGRAM(s) Oral daily  atorvastatin 80 milliGRAM(s) Oral at bedtime  clopidogrel Tablet 75 milliGRAM(s) Oral daily  enoxaparin Injectable 40 milliGRAM(s) SubCutaneous daily  midodrine. 10 milliGRAM(s) Oral three times a day  polyethylene glycol 3350 17 Gram(s) Oral daily  senna 2 Tablet(s) Oral at bedtime    MEDICATIONS  (PRN):  acetaminophen   Tablet .. 650 milliGRAM(s) Oral every 6 hours PRN Temp greater or equal to 38C (100.4F), Mild Pain (1 - 3), Moderate Pain (4 - 6), Severe Pain (7 - 10)  metoclopramide Injectable 10 milliGRAM(s) IV Push three times a day PRN Hiccups      Vital Signs Last 24 Hrs  T(C): 36.8 (24 Aug 2020 08:00), Max: 37.2 (23 Aug 2020 20:00)  T(F): 98.3 (24 Aug 2020 08:00), Max: 99 (23 Aug 2020 20:00)  HR: 67 (24 Aug 2020 10:15) (52 - 67)  BP: 126/58 (24 Aug 2020 10:15) (104/52 - 151/72)  BP(mean): 76 (24 Aug 2020 10:15) (67 - 93)  RR: 15 (24 Aug 2020 10:15) (15 - 27)  SpO2: 99% (24 Aug 2020 10:15) (95% - 99%)  CAPILLARY BLOOD GLUCOSE        I&O's Summary    23 Aug 2020 07:01  -  24 Aug 2020 07:00  --------------------------------------------------------  IN: 0 mL / OUT: 300 mL / NET: -300 mL    24 Aug 2020 07:01  -  24 Aug 2020 12:15  --------------------------------------------------------  IN: 150 mL / OUT: 0 mL / NET: 150 mL        PHYSICAL EXAM:  GENERAL: NAD, well-developed  HEAD:  Atraumatic, Normocephalic  EYES: EOMI, PERRLA, conjunctiva and sclera clear  NECK: Supple, No JVD  CHEST/LUNG: Clear to auscultation bilaterally; No wheeze  HEART: Regular rate and rhythm; No murmurs, rubs, or gallops  ABDOMEN: Soft, Nontender, Nondistended; Bowel sounds present  EXTREMITIES:  2+ Peripheral Pulses, No clubbing, cyanosis, or edema  PSYCH: AAOx3  NEUROLOGY: non-focal  SKIN: No rashes or lesions    LABS:                        13.9   7.48  )-----------( 254      ( 24 Aug 2020 05:27 )             41.0     08-24    137  |  103  |  16  ----------------------------<  99  3.9   |  23  |  0.72    Ca    9.6      24 Aug 2020 05:28                RADIOLOGY & ADDITIONAL TESTS:    Imaging Personally Reviewed:    Consultant(s) Notes Reviewed:      Care Discussed with Consultants/Other Providers:

## 2020-08-24 NOTE — CONSULT NOTE ADULT - ASSESSMENT
RAFY KIRKPATRICK    76YO M pmhx HTN adm 8/14 after 1d dysarthria, L hemiparesis, stroke code called. CTA showed severe M1 stenosis w/ some reconstitution at bifurcation, but outside window for intervention. Also questionable 2mm outpouching at L supraclinoid ICA, possible aneurysm vs. infundibulum. MRI 8/18 confirmed M1 stenosis w/ interval inc in R inf M2 inferior division infarct and e/o R watershed infarct on DWI. Exam: AOx3, PERRL 3 b/l, EOMI, L facial, +dysarthria, RUE/RLE 5/5, LUE 3/5 proximally, 2/5 hg, LLE 4/5. No clonus. SILT. (cant participate VF).   - Stroke unit, q2h neuro checks  - Cont asa/plavix/lipitor   - Last   - PT/OT  - Fu MR NOVA

## 2020-08-24 NOTE — PROGRESS NOTE ADULT - SUBJECTIVE AND OBJECTIVE BOX
THE PATIENT WAS SEEN AND EXAMINED BY ME WITH THE HOUSESTAFF AND STROKE TEAM DURING MORNING ROUNDS.   HPI:  76 y/o RH man with a PMH of HTN not on antithrombotics who presented to the ED with an acute one day history of headache and altered mental status. Per his friend, the pt suddenly became confused day prior to admission around 5pm and complained of a new onset headache. The pt complained about some weakness in his right lower extremity and endorses a sharp headache localized to the top/middle of his hairline that did not radiate anywhere. The pt denied  photophobia, recent fever or flu like illness, changes in hearing, changes in vision, recent falls or trauma, or pain other than the headache, difficulty swallowing, dysuria, diarrhea, numbness/tingling anywhere. No history of headaches.  Per ED,  The friend stated that the patient's speech was "off" and that his face "did not look normal". The pt did not want to go to the ER at the time, but came day of admission because the headache persisted throughout the night and today he began to feel lightheaded. The pt stated that he ambulates and talks normally at baseline and that he had never experienced these symptoms before day prior to admission.     SUBJECTIVE: No events overnight.  No new neurologic complaints.  ROS reported negative unless otherwise noted.    acetaminophen   Tablet .. 650 milliGRAM(s) Oral every 6 hours PRN  artificial tears (preservative free) Ophthalmic Solution 1 Drop(s) Both EYES daily  aspirin  chewable 81 milliGRAM(s) Oral daily  atorvastatin 80 milliGRAM(s) Oral at bedtime  clopidogrel Tablet 75 milliGRAM(s) Oral daily  enoxaparin Injectable 40 milliGRAM(s) SubCutaneous daily  metoclopramide Injectable 10 milliGRAM(s) IV Push three times a day PRN  midodrine. 10 milliGRAM(s) Oral three times a day  polyethylene glycol 3350 17 Gram(s) Oral daily  senna 2 Tablet(s) Oral at bedtime      PHYSICAL EXAM:   Vital Signs Last 24 Hrs  T(C): 37.2 (23 Aug 2020 20:00), Max: 37.2 (23 Aug 2020 20:00)  T(F): 99 (23 Aug 2020 20:00), Max: 99 (23 Aug 2020 20:00)  HR: 57 (24 Aug 2020 06:00) (52 - 73)  BP: 136/70 (24 Aug 2020 06:00) (104/52 - 151/72)  BP(mean): 88 (24 Aug 2020 06:00) (67 - 93)  RR: 19 (24 Aug 2020 06:00) (15 - 27)  SpO2: 95% (24 Aug 2020 06:00) (95% - 98%)    General: No acute distress  HEENT: left gaze palsy,  Left homonymous hemianopia  Abdomen: Soft, nontender, nondistended   Extremities: No edema    NEUROLOGICAL EXAM:  Mental status: Eyes open, awake, alert, oriented to name, place and time, speech fluent, left hemineglect, following commands  Cranial Nerves: Mild to moderate left facial palsy, no nystagmus, LHH, mild left gaze palsy, mild dysarthria.  Motor exam: Normal tone, right side moves against gravity, left side with motor neglect: when tested independent from right LUE 3/5, LLE 4/5  Sensation: intact to light touch on right but sensory-extinction on left  Coordination/ Gait: no ataxia bilaterally, gait not tested     LABS:                        13.9   7.48  )-----------( 254      ( 24 Aug 2020 05:27 )             41.0    08-24    137  |  103  |  16  ----------------------------<  99  3.9   |  23  |  0.72    Ca    9.6      24 Aug 2020 05:28          IMAGING: Reviewed by me.   MRI Head No Cont (08.18.2020) Interval apparent increase in the infarcts in the right MCA territory involving the right centrum semiovale and corona radiata as well as the right temporal lobe compared to prior MRI exam 8/15/2020.  No acute intracranial hemorrhage. Occlusion right MCA as seen on CTA exam    CT Angio Head w/ IV Cont (08.17.20) Redemonstration of near complete occlusion of the M1 segment of the right MCA.  2 mm conical outpouching arising from the left supraclinoid ICA directed inferiorly suggesting an infundibulum versus small aneurysm (8:39).     CT head 08.17.20 : Redemonstration of acute/subacute right MCA territory infarct.    MRI Brain w/o (08/15/20): Acute infarct in the right MCA territory somewhat patchy involving the right lateral temporal lobe as well as the right corona radiata/centrum semiovale ovale region in a somewhat junctional pattern, deep internal border zone type pattern. No significant mass effect. No associated hemorrhage    CT Head No Cont. (08/14/20): There is no acute intracranial hemorrhage. No focal edema or evidence of acute, transcortical infarct. No hydrocephalus, midline shift or mass effect.    CT Angio Head w/IV Cont. (08/14/20): 6 mm in length severe stenosis/near occlusion of the M1 segment of the Right MCA with reconstitution at the bifurcation.    CT Angio Neck w/IV Cont. (08/14/20): Patent cervical vasculature. No flow limiting stenosis or occlusion. THE PATIENT WAS SEEN AND EXAMINED BY ME WITH THE HOUSESTAFF AND STROKE TEAM DURING MORNING ROUNDS.   HPI:  76 y/o RH man with a PMH of HTN not on antithrombotics who presented to the ED with an acute one day history of headache and altered mental status. Per his friend, the pt suddenly became confused day prior to admission around 5pm and complained of a new onset headache. The pt complained about some weakness in his right lower extremity and endorses a sharp headache localized to the top/middle of his hairline that did not radiate anywhere. The pt denied  photophobia, recent fever or flu like illness, changes in hearing, changes in vision, recent falls or trauma, or pain other than the headache, difficulty swallowing, dysuria, diarrhea, numbness/tingling anywhere. No history of headaches.  Per ED,  The friend stated that the patient's speech was "off" and that his face "did not look normal". The pt did not want to go to the ER at the time, but came day of admission because the headache persisted throughout the night and today he began to feel lightheaded. The pt stated that he ambulates and talks normally at baseline and that he had never experienced these symptoms before day prior to admission.     SUBJECTIVE: No events overnight.  No new neurologic complaints.  ROS reported negative unless otherwise noted.    acetaminophen   Tablet .. 650 milliGRAM(s) Oral every 6 hours PRN  artificial tears (preservative free) Ophthalmic Solution 1 Drop(s) Both EYES daily  aspirin  chewable 81 milliGRAM(s) Oral daily  atorvastatin 80 milliGRAM(s) Oral at bedtime  clopidogrel Tablet 75 milliGRAM(s) Oral daily  enoxaparin Injectable 40 milliGRAM(s) SubCutaneous daily  metoclopramide Injectable 10 milliGRAM(s) IV Push three times a day PRN  midodrine. 10 milliGRAM(s) Oral three times a day  polyethylene glycol 3350 17 Gram(s) Oral daily  senna 2 Tablet(s) Oral at bedtime      PHYSICAL EXAM:   Vital Signs Last 24 Hrs  T(C): 37.2 (23 Aug 2020 20:00), Max: 37.2 (23 Aug 2020 20:00)  T(F): 99 (23 Aug 2020 20:00), Max: 99 (23 Aug 2020 20:00)  HR: 57 (24 Aug 2020 06:00) (52 - 73)  BP: 136/70 (24 Aug 2020 06:00) (104/52 - 151/72)  BP(mean): 88 (24 Aug 2020 06:00) (67 - 93)  RR: 19 (24 Aug 2020 06:00) (15 - 27)  SpO2: 95% (24 Aug 2020 06:00) (95% - 98%)    General: No acute distress  HEENT: left gaze palsy,  Left homonymous hemianopia  Abdomen: Soft, nontender, nondistended   Extremities: No edema    NEUROLOGICAL EXAM:  Mental status: Eyes open, awake, alert, oriented to name, place and time, speech fluent, left hemineglect, following commands  Cranial Nerves: Mild to moderate left facial palsy, no nystagmus, LHH, mild left gaze palsy, mild dysarthria.  Motor exam: Normal tone, right side moves against gravity, left side with motor neglect: when tested independent from right LUE 3/5, LLE 4/5 with drift   Sensation: intact to light touch on right but sensory-extinction on left  Coordination/ Gait: no ataxia bilaterally, gait not tested     LABS:                        13.9   7.48  )-----------( 254      ( 24 Aug 2020 05:27 )             41.0    08-24    137  |  103  |  16  ----------------------------<  99  3.9   |  23  |  0.72    Ca    9.6      24 Aug 2020 05:28          IMAGING: Reviewed by me.   MRI Head No Cont (08.18.2020) Interval apparent increase in the infarcts in the right MCA territory involving the right centrum semiovale and corona radiata as well as the right temporal lobe compared to prior MRI exam 8/15/2020.  No acute intracranial hemorrhage. Occlusion right MCA as seen on CTA exam    CT Angio Head w/ IV Cont (08.17.20) Redemonstration of near complete occlusion of the M1 segment of the right MCA.  2 mm conical outpouching arising from the left supraclinoid ICA directed inferiorly suggesting an infundibulum versus small aneurysm (8:39).     CT head 08.17.20 : Redemonstration of acute/subacute right MCA territory infarct.    MRI Brain w/o (08/15/20): Acute infarct in the right MCA territory somewhat patchy involving the right lateral temporal lobe as well as the right corona radiata/centrum semiovale ovale region in a somewhat junctional pattern, deep internal border zone type pattern. No significant mass effect. No associated hemorrhage    CT Head No Cont. (08/14/20): There is no acute intracranial hemorrhage. No focal edema or evidence of acute, transcortical infarct. No hydrocephalus, midline shift or mass effect.    CT Angio Head w/IV Cont. (08/14/20): 6 mm in length severe stenosis/near occlusion of the M1 segment of the Right MCA with reconstitution at the bifurcation.    CT Angio Neck w/IV Cont. (08/14/20): Patent cervical vasculature. No flow limiting stenosis or occlusion.

## 2020-08-24 NOTE — CONSULT NOTE ADULT - CONSULT REQUESTED DATE/TIME
14-Aug-2020 21:19
16-Aug-2020
16-Aug-2020 10:43
17-Aug-2020 13:05
24-Aug-2020 16:54
16-Aug-2020 09:48
15-Aug-2020 21:51

## 2020-08-25 ENCOUNTER — APPOINTMENT (OUTPATIENT)
Dept: NEUROSURGERY | Facility: HOSPITAL | Age: 75
End: 2020-08-25
Payer: MEDICAID

## 2020-08-25 LAB
ANION GAP SERPL CALC-SCNC: 10 MMOL/L — SIGNIFICANT CHANGE UP (ref 5–17)
APTT BLD: 35.1 SEC — SIGNIFICANT CHANGE UP (ref 27.5–35.5)
BLD GP AB SCN SERPL QL: NEGATIVE — SIGNIFICANT CHANGE UP
BUN SERPL-MCNC: 15 MG/DL — SIGNIFICANT CHANGE UP (ref 7–23)
CALCIUM SERPL-MCNC: 9.5 MG/DL — SIGNIFICANT CHANGE UP (ref 8.4–10.5)
CHLORIDE SERPL-SCNC: 101 MMOL/L — SIGNIFICANT CHANGE UP (ref 96–108)
CO2 SERPL-SCNC: 23 MMOL/L — SIGNIFICANT CHANGE UP (ref 22–31)
CREAT SERPL-MCNC: 0.65 MG/DL — SIGNIFICANT CHANGE UP (ref 0.5–1.3)
GLUCOSE SERPL-MCNC: 98 MG/DL — SIGNIFICANT CHANGE UP (ref 70–99)
HCT VFR BLD CALC: 41.1 % — SIGNIFICANT CHANGE UP (ref 39–50)
HGB BLD-MCNC: 13.5 G/DL — SIGNIFICANT CHANGE UP (ref 13–17)
INR BLD: 1.09 RATIO — SIGNIFICANT CHANGE UP (ref 0.88–1.16)
MCHC RBC-ENTMCNC: 29.6 PG — SIGNIFICANT CHANGE UP (ref 27–34)
MCHC RBC-ENTMCNC: 32.8 GM/DL — SIGNIFICANT CHANGE UP (ref 32–36)
MCV RBC AUTO: 90.1 FL — SIGNIFICANT CHANGE UP (ref 80–100)
NRBC # BLD: 0 /100 WBCS — SIGNIFICANT CHANGE UP (ref 0–0)
PLATELET # BLD AUTO: 289 K/UL — SIGNIFICANT CHANGE UP (ref 150–400)
POTASSIUM SERPL-MCNC: 3.6 MMOL/L — SIGNIFICANT CHANGE UP (ref 3.5–5.3)
POTASSIUM SERPL-SCNC: 3.6 MMOL/L — SIGNIFICANT CHANGE UP (ref 3.5–5.3)
PROTHROM AB SERPL-ACNC: 12.9 SEC — SIGNIFICANT CHANGE UP (ref 10.6–13.6)
RBC # BLD: 4.56 M/UL — SIGNIFICANT CHANGE UP (ref 4.2–5.8)
RBC # FLD: 11.9 % — SIGNIFICANT CHANGE UP (ref 10.3–14.5)
RH IG SCN BLD-IMP: POSITIVE — SIGNIFICANT CHANGE UP
RH IG SCN BLD-IMP: POSITIVE — SIGNIFICANT CHANGE UP
SARS-COV-2 RNA SPEC QL NAA+PROBE: SIGNIFICANT CHANGE UP
SODIUM SERPL-SCNC: 134 MMOL/L — LOW (ref 135–145)
WBC # BLD: 7.1 K/UL — SIGNIFICANT CHANGE UP (ref 3.8–10.5)
WBC # FLD AUTO: 7.1 K/UL — SIGNIFICANT CHANGE UP (ref 3.8–10.5)

## 2020-08-25 PROCEDURE — 36227 PLACE CATH XTRNL CAROTID: CPT

## 2020-08-25 PROCEDURE — 36224 PLACE CATH CAROTD ART: CPT | Mod: 50

## 2020-08-25 PROCEDURE — 70544 MR ANGIOGRAPHY HEAD W/O DYE: CPT | Mod: 26

## 2020-08-25 PROCEDURE — 36226 PLACE CATH VERTEBRAL ART: CPT | Mod: 50

## 2020-08-25 RX ORDER — SODIUM CHLORIDE 9 MG/ML
1000 INJECTION INTRAMUSCULAR; INTRAVENOUS; SUBCUTANEOUS
Refills: 0 | Status: DISCONTINUED | OUTPATIENT
Start: 2020-08-25 | End: 2020-08-27

## 2020-08-25 RX ORDER — ACETAMINOPHEN 500 MG
1000 TABLET ORAL ONCE
Refills: 0 | Status: COMPLETED | OUTPATIENT
Start: 2020-08-25 | End: 2020-08-25

## 2020-08-25 RX ORDER — LIDOCAINE 4 G/100G
1 CREAM TOPICAL ONCE
Refills: 0 | Status: COMPLETED | OUTPATIENT
Start: 2020-08-25 | End: 2020-08-25

## 2020-08-25 RX ADMIN — MIDODRINE HYDROCHLORIDE 10 MILLIGRAM(S): 2.5 TABLET ORAL at 20:42

## 2020-08-25 RX ADMIN — Medication 1 DROP(S): at 21:43

## 2020-08-25 RX ADMIN — LIDOCAINE 1 PATCH: 4 CREAM TOPICAL at 20:42

## 2020-08-25 RX ADMIN — SODIUM CHLORIDE 75 MILLILITER(S): 9 INJECTION INTRAMUSCULAR; INTRAVENOUS; SUBCUTANEOUS at 06:04

## 2020-08-25 RX ADMIN — ATORVASTATIN CALCIUM 80 MILLIGRAM(S): 80 TABLET, FILM COATED ORAL at 21:43

## 2020-08-25 RX ADMIN — CLOPIDOGREL BISULFATE 75 MILLIGRAM(S): 75 TABLET, FILM COATED ORAL at 13:39

## 2020-08-25 RX ADMIN — MIDODRINE HYDROCHLORIDE 10 MILLIGRAM(S): 2.5 TABLET ORAL at 13:39

## 2020-08-25 RX ADMIN — Medication 81 MILLIGRAM(S): at 13:39

## 2020-08-25 RX ADMIN — Medication 400 MILLIGRAM(S): at 05:20

## 2020-08-25 RX ADMIN — ENOXAPARIN SODIUM 40 MILLIGRAM(S): 100 INJECTION SUBCUTANEOUS at 13:39

## 2020-08-25 RX ADMIN — Medication 1000 MILLIGRAM(S): at 05:50

## 2020-08-25 RX ADMIN — SENNA PLUS 2 TABLET(S): 8.6 TABLET ORAL at 21:43

## 2020-08-25 RX ADMIN — MIDODRINE HYDROCHLORIDE 10 MILLIGRAM(S): 2.5 TABLET ORAL at 06:04

## 2020-08-25 NOTE — PROGRESS NOTE ADULT - SUBJECTIVE AND OBJECTIVE BOX
Subjective: Patient seen and examined. No new events except as noted.   remains in Stroke unit     REVIEW OF SYSTEMS:    CONSTITUTIONAL: + weakness, fevers or chills  EYES/ENT: No visual changes;  No vertigo or throat pain   NECK: No pain or stiffness  RESPIRATORY: No cough, wheezing, hemoptysis; No shortness of breath  CARDIOVASCULAR: No chest pain or palpitations  GASTROINTESTINAL: No abdominal or epigastric pain. No nausea, vomiting, or hematemesis; No diarrhea or constipation. No melena or hematochezia.  GENITOURINARY: No dysuria, frequency or hematuria  NEUROLOGICAL: No numbness or weakness  SKIN: No itching, burning, rashes, or lesions   All other review of systems is negative unless indicated above.    MEDICATIONS:  MEDICATIONS  (STANDING):  artificial tears (preservative free) Ophthalmic Solution 1 Drop(s) Both EYES daily  aspirin  chewable 81 milliGRAM(s) Oral daily  atorvastatin 80 milliGRAM(s) Oral at bedtime  clopidogrel Tablet 75 milliGRAM(s) Oral daily  enoxaparin Injectable 40 milliGRAM(s) SubCutaneous daily  midodrine. 10 milliGRAM(s) Oral three times a day  polyethylene glycol 3350 17 Gram(s) Oral daily  senna 2 Tablet(s) Oral at bedtime  sodium chloride 0.9%. 1000 milliLiter(s) (75 mL/Hr) IV Continuous <Continuous>      PHYSICAL EXAM:  T(C): 36.7 (08-25-20 @ 08:58), Max: 36.9 (08-24-20 @ 13:41)  HR: 59 (08-25-20 @ 10:00) (56 - 67)  BP: 125/59 (08-25-20 @ 10:00) (120/62 - 143/58)  RR: 16 (08-25-20 @ 10:00) (12 - 25)  SpO2: 98% (08-25-20 @ 10:00) (96% - 98%)  Wt(kg): --  I&O's Summary    24 Aug 2020 07:01  -  25 Aug 2020 07:00  --------------------------------------------------------  IN: 775 mL / OUT: 650 mL / NET: 125 mL    25 Aug 2020 07:01  -  25 Aug 2020 11:50  --------------------------------------------------------  IN: 150 mL / OUT: 0 mL / NET: 150 mL            Appearance: NAD	  HEENT:   Dry  oral mucosa, PERRL, EOMI	  Lymphatic: No lymphadenopathy  Cardiovascular: Normal S1 S2, No JVD, No murmurs, No edema  Respiratory: Lungs clear to auscultation	  Psychiatry: A & O x 3, Mood & affect appropriate  Gastrointestinal:  Soft, Non-tender, + BS	  Skin: No rashes, No ecchymoses, No cyanosis	  Extremities: Normal range of motion, No clubbing, cyanosis or edema  Vascular: Peripheral pulses palpable 2+ bilaterally  - Cranial Nerves II-XII:  VFF, EOMI, PERRL (3mm OD, OS), V1-V3 intact, some nasolabial flattening on the left side of face, t/p midline, SCM/trap intact.  	- Fundoscopic examination: upon fundoscopic examination grossly clear margins of optic disc & cup  	- Motor: Pronator drift present on the left side. demonstrates orbiting around the left arm. Strength left arm 4+/5 with  strength 4/5. Left leg 4+/5 hip flexors/extensors with 5/5 knee flexion/ext dorsi/plantarflexion. right arm and leg 5/5. Normal muscle bulk and tone throughout. Neck flexion/extension 5/5 without neck stiffness  	- Sensory:  Intact to light touch and PP bilaterally throughout.  	- Coordination:  FTN demonstrated mild dysmetria in proportion to weakness on left arm, intact on right  - Gait: patient able to stand up on his own, ambulate several steps without wide based gait, not requiring assistance.        LABS:    CARDIAC MARKERS:                                13.5   7.10  )-----------( 289      ( 25 Aug 2020 04:05 )             41.1     08-25    134<L>  |  101  |  15  ----------------------------<  98  3.6   |  23  |  0.65    Ca    9.5      25 Aug 2020 04:05      proBNP:   Lipid Profile:   HgA1c:   TSH:             TELEMETRY: 	SR    ECG:  	  RADIOLOGY:   DIAGNOSTIC TESTING:  [ ] Echocardiogram:  [ ]  Catheterization:  [ ] Stress Test:    OTHER:

## 2020-08-25 NOTE — CHART NOTE - NSCHARTNOTEFT_GEN_A_CORE
Interventional Neuro Radiology  Pre-Procedure Note PA-C    HPI:  This is a 76YO M pmhx HTN adm 8/14 after 1d dysarthria, L hemiparesis, stroke code called. CTA showed severe RM1 stenosis w/ some reconstitution at bifurcation, but outside window for intervention. Also questionable 2mm outpouching at L supraclinoid ICA, possible aneurysm vs. infundibulum. MRA NOVA showed right mca stenosis vs occlusion. Patient presents today to neuro IR for diagnostic cerebral angiogram.       Allergies: apple (Vomiting; Nausea)  No Known Drug Allergies      PAST MEDICAL & SURGICAL HISTORY:  HTN (hypertension)      Social History: unknown    FAMILY HISTORY: unknown      Current Medications:   acetaminophen   Tablet .. 650 milliGRAM(s) Oral every 6 hours PRN  artificial tears (preservative free) Ophthalmic Solution 1 Drop(s) Both EYES daily  aspirin  chewable 81 milliGRAM(s) Oral daily  atorvastatin 80 milliGRAM(s) Oral at bedtime  clopidogrel Tablet 75 milliGRAM(s) Oral daily  enoxaparin Injectable 40 milliGRAM(s) SubCutaneous daily  metoclopramide Injectable 10 milliGRAM(s) IV Push three times a day PRN  midodrine. 10 milliGRAM(s) Oral three times a day  polyethylene glycol 3350 17 Gram(s) Oral daily  senna 2 Tablet(s) Oral at bedtime  sodium chloride 0.9%. 1000 milliLiter(s) IV Continuous <Continuous>      Labs:                         13.5   7.10  )-----------( 289                  41.1           134<L>  |  101  |  15  ----------------------------<  98  3.6   |  23  |  0.65                Blood Bank: 08-25-20  B Positive        Assessment/Plan:   This is a 75y  year old male with rmca stroke, MRA NOVA showing rmca stenosis vs occlusion as well as possible left ica aneurysm.   Patient presents to neuro-IR for selective cerebral angiography.   Procedure, goals, risks, benefits and alternatives  were discussed with patient and patient's family.  All questions were answered.  Risks include but are not limited to stroke, vessel injury, hemorrhage, and or right  groin hematoma.  Patient demonstrates understanding  of all risks involved with this procedure and wishes to continue.   Appropriate  consent was obtained from patient and consent is in the patient's chart.

## 2020-08-25 NOTE — PROGRESS NOTE ADULT - ASSESSMENT
KAYARAFY BRIA    74YO M pmhx HTN adm 8/14 after 1d dysarthria, L hemiparesis, stroke code called. CTA showed severe M1 stenosis w/ some reconstitution at bifurcation, but outside window for intervention. Also questionable 2mm outpouching at L supraclinoid ICA, possible aneurysm vs. infundibulum. MRI 8/18 confirmed M1 stenosis w/ interval inc in R inf M2 inferior division infarct and e/o R watershed infarct on DWI. Exam: AOx3, PERRL 3 b/l, EOMI, L facial, +dysarthria, RUE/RLE 5/5, LUE 3/5 proximally, 2/5 hg, LLE 4/5. No clonus. SILT. (cant participate VF).   - Stroke unit, q2h neuro checks  - Cont asa/plavix/lipitor   - Last   - PT/OT  - Fu MR NOVA   - Pre OP for ANGIO TODAY No

## 2020-08-25 NOTE — PROGRESS NOTE ADULT - SUBJECTIVE AND OBJECTIVE BOX
Patient is a 75y old  Male who presents with a chief complaint of R M1 occlusion vs stenosis (25 Aug 2020 11:50)      SUBJECTIVE / OVERNIGHT EVENTS:  No chest pain. No shortness of breath. No complaints. No events overnight.     MEDICATIONS  (STANDING):  artificial tears (preservative free) Ophthalmic Solution 1 Drop(s) Both EYES daily  aspirin  chewable 81 milliGRAM(s) Oral daily  atorvastatin 80 milliGRAM(s) Oral at bedtime  clopidogrel Tablet 75 milliGRAM(s) Oral daily  enoxaparin Injectable 40 milliGRAM(s) SubCutaneous daily  midodrine. 10 milliGRAM(s) Oral three times a day  polyethylene glycol 3350 17 Gram(s) Oral daily  senna 2 Tablet(s) Oral at bedtime  sodium chloride 0.9%. 1000 milliLiter(s) (75 mL/Hr) IV Continuous <Continuous>    MEDICATIONS  (PRN):  acetaminophen   Tablet .. 650 milliGRAM(s) Oral every 6 hours PRN Temp greater or equal to 38C (100.4F), Mild Pain (1 - 3), Moderate Pain (4 - 6), Severe Pain (7 - 10)  metoclopramide Injectable 10 milliGRAM(s) IV Push three times a day PRN Hiccups      Vital Signs Last 24 Hrs  T(C): 36.8 (25 Aug 2020 12:36), Max: 36.9 (24 Aug 2020 19:51)  T(F): 98.2 (25 Aug 2020 12:36), Max: 98.5 (24 Aug 2020 19:51)  HR: 56 (25 Aug 2020 12:00) (56 - 67)  BP: 141/62 (25 Aug 2020 12:00) (120/62 - 143/58)  BP(mean): 85 (25 Aug 2020 12:00) (73 - 95)  RR: 16 (25 Aug 2020 12:00) (12 - 25)  SpO2: 100% (25 Aug 2020 12:00) (96% - 100%)  CAPILLARY BLOOD GLUCOSE        I&O's Summary    24 Aug 2020 07:01  -  25 Aug 2020 07:00  --------------------------------------------------------  IN: 775 mL / OUT: 650 mL / NET: 125 mL    25 Aug 2020 07:01  -  25 Aug 2020 13:54  --------------------------------------------------------  IN: 150 mL / OUT: 0 mL / NET: 150 mL        PHYSICAL EXAM:  GENERAL: NAD, well-developed  HEAD:  Atraumatic, Normocephalic  EYES: EOMI, PERRLA, conjunctiva and sclera clear  NECK: Supple, No JVD  CHEST/LUNG: Clear to auscultation bilaterally; No wheeze  HEART: Regular rate and rhythm; No murmurs, rubs, or gallops  ABDOMEN: Soft, Nontender, Nondistended; Bowel sounds present  EXTREMITIES:  2+ Peripheral Pulses, No clubbing, cyanosis, or edema  PSYCH: AAOx3  NEUROLOGY: non-focal  SKIN: No rashes or lesions    LABS:                        13.5   7.10  )-----------( 289      ( 25 Aug 2020 04:05 )             41.1     08-25    134<L>  |  101  |  15  ----------------------------<  98  3.6   |  23  |  0.65    Ca    9.5      25 Aug 2020 04:05      PT/INR - ( 25 Aug 2020 06:24 )   PT: 12.9 sec;   INR: 1.09 ratio         PTT - ( 25 Aug 2020 06:24 )  PTT:35.1 sec          RADIOLOGY & ADDITIONAL TESTS:    Imaging Personally Reviewed:    Consultant(s) Notes Reviewed:      Care Discussed with Consultants/Other Providers:

## 2020-08-25 NOTE — PROGRESS NOTE ADULT - SUBJECTIVE AND OBJECTIVE BOX
Patient seen and examined at bedside.    Imaging:     MR Brain (8/18): interval inc in R MCA territory infarcts compared to prior MRI 8/15, no hemorrhage, occlusion of R MCA     CTA H/N (8/17): near complete R M1 occlusion, 2.2mm possible aneurysm from L supraclinoid ICA directed inferiorly    CTH (8/17): redemonstration acute/subacute R MCA infarct    Exam: AOx3, PERRL 3 b/l, EOMI, L facial, +dysarthria, RUE/RLE 5/5, LUE 3/5 proximally, 2/5 hg, LLE 4/5. No clonus. SILT. (cant participate VF)    --Anticoagulation:  aspirin  chewable 81 milliGRAM(s) Oral daily  clopidogrel Tablet 75 milliGRAM(s) Oral daily  enoxaparin Injectable 40 milliGRAM(s) SubCutaneous daily

## 2020-08-25 NOTE — PROGRESS NOTE ADULT - ASSESSMENT
75-year-old right-handed gentleman first evaluated at [facility] on 8/15/2020 with left hemiparesis.  On 8/14/2020 he developed some type of change in mental status along with headache, and had been dropping objects, most likely due to left hemiparesis. CT head (8/14/2020) showed moderate-severe periventricular chronic ischemic changes.  There was a possible subacute right corona radiata infarct, perhaps more likely part and parcel of the chronic ischemic changes. CTA neck and head (8/14/2020) showed severe right M1 stenosis.  The left vertebral artery was dominant.  Incidentally noted were emphysematous lung changes.     Impression.  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  The right M1 stenosis is most likely due to large artery atherosclerosis, although an embolus with partial lysis cannot be ruled out with certainty (embolic stroke of undetermined source).       NEURO: No acute neurological change, no recently reported fluctuations .  Continue close monitoring for neurologic deterioration, repeat CTH and CTA without acute change. Initial MRI Brain showed moderate sized right corona radiata/centrum semiovale internal borderzone infarct.  Repeat MRI with increase in RMCA infarcts.  continue on midodrine to maintain SBP >140 mmHg given neurological stability, patient with hypotensive event on 8/15/20 resulting in worsening neurological deficits but improvement to baseline once given hydration and BP raised, LDL -156- continue on high intensity statin,  Reglan PRN hiccups , 2 mm conical outpouching arising from the left supraclinoid ICA directed inferiorly suggesting an infundibulum versus small aneurysm should be followed with yearly vascular imaging, MR Long for further evaluation of cerebral vasculature per nsx with consideration for cerebral angiogram there after should it be clinically indicated and if patient/ family-friend amenable- d/w nsx ,  Physical therapy/OT recommended acute rehab.     ANTITHROMBOTIC THERAPY: ASA and Plavix for 3 months per SAMPRISS protocol, then ASA indefinitely from neurological standpoint. PRU/P2Y12 126    PULMONARY: CXR (08/15/20) clear, protecting airway, saturating well on room air. Incidental finding on CTA H/N emphysematous changes. Pulmonary consulted, Dr. Zelaya following -. CT Chest 8/16 showed apical paraseptal emphysema, no need for bronchodilator at this time     CARDIOVASCULAR: TTE (08/16/20): EF 73%, minimal MR, negative PFO, continue cardiac monitoring on telemetry, Cardiology, Dr. Aguirre consult appreciated - Midodrine continued, Holter monitor vs loop recorder to screen for occult arrhythmia.     SBP goal: 140-180mmHg as currently tolerating     GASTROINTESTINAL:  initial dysphagia screen passed but patient was witnessed to be drooling and pocketing meals while eating. Pt had MBS on 8/18 and was started on  D2 [place] diet. Then again noted with difficulty with medications- pt had repeat S&S eval on 8/20 recommended dysphagia 1 nectar, tolerating well. Continue bowel regimen     Diet: D1 nectar     RENAL: BUN/Cr without acute change, maintain adequate hydration, mild hyponatremia- continue to monitor.     Na Goal: Greater than 135     Lee: post TOV with void.     HEMATOLOGY: H/H without acute change , Platelets 289, no si/sx of bleeding      DVT ppx: LMWH [x]     ID: afebrile, no leukocytosis, completed course of Abx for UTI, blood and urine cultures NGTD- short course as noted, continue to monitor     OTHER: ENT consulted as recommendations per S&S and Pulm for hoarse voice r/o vocal cord paralysis. ENT bedside FOE revealed patent airway and vocal cords mobile with good contact b/l - possible small glottic endy inferiorly, no further recommendations, no further ENT intervention required. Plan endorsed to patient bedside. Patient gives permission to disclose information to family member/friend. All questions and concerns addressed. Dr Rob (medicine) consult appreciated    DISPOSITION: Acute Rehab once stable and workup is complete. Uninsured- social work following.     CORE MEASURES:        Admission NIHSS: 2     TPA: [] YES [x] NO      LDL/HDL: 156/44     Depression Screen: 0     Statin Therapy: yes     Dysphagia Screen: [x] PASS [] FAIL     Smoking [] YES [x] NO      Afib [] YES [x] NO     Stroke Education [x] YES [] NO    Obtain screening lower extremity venous ultrasound in patients who meet 1 or more of the following criteria as patient is high risk for DVT/PE on admission:   [] History of DVT/PE  []Hypercoagulable states (Factor V Leiden, Cancer, OCP, etc. )  []Prolonged immobility (hemiplegia/hemiparesis/post operative or any other extended immobilization)  [] Transferred from outside facility (Rehab or Long term care)  [] Age </= to 50

## 2020-08-25 NOTE — PROGRESS NOTE ADULT - SUBJECTIVE AND OBJECTIVE BOX
THE PATIENT WAS SEEN AND EXAMINED BY ME WITH THE HOUSESTAFF AND STROKE TEAM DURING MORNING ROUNDS.   HPI:  76 y/o RH man with a PMH of HTN not on antithrombotics who presented to the ED with an acute one day history of headache and altered mental status. Per his friend, the pt suddenly became confused day prior to admission around 5pm and complained of a new onset headache. The pt complained about some weakness in his right lower extremity and endorses a sharp headache localized to the top/middle of his hairline that did not radiate anywhere. The pt denied  photophobia, recent fever or flu like illness, changes in hearing, changes in vision, recent falls or trauma, or pain other than the headache, difficulty swallowing, dysuria, diarrhea, numbness/tingling anywhere. No history of headaches.  Per ED,  The friend stated that the patient's speech was "off" and that his face "did not look normal". The pt did not want to go to the ER at the time, but came day of admission because the headache persisted throughout the night and today he began to feel lightheaded. The pt stated that he ambulates and talks normally at baseline and that he had never experienced these symptoms before day prior to admission.     SUBJECTIVE: No events overnight.  No new neurologic complaints.  ROS reported negative unless otherwise noted.    acetaminophen   Tablet .. 650 milliGRAM(s) Oral every 6 hours PRN  artificial tears (preservative free) Ophthalmic Solution 1 Drop(s) Both EYES daily  aspirin  chewable 81 milliGRAM(s) Oral daily  atorvastatin 80 milliGRAM(s) Oral at bedtime  clopidogrel Tablet 75 milliGRAM(s) Oral daily  enoxaparin Injectable 40 milliGRAM(s) SubCutaneous daily  metoclopramide Injectable 10 milliGRAM(s) IV Push three times a day PRN  midodrine. 10 milliGRAM(s) Oral three times a day  polyethylene glycol 3350 17 Gram(s) Oral daily  senna 2 Tablet(s) Oral at bedtime  sodium chloride 0.9%. 1000 milliLiter(s) IV Continuous <Continuous>      PHYSICAL EXAM:   Vital Signs Last 24 Hrs  T(C): 36.7 (25 Aug 2020 04:00), Max: 36.9 (24 Aug 2020 13:41)  T(F): 98.1 (25 Aug 2020 04:00), Max: 98.5 (24 Aug 2020 13:41)  HR: 65 (25 Aug 2020 06:00) (53 - 67)  BP: 120/62 (25 Aug 2020 06:00) (120/62 - 143/58)  BP(mean): 79 (25 Aug 2020 06:00) (74 - 95)  RR: 16 (25 Aug 2020 06:00) (15 - 25)  SpO2: 96% (25 Aug 2020 06:00) (96% - 99%)    General: No acute distress  HEENT: left gaze palsy,  Left homonymous hemianopia  Abdomen: Soft, nontender, nondistended   Extremities: No edema    NEUROLOGICAL EXAM:  Mental status: Eyes open, awake, alert, oriented to name, place and time, speech fluent, left hemineglect, following commands  Cranial Nerves: Mild to moderate left facial palsy, no nystagmus, LHH, mild left gaze palsy, mild dysarthria.  Motor exam: Normal tone, right side moves against gravity, left side with motor neglect: when tested independent from right LUE 3/5, LLE 4/5 with drift   Sensation: intact to light touch on right but sensory-extinction on left  Coordination/ Gait: no ataxia bilaterally, gait not tested     LABS:                        13.5   7.10  )-----------( 289      ( 25 Aug 2020 04:05 )             41.1    08-25    134<L>  |  101  |  15  ----------------------------<  98  3.6   |  23  |  0.65    Ca    9.5      25 Aug 2020 04:05          IMAGING: Reviewed by me.   MRI Head No Cont (08.18.2020) Interval apparent increase in the infarcts in the right MCA territory involving the right centrum semiovale and corona radiata as well as the right temporal lobe compared to prior MRI exam 8/15/2020.  No acute intracranial hemorrhage. Occlusion right MCA as seen on CTA exam    CT Angio Head w/ IV Cont (08.17.20) Redemonstration of near complete occlusion of the M1 segment of the right MCA.  2 mm conical outpouching arising from the left supraclinoid ICA directed inferiorly suggesting an infundibulum versus small aneurysm (8:39).     CT head 08.17.20 : Redemonstration of acute/subacute right MCA territory infarct.    MRI Brain w/o (08/15/20): Acute infarct in the right MCA territory somewhat patchy involving the right lateral temporal lobe as well as the right corona radiata/centrum semiovale ovale region in a somewhat junctional pattern, deep internal border zone type pattern. No significant mass effect. No associated hemorrhage    CT Head No Cont. (08/14/20): There is no acute intracranial hemorrhage. No focal edema or evidence of acute, transcortical infarct. No hydrocephalus, midline shift or mass effect.    CT Angio Head w/IV Cont. (08/14/20): 6 mm in length severe stenosis/near occlusion of the M1 segment of the Right MCA with reconstitution at the bifurcation.    CT Angio Neck w/IV Cont. (08/14/20): Patent cervical vasculature. No flow limiting stenosis or occlusion. THE PATIENT WAS SEEN AND EXAMINED BY ME WITH THE HOUSESTAFF AND STROKE TEAM DURING MORNING ROUNDS.   HPI:  74 y/o RH man with a PMH of HTN not on antithrombotics who presented to the ED with an acute one day history of headache and altered mental status. Per his friend, the pt suddenly became confused day prior to admission around 5pm and complained of a new onset headache. The pt complained about some weakness in his right lower extremity and endorses a sharp headache localized to the top/middle of his hairline that did not radiate anywhere. The pt denied  photophobia, recent fever or flu like illness, changes in hearing, changes in vision, recent falls or trauma, or pain other than the headache, difficulty swallowing, dysuria, diarrhea, numbness/tingling anywhere. No history of headaches.  Per ED,  The friend stated that the patient's speech was "off" and that his face "did not look normal". The pt did not want to go to the ER at the time, but came day of admission because the headache persisted throughout the night and today he began to feel lightheaded. The pt stated that he ambulates and talks normally at baseline and that he had never experienced these symptoms before day prior to admission.     SUBJECTIVE: No events overnight.  No new neurologic complaints.  ROS reported negative unless otherwise noted.    acetaminophen   Tablet .. 650 milliGRAM(s) Oral every 6 hours PRN  artificial tears (preservative free) Ophthalmic Solution 1 Drop(s) Both EYES daily  aspirin  chewable 81 milliGRAM(s) Oral daily  atorvastatin 80 milliGRAM(s) Oral at bedtime  clopidogrel Tablet 75 milliGRAM(s) Oral daily  enoxaparin Injectable 40 milliGRAM(s) SubCutaneous daily  metoclopramide Injectable 10 milliGRAM(s) IV Push three times a day PRN  midodrine. 10 milliGRAM(s) Oral three times a day  polyethylene glycol 3350 17 Gram(s) Oral daily  senna 2 Tablet(s) Oral at bedtime  sodium chloride 0.9%. 1000 milliLiter(s) IV Continuous <Continuous>      PHYSICAL EXAM:   Vital Signs Last 24 Hrs  T(C): 36.7 (25 Aug 2020 04:00), Max: 36.9 (24 Aug 2020 13:41)  T(F): 98.1 (25 Aug 2020 04:00), Max: 98.5 (24 Aug 2020 13:41)  HR: 65 (25 Aug 2020 06:00) (53 - 67)  BP: 120/62 (25 Aug 2020 06:00) (120/62 - 143/58)  BP(mean): 79 (25 Aug 2020 06:00) (74 - 95)  RR: 16 (25 Aug 2020 06:00) (15 - 25)  SpO2: 96% (25 Aug 2020 06:00) (96% - 99%)    General: No acute distress  HEENT: left gaze palsy,  Left homonymous hemianopia  Abdomen: Soft, nontender, nondistended   Extremities: No edema    NEUROLOGICAL EXAM:  Mental status: Eyes open, awake, alert, oriented to name, place and time, speech fluent, left hemineglect, following commands  Cranial Nerves: Mild to moderate left facial palsy, no nystagmus, LHH, mild left gaze palsy, mild dysarthria.  Motor exam: Normal tone, right side moves against gravity, left side with motor neglect: when tested independent from right LUE 3-4/5, LLE 4/5 with drift   Sensation: intact to light touch on right but sensory-extinction on left  Coordination/ Gait: no ataxia bilaterally, gait not tested     LABS:                        13.5   7.10  )-----------( 289      ( 25 Aug 2020 04:05 )             41.1    08-25    134<L>  |  101  |  15  ----------------------------<  98  3.6   |  23  |  0.65    Ca    9.5      25 Aug 2020 04:05          IMAGING: Reviewed by me.   MRI Head No Cont (08.18.2020) Interval apparent increase in the infarcts in the right MCA territory involving the right centrum semiovale and corona radiata as well as the right temporal lobe compared to prior MRI exam 8/15/2020.  No acute intracranial hemorrhage. Occlusion right MCA as seen on CTA exam    CT Angio Head w/ IV Cont (08.17.20) Redemonstration of near complete occlusion of the M1 segment of the right MCA.  2 mm conical outpouching arising from the left supraclinoid ICA directed inferiorly suggesting an infundibulum versus small aneurysm (8:39).     CT head 08.17.20 : Redemonstration of acute/subacute right MCA territory infarct.    MRI Brain w/o (08/15/20): Acute infarct in the right MCA territory somewhat patchy involving the right lateral temporal lobe as well as the right corona radiata/centrum semiovale ovale region in a somewhat junctional pattern, deep internal border zone type pattern. No significant mass effect. No associated hemorrhage    CT Head No Cont. (08/14/20): There is no acute intracranial hemorrhage. No focal edema or evidence of acute, transcortical infarct. No hydrocephalus, midline shift or mass effect.    CT Angio Head w/IV Cont. (08/14/20): 6 mm in length severe stenosis/near occlusion of the M1 segment of the Right MCA with reconstitution at the bifurcation.    CT Angio Neck w/IV Cont. (08/14/20): Patent cervical vasculature. No flow limiting stenosis or occlusion.

## 2020-08-26 ENCOUNTER — APPOINTMENT (OUTPATIENT)
Dept: NEUROSURGERY | Facility: HOSPITAL | Age: 75
End: 2020-08-26
Payer: MEDICAID

## 2020-08-26 LAB
ANION GAP SERPL CALC-SCNC: 13 MMOL/L — SIGNIFICANT CHANGE UP (ref 5–17)
ANION GAP SERPL CALC-SCNC: 15 MMOL/L — SIGNIFICANT CHANGE UP (ref 5–17)
BUN SERPL-MCNC: 11 MG/DL — SIGNIFICANT CHANGE UP (ref 7–23)
BUN SERPL-MCNC: 11 MG/DL — SIGNIFICANT CHANGE UP (ref 7–23)
CALCIUM SERPL-MCNC: 9.3 MG/DL — SIGNIFICANT CHANGE UP (ref 8.4–10.5)
CALCIUM SERPL-MCNC: 9.6 MG/DL — SIGNIFICANT CHANGE UP (ref 8.4–10.5)
CHLORIDE SERPL-SCNC: 102 MMOL/L — SIGNIFICANT CHANGE UP (ref 96–108)
CHLORIDE SERPL-SCNC: 104 MMOL/L — SIGNIFICANT CHANGE UP (ref 96–108)
CO2 SERPL-SCNC: 20 MMOL/L — LOW (ref 22–31)
CO2 SERPL-SCNC: 23 MMOL/L — SIGNIFICANT CHANGE UP (ref 22–31)
CREAT SERPL-MCNC: 0.61 MG/DL — SIGNIFICANT CHANGE UP (ref 0.5–1.3)
CREAT SERPL-MCNC: 0.63 MG/DL — SIGNIFICANT CHANGE UP (ref 0.5–1.3)
GLUCOSE SERPL-MCNC: 125 MG/DL — HIGH (ref 70–99)
GLUCOSE SERPL-MCNC: 88 MG/DL — SIGNIFICANT CHANGE UP (ref 70–99)
HCT VFR BLD CALC: 39 % — SIGNIFICANT CHANGE UP (ref 39–50)
HGB BLD-MCNC: 13.2 G/DL — SIGNIFICANT CHANGE UP (ref 13–17)
MAGNESIUM SERPL-MCNC: 2.3 MG/DL — SIGNIFICANT CHANGE UP (ref 1.6–2.6)
MCHC RBC-ENTMCNC: 30.3 PG — SIGNIFICANT CHANGE UP (ref 27–34)
MCHC RBC-ENTMCNC: 33.8 GM/DL — SIGNIFICANT CHANGE UP (ref 32–36)
MCV RBC AUTO: 89.7 FL — SIGNIFICANT CHANGE UP (ref 80–100)
NRBC # BLD: 0 /100 WBCS — SIGNIFICANT CHANGE UP (ref 0–0)
PA ADP PRP-ACNC: 153 PRU — LOW (ref 194–417)
PHOSPHATE SERPL-MCNC: 3.2 MG/DL — SIGNIFICANT CHANGE UP (ref 2.5–4.5)
PLATELET # BLD AUTO: 274 K/UL — SIGNIFICANT CHANGE UP (ref 150–400)
PLATELET RESPONSE ASPIRIN RESULT: 549 ARU — SIGNIFICANT CHANGE UP (ref 350–700)
POTASSIUM SERPL-MCNC: 3.8 MMOL/L — SIGNIFICANT CHANGE UP (ref 3.5–5.3)
POTASSIUM SERPL-MCNC: 4 MMOL/L — SIGNIFICANT CHANGE UP (ref 3.5–5.3)
POTASSIUM SERPL-SCNC: 3.8 MMOL/L — SIGNIFICANT CHANGE UP (ref 3.5–5.3)
POTASSIUM SERPL-SCNC: 4 MMOL/L — SIGNIFICANT CHANGE UP (ref 3.5–5.3)
RBC # BLD: 4.35 M/UL — SIGNIFICANT CHANGE UP (ref 4.2–5.8)
RBC # FLD: 11.9 % — SIGNIFICANT CHANGE UP (ref 10.3–14.5)
SODIUM SERPL-SCNC: 137 MMOL/L — SIGNIFICANT CHANGE UP (ref 135–145)
SODIUM SERPL-SCNC: 140 MMOL/L — SIGNIFICANT CHANGE UP (ref 135–145)
WBC # BLD: 7.86 K/UL — SIGNIFICANT CHANGE UP (ref 3.8–10.5)
WBC # FLD AUTO: 7.86 K/UL — SIGNIFICANT CHANGE UP (ref 3.8–10.5)

## 2020-08-26 PROCEDURE — 99291 CRITICAL CARE FIRST HOUR: CPT

## 2020-08-26 PROCEDURE — 99292 CRITICAL CARE ADDL 30 MIN: CPT

## 2020-08-26 PROCEDURE — 70496 CT ANGIOGRAPHY HEAD: CPT | Mod: 26

## 2020-08-26 PROCEDURE — 61635 INTRACRAN ANGIOPLSTY W/STENT: CPT

## 2020-08-26 RX ORDER — MIDODRINE HYDROCHLORIDE 2.5 MG/1
5 TABLET ORAL THREE TIMES A DAY
Refills: 0 | Status: DISCONTINUED | OUTPATIENT
Start: 2020-08-26 | End: 2020-08-27

## 2020-08-26 RX ORDER — LIDOCAINE 4 G/100G
1 CREAM TOPICAL EVERY 24 HOURS
Refills: 0 | Status: DISCONTINUED | OUTPATIENT
Start: 2020-08-26 | End: 2020-09-02

## 2020-08-26 RX ORDER — HYDRALAZINE HCL 50 MG
5 TABLET ORAL ONCE
Refills: 0 | Status: COMPLETED | OUTPATIENT
Start: 2020-08-26 | End: 2020-08-26

## 2020-08-26 RX ADMIN — Medication 650 MILLIGRAM(S): at 02:57

## 2020-08-26 RX ADMIN — MIDODRINE HYDROCHLORIDE 10 MILLIGRAM(S): 2.5 TABLET ORAL at 06:16

## 2020-08-26 RX ADMIN — Medication 81 MILLIGRAM(S): at 12:25

## 2020-08-26 RX ADMIN — LIDOCAINE 1 PATCH: 4 CREAM TOPICAL at 08:53

## 2020-08-26 RX ADMIN — Medication 650 MILLIGRAM(S): at 02:22

## 2020-08-26 RX ADMIN — Medication 5 MILLIGRAM(S): at 18:45

## 2020-08-26 RX ADMIN — CLOPIDOGREL BISULFATE 75 MILLIGRAM(S): 75 TABLET, FILM COATED ORAL at 12:26

## 2020-08-26 RX ADMIN — ATORVASTATIN CALCIUM 80 MILLIGRAM(S): 80 TABLET, FILM COATED ORAL at 22:10

## 2020-08-26 RX ADMIN — Medication 1 DROP(S): at 22:10

## 2020-08-26 NOTE — PROGRESS NOTE ADULT - SUBJECTIVE AND OBJECTIVE BOX
Patient is a 75y old  Male who presents with a chief complaint of R M1 occlusion vs stenosis (26 Aug 2020 05:41)      SUBJECTIVE / OVERNIGHT EVENTS:  s/p cerebral angiography and stenting    MEDICATIONS  (STANDING):  artificial tears (preservative free) Ophthalmic Solution 1 Drop(s) Both EYES daily  aspirin  chewable 81 milliGRAM(s) Oral daily  atorvastatin 80 milliGRAM(s) Oral at bedtime  clopidogrel Tablet 75 milliGRAM(s) Oral daily  enoxaparin Injectable 40 milliGRAM(s) SubCutaneous daily  midodrine. 10 milliGRAM(s) Oral three times a day  polyethylene glycol 3350 17 Gram(s) Oral daily  senna 2 Tablet(s) Oral at bedtime  sodium chloride 0.9%. 1000 milliLiter(s) (75 mL/Hr) IV Continuous <Continuous>    MEDICATIONS  (PRN):  acetaminophen   Tablet .. 650 milliGRAM(s) Oral every 6 hours PRN Temp greater or equal to 38C (100.4F), Mild Pain (1 - 3), Moderate Pain (4 - 6), Severe Pain (7 - 10)  metoclopramide Injectable 10 milliGRAM(s) IV Push three times a day PRN Hiccups      Vital Signs Last 24 Hrs  T(C): 37.2 (26 Aug 2020 11:50), Max: 37.6 (26 Aug 2020 02:00)  T(F): 99 (26 Aug 2020 11:50), Max: 99.7 (26 Aug 2020 02:00)  HR: 70 (26 Aug 2020 11:50) (49 - 70)  BP: 158/59 (26 Aug 2020 11:50) (116/67 - 158/59)  BP(mean): 85 (26 Aug 2020 11:29) (60 - 109)  RR: 16 (26 Aug 2020 11:50) (12 - 21)  SpO2: 98% (26 Aug 2020 11:50) (92% - 100%)  CAPILLARY BLOOD GLUCOSE        I&O's Summary    25 Aug 2020 07:01  -  26 Aug 2020 07:00  --------------------------------------------------------  IN: 1790 mL / OUT: 626 mL / NET: 1164 mL    26 Aug 2020 07:01  -  26 Aug 2020 14:08  --------------------------------------------------------  IN: 375 mL / OUT: 0 mL / NET: 375 mL        PHYSICAL EXAM:  GENERAL: NAD, well-developed  HEAD:  Atraumatic, Normocephalic  EYES: EOMI, PERRLA, conjunctiva and sclera clear  NECK: Supple, No JVD  CHEST/LUNG: Clear to auscultation bilaterally; No wheeze  HEART: Regular rate and rhythm; No murmurs, rubs, or gallops  ABDOMEN: Soft, Nontender, Nondistended; Bowel sounds present  EXTREMITIES:  2+ Peripheral Pulses, No clubbing, cyanosis, or edema  PSYCH: AAOx3  NEUROLOGY: non-focal  SKIN: No rashes or lesions    LABS:                        13.2   7.86  )-----------( 274      ( 26 Aug 2020 06:12 )             39.0     08-26    140  |  104  |  11  ----------------------------<  88  3.8   |  23  |  0.61    Ca    9.3      26 Aug 2020 06:12      PT/INR - ( 25 Aug 2020 06:24 )   PT: 12.9 sec;   INR: 1.09 ratio         PTT - ( 25 Aug 2020 06:24 )  PTT:35.1 sec          RADIOLOGY & ADDITIONAL TESTS:    Imaging Personally Reviewed:    Consultant(s) Notes Reviewed:      Care Discussed with Consultants/Other Providers:

## 2020-08-26 NOTE — PROGRESS NOTE ADULT - SUBJECTIVE AND OBJECTIVE BOX
Underwent right MCA stent for stenosis.    Awake, alert, fully oriented, PERRL, left facial, dysarthria, right side 5/5, LUE 3/5 proximally but 2/5 HG, LLE 4/5 Underwent right MCA stent for stenosis.    Awake, alert, fully oriented, PERRL, left facial, dysarthria, right side 5/5, LUE 3/5 proximally but 2/5 HG, LLE 4/5, right groin with medial ecchymosis and small hematoma

## 2020-08-26 NOTE — PROVIDER CONTACT NOTE (OTHER) - ACTION/TREATMENT ORDERED:
Another 500cc NS bolus
CORNEL Fontana coming to bedside to assess and redress pt dressing
250cc NS bolus
500c NS Bolus
Give 6am Midodrine 10mg dose now
Indwelling Lee

## 2020-08-26 NOTE — CHART NOTE - NSCHARTNOTEFT_GEN_A_CORE
Nutrition Follow Up Note     Patient seen for: nutrition follow up on ICU    Source: patient, medical record, communication with team.     Chart reviewed, events noted. Pt is a 76 yo M with PMH: HTN admitted 8/14 after 1 day dysarthria, L hemiparesis, code stroke called. CTA showed severe M1 stenosis with some reconstitution at bifurcation; outside window for intervention.     Pt s/p MR Long (8/25): Noted with severe stenosis of the mid to distal right M1 segment of the middle cerebral artery without change in flow proximal or distal to the stenosis; decreased flow in the right middle cerebral artery branches in the sylvian fissure compared with the left.     Diet Order: Diet, Dysphagia 1 Pureed-Nectar Consistency Fluid (08-20-20 @ 16:23)  Diet, NPO after Midnight:      NPO Start Date: 25-Aug-2020,   NPO Start Time: 23:59  Except Medications (08-25-20 @ 23:36)    Pt observed resting in bed at time of RD visit. Per discussion with pt, reports fair-good appetite in-house. Currently NPO (in context of s/p and pending procedures, discussed with medical team). Pt denies N/V. Last BM: (8/26): x 1. On bowel regimen as ordered. Agreeable to continue Mighty Shakes, previously served three times daily. Pt made aware of RD available daily if further questions/concerns noted.     Anthropometric Measurements:   Height (cm): 170.18 (08-14-20 @ 19:27)  Weight (kg): 63.5 (08-14-20 @ 19:27)  BMI (kg/m2): 21.9 (08-14-20 @ 19:27)    Medications: MEDICATIONS  (STANDING):  artificial tears (preservative free) Ophthalmic Solution 1 Drop(s) Both EYES daily  aspirin  chewable 81 milliGRAM(s) Oral daily  atorvastatin 80 milliGRAM(s) Oral at bedtime  clopidogrel Tablet 75 milliGRAM(s) Oral daily  enoxaparin Injectable 40 milliGRAM(s) SubCutaneous daily  midodrine. 10 milliGRAM(s) Oral three times a day  polyethylene glycol 3350 17 Gram(s) Oral daily  senna 2 Tablet(s) Oral at bedtime  sodium chloride 0.9%. 1000 milliLiter(s) (75 mL/Hr) IV Continuous <Continuous>    MEDICATIONS  (PRN):  acetaminophen   Tablet .. 650 milliGRAM(s) Oral every 6 hours PRN Temp greater or equal to 38C (100.4F), Mild Pain (1 - 3), Moderate Pain (4 - 6), Severe Pain (7 - 10)  metoclopramide Injectable 10 milliGRAM(s) IV Push three times a day PRN Hiccups    Labs: 08-26 @ 06:12: Sodium 140, Potassium 3.8, Calcium 9.3, Magnesium --, Phosphorus --, BUN 11, Creatinine 0.61, Glucose 88, Alk Phos --, ALT/SGPT --, AST/SGOT --, Albumin --, Prealbumin --, Total Bilirubin --, Hemoglobin 13.2, Hematocrit 39.0, Ferritin --, C-Reactive Protein --, Creatine Kinase <<27>      Triglycerides, Serum: 98 mg/dL (08-15-20 @ 11:22)    Skin per nursing documentation: no pressure injuries documented  Edema: none noted    Estimated Needs: based on dosing wt 63.5 kg  Energy: 8676-8622 (25-30 kcals/kg)  Protein: 63-76 gm (1.0-1.2 gm/kg)    Previous Nutrition Diagnosis: swallowing difficulty  Nutrition Diagnosis is: remains appropriate, addressed with mechanically altered diet    New Nutrition Diagnosis:      Recommended Interventions:   1. As able, recommend to resume previously prescribed PO diet. Defer consistency to medical team, SLP.   2. Monitor and encourage PO intake; staff to provide meal/menu assistance PRN.   3. Continue Mighty Shakes TID (as able).     Monitoring and Evaluation:   Continue to monitor nutrition provision and tolerance, weights, labs, skin integrity.   RD remains available upon request and will follow up per protocol.    Alison Kleiner RD, [facility] ([phone])

## 2020-08-26 NOTE — PROGRESS NOTE ADULT - ASSESSMENT
R MCA stenosis status post stent  - Stent: ASA/clopidogrel  - MR NOVA in AM  - Monitor groin  - -150mmHg  - Dyslipidemia: Statin

## 2020-08-26 NOTE — PROVIDER CONTACT NOTE (OTHER) - BACKGROUND
admitted for R M1 occlusion, NS @125ml/hr
dx stroke
admitted for R M1 occlusion, s/p 750cc NS bolus
admitted for R M1 occlusion; tx to stroke d/t symptomatic hypotension
admitted for R M1 occlusion; tx to stroke d/t symptomatic hypotension. NS @125ml/hr
admitted for R M1 occlusion; tx to stroke d/t symptomatic hypotension. NS @125ml/hr

## 2020-08-26 NOTE — PROVIDER CONTACT NOTE (OTHER) - SITUATION
/63 HR 53 after 500cc NS bolus
pt right groin angio site saturated in blood
/44, HR 51, NS @125ml/hr
Pt unable to void without getting up from bed; attempted many times with urinal. However pt BP has been below parameters (160-190) since being transferred to stroke. Bladder scan 650cc
SBP <160, pt neurologically similar, 1x 500cc NS bolus given at 02:16, NS @125ml/hr
SBP <160, pt neurologically similar, NS @125ml/hr

## 2020-08-26 NOTE — PROGRESS NOTE ADULT - SUBJECTIVE AND OBJECTIVE BOX
Subjective: Patient seen and examined. No new events except as noted.   now in NSCU   underwent angiography with R MCA stenting, treated with DAPT      REVIEW OF SYSTEMS:    CONSTITUTIONAL: +weakness, fevers or chills  EYES/ENT: No visual changes;  No vertigo or throat pain   NECK: No pain or stiffness  RESPIRATORY: No cough, wheezing, hemoptysis; No shortness of breath  CARDIOVASCULAR: No chest pain or palpitations  GASTROINTESTINAL: No abdominal or epigastric pain. No nausea, vomiting, or hematemesis; No diarrhea or constipation. No melena or hematochezia.  GENITOURINARY: No dysuria, frequency or hematuria  NEUROLOGICAL: + numbness or weakness  SKIN: No itching, burning, rashes, or lesions   All other review of systems is negative unless indicated above.    MEDICATIONS:  MEDICATIONS  (STANDING):  artificial tears (preservative free) Ophthalmic Solution 1 Drop(s) Both EYES daily  aspirin  chewable 81 milliGRAM(s) Oral daily  atorvastatin 80 milliGRAM(s) Oral at bedtime  clopidogrel Tablet 75 milliGRAM(s) Oral daily  enoxaparin Injectable 40 milliGRAM(s) SubCutaneous daily  midodrine. 5 milliGRAM(s) Oral three times a day  polyethylene glycol 3350 17 Gram(s) Oral daily  senna 2 Tablet(s) Oral at bedtime  sodium chloride 0.9%. 1000 milliLiter(s) (75 mL/Hr) IV Continuous <Continuous>      PHYSICAL EXAM:  T(C): 36.4 (08-26-20 @ 18:00), Max: 37.6 (08-26-20 @ 02:00)  HR: 83 (08-26-20 @ 20:00) (49 - 102)  BP: 138/54 (08-26-20 @ 20:00) (116/67 - 158/59)  RR: 18 (08-26-20 @ 20:00) (14 - 21)  SpO2: 97% (08-26-20 @ 20:00) (92% - 100%)  Wt(kg): --  I&O's Summary    25 Aug 2020 07:01  -  26 Aug 2020 07:00  --------------------------------------------------------  IN: 1790 mL / OUT: 626 mL / NET: 1164 mL    26 Aug 2020 07:01  -  26 Aug 2020 20:51  --------------------------------------------------------  IN: 750 mL / OUT: 1100 mL / NET: -350 mL      Height (cm): 170.18 (08-26 @ 11:29)  Weight (kg): 63.5 (08-26 @ 11:50)  BMI (kg/m2): 21.9 (08-26 @ 11:50)  BSA (m2): 1.74 (08-26 @ 11:50)    Appearance: NAD	  HEENT:   Dry  oral mucosa, PERRL, EOMI	  Lymphatic: No lymphadenopathy  Cardiovascular: Normal S1 S2, No JVD, No murmurs, No edema  Respiratory: Lungs clear to auscultation	  Psychiatry: A & O x 3, Mood & affect appropriate  Gastrointestinal:  Soft, Non-tender, + BS	  Skin: No rashes, No ecchymoses, No cyanosis	  Extremities: Normal range of motion, No clubbing, cyanosis or edema  Vascular: Peripheral pulses palpable 2+ bilaterally  - Cranial Nerves II-XII:  VFF, EOMI, PERRL (3mm OD, OS), V1-V3 intact, some nasolabial flattening on the left side of face, t/p midline, SCM/trap intact.  	- Fundoscopic examination: upon fundoscopic examination grossly clear margins of optic disc & cup  	- Motor: Pronator drift present on the left side. demonstrates orbiting around the left arm. Strength left arm 4+/5 with  strength 4/5. Left leg 4+/5 hip flexors/extensors with 5/5 knee flexion/ext dorsi/plantarflexion. right arm and leg 5/5. Normal muscle bulk and tone throughout. Neck flexion/extension 5/5 without neck stiffness  	- Sensory:  Intact to light touch and PP bilaterally throughout.  	- Coordination:  FTN demonstrated mild dysmetria in proportion to weakness on left arm, intact on right  - Gait: patient able to stand up on his own, ambulate several steps without wide based gait, not requiring assistance.    LABS:    CARDIAC MARKERS:                                13.2   7.86  )-----------( 274      ( 26 Aug 2020 06:12 )             39.0     08-26    140  |  104  |  11  ----------------------------<  88  3.8   |  23  |  0.61    Ca    9.3      26 Aug 2020 06:12      proBNP:   Lipid Profile:   HgA1c:   TSH:             TELEMETRY: 	SR    ECG:  	  RADIOLOGY:   DIAGNOSTIC TESTING:  [ ] Echocardiogram:  < from: Transthoracic Echocardiogram (08.16.20 @ 11:09) >    Patient name: RAFY KIRKPATRICK  YOB: 1945   Age: 75 (M)   MR#: [id number]  Study Date: 8/16/2020  [facility] RD[facility]  Study quality: Technically fair  Referring Physician: Richard B Libman, MD  Blood Pressure: 155/59 mmHg  Height: 168 cm  Weight: 64 kg  BSA: 1.7 m2  ------------------------------------------------------------------------  PROCEDURE: Transthoracic echocardiogram with 2-D, M-Mode  and complete spectral and color flow Doppler. Patient was  injected with 10 cc's of aerosolized saline. Patient was  injected with 10 cc's of aerosolized saline.  INDICATION: Cerebral infarction, unspecified (I63.9)  ------------------------------------------------------------------------  Dimensions:    Normal Values:  LA:     3.6    2.0 - 4.0 cm  Ao:     3.3    2.0 - 3.8 cm  SEPTUM: 0.7    0.6 - 1.2 cm  PWT:    0.9    0.6 - 1.1 cm  LVIDd:  4.9    3.0 - 5.6 cm  LVIDs:  2.7    1.8 - 4.0 cm  Derived variables:  LVMI: 76 g/m2  RWT: 0.36  Fractional short: 45 %  EF (Alejandra Rule): 73 %  ------------------------------------------------------------------------  Observations:  Mitral Valve: Normal mitral valve. Minimal mitral  regurgitation.  Aortic Valve/Aorta: Normal trileaflet aortic valve. No  aortic valve regurgitation seen.  Aortic Root: 3.3 cm.  Left Atrium: Normal left atrium.  LA volume index = 17  cc/m2.  Left Ventricle: Normal left ventricular systolic function.  No segmental wall motion abnormalities. Normal left  ventricular internal dimensions and wall thicknesses.  Normal diastolic function  Right Heart: Normal right atrium. Normal right ventricular  size and function. Normal tricuspid valve. Minimal  tricuspid regurgitation. Pulmonic valve not well  visualized, probably normal. No pulmonic regurgitation.  Pericardium/Pleura: Normal pericardium with no pericardial  effusion.  Hemodynamic: Estimated right atrial pressure is 8 mm Hg.  Agitated saline injection demonstrates no evidence of a  patent foramen ovale.  ------------------------------------------------------------------------  Conclusions:  1. Normal mitral valve. Minimal mitral regurgitation.  2. Normal trileaflet aortic valve. No aortic valve  regurgitation seen.  3. Normal left ventricular systolic function. No segmental  wall motion abnormalities.  4. Normal diastolic function  5. Normal right ventricular size and function.  6. Normal pericardium with no pericardial effusion.  *** No previous Echo exam.  ------------------------------------------------------------------------  Confirmed on  8/16/2020 - 16:05:05 by Maciej Hernandez M.D.  ------------------------------------------------------------------------    < end of copied text >    [ ]  Catheterization:  [ ] Stress Test:    OTHER:

## 2020-08-26 NOTE — PROGRESS NOTE ADULT - SUBJECTIVE AND OBJECTIVE BOX
THE PATIENT WAS SEEN AND EXAMINED BY ME WITH THE HOUSESTAFF AND STROKE TEAM DURING MORNING ROUNDS.   HPI:  74 y/o RH man with a PMH of HTN not on antithrombotics who presented to the ED with an acute one day history of headache and altered mental status. Per his friend, the pt suddenly became confused day prior to admission around 5pm and complained of a new onset headache. The pt complained about some weakness in his right lower extremity and endorses a sharp headache localized to the top/middle of his hairline that did not radiate anywhere. The pt denied  photophobia, recent fever or flu like illness, changes in hearing, changes in vision, recent falls or trauma, or pain other than the headache, difficulty swallowing, dysuria, diarrhea, numbness/tingling anywhere. No history of headaches.  Per ED,  The friend stated that the patient's speech was "off" and that his face "did not look normal". The pt did not want to go to the ER at the time, but came day of admission because the headache persisted throughout the night and today he began to feel lightheaded. The pt stated that he ambulates and talks normally at baseline and that he had never experienced these symptoms before day prior to admission.     SUBJECTIVE: No events overnight.  No new neurologic complaints.  ROS reported negative unless otherwise noted.    acetaminophen   Tablet .. 650 milliGRAM(s) Oral every 6 hours PRN  artificial tears (preservative free) Ophthalmic Solution 1 Drop(s) Both EYES daily  aspirin  chewable 81 milliGRAM(s) Oral daily  atorvastatin 80 milliGRAM(s) Oral at bedtime  clopidogrel Tablet 75 milliGRAM(s) Oral daily  enoxaparin Injectable 40 milliGRAM(s) SubCutaneous daily  metoclopramide Injectable 10 milliGRAM(s) IV Push three times a day PRN  midodrine. 10 milliGRAM(s) Oral three times a day  polyethylene glycol 3350 17 Gram(s) Oral daily  senna 2 Tablet(s) Oral at bedtime  sodium chloride 0.9%. 1000 milliLiter(s) IV Continuous <Continuous>      PHYSICAL EXAM:   Vital Signs Last 24 Hrs  T(C): 37.2 (26 Aug 2020 11:50), Max: 37.6 (26 Aug 2020 02:00)  T(F): 99 (26 Aug 2020 11:50), Max: 99.7 (26 Aug 2020 02:00)  HR: 70 (26 Aug 2020 11:50) (49 - 70)  BP: 158/59 (26 Aug 2020 11:50) (116/67 - 158/59)  BP(mean): 85 (26 Aug 2020 11:29) (60 - 109)  RR: 16 (26 Aug 2020 11:50) (12 - 21)  SpO2: 98% (26 Aug 2020 11:50) (92% - 100%)    General: No acute distress  HEENT: left gaze palsy,  Left homonymous hemianopia  Abdomen: Soft, nontender, nondistended   Extremities: No edema, right groin site with some mild oozing of bright red blood into dressing, soft, no hematoma    NEUROLOGICAL EXAM:  Mental status: Eyes open, awake, alert, oriented to name, place and time, speech fluent, left hemineglect, following commands  Cranial Nerves: Mild to moderate left facial palsy, no nystagmus, LHH, mild left gaze palsy, mild dysarthria.  Motor exam: Normal tone, right side moves against gravity, left side with motor neglect: when tested independent from right LUE 3-4/5, LLE 4/5 with drift   Sensation: intact to light touch on right    LABS:                        13.2   7.86  )-----------( 274      ( 26 Aug 2020 06:12 )             39.0    08-26    140  |  104  |  11  ----------------------------<  88  3.8   |  23  |  0.61    Ca    9.3      26 Aug 2020 06:12    PT/INR - ( 25 Aug 2020 06:24 )   PT: 12.9 sec;   INR: 1.09 ratio         PTT - ( 25 Aug 2020 06:24 )  PTT:35.1 sec      IMAGING: Reviewed by me.       MR Angio Head No Cont (08.25.200  Severe stenosis of the mid to distal right M1 segment of the middle cerebral artery without change in flow proximal or distal to the stenosis. Decreased flow in the right middle cerebral artery branches in the sylvian fissure compared with the left.    MRI Head No Cont (08.18.2020) Interval apparent increase in the infarcts in the right MCA territory involving the right centrum semiovale and corona radiata as well as the right temporal lobe compared to prior MRI exam 8/15/2020.  No acute intracranial hemorrhage. Occlusion right MCA as seen on CTA exam    CT Angio Head w/ IV Cont (08.17.20) Redemonstration of near complete occlusion of the M1 segment of the right MCA.  2 mm conical outpouching arising from the left supraclinoid ICA directed inferiorly suggesting an infundibulum versus small aneurysm (8:39).     CT head 08.17.20 : Redemonstration of acute/subacute right MCA territory infarct.    MRI Brain w/o (08/15/20): Acute infarct in the right MCA territory somewhat patchy involving the right lateral temporal lobe as well as the right corona radiata/centrum semiovale ovale region in a somewhat junctional pattern, deep internal border zone type pattern. No significant mass effect. No associated hemorrhage    CT Head No Cont. (08/14/20): There is no acute intracranial hemorrhage. No focal edema or evidence of acute, transcortical infarct. No hydrocephalus, midline shift or mass effect.    CT Angio Head w/IV Cont. (08/14/20): 6 mm in length severe stenosis/near occlusion of the M1 segment of the Right MCA with reconstitution at the bifurcation.    CT Angio Neck w/IV Cont. (08/14/20): Patent cervical vasculature. No flow limiting stenosis or occlusion.

## 2020-08-26 NOTE — PROGRESS NOTE ADULT - SUBJECTIVE AND OBJECTIVE BOX
Patient seen and examined at bedside.    --Anticoagulation--    T(C): 36.7 (08-26-20 @ 04:00), Max: 37.6 (08-26-20 @ 02:00)  HR: 49 (08-26-20 @ 04:00) (49 - 69)  BP: 116/67 (08-26-20 @ 04:00) (116/67 - 155/68)  RR: 15 (08-26-20 @ 04:00) (12 - 21)  SpO2: 95% (08-26-20 @ 04:00) (95% - 100%)  Wt(kg): --    Exam: AOx3, PERRL 3 b/l, EOMI, L facial, +dysarthria, RUE/RLE 5/5, LUE 3/5 proximally, 2/5 hg, LLE 4/5. No clonus. SILT. (cant participate VF)    Imaging:   MR Long (8/25): Severe stenosis of the mid to distal right M1 segment of the middle cerebral artery without change in flow proximal or distal to the stenosis. Decreased flow in the right middle cerebral artery branches in the sylvian fissure compared with the left.     Labs:                         13.5   7.10  )-----------( 289      ( 25 Aug 2020 04:05 )             41.1     08-25    134<L>  |  101  |  15  ----------------------------<  98  3.6   |  23  |  0.65    Ca    9.5      25 Aug 2020 04:05    PT/INR - ( 25 Aug 2020 06:24 )   PT: 12.9 sec;   INR: 1.09 ratio      PTT - ( 25 Aug 2020 06:24 )  PTT:35.1 sec    COVID-19 PCR: NotDetec (25 Aug 2020 06:24)

## 2020-08-26 NOTE — PROGRESS NOTE ADULT - ASSESSMENT
76YO M pmhx HTN adm 8/14 after 1d dysarthria, L hemiparesis, stroke code called. CTA showed severe M1 stenosis w/ some reconstitution at bifurcation, but outside window for intervention. Also questionable 2mm outpouching at L supraclinoid ICA, possible aneurysm vs. infundibulum. MRI 8/18 confirmed M1 stenosis w/ interval inc in R inf M2 inferior division infarct and e/o R watershed infarct on DWI. Exam: AOx3, PERRL 3 b/l, EOMI, L facial, +dysarthria, RUE/RLE 5/5, LUE 3/5 proximally, 2/5 hg, LLE 4/5. No clonus. SILT. (cant participate VF).  - s/p diagnostic angio   - Stroke unit, q2h neuro checks  - Cont asa/plavix/lipitor   - possible intervention for MCA stenosis this week, will d/w attending

## 2020-08-26 NOTE — PROGRESS NOTE ADULT - ASSESSMENT
ASSESSMENT/PLAN:     NEURO:  CT Head in AM, MRI post-op, Surgical drains per NSGY, Pain control  Activity: [] OOB as tolerated [] Bedrest [] PT [] OT [] PMNR    PULM:  Incentive spirometry, mobilize as tolerated    CV:  -150mmHg, d/c a-line in AM    RENAL:  IVF until good PO intake, d/c evans in AM    GI:  Diet: Dysphagia screen and then advance diet as tolerated  GI prophylaxis [] not indicated [] PPI [] other:  Bowel regimen [] colace [] senna [] other:    ENDO:   Goal euglycemia (-180)    HEME/ONC:  VTE prophylaxis: [] SCDs [] chemoprophylaxis [] hold chemoprophylaxis due to: [] high risk of DVT/PE on admission due to:    ID:  Aliya-op antibiotics    MISC:    SOCIAL/FAMILY:  [] awaiting [] updated at bedside [] family meeting    CODE STATUS:  [] Full Code [] DNR [] DNI [] Palliative/Comfort Care    DISPOSITION:  [] ICU [] Stroke Unit [] Floor [] EMU [] RCU [] PCU    [] Patient is at high risk of neurologic deterioration/death due to:     Time seen:  Time spent: ___ [] critical care minutes ASSESSMENT/PLAN: 75 M with prior R MCA infarct found to have MCA stenosis, now s/p cerebral angiography with stenting on 8/26/20    NEURO:  neuro q1  MRA post-op, DAPT, statin, Pain control  Activity: [x] OOB as tolerated [] Bedrest [x] PT [x] OT [] PMNR    PULM:  Incentive spirometry, mobilize as tolerated    CV:  -140, d/c a-line in AM  statin, DAPT, lipid profile  pt initiated on midodrine during hospitalization, will wean off  echo- no PFO, normal BiV function, no significant valvulopathy  EKG- sb with 1st degree avb, q-waves in inferior leads, no st-t wave changes  aru, pru    RENAL:  IVF until good PO intake, monitor I/Os    GI:  Diet: Dysphagia screen and then advance diet as tolerated  GI prophylaxis [x] not indicated [] PPI [] other:  Bowel regimen [] colace [] senna [] other:    ENDO:   Goal euglycemia (-180)  a1c for stroke core measures    HEME/ONC:  VTE prophylaxis: [x] SCDs [x] chemoprophylaxis [] hold chemoprophylaxis due to: [] high risk of DVT/PE on admission due to:    ID:  n/a  trend fever curve and wbc    MISC:    SOCIAL/FAMILY:  [x] awaiting [] updated at bedside [] family meeting    CODE STATUS:  [x] Full Code [] DNR [] DNI [] Palliative/Comfort Care    DISPOSITION:  [x] ICU [] Stroke Unit [] Floor [] EMU [] RCU [] PCU    [x] Patient is at high risk of neurologic deterioration/death due to: ich, stent thrombosis    Time seen:  Time spent: __45_ [x] critical care minutes ASSESSMENT/PLAN: 75 M with prior R MCA infarct found to have MCA stenosis, now s/p cerebral angiography with stenting on 8/26/20    NEURO:  neuro q1  MRA post-op, DAPT, statin, Pain control  Activity: [x] OOB as tolerated [] Bedrest [x] PT [x] OT [] PMNR    PULM:  Incentive spirometry, mobilize as tolerated    CV:  -140, d/c a-line in AM  statin, DAPT, lipid profile  pt initiated on midodrine during hospitalization, for brain perfusion will wean off  echo- no PFO, normal BiV function, no significant valvulopathy  EKG- sb with 1st degree avb, q-waves in inferior leads, no st-t wave changes  aru, pru    RENAL:  IVF until good PO intake, monitor I/Os    GI:  Diet: Dysphagia screen and then advance diet as tolerated  GI prophylaxis [x] not indicated [] PPI [] other:  Bowel regimen [] colace [] senna [] other:    ENDO:   Goal euglycemia (-180)  a1c for stroke core measures    HEME/ONC:  VTE prophylaxis: [x] SCDs [x] chemoprophylaxis [] hold chemoprophylaxis due to: [] high risk of DVT/PE on admission due to:    ID:  n/a  trend fever curve and wbc    MISC:    SOCIAL/FAMILY:  [x] awaiting [] updated at bedside [] family meeting    CODE STATUS:  [x] Full Code [] DNR [] DNI [] Palliative/Comfort Care    DISPOSITION:  [x] ICU [] Stroke Unit [] Floor [] EMU [] RCU [] PCU    [x] Patient is at high risk of neurologic deterioration/death due to: ich, stent thrombosis    Time seen:  Time spent: __30_ [x] critical care minutes

## 2020-08-26 NOTE — CHART NOTE - NSCHARTNOTEFT_GEN_A_CORE
Interventional Neuro- Radiology   Procedure Note      Procedure: Selective Cerebral Angiography and stenting   Pre- Procedure Diagnosis: Right MCA stenosis   Post- Procedure Diagnosis:    : Dr. Gurmeet MD  Fellow: Dr. Loyd MD/ Dr. Roxana MD   Physician Assistant: Renetta Cherry PA-C    RN: Brittany Betancourt: Mellissa     Anesthesia: Dr. Doe MD   (general anesthesia)    I/Os:  Fluids:  Lee:  Contrast:  Estimated Blood Loss: <10cc    Preliminary Report:  Under general anesthesia, using a ___Fr short/long sheath to the right groin examination of right internal carotid artery via selective cerebral angiography demonstrates ________. ( Official note to follow).    Patient tolerated procedure well, vital signs stable, hemodynamically stable, no change in neurological status compared to baseline. Results discussed with neurosurgery/ patient and their family. Groin sheath d/c'ed, manual compression held to hemostasis, no active bleeding, no hematoma, star-close device applied, quick clot and safeguard balloon dressing applied at _____h. Patient transferred to PACU  for further care/ monitoring. Interventional Neuro- Radiology   Procedure Note      Procedure: Selective Cerebral Angiography and stenting   Pre- Procedure Diagnosis: Right MCA stenosis   Post- Procedure Diagnosis: Right MCA stent placement     : Dr. Gurmeet MD  Fellow: Dr. Loyd MD  Physician Assistant: Renetta Cherry PA-C    RN: Brittany Betancourt: Mellissa     Anesthesia: Dr. Doe MD   (general anesthesia)    I/Os:  Fluids: 500cc   Lee: 500cc   Contrast: 106cc   Estimated Blood Loss: <10cc    Preliminary Report:  Under general anesthesia, using a 5Fr Fabuki sheath to the right groin examination of right internal carotid artery via selective cerebral angiography demonstrates right MCA stenosis s/p 2.0mm x 8mm Resolute Marshall stent placement. ( Official note to follow).    Patient tolerated procedure well, vital signs stable, hemodynamically stable, no change in neurological status compared to baseline. Results discussed with neurosurgery/ patient and their family. Groin sheath d/c'ed, manual compression held to hemostasis, no active bleeding, no hematoma, star-close device applied, quick clot and safeguard balloon dressing applied at 14:00h. Patient transferred to PACU  for further care/ monitoring.    Renetta Cherry PA-C  [phone]

## 2020-08-26 NOTE — PROGRESS NOTE ADULT - SUBJECTIVE AND OBJECTIVE BOX
SUMMARY: 74 y/o RH man with a PMH of HTN not on antithrombotics who presented to the ED with an acute one day history of headache and altered mental status. Per his friend, the pt suddenly became confused day prior to admission around 5pm and complained of a new onset headache. The pt complained about some weakness in his right lower extremity and endorses a sharp headache localized to the top/middle of his hairline that did not radiate anywhere. The pt denied  photophobia, recent fever or flu like illness, changes in hearing, changes in vision, recent falls or trauma, or pain other than the headache, difficulty swallowing, dysuria, diarrhea, numbness/tingling anywhere. No history of headaches.  Per ED,  The friend stated that the patient's speech was "off" and that his face "did not look normal". The pt did not want to go to the ER at the time, but came day of admission because the headache persisted throughout the night and today he began to feel lightheaded. The pt stated that he ambulates and talks normally at baseline and that he had never experienced these symptoms before day prior to admission.     OVERNIGHT EVENTS: underwent angiography with R MCA stenting, treated with DAPT    ADMISSION SCORES:     SEDATION SCORES:  RASS: CAM-ICU:     REVIEW OF SYSTEMS:    VITALS: [] Reviewed    IMAGING/DATA: [] Reviewed    IVF FLUIDS/MEDICATIONS: [] Reviewed    ALLERGIES: Allergies    apple (Vomiting; Nausea)  No Known Drug Allergies    Intolerances        DEVICES:   [] Restraints [x] PIVs [] ET tube [] central line [] PICC [x] arterial line [] evans [] NGT/OGT [] EVD [] LD [] HAZEL/HMV [] LiCOX [] ICP monitor [] Trach [] PEG [] Chest Tube [] other:    EXAMINATION:  General: drowsy, comfortable in bed  HEENT: Anicteric sclerae  Cardiac: [id number]  Lungs: Clear  Abdomen: Soft, non-tender, +BS  Extremities: No c/c/e  Skin/Incision Site: Clean, dry and intact  Neurologic: Awake, alert, fully oriented, follows commands, PERRL, EOMI, face symmetric, tongue midline, 4/5 L side, Right intact SUMMARY: 76 y/o RH man with a PMH of HTN not on antithrombotics who presented to the ED with an acute one day history of headache and altered mental status. Neurological examination demonstrates mild dysarthria with mild left nasolabial flattening with left arm weakness. CT/CTA per Neuroradiology demonstrates stenosis vs occlusion R M1. Patient out of window for tpa.    OVERNIGHT EVENTS: underwent angiography with R MCA stenting, treated with DAPT    REVIEW OF SYSTEMS: no HA, no numbness/tingling, visual disturbances    VITALS: [] Reviewed    IMAGING/DATA: [] Reviewed    IVF FLUIDS/MEDICATIONS: [] Reviewed        DEVICES:   [] Restraints [x] PIVs [] ET tube [] central line [] PICC [x] arterial line [] evans [] NGT/OGT [] EVD [] LD [] HAZEL/HMV [] LiCOX [] ICP monitor [] Trach [] PEG [] Chest Tube [] other:    EXAMINATION:  General: drowsy, comfortable in bed  HEENT: Anicteric sclerae  Cardiac: [id number]  Lungs: Clear  Abdomen: Soft, non-tender, +BS  Extremities: No c/c/e  Skin/Incision Site: Clean, dry and intact  Neurologic: Awake, alert, fully oriented, follows commands, PERRL, EOMI, moderate L facial droop, tongue midline, 4/5 L side, LUE drift, Right intact SUMMARY: 74 y/o RH man with a PMH of HTN not on antithrombotics who presented to the ED with an acute one day history of headache and altered mental status. Neurological examination demonstrates mild dysarthria with mild left nasolabial flattening with left arm weakness. CT/CTA per Neuroradiology demonstrates stenosis vs occlusion R M1. Patient out of window for tpa.    OVERNIGHT EVENTS: underwent angiography with R MCA stenting, treated with DAPT    REVIEW OF SYSTEMS: no HA, no numbness/tingling, visual disturbances    VITALS: [] Reviewed    IMAGING/DATA: [] Reviewed    IVF FLUIDS/MEDICATIONS: [] Reviewed        DEVICES:   [] Restraints [x] PIVs [] ET tube [] central line [] PICC [x] arterial line [] evans [] NGT/OGT [] EVD [] LD [] HAZEL/HMV [] LiCOX [] ICP monitor [] Trach [] PEG [] Chest Tube [] other:    EXAMINATION:  General: drowsy, comfortable in bed  HEENT: Anicteric sclerae  Cardiac: [id number]  Lungs: Clear  Abdomen: Soft, non-tender, +BS  Extremities: No c/c/e  Skin/Incision Site: Clean, dry and intact  Neurologic: Awake, alert, fully oriented, follows commands, PERRL, EOMI, moderate L facial droop, tongue midline, 4/5 L side, LUE drift, Right intact`sh

## 2020-08-26 NOTE — PROVIDER CONTACT NOTE (OTHER) - REASON
/44
SBP <160
SBP <160
pt right groin angio site saturated in blood
pt unable to void without standing up, BP below parameters
SBP <160

## 2020-08-26 NOTE — PROGRESS NOTE ADULT - ASSESSMENT
76 y/o RH man with a PMH of HTN not on antithrombotics who presented to the ED with an acute one day history of headache and altered mental status. Neurological examination demonstrates mild dysarthria with mild left nasolabial flattening with left arm weakness. CT/CTA per Neuroradiology demonstrates stenosis vs occlusion R M1. Patient out of window for tpa.    CVA  - workup and plan as per neurology  - s/p cerebral angiography and stenting  - ASA/plavix  - lipitor  - PT/OT  - swallow eval - puree diet    hypotension  - urine and blood cultures NGTD  - s/p  ceftriaxone   - midodrine    HLD  -   - c/w lipitor    emphysema  - CT chest without contrast done  - pulm following    constipation  - miralax daily  - senna qhs  - lactulose x 1    DVT px  - lovenox

## 2020-08-26 NOTE — PROGRESS NOTE ADULT - ASSESSMENT
75-year-old right-handed gentleman first evaluated at [facility] on 8/15/2020 with left hemiparesis.  On 8/14/2020 he developed some type of change in mental status along with headache, and had been dropping objects, most likely due to left hemiparesis. CT head (8/14/2020) showed moderate-severe periventricular chronic ischemic changes.  There was a possible subacute right corona radiata infarct, perhaps more likely part and parcel of the chronic ischemic changes. CTA neck and head (8/14/2020) showed severe right M1 stenosis.  The left vertebral artery was dominant.  Incidentally noted were emphysematous lung changes.     Impression.  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  The right M1 stenosis is most likely due to large artery atherosclerosis, although an embolus with partial lysis cannot be ruled out with certainty (embolic stroke of undetermined source).       NEURO: No acute neurological change, Continue close monitoring for neurologic deterioration. Initial MRI Brain showed moderate sized right corona radiata/centrum semiovale internal borderzone infarct.  Repeat MRI with increase in RMCA infarcts.  on midodrine to maintain SBP >140 mmHg given neurological stability, patient with hypotensive event on 8/15/20 resulting in worsening neurological deficits but improvement to baseline once given hydration and BP raised, LDL -156- continue on high intensity statin,  Reglan PRN hiccups , 2 mm conical outpouching arising from the left supraclinoid ICA directed inferiorly suggesting an infundibulum versus small aneurysm should be followed with yearly vascular imaging, MR Long per nsx demonstrated severe M1 stenosis, post cerebral angiogram with consideration for stenting after should it be clinically indicated and if patient/ family-friend amenable- d/w nsx ,  Physical therapy/OT recommended acute rehab.     ANTITHROMBOTIC THERAPY: ASA and Plavix for 3 months per SAMPRISS protocol, then ASA indefinitely from neurological standpoint. PRU/P2Y12 126    PULMONARY: CXR (08/15/20) clear, protecting airway, saturating well on room air. Incidental finding on CTA H/N emphysematous changes. Pulmonary consulted, Dr. Zelaya following -. CT Chest 8/16 showed apical paraseptal emphysema, no need for bronchodilator at this time     CARDIOVASCULAR: TTE (08/16/20): EF 73%, minimal MR, negative PFO, continue cardiac monitoring on telemetry, Cardiology, Dr. Aguirre consult appreciated - Midodrine continued, Holter monitor vs loop recorder to screen for occult arrhythmia.     SBP goal: 140-180mmHg as currently tolerating     GASTROINTESTINAL:  initial dysphagia screen passed but patient was witnessed to be drooling and pocketing meals while eating. Pt had MBS on 8/18 and was started on  D2 [place] diet. Then again noted with difficulty with medications- pt had repeat S&S eval on 8/20 recommended dysphagia 1 nectar, tolerating well. Continue bowel regimen     Diet: D1 nectar     RENAL: BUN/Cr without acute change, maintain adequate hydration, mild hyponatremia-  resolved      Na Goal: Greater than 135     Lee: post TOV with void.     HEMATOLOGY: H/H without acute change , Platelets 274, no si/sx of bleeding      DVT ppx: LMWH [x]     ID: afebrile, no leukocytosis, completed course of Abx for UTI, blood and urine cultures NGTD- short course as noted, continue to monitor     OTHER: ENT consulted as recommendations per S&S and Pulm for hoarse voice r/o vocal cord paralysis. ENT bedside FOE revealed patent airway and vocal cords mobile with good contact b/l - possible small glottic endy inferiorly, no further recommendations, no further ENT intervention required. Plan endorsed to patient bedside. Patient gives permission to disclose information to family member/friend. All questions and concerns addressed. Dr Rob (medicine) consult appreciated. Direct manual pressure held to hemostatis of right groin site, no further active bleeding, no hematoma, soft throughout, 2+ pedal pulses.     DISPOSITION: Acute Rehab once stable and workup is complete. Uninsured- social work following.     CORE MEASURES:        Admission NIHSS: 2     TPA: [] YES [x] NO      LDL/HDL: 156/44     Depression Screen: 0     Statin Therapy: yes     Dysphagia Screen: [x] PASS [] FAIL     Smoking [] YES [x] NO      Afib [] YES [x] NO     Stroke Education [x] YES [] NO    Obtain screening lower extremity venous ultrasound in patients who meet 1 or more of the following criteria as patient is high risk for DVT/PE on admission:   [] History of DVT/PE  []Hypercoagulable states (Factor V Leiden, Cancer, OCP, etc. )  []Prolonged immobility (hemiplegia/hemiparesis/post operative or any other extended immobilization)  [] Transferred from outside facility (Rehab or Long term care)  [] Age </= to 50 75-year-old right-handed gentleman first evaluated at [facility] on 8/15/2020 with left hemiparesis.  On 8/14/2020 he developed some type of change in mental status along with headache, and had been dropping objects, most likely due to left hemiparesis. CT head (8/14/2020) showed moderate-severe periventricular chronic ischemic changes.  There was a possible subacute right corona radiata infarct, perhaps more likely part and parcel of the chronic ischemic changes. CTA neck and head (8/14/2020) showed severe right M1 stenosis.  The left vertebral artery was dominant.  Incidentally noted were emphysematous lung changes.     Impression.  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  The right M1 stenosis is most likely due to large artery atherosclerosis, although an embolus with partial lysis cannot be ruled out with certainty (embolic stroke of undetermined source).       NEURO: No acute neurological change, Continue close monitoring for neurologic deterioration. Initial MRI Brain showed moderate sized right corona radiata/centrum semiovale internal borderzone infarct.  Repeat MRI with increase in RMCA infarcts.  on midodrine to maintain SBP >140 mmHg given neurological stability, patient with hypotensive event on 8/15/20 resulting in worsening neurological deficits but improvement to baseline once given hydration and BP raised, LDL -156- continue on high intensity statin,  Reglan PRN hiccups , 2 mm conical outpouching arising from the left supraclinoid ICA directed inferiorly suggesting an infundibulum versus small aneurysm should be followed with yearly vascular imaging, MR Long per nsx demonstrated severe M1 stenosis, post cerebral angiogram with consideration for stenting after should it be clinically indicated and if patient/ family-friend amenable- d/w nsx ,  Physical therapy/OT recommended acute rehab.     ANTITHROMBOTIC THERAPY: ASA and Plavix for 3 months per SAMPRISS protocol, then ASA indefinitely from neurological standpoint. PRU/P2Y12 126    PULMONARY: CXR (08/15/20) clear, protecting airway, saturating well on room air. Incidental finding on CTA H/N emphysematous changes. Pulmonary consulted, Dr. Zelaya following -. CT Chest 8/16 showed apical paraseptal emphysema, no need for bronchodilator at this time     CARDIOVASCULAR: TTE (08/16/20): EF 73%, minimal MR, negative PFO, continue cardiac monitoring on telemetry, Cardiology, Dr. Aguirre consult appreciated - Midodrine continued, Holter monitor vs loop recorder to screen for occult arrhythmia.     SBP goal: 140-180mmHg as currently tolerating     GASTROINTESTINAL:  initial dysphagia screen passed but patient was witnessed to be drooling and pocketing meals while eating. Pt had MBS on 8/18 and was started on  D2 [place] diet. Then again noted with difficulty with medications- pt had repeat S&S eval on 8/20 recommended dysphagia 1 nectar, tolerating well. Continue bowel regimen     Diet: D1 nectar     RENAL: BUN/Cr without acute change, maintain adequate hydration, mild hyponatremia-  resolved      Na Goal: Greater than 135     Lee: post TOV with void.     HEMATOLOGY: H/H without acute change , Platelets 274, no si/sx of bleeding      DVT ppx: LMWH [x]     ID: afebrile, no leukocytosis, completed course of Abx for UTI, blood and urine cultures NGTD- short course as noted, continue to monitor     OTHER: ENT consulted as recommendations per S&S and Pulm for hoarse voice r/o vocal cord paralysis. ENT bedside FOE revealed patent airway and vocal cords mobile with good contact b/l - possible small glottic endy inferiorly, no further recommendations, no further ENT intervention required. Plan endorsed to patient bedside. Patient gives permission to disclose information to family member/friend. All questions and concerns addressed. Dr Rob (medicine) consult appreciated. Direct manual pressure held to hemostatis of right groin site, no further active bleeding, no hematoma, soft throughout, 2+ pedal pulses. Condition and plan of care d/w patient per team [id number].    DISPOSITION: Acute Rehab once stable and workup is complete. Uninsured- social work following.     CORE MEASURES:        Admission NIHSS: 2     TPA: [] YES [x] NO      LDL/HDL: 156/44     Depression Screen: 0     Statin Therapy: yes     Dysphagia Screen: [x] PASS [] FAIL     Smoking [] YES [x] NO      Afib [] YES [x] NO     Stroke Education [x] YES [] NO    Obtain screening lower extremity venous ultrasound in patients who meet 1 or more of the following criteria as patient is high risk for DVT/PE on admission:   [] History of DVT/PE  []Hypercoagulable states (Factor V Leiden, Cancer, OCP, etc. )  []Prolonged immobility (hemiplegia/hemiparesis/post operative or any other extended immobilization)  [] Transferred from outside facility (Rehab or Long term care)  [] Age </= to 50

## 2020-08-26 NOTE — CHART NOTE - NSCHARTNOTEFT_GEN_A_CORE
Interventional Neuro Radiology  Pre-Procedure Note    This is a 74YO M pmhx HTN, admitted on 8/14 after 1d day of dysarthria, L hemiparesis, stroke code called. CTA showed severe M1 stenosis w/ some reconstitution at bifurcation, but outside window for intervention. Also questionable 2mm outpouching at L supraclinoid ICA, possible aneurysm vs. infundibulum. MRI 8/18 confirmed M1 stenosis w/ interval inc in R inf M2 inferior division infarct and e/o R watershed infarct on DW. Patient is s/p diagnostic cerebral angiography yesterday confirming MCA stenosis, patient presents today to neuro IR for selective cerebral angiography and possible stenting.     Neuro Exam: AOx3, PERRL 3 b/l, EOMI, L facial, +dysarthria, RUE/ RLE 5/5, LUE 3/5 proximally, 2/5 hg, LLE 4/5. No clonus. SILT. (cant participate VF)    PAST MEDICAL & SURGICAL HISTORY:  HTN (hypertension)    Social History:   Denies tobacco use    FAMILY HISTORY:  No pertinent family history    Allergies:   apple (Vomiting; Nausea)  No Known Drug Allergies    Current Medications:   acetaminophen   Tablet .. 650 milliGRAM(s) Oral every 6 hours PRN  artificial tears (preservative free) Ophthalmic Solution 1 Drop(s) Both EYES daily  aspirin  chewable 81 milliGRAM(s) Oral daily  atorvastatin 80 milliGRAM(s) Oral at bedtime  clopidogrel Tablet 75 milliGRAM(s) Oral daily  enoxaparin Injectable 40 milliGRAM(s) SubCutaneous daily  metoclopramide Injectable 10 milliGRAM(s) IV Push three times a day PRN  midodrine. 10 milliGRAM(s) Oral three times a day  polyethylene glycol 3350 17 Gram(s) Oral daily  senna 2 Tablet(s) Oral at bedtime  sodium chloride 0.9%. 1000 milliLiter(s) IV Continuous <Continuous>      Labs:                         13.2   7.86  )-----------( 274      ( 26 Aug 2020 06:12 )             39.0       08-26    140  |  104  |  11  ----------------------------<  88  3.8   |  23  |  0.61    Ca    9.3      26 Aug 2020 06:12      Blood Bank: 08-25-20  B  --  Positive      Assessment/Plan:   This is a 76yo male presents with right MCA stenosis. Patient presents to neuro-IR for selective cerebral angiography and intracranial stenting. Procedure/ risks/ benefits/ goals/ alternatives were explained. Risks include but are not limited to stroke/ vessel injury/ hemorrhage/ groin hematoma. All questions answered. Informed content obtained from patient. Consent placed in chart.    Renetta Cherry PA-C  [phone]

## 2020-08-26 NOTE — PRE-ANESTHESIA EVALUATION ADULT - NSANTHOSAYNRD_GEN_A_CORE
No. ADITYA screening performed.  STOP BANG Legend: 0-2 = LOW Risk; 3-4 = INTERMEDIATE Risk; 5-8 = HIGH Risk

## 2020-08-27 LAB
HCT VFR BLD CALC: 39.1 % — SIGNIFICANT CHANGE UP (ref 39–50)
HGB BLD-MCNC: 13.3 G/DL — SIGNIFICANT CHANGE UP (ref 13–17)
MCHC RBC-ENTMCNC: 30.3 PG — SIGNIFICANT CHANGE UP (ref 27–34)
MCHC RBC-ENTMCNC: 34 GM/DL — SIGNIFICANT CHANGE UP (ref 32–36)
MCV RBC AUTO: 89.1 FL — SIGNIFICANT CHANGE UP (ref 80–100)
NRBC # BLD: 0 /100 WBCS — SIGNIFICANT CHANGE UP (ref 0–0)
PLATELET # BLD AUTO: 309 K/UL — SIGNIFICANT CHANGE UP (ref 150–400)
RBC # BLD: 4.39 M/UL — SIGNIFICANT CHANGE UP (ref 4.2–5.8)
RBC # FLD: 11.9 % — SIGNIFICANT CHANGE UP (ref 10.3–14.5)
WBC # BLD: 9.26 K/UL — SIGNIFICANT CHANGE UP (ref 3.8–10.5)
WBC # FLD AUTO: 9.26 K/UL — SIGNIFICANT CHANGE UP (ref 3.8–10.5)

## 2020-08-27 PROCEDURE — 99233 SBSQ HOSP IP/OBS HIGH 50: CPT

## 2020-08-27 PROCEDURE — 70551 MRI BRAIN STEM W/O DYE: CPT | Mod: 26

## 2020-08-27 RX ORDER — MIDODRINE HYDROCHLORIDE 2.5 MG/1
5 TABLET ORAL
Refills: 0 | Status: DISCONTINUED | OUTPATIENT
Start: 2020-08-27 | End: 2020-08-28

## 2020-08-27 RX ORDER — ASPIRIN/CALCIUM CARB/MAGNESIUM 324 MG
325 TABLET ORAL DAILY
Refills: 0 | Status: DISCONTINUED | OUTPATIENT
Start: 2020-08-27 | End: 2020-09-02

## 2020-08-27 RX ADMIN — CLOPIDOGREL BISULFATE 75 MILLIGRAM(S): 75 TABLET, FILM COATED ORAL at 11:36

## 2020-08-27 RX ADMIN — Medication 1 DROP(S): at 22:06

## 2020-08-27 RX ADMIN — MIDODRINE HYDROCHLORIDE 5 MILLIGRAM(S): 2.5 TABLET ORAL at 05:25

## 2020-08-27 RX ADMIN — LIDOCAINE 1 PATCH: 4 CREAM TOPICAL at 07:41

## 2020-08-27 RX ADMIN — LIDOCAINE 1 PATCH: 4 CREAM TOPICAL at 05:26

## 2020-08-27 RX ADMIN — POLYETHYLENE GLYCOL 3350 17 GRAM(S): 17 POWDER, FOR SOLUTION ORAL at 11:36

## 2020-08-27 RX ADMIN — Medication 81 MILLIGRAM(S): at 11:36

## 2020-08-27 RX ADMIN — MIDODRINE HYDROCHLORIDE 5 MILLIGRAM(S): 2.5 TABLET ORAL at 18:03

## 2020-08-27 RX ADMIN — MIDODRINE HYDROCHLORIDE 5 MILLIGRAM(S): 2.5 TABLET ORAL at 11:36

## 2020-08-27 RX ADMIN — ATORVASTATIN CALCIUM 80 MILLIGRAM(S): 80 TABLET, FILM COATED ORAL at 21:02

## 2020-08-27 RX ADMIN — ENOXAPARIN SODIUM 40 MILLIGRAM(S): 100 INJECTION SUBCUTANEOUS at 11:36

## 2020-08-27 RX ADMIN — Medication 325 MILLIGRAM(S): at 21:02

## 2020-08-27 NOTE — PROGRESS NOTE ADULT - SUBJECTIVE AND OBJECTIVE BOX
Patient is a 75y old  Male who presents with a chief complaint of R M1 occlusion vs stenosis (27 Aug 2020 12:48)      SUBJECTIVE / OVERNIGHT EVENTS:  No chest pain. No shortness of breath. No complaints. No events overnight.     MEDICATIONS  (STANDING):  artificial tears (preservative free) Ophthalmic Solution 1 Drop(s) Both EYES daily  aspirin  chewable 81 milliGRAM(s) Oral daily  atorvastatin 80 milliGRAM(s) Oral at bedtime  clopidogrel Tablet 75 milliGRAM(s) Oral daily  enoxaparin Injectable 40 milliGRAM(s) SubCutaneous daily  lidocaine   Patch 1 Patch Transdermal every 24 hours  midodrine. 5 milliGRAM(s) Oral <User Schedule>  polyethylene glycol 3350 17 Gram(s) Oral daily  senna 2 Tablet(s) Oral at bedtime    MEDICATIONS  (PRN):  acetaminophen   Tablet .. 650 milliGRAM(s) Oral every 6 hours PRN Temp greater or equal to 38C (100.4F), Mild Pain (1 - 3), Moderate Pain (4 - 6), Severe Pain (7 - 10)  metoclopramide Injectable 10 milliGRAM(s) IV Push three times a day PRN Hiccups      Vital Signs Last 24 Hrs  T(C): 37 (27 Aug 2020 11:00), Max: 37.1 (27 Aug 2020 05:00)  T(F): 98.6 (27 Aug 2020 11:00), Max: 98.7 (27 Aug 2020 05:00)  HR: 73 (27 Aug 2020 12:36) (59 - 102)  BP: 134/59 (26 Aug 2020 21:00) (121/59 - 149/58)  BP(mean): 79 (26 Aug 2020 21:00) (77 - 97)  RR: 20 (27 Aug 2020 12:36) (11 - 21)  SpO2: 97% (27 Aug 2020 12:36) (95% - 100%)  CAPILLARY BLOOD GLUCOSE        I&O's Summary    26 Aug 2020 07:01  -  27 Aug 2020 07:00  --------------------------------------------------------  IN: 1845 mL / OUT: 2200 mL / NET: -355 mL    27 Aug 2020 07:01  -  27 Aug 2020 13:51  --------------------------------------------------------  IN: 900 mL / OUT: 90 mL / NET: 810 mL        PHYSICAL EXAM:  GENERAL: NAD, well-developed  HEAD:  Atraumatic, Normocephalic  EYES: EOMI, PERRLA, conjunctiva and sclera clear  NECK: Supple, No JVD  CHEST/LUNG: Clear to auscultation bilaterally; No wheeze  HEART: Regular rate and rhythm; No murmurs, rubs, or gallops  ABDOMEN: Soft, Nontender, Nondistended; Bowel sounds present  EXTREMITIES:  2+ Peripheral Pulses, No clubbing, cyanosis, or edema  PSYCH: AAOx3  NEUROLOGY: non-focal  SKIN: No rashes or lesions    LABS:                        13.3   9.26  )-----------( 309      ( 26 Aug 2020 23:11 )             39.1     08-26    137  |  102  |  11  ----------------------------<  125<H>  4.0   |  20<L>  |  0.63    Ca    9.6      26 Aug 2020 23:11  Phos  3.2     08-26  Mg     2.3     08-26                RADIOLOGY & ADDITIONAL TESTS:    Imaging Personally Reviewed:    Consultant(s) Notes Reviewed:      Care Discussed with Consultants/Other Providers:

## 2020-08-27 NOTE — PROGRESS NOTE ADULT - SUBJECTIVE AND OBJECTIVE BOX
Subjective: Patient seen and examined. No new events except as noted.   remains in NSCU   resting comfortably   no cp or sob     REVIEW OF SYSTEMS:    CONSTITUTIONAL:+ weakness, fevers or chills  EYES/ENT: No visual changes;  No vertigo or throat pain   NECK: No pain or stiffness  RESPIRATORY: No cough, wheezing, hemoptysis; No shortness of breath  CARDIOVASCULAR: No chest pain or palpitations  GASTROINTESTINAL: No abdominal or epigastric pain. No nausea, vomiting, or hematemesis; No diarrhea or constipation. No melena or hematochezia.  GENITOURINARY: No dysuria, frequency or hematuria  NEUROLOGICAL: + numbness or weakness  SKIN: No itching, burning, rashes, or lesions   All other review of systems is negative unless indicated above.    MEDICATIONS:  MEDICATIONS  (STANDING):  artificial tears (preservative free) Ophthalmic Solution 1 Drop(s) Both EYES daily  aspirin  chewable 81 milliGRAM(s) Oral daily  atorvastatin 80 milliGRAM(s) Oral at bedtime  clopidogrel Tablet 75 milliGRAM(s) Oral daily  enoxaparin Injectable 40 milliGRAM(s) SubCutaneous daily  lidocaine   Patch 1 Patch Transdermal every 24 hours  midodrine. 5 milliGRAM(s) Oral three times a day  polyethylene glycol 3350 17 Gram(s) Oral daily  senna 2 Tablet(s) Oral at bedtime  sodium chloride 0.9%. 1000 milliLiter(s) (75 mL/Hr) IV Continuous <Continuous>      PHYSICAL EXAM:  T(C): 36.8 (08-27-20 @ 09:00), Max: 37.2 (08-26-20 @ 11:50)  HR: 72 (08-27-20 @ 09:00) (52 - 102)  BP: 134/59 (08-26-20 @ 21:00) (121/59 - 158/59)  RR: 15 (08-27-20 @ 09:00) (14 - 21)  SpO2: 99% (08-27-20 @ 09:00) (95% - 100%)  Wt(kg): --  I&O's Summary    26 Aug 2020 07:01  -  27 Aug 2020 07:00  --------------------------------------------------------  IN: 1845 mL / OUT: 2200 mL / NET: -355 mL    27 Aug 2020 07:01  -  27 Aug 2020 09:36  --------------------------------------------------------  IN: 150 mL / OUT: 0 mL / NET: 150 mL      Height (cm): 170.18 (08-26 @ 11:29)  Weight (kg): 63.5 (08-26 @ 11:50)  BMI (kg/m2): 21.9 (08-26 @ 11:50)  BSA (m2): 1.74 (08-26 @ 11:50)      Appearance: NAD	  HEENT:   Dry  oral mucosa, PERRL, EOMI	  Lymphatic: No lymphadenopathy  Cardiovascular: Normal S1 S2, No JVD, No murmurs, No edema  Respiratory: Lungs clear to auscultation	  Psychiatry: A & O x 3, Mood & affect appropriate  Gastrointestinal:  Soft, Non-tender, + BS	  Skin: No rashes, No ecchymoses, No cyanosis	  Extremities: Normal range of motion, No clubbing, cyanosis or edema  Vascular: Peripheral pulses palpable 2+ bilaterally  - Cranial Nerves II-XII:  VFF, EOMI, PERRL (3mm OD, OS), V1-V3 intact, some nasolabial flattening on the left side of face, t/p midline, SCM/trap intact.  	- Fundoscopic examination: upon fundoscopic examination grossly clear margins of optic disc & cup  	- Motor: Pronator drift present on the left side. demonstrates orbiting around the left arm. Strength left arm 4+/5 with  strength 4/5. Left leg 4+/5 hip flexors/extensors with 5/5 knee flexion/ext dorsi/plantarflexion. right arm and leg 5/5. Normal muscle bulk and tone throughout. Neck flexion/extension 5/5 without neck stiffness  	- Sensory:  Intact to light touch and PP bilaterally throughout.  	- Coordination:  FTN demonstrated mild dysmetria in proportion to weakness on left arm, intact on right  - Gait: patient able to stand up on his own, ambulate several steps without wide based gait, not requiring assistance.      LABS:    CARDIAC MARKERS:                                13.3   9.26  )-----------( 309      ( 26 Aug 2020 23:11 )             39.1     08-26    137  |  102  |  11  ----------------------------<  125<H>  4.0   |  20<L>  |  0.63    Ca    9.6      26 Aug 2020 23:11  Phos  3.2     08-26  Mg     2.3     08-26      proBNP:   Lipid Profile:   HgA1c:   TSH:             TELEMETRY: 	 SR  ECG:  	  RADIOLOGY:   DIAGNOSTIC TESTING:  [ ] Echocardiogram:  [ ]  Catheterization:  [ ] Stress Test:    OTHER:

## 2020-08-27 NOTE — PROGRESS NOTE ADULT - SUBJECTIVE AND OBJECTIVE BOX
SUMMARY: 74 y/o RH man with a PMH of HTN not on antithrombotics who presented to the ED with an acute one day history of headache and altered mental status. Neurological examination demonstrates mild dysarthria with mild left nasolabial flattening with left arm weakness. CT/CTA per Neuroradiology demonstrates stenosis vs occlusion R M1. Patient out of window for tpa.    OVERNIGHT EVENTS: underwent angiography with R MCA stenting, treated with DAPT              ICU Vital Signs Last 24 Hrs  T(C): 37.1 (27 Aug 2020 05:00), Max: 37.2 (26 Aug 2020 11:50)  T(F): 98.7 (27 Aug 2020 05:00), Max: 99 (26 Aug 2020 11:50)  HR: 59 (27 Aug 2020 06:00) (52 - 102)  BP: 134/59 (26 Aug 2020 21:00) (121/59 - 158/59)  BP(mean): 79 (26 Aug 2020 21:00) (77 - 97)  ABP: 125/44 (27 Aug 2020 06:00) (125/44 - 175/68)  ABP(mean): 73 (27 Aug 2020 06:00) (72 - 107)  RR: 14 (27 Aug 2020 06:00) (14 - 21)  SpO2: 98% (27 Aug 2020 06:00) (95% - 100%)      08-26-20 @ 07:01  -  08-27-20 @ 07:00  --------------------------------------------------------  IN: 1845 mL / OUT: 2200 mL / NET: -355 mL            acetaminophen   Tablet .. 650 milliGRAM(s) Oral every 6 hours PRN  artificial tears (preservative free) Ophthalmic Solution 1 Drop(s) Both EYES daily  aspirin  chewable 81 milliGRAM(s) Oral daily  atorvastatin 80 milliGRAM(s) Oral at bedtime  clopidogrel Tablet 75 milliGRAM(s) Oral daily  enoxaparin Injectable 40 milliGRAM(s) SubCutaneous daily  lidocaine   Patch 1 Patch Transdermal every 24 hours  metoclopramide Injectable 10 milliGRAM(s) IV Push three times a day PRN  midodrine. 5 milliGRAM(s) Oral three times a day  polyethylene glycol 3350 17 Gram(s) Oral daily  senna 2 Tablet(s) Oral at bedtime  sodium chloride 0.9%. 1000 milliLiter(s) (75 mL/Hr) IV Continuous <Continuous>            DEVICES:   [] Restraints [x] PIVs [] ET tube [] central line [] PICC [x] arterial line [] evans [] NGT/OGT [] EVD [] LD [] HAZEL/HMV [] LiCOX [] ICP monitor [] Trach [] PEG [] Chest Tube [] other:    EXAMINATION:  General: drowsy, comfortable in bed  HEENT: Anicteric sclerae  Cardiac: [id number]  Lungs: Clear  Abdomen: Soft, non-tender, +BS  Extremities: No c/c/e  Skin/Incision Site: Clean, dry and intact  Neurologic: Awake, alert, fully oriented, follows commands, PERRL, EOMI, moderate L facial droop, tongue midline, 4+/5 L side, LUE drift, Right intact      LABS:  Na: 137 (08-26 @ 23:11), 140 (08-26 @ 06:12), 134 (08-25 @ 04:05)  K: 4.0 (08-26 @ 23:11), 3.8 (08-26 @ 06:12), 3.6 (08-25 @ 04:05)  Cl: 102 (08-26 @ 23:11), 104 (08-26 @ 06:12), 101 (08-25 @ 04:05)  CO2: 20 (08-26 @ 23:11), 23 (08-26 @ 06:12), 23 (08-25 @ 04:05)  BUN: 11 (08-26 @ 23:11), 11 (08-26 @ 06:12), 15 (08-25 @ 04:05)  Cr: 0.63 (08-26 @ 23:11), 0.61 (08-26 @ 06:12), 0.65 (08-25 @ 04:05)  Glu: 125(08-26 @ 23:11), 88(08-26 @ 06:12), 98(08-25 @ 04:05)    Hgb: 13.3 (08-26 @ 23:11), 13.2 (08-26 @ 06:12), 13.5 (08-25 @ 04:05)  Hct: 39.1 (08-26 @ 23:11), 39.0 (08-26 @ 06:12), 41.1 (08-25 @ 04:05)  WBC: 9.26 (08-26 @ 23:11), 7.86 (08-26 @ 06:12), 7.10 (08-25 @ 04:05)  Plt: 309 (08-26 @ 23:11), 274 (08-26 @ 06:12), 289 (08-25 @ 04:05)    INR: 1.09 08-25-20 @ 06:24  PTT: 35.1 08-25-20 @ 06:24 History of Present Illness:  Reason for Admission: R M1 occlusion vs stenosis  History of Present Illness:   Opal Matthew is a 76 y/o RH man with a PMH of HTN not on antithrombotics who presented to the ED with an acute one day history of headache and altered mental status. Per his friend, the pt suddenly became confused yesterday around 5pm and complained of a new onset headache.The pt complains about some weakness in his right lower extremity and endorses a sharp headache localized to the top/middle of his hairline that does not radiate anywhere. The pt denies photophobia, recent fever or flu like illness, changes in hearing, changes in vision, recent falls or trauma, or pain other than the headache, difficulty swallowing, dysuria, diarrhea, numbness/tingling anywhere. No history of headaches.     Per ED,  The friend stated that the patient's speech was "off" and that his face "did not look normal". The pt did not want to go to the ER at the time, but came today because the headache persisted throughout the night and today he began to feel lightheaded. The pt stated that he ambulates and talks normally at baseline and that he had never experienced these symptoms before yesterday.      Review of Systems:  Other Review of Systems: All other review of systems negative, except as noted in HPI      Allergies and Intolerances:        Allergies:  	No Known Allergies:     Home Medications:   * Outpatient Medication Status not yet specified    .    Patient History:    Past Medical, Past Surgical, and Family History:  PAST MEDICAL HISTORY:  HTN (hypertension).     Tobacco Screening:  · Core Measure Site	No    Risk Assessment:    Present on Admission:  Deep Venous Thrombosis	no  Pulmonary Embolus	no     Heart Failure:  Does this patient have a history of or has been diagnosed with heart failure? no.    OVERNIGHT EVENTS: underwent angiography with R MCA stenting, treated with DAPT                REVIEW OF SYSTEMS: [ ] Unable to Assess due to neurologic exam   [ x] All ROS addressed below are non-contributory, except:  Neuro: [ ] Headache [ ] Back pain [ ] Numbness [ ] Weakness [ ] Ataxia [ ] Dizziness [ ] Aphasia [ ] Dysarthria [ ] Visual disturbance  Resp: [ ] Shortness of breath/dyspnea, [ ] Orthopnea [ ] Cough  CV: [ ] Chest pain [ ] Palpitation [ ] Lightheadedness [ ] Syncope  Renal: [ ] Thirst [ ] Edema  GI: [ ] Nausea [ ] Emesis [ ] Abdominal pain [ ] Constipation [ ] Diarrhea  Hem: [ ] Hematemesis [ ] bright red blood per rectum  ID: [ ] Fever [ ] Chills [ ] Dysuria  ENT: [ ] Rhinorrhea            ICU Vital Signs Last 24 Hrs  T(C): 37.1 (27 Aug 2020 05:00), Max: 37.2 (26 Aug 2020 11:50)  T(F): 98.7 (27 Aug 2020 05:00), Max: 99 (26 Aug 2020 11:50)  HR: 59 (27 Aug 2020 06:00) (52 - 102)  BP: 134/59 (26 Aug 2020 21:00) (121/59 - 158/59)  BP(mean): 79 (26 Aug 2020 21:00) (77 - 97)  ABP: 125/44 (27 Aug 2020 06:00) (125/44 - 175/68)  ABP(mean): 73 (27 Aug 2020 06:00) (72 - 107)  RR: 14 (27 Aug 2020 06:00) (14 - 21)  SpO2: 98% (27 Aug 2020 06:00) (95% - 100%)      08-26-20 @ 07:01  -  08-27-20 @ 07:00  --------------------------------------------------------  IN: 1845 mL / OUT: 2200 mL / NET: -355 mL            acetaminophen   Tablet .. 650 milliGRAM(s) Oral every 6 hours PRN  artificial tears (preservative free) Ophthalmic Solution 1 Drop(s) Both EYES daily  aspirin  chewable 81 milliGRAM(s) Oral daily  atorvastatin 80 milliGRAM(s) Oral at bedtime  clopidogrel Tablet 75 milliGRAM(s) Oral daily  enoxaparin Injectable 40 milliGRAM(s) SubCutaneous daily  lidocaine   Patch 1 Patch Transdermal every 24 hours  metoclopramide Injectable 10 milliGRAM(s) IV Push three times a day PRN  midodrine. 5 milliGRAM(s) Oral three times a day  polyethylene glycol 3350 17 Gram(s) Oral daily  senna 2 Tablet(s) Oral at bedtime  sodium chloride 0.9%. 1000 milliLiter(s) (75 mL/Hr) IV Continuous <Continuous>            DEVICES:   [] Restraints [x] PIVs [] ET tube [] central line [] PICC [x] arterial line [] evans [] NGT/OGT [] EVD [] LD [] HAZEL/HMV [] LiCOX [] ICP monitor [] Trach [] PEG [] Chest Tube [] other:    EXAMINATION:  General: drowsy, comfortable in bed  HEENT: Anicteric sclerae  Cardiac: [id number]  Lungs: Clear  Abdomen: Soft, non-tender, +BS  Extremities: No c/c/e  Skin/Incision Site: Clean, dry and intact  Neurologic: Awake, alert, fully oriented, follows commands, PERRL, EOMI, moderate L facial droop, tongue midline, 5/5 L side, LUE drift, Right intact      LABS:  Na: 137 (08-26 @ 23:11), 140 (08-26 @ 06:12), 134 (08-25 @ 04:05)  K: 4.0 (08-26 @ 23:11), 3.8 (08-26 @ 06:12), 3.6 (08-25 @ 04:05)  Cl: 102 (08-26 @ 23:11), 104 (08-26 @ 06:12), 101 (08-25 @ 04:05)  CO2: 20 (08-26 @ 23:11), 23 (08-26 @ 06:12), 23 (08-25 @ 04:05)  BUN: 11 (08-26 @ 23:11), 11 (08-26 @ 06:12), 15 (08-25 @ 04:05)  Cr: 0.63 (08-26 @ 23:11), 0.61 (08-26 @ 06:12), 0.65 (08-25 @ 04:05)  Glu: 125(08-26 @ 23:11), 88(08-26 @ 06:12), 98(08-25 @ 04:05)    Hgb: 13.3 (08-26 @ 23:11), 13.2 (08-26 @ 06:12), 13.5 (08-25 @ 04:05)  Hct: 39.1 (08-26 @ 23:11), 39.0 (08-26 @ 06:12), 41.1 (08-25 @ 04:05)  WBC: 9.26 (08-26 @ 23:11), 7.86 (08-26 @ 06:12), 7.10 (08-25 @ 04:05)  Plt: 309 (08-26 @ 23:11), 274 (08-26 @ 06:12), 289 (08-25 @ 04:05)    INR: 1.09 08-25-20 @ 06:24  PTT: 35.1 08-25-20 @ 06:24

## 2020-08-27 NOTE — PROGRESS NOTE ADULT - ASSESSMENT
75-year-old right-handed gentleman first evaluated at [facility] on 8/15/2020 with left hemiparesis. On 8/14/2020 he developed some type of change in mental status along with headache, and had been dropping objects, most likely due to left hemiparesis. CT head (8/14/2020) showed moderate-severe periventricular chronic ischemic changes.  There was a possible subacute right corona radiata infarct, perhaps more likely part and parcel of the chronic ischemic changes. CTA neck and head (8/14/2020) showed severe right M1 stenosis.  The left vertebral artery was dominant.  Incidentally noted were emphysematous lung changes.     Impression.  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  The right M1 stenosis is most likely due to large artery atherosclerosis, although an embolus with partial lysis cannot be ruled out with certainty (embolic stroke of undetermined source).       NEURO: No acute neurological change, Continue close monitoring for neurologic deterioration. Initial MRI Brain showed moderate sized right corona radiata/centrum semiovale internal borderzone infarct.  Repeat MRI with increase in RMCA infarcts. MR Mercedes per nsx demonstrated severe M1 stenosis.  Cerebral angiography demonstrated right MCA stenosis s/p 2.0mm x 8mm Resolute [place] stent placement on 8/26. Continued on midodrine to maintain SBP >140 mmHg given neurological stability, patient with hypotensive event on 8/15/20 resulting in worsening neurological deficits but improvement to baseline once given hydration and BP raised, LDL -156- continue on high intensity statin,  Reglan PRN hiccups , 2 mm conical outpouching arising from the left supraclinoid ICA directed inferiorly suggesting an infundibulum versus small aneurysm should be followed with yearly vascular imaging, Physical therapy/OT recommended acute rehab.     ANTITHROMBOTIC THERAPY: ASA and Plavix for 3 months per SAMPRISS protocol, then ASA indefinitely from neurological standpoint. [id number]    PULMONARY: CXR (08/15/20) clear, protecting airway, saturating well on room air. Incidental finding on CTA H/N emphysematous changes. Pulmonary consulted, Dr. Zelaya following -. CT Chest 8/16 showed apical paraseptal emphysema, no need for bronchodilator at this time     CARDIOVASCULAR: TTE (08/16/20): EF 73%, minimal MR, negative PFO, continue cardiac monitoring on telemetry, Cardiology, Dr. Aguirre consult appreciated - Midodrine continued, Holter monitor vs loop recorder to screen for occult arrhythmia.     SBP goal: 140-180mmHg as currently tolerating     GASTROINTESTINAL:  initial dysphagia screen passed but patient was witnessed to be drooling and pocketing meals while eating. Pt had MBS on 8/18 and was started on  D2 [place] diet. Then again noted with difficulty with medications- pt had repeat S&S eval on 8/20 recommended dysphagia 1 nectar, tolerating well. Continue bowel regimen     Diet: D1 nectar     RENAL: BUN/Cr without acute change, maintain adequate hydration, mild hyponatremia-  resolved      Na Goal: Greater than 135     Lee: post TOV with void.     HEMATOLOGY: H/H without acute change , Platelets 309 no si/sx of bleeding      DVT ppx: LMWH [x]     ID: afebrile, no leukocytosis, completed course of Abx for UTI, blood and urine cultures NGTD- short course as noted, continue to monitor     OTHER: ENT consulted as recommendations per S&S and Pulm for hoarse voice r/o vocal cord paralysis. ENT bedside FOE revealed patent airway and vocal cords mobile with good contact b/l - possible small glottic endy inferiorly, no further recommendations, no further ENT intervention required. Plan endorsed to patient bedside. Patient gives permission to disclose information to family member/friend. All questions and concerns addressed. Dr Rob (medicine) consult appreciated. Direct manual pressure held to hemostatis of right groin site on 8/26, no further active bleeding, no hematoma, soft throughout, 2+ pedal pulses.     DISPOSITION: Acute Rehab once stable and workup is complete. Uninsured- social work following.     CORE MEASURES:        Admission NIHSS: 2     TPA: [] YES [x] NO      LDL/HDL: 156/44     Depression Screen: 0     Statin Therapy: yes     Dysphagia Screen: [x] PASS [] FAIL     Smoking [] YES [x] NO      Afib [] YES [x] NO     Stroke Education [x] YES [] NO    Obtain screening lower extremity venous ultrasound in patients who meet 1 or more of the following criteria as patient is high risk for DVT/PE on admission:   [] History of DVT/PE  []Hypercoagulable states (Factor V Leiden, Cancer, OCP, etc. )  []Prolonged immobility (hemiplegia/hemiparesis/post operative or any other extended immobilization)  [] Transferred from outside facility (Rehab or Long term care)  [] Age </= to 50

## 2020-08-27 NOTE — PROGRESS NOTE ADULT - ASSESSMENT
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 stenosis    REC    No need for bronchodilators at this time  Monitor resp status    8/24:  came after a week:  Dr Junior was covering me:  ct chest noted  he is not SOB and not wheezy:  his oxygenation on room air is good:  no acuter intervention from pulmonary side:  stroke per neurology :  angela pa: will follow prn if necessary    8/28:    doing pretty good: events noted:  on room air: has copd: but no wheezing or SOB :  no intervention from pulm side:   DVT proaphyxlis  ANGELA PMD at [facility]

## 2020-08-27 NOTE — PROGRESS NOTE ADULT - ASSESSMENT
ASSESSMENT/PLAN: 75 M with prior R MCA infarct found to have MCA stenosis, now s/p cerebral angiography with stenting on 8/26/20    NEURO:  neuro q1  MRA post-op, DAPT, statin, Pain control  Activity: [x] OOB as tolerated [] Bedrest [x] PT [x] OT [] PMNR    PULM:  Incentive spirometry, mobilize as tolerated    CV:  -140, d/c a-line in AM  statin, DAPT, lipid profile  pt initiated on midodrine during hospitalization, for brain perfusion will wean off  echo- no PFO, normal BiV function, no significant valvulopathy  EKG- sb with 1st degree avb, q-waves in inferior leads, no st-t wave changes  aru, pru    RENAL:  IVF until good PO intake, monitor I/Os    GI:  Diet: Dysphagia screen and then advance diet as tolerated  GI prophylaxis [x] not indicated [] PPI [] other:  Bowel regimen [] colace [] senna [] other:    ENDO:   Goal euglycemia (-180)  a1c for stroke core measures    HEME/ONC:  VTE prophylaxis: [x] SCDs [x] chemoprophylaxis [] hold chemoprophylaxis due to: [] high risk of DVT/PE on admission due to:    ID:  n/a  trend fever curve and wbc    MISC:    SOCIAL/FAMILY:  [x] awaiting [] updated at bedside [] family meeting    CODE STATUS:  [x] Full Code [] DNR [] DNI [] Palliative/Comfort Care    DISPOSITION:  [x] ICU [] Stroke Unit [] Floor [] EMU [] RCU [] PCU    [x] Patient is at high risk of neurologic deterioration/death due to: ich, stent thrombosis    Time seen:  Time spent: __30_ [x] critical care minutes ASSESSMENT/PLAN: 75 M with prior R MCA infarct found to have MCA stenosis, now s/p cerebral angiography with stenting on 8/26/20    NEURO:  neuro q 4  MRA post-op, DAPT, statin, Pain control  continue aspirin and clopidogrel   PRU daily   Activity: [x] OOB as tolerated [] Bedrest [x] PT [x] OT [] PMNR    PULM:  Incentive spirometry, mobilize as tolerated    CV:  -150 mmhg , d/c a-line   statin, DAPT, lipid profile  wean midodripne to 5 mg Q12hr, d/c tomorrow if BP remains stable   echo- no PFO, normal BiV function, no significant valvulopathy  EKG- sb with 1st degree avb, q-waves in inferior leads, no st-t wave changes  aru, pru    RENAL:  IVL    GI:  Diet: Dysphagia screen and then advance diet as tolerated  GI prophylaxis [x] not indicated [] PPI [] other:  Bowel regimen xcolace [] senna [] other:    ENDO:   Goal euglycemia (-180)  a1c for stroke core measures    HEME/ONC:  VTE prophylaxis: [x] SCDs [x] chemoprophylaxis [] hold chemoprophylaxis due to: [] high risk of DVT/PE on admission due to:    ID:  n/a  trend fever curve and wbc    MISC:    SOCIAL/FAMILY:  [x] awaiting [] updated at bedside [] family meeting    CODE STATUS:  [x] Full Code [] DNR [] DNI [] Palliative/Comfort Care    DISPOSITION:  [] ICU [] Stroke Unit [x] Floor [] EMU [] RCU [] PCU

## 2020-08-27 NOTE — PROGRESS NOTE ADULT - SUBJECTIVE AND OBJECTIVE BOX
Patient is a 75y old  Male who presents with a chief complaint of R M1 occlusion vs stenosis (27 Aug 2020 09:36)      Any change in ROS: pt is doing ok : no OSB : eents ntoed; no pulm issues:  on room air:  alert [facility]     MEDICATIONS  (STANDING):  artificial tears (preservative free) Ophthalmic Solution 1 Drop(s) Both EYES daily  aspirin  chewable 81 milliGRAM(s) Oral daily  atorvastatin 80 milliGRAM(s) Oral at bedtime  clopidogrel Tablet 75 milliGRAM(s) Oral daily  enoxaparin Injectable 40 milliGRAM(s) SubCutaneous daily  lidocaine   Patch 1 Patch Transdermal every 24 hours  midodrine. 5 milliGRAM(s) Oral <User Schedule>  polyethylene glycol 3350 17 Gram(s) Oral daily  senna 2 Tablet(s) Oral at bedtime    MEDICATIONS  (PRN):  acetaminophen   Tablet .. 650 milliGRAM(s) Oral every 6 hours PRN Temp greater or equal to 38C (100.4F), Mild Pain (1 - 3), Moderate Pain (4 - 6), Severe Pain (7 - 10)  metoclopramide Injectable 10 milliGRAM(s) IV Push three times a day PRN Hiccups    Vital Signs Last 24 Hrs  T(C): 36.8 (27 Aug 2020 09:00), Max: 37.1 (27 Aug 2020 05:00)  T(F): 98.3 (27 Aug 2020 09:00), Max: 98.7 (27 Aug 2020 05:00)  HR: 73 (27 Aug 2020 12:36) (59 - 102)  BP: 134/59 (26 Aug 2020 21:00) (121/59 - 149/58)  BP(mean): 79 (26 Aug 2020 21:00) (77 - 97)  RR: 20 (27 Aug 2020 12:36) (11 - 21)  SpO2: 97% (27 Aug 2020 12:36) (95% - 100%)    I&O's Summary    26 Aug 2020 07:01  -  27 Aug 2020 07:00  --------------------------------------------------------  IN: 1845 mL / OUT: 2200 mL / NET: -355 mL    27 Aug 2020 07:01  -  27 Aug 2020 12:48  --------------------------------------------------------  IN: 300 mL / OUT: 90 mL / NET: 210 mL          Physical Exam:   GENERAL: NAD, well-groomed, well-developed  HEENT: DAMIAN/   Atraumatic, Normocephalic  ENMT: No tonsillar erythema, exudates, or enlargement; Moist mucous membranes, Good dentition, No lesions  NECK: Supple, No JVD, Normal thyroid  CHEST/LUNG: Clear to auscultaion  CVS: Regular rate and rhythm; No murmurs, rubs, or gallops  GI: : Soft, Nontender, Nondistended; Bowel sounds present  NERVOUS SYSTEM:  Alert & Oriented X3  EXTREMITIES:   edema  LYMPH: No lymphadenopathy noted  SKIN: No rashes or lesions  ENDOCRINOLOGY: No Thyromegaly  PSYCH: Appropriate    Labs:                              13.3   9.26  )-----------( 309      ( 26 Aug 2020 23:11 )             39.1                         13.2   7.86  )-----------( 274      ( 26 Aug 2020 06:12 )             39.0                         13.5   7.10  )-----------( 289      ( 25 Aug 2020 04:05 )             41.1                         13.9   7.48  )-----------( 254      ( 24 Aug 2020 05:27 )             41.0     08-26    137  |  102  |  11  ----------------------------<  125<H>  4.0   |  20<L>  |  0.63  08-26    140  |  104  |  11  ----------------------------<  88  3.8   |  23  |  0.61  08-25    134<L>  |  101  |  15  ----------------------------<  98  3.6   |  23  |  0.65  08-24    137  |  103  |  16  ----------------------------<  99  3.9   |  23  |  0.72    Ca    9.6      26 Aug 2020 23:11  Ca    9.3      26 Aug 2020 06:12  Phos  3.2     08-26  Mg     2.3     08-26      CAPILLARY BLOOD GLUCOSE              < from: MR Angio Head No Cont (08.25.20 @ 11:20) >          INTERPRETATION:  Clinical indication: 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     3-D axial noncontrast MRA were performed on the cervical and intracranial vessels, respectively. Intravascular flow quantification was performed using gated 2D phase contrast MR, imaged perpendicular to the vessel axis.  Images were post processed NOVA software and a NOVA flow study report is available.    There is severe stenosis of the mid to distal right M1 segment of the middle cerebral artery. Flow proximal and distal to the stenosis is unchanged and 29 mL/m. Decreased flow in the right branches are identified compared with the left.    Flow is as follows in milliliters per minute    , RMCA prox 29, RMCA distal 29, RM21 12, RM22 33, RACA 108, RACA2 88.    LICA 205, LMCA 117, LM21 37, LM22 100, LACA 34, LACA2 43.    RVA 49, , , RPCA 66, LPCA 64.    IMPRESSION: Severe stenosis of the mid to distal right M1 segment of the middle cerebral artery without change in flow proximal or distal to the stenosis. Decreased flow in the right middle cerebral artery branches in the sylvian fissure compared with the left.          < from: CT Chest No Cont (08.16.20 @ 14:54) >  PROCEDURE DATE:  08/16/2020            INTERPRETATION:  CLINICAL INFORMATION: Cough. Abnormality seen on chest CT scan. Possible emphysema. Stroke.    COMPARISON: None.    PROCEDURE:  CT of the Chest was performed without intravenous contrast.  Sagittal and coronal reformats were performed.    FINDINGS:    LUNGS AND AIRWAYS: Patent central airways.  Dependent atelectasis. Biapical paraseptal emphysematous change. Subcentimeter bleb in the right lower lobe.  PLEURA: No pleural effusion.  MEDIASTINUM AND RODOLFO: No lymphadenopathy.  VESSELS: Within normal limits.  HEART: Heart size is normal. No pericardial effusion.  CHEST WALL AND LOWER NECK: Within normal limits.  VISUALIZED UPPER ABDOMEN: Large calcified stones in the gallbladder.  BONES: Within normal limits.    IMPRESSION:    Apical paraseptal emphysema.                  ABDIFATAH KIRKPATRICK M.D., ATTENDING RADIOLOGIST  This document has been electronically signed. Aug 16 2020  4:44PM        < end of copied text >          GABE JIN MD, ATTENDING RADIOLOGIST  This document has been electronically signed. Aug 25 2020 11:32AM        < end of copied text >          RECENT CULTURES:        RESPIRATORY CULTURES:          Studies  Chest X-RAY  CT SCAN Chest   Venous Dopplers: LE:   CT Abdomen  Others

## 2020-08-27 NOTE — PROGRESS NOTE ADULT - SUBJECTIVE AND OBJECTIVE BOX
THE PATIENT WAS SEEN AND EXAMINED BY ME WITH THE HOUSESTAFF AND STROKE TEAM DURING MORNING ROUNDS.   HPI:  76 y/o RH man with a PMH of HTN not on antithrombotics who presented to the ED with an acute one day history of headache and altered mental status. Per his friend, the pt suddenly became confused day prior to admission around 5pm and complained of a new onset headache. The pt complained about some weakness in his right lower extremity and endorses a sharp headache localized to the top/middle of his hairline that did not radiate anywhere. The pt denied  photophobia, recent fever or flu like illness, changes in hearing, changes in vision, recent falls or trauma, or pain other than the headache, difficulty swallowing, dysuria, diarrhea, numbness/tingling anywhere. No history of headaches.  Per ED,  The friend stated that the patient's speech was "off" and that his face "did not look normal". The pt did not want to go to the ER at the time, but came day of admission because the headache persisted throughout the night and today he began to feel lightheaded. The pt stated that he ambulates and talks normally at baseline and that he had never experienced these symptoms before day prior to admission.     SUBJECTIVE: No events overnight.  No new neurologic complaints.  ROS reported negative unless otherwise noted.    acetaminophen   Tablet .. 650 milliGRAM(s) Oral every 6 hours PRN  artificial tears (preservative free) Ophthalmic Solution 1 Drop(s) Both EYES daily  aspirin  chewable 81 milliGRAM(s) Oral daily  atorvastatin 80 milliGRAM(s) Oral at bedtime  clopidogrel Tablet 75 milliGRAM(s) Oral daily  enoxaparin Injectable 40 milliGRAM(s) SubCutaneous daily  lidocaine   Patch 1 Patch Transdermal every 24 hours  metoclopramide Injectable 10 milliGRAM(s) IV Push three times a day PRN  midodrine. 5 milliGRAM(s) Oral three times a day  polyethylene glycol 3350 17 Gram(s) Oral daily  senna 2 Tablet(s) Oral at bedtime  sodium chloride 0.9%. 1000 milliLiter(s) IV Continuous <Continuous>    PHYSICAL EXAM:   Vital Signs Last 24 Hrs  T(C): 37.1 (27 Aug 2020 05:00), Max: 37.2 (26 Aug 2020 11:50)  T(F): 98.7 (27 Aug 2020 05:00), Max: 99 (26 Aug 2020 11:50)  HR: 59 (27 Aug 2020 06:00) (52 - 102)  BP: 134/59 (26 Aug 2020 21:00) (121/59 - 158/59)  BP(mean): 79 (26 Aug 2020 21:00) (77 - 97)  RR: 14 (27 Aug 2020 06:00) (14 - 21)  SpO2: 98% (27 Aug 2020 06:00) (95% - 100%)    General: No acute distress  HEENT: left gaze palsy,  Left homonymous hemianopia  Abdomen: Soft, nontender, nondistended   Extremities: No edema, right groin site with some mild oozing of bright red blood into dressing, soft, no hematoma    NEUROLOGICAL EXAM:  Mental status: Eyes open, awake, alert, oriented to name, place and time, speech fluent, left hemineglect, following commands  Cranial Nerves: Mild to moderate left facial palsy, no nystagmus, LHH, mild left gaze palsy, mild dysarthria.  Motor exam: Normal tone, right side moves against gravity, left side with motor neglect: when tested independent from right LUE 3-4/5, LLE 4/5 with drift   Sensation: intact to light touch on right    LABS:                        13.3   9.26  )-----------( 309      ( 26 Aug 2020 23:11 )             39.1    08-26    137  |  102  |  11  ----------------------------<  125<H>  4.0   |  20<L>  |  0.63    Ca    9.6      26 Aug 2020 23:11  Phos  3.2     08-26  Mg     2.3     08-26    IMAGING: Reviewed by me.     MR Angio Head No Cont (08.25.200  Severe stenosis of the mid to distal right M1 segment of the middle cerebral artery without change in flow proximal or distal to the stenosis. Decreased flow in the right middle cerebral artery branches in the sylvian fissure compared with the left.    MRI Head No Cont (08.18.2020) Interval apparent increase in the infarcts in the right MCA territory involving the right centrum semiovale and corona radiata as well as the right temporal lobe compared to prior MRI exam 8/15/2020.  No acute intracranial hemorrhage. Occlusion right MCA as seen on CTA exam    CT Angio Head w/ IV Cont (08.17.20) Redemonstration of near complete occlusion of the M1 segment of the right MCA.  2 mm conical outpouching arising from the left supraclinoid ICA directed inferiorly suggesting an infundibulum versus small aneurysm (8:39).     CT head 08.17.20 : Redemonstration of acute/subacute right MCA territory infarct.    MRI Brain w/o (08/15/20): Acute infarct in the right MCA territory somewhat patchy involving the right lateral temporal lobe as well as the right corona radiata/centrum semiovale ovale region in a somewhat junctional pattern, deep internal border zone type pattern. No significant mass effect. No associated hemorrhage    CT Head No Cont. (08/14/20): There is no acute intracranial hemorrhage. No focal edema or evidence of acute, transcortical infarct. No hydrocephalus, midline shift or mass effect.    CT Angio Head w/IV Cont. (08/14/20): 6 mm in length severe stenosis/near occlusion of the M1 segment of the Right MCA with reconstitution at the bifurcation.    CT Angio Neck w/IV Cont. (08/14/20): Patent cervical vasculature. No flow limiting stenosis or occlusion. THE PATIENT WAS SEEN AND EXAMINED BY ME WITH THE HOUSESTAFF AND STROKE TEAM DURING MORNING ROUNDS.   HPI:  76 y/o RH man with a PMH of HTN not on antithrombotics who presented to the ED with an acute one day history of headache and altered mental status. Per his friend, the pt suddenly became confused day prior to admission around 5pm and complained of a new onset headache. The pt complained about some weakness in his right lower extremity and endorses a sharp headache localized to the top/middle of his hairline that did not radiate anywhere. The pt denied  photophobia, recent fever or flu like illness, changes in hearing, changes in vision, recent falls or trauma, or pain other than the headache, difficulty swallowing, dysuria, diarrhea, numbness/tingling anywhere. No history of headaches.  Per ED,  The friend stated that the patient's speech was "off" and that his face "did not look normal". The pt did not want to go to the ER at the time, but came day of admission because the headache persisted throughout the night and today he began to feel lightheaded. The pt stated that he ambulates and talks normally at baseline and that he had never experienced these symptoms before day prior to admission.     SUBJECTIVE: No events overnight.  No new neurologic complaints.  ROS reported negative unless otherwise noted.     ID [id number]    acetaminophen   Tablet .. 650 milliGRAM(s) Oral every 6 hours PRN  artificial tears (preservative free) Ophthalmic Solution 1 Drop(s) Both EYES daily  aspirin  chewable 81 milliGRAM(s) Oral daily  atorvastatin 80 milliGRAM(s) Oral at bedtime  clopidogrel Tablet 75 milliGRAM(s) Oral daily  enoxaparin Injectable 40 milliGRAM(s) SubCutaneous daily  lidocaine   Patch 1 Patch Transdermal every 24 hours  metoclopramide Injectable 10 milliGRAM(s) IV Push three times a day PRN  midodrine. 5 milliGRAM(s) Oral three times a day  polyethylene glycol 3350 17 Gram(s) Oral daily  senna 2 Tablet(s) Oral at bedtime  sodium chloride 0.9%. 1000 milliLiter(s) IV Continuous <Continuous>    PHYSICAL EXAM:   Vital Signs Last 24 Hrs  T(C): 37.1 (27 Aug 2020 05:00), Max: 37.2 (26 Aug 2020 11:50)  T(F): 98.7 (27 Aug 2020 05:00), Max: 99 (26 Aug 2020 11:50)  HR: 59 (27 Aug 2020 06:00) (52 - 102)  BP: 134/59 (26 Aug 2020 21:00) (121/59 - 158/59)  BP(mean): 79 (26 Aug 2020 21:00) (77 - 97)  RR: 14 (27 Aug 2020 06:00) (14 - 21)  SpO2: 98% (27 Aug 2020 06:00) (95% - 100%)    General: No acute distress  HEENT: left gaze palsy,  Left homonymous hemianopia  Abdomen: Soft, nontender, nondistended   Extremities: No edema, right groin site with some mild oozing of bright red blood into dressing, soft, no hematoma    NEUROLOGICAL EXAM:  Mental status: Eyes open, awake, alert, oriented to name, place and time, speech fluent, left hemineglect, following commands  Cranial Nerves: moderate left facial palsy, no nystagmus, LHH, mild left gaze palsy, mild dysarthria.  Motor exam: Normal tone, right side moves against gravity, left side with motor neglect: when tested independent from right LUE 4/5, LLE 4+/5 with drift   Sensation: intact to light touch on right    LABS:                        13.3   9.26  )-----------( 309      ( 26 Aug 2020 23:11 )             39.1    08-26    137  |  102  |  11  ----------------------------<  125<H>  4.0   |  20<L>  |  0.63    Ca    9.6      26 Aug 2020 23:11  Phos  3.2     08-26  Mg     2.3     08-26    IMAGING: Reviewed by me.     MR Angio Head No Cont (08.25.200  Severe stenosis of the mid to distal right M1 segment of the middle cerebral artery without change in flow proximal or distal to the stenosis. Decreased flow in the right middle cerebral artery branches in the sylvian fissure compared with the left.    MRI Head No Cont (08.18.2020) Interval apparent increase in the infarcts in the right MCA territory involving the right centrum semiovale and corona radiata as well as the right temporal lobe compared to prior MRI exam 8/15/2020.  No acute intracranial hemorrhage. Occlusion right MCA as seen on CTA exam    CT Angio Head w/ IV Cont (08.17.20) Redemonstration of near complete occlusion of the M1 segment of the right MCA.  2 mm conical outpouching arising from the left supraclinoid ICA directed inferiorly suggesting an infundibulum versus small aneurysm (8:39).     CT head 08.17.20 : Redemonstration of acute/subacute right MCA territory infarct.    MRI Brain w/o (08/15/20): Acute infarct in the right MCA territory somewhat patchy involving the right lateral temporal lobe as well as the right corona radiata/centrum semiovale ovale region in a somewhat junctional pattern, deep internal border zone type pattern. No significant mass effect. No associated hemorrhage    CT Head No Cont. (08/14/20): There is no acute intracranial hemorrhage. No focal edema or evidence of acute, transcortical infarct. No hydrocephalus, midline shift or mass effect.    CT Angio Head w/IV Cont. (08/14/20): 6 mm in length severe stenosis/near occlusion of the M1 segment of the Right MCA with reconstitution at the bifurcation.    CT Angio Neck w/IV Cont. (08/14/20): Patent cervical vasculature. No flow limiting stenosis or occlusion.

## 2020-08-28 LAB
ANION GAP SERPL CALC-SCNC: 11 MMOL/L — SIGNIFICANT CHANGE UP (ref 5–17)
BUN SERPL-MCNC: 13 MG/DL — SIGNIFICANT CHANGE UP (ref 7–23)
CALCIUM SERPL-MCNC: 9.4 MG/DL — SIGNIFICANT CHANGE UP (ref 8.4–10.5)
CHLORIDE SERPL-SCNC: 104 MMOL/L — SIGNIFICANT CHANGE UP (ref 96–108)
CO2 SERPL-SCNC: 26 MMOL/L — SIGNIFICANT CHANGE UP (ref 22–31)
CREAT SERPL-MCNC: 0.68 MG/DL — SIGNIFICANT CHANGE UP (ref 0.5–1.3)
GLUCOSE SERPL-MCNC: 102 MG/DL — HIGH (ref 70–99)
HCT VFR BLD CALC: 39.6 % — SIGNIFICANT CHANGE UP (ref 39–50)
HGB BLD-MCNC: 13.4 G/DL — SIGNIFICANT CHANGE UP (ref 13–17)
MCHC RBC-ENTMCNC: 30.7 PG — SIGNIFICANT CHANGE UP (ref 27–34)
MCHC RBC-ENTMCNC: 33.8 GM/DL — SIGNIFICANT CHANGE UP (ref 32–36)
MCV RBC AUTO: 90.6 FL — SIGNIFICANT CHANGE UP (ref 80–100)
NRBC # BLD: 0 /100 WBCS — SIGNIFICANT CHANGE UP (ref 0–0)
PA ADP PRP-ACNC: 125 PRU — LOW (ref 194–417)
PLATELET # BLD AUTO: 294 K/UL — SIGNIFICANT CHANGE UP (ref 150–400)
POTASSIUM SERPL-MCNC: 3.9 MMOL/L — SIGNIFICANT CHANGE UP (ref 3.5–5.3)
POTASSIUM SERPL-SCNC: 3.9 MMOL/L — SIGNIFICANT CHANGE UP (ref 3.5–5.3)
RBC # BLD: 4.37 M/UL — SIGNIFICANT CHANGE UP (ref 4.2–5.8)
RBC # FLD: 12.2 % — SIGNIFICANT CHANGE UP (ref 10.3–14.5)
SODIUM SERPL-SCNC: 141 MMOL/L — SIGNIFICANT CHANGE UP (ref 135–145)
WBC # BLD: 8.71 K/UL — SIGNIFICANT CHANGE UP (ref 3.8–10.5)
WBC # FLD AUTO: 8.71 K/UL — SIGNIFICANT CHANGE UP (ref 3.8–10.5)

## 2020-08-28 PROCEDURE — 99233 SBSQ HOSP IP/OBS HIGH 50: CPT

## 2020-08-28 PROCEDURE — 93970 EXTREMITY STUDY: CPT | Mod: 26

## 2020-08-28 PROCEDURE — 99232 SBSQ HOSP IP/OBS MODERATE 35: CPT

## 2020-08-28 RX ADMIN — LIDOCAINE 1 PATCH: 4 CREAM TOPICAL at 08:02

## 2020-08-28 RX ADMIN — CLOPIDOGREL BISULFATE 75 MILLIGRAM(S): 75 TABLET, FILM COATED ORAL at 11:31

## 2020-08-28 RX ADMIN — Medication 325 MILLIGRAM(S): at 11:31

## 2020-08-28 RX ADMIN — ENOXAPARIN SODIUM 40 MILLIGRAM(S): 100 INJECTION SUBCUTANEOUS at 11:31

## 2020-08-28 RX ADMIN — SENNA PLUS 2 TABLET(S): 8.6 TABLET ORAL at 21:49

## 2020-08-28 RX ADMIN — LIDOCAINE 1 PATCH: 4 CREAM TOPICAL at 05:10

## 2020-08-28 RX ADMIN — Medication 1 DROP(S): at 12:49

## 2020-08-28 RX ADMIN — ATORVASTATIN CALCIUM 80 MILLIGRAM(S): 80 TABLET, FILM COATED ORAL at 21:49

## 2020-08-28 RX ADMIN — Medication 1 DROP(S): at 21:50

## 2020-08-28 RX ADMIN — LIDOCAINE 1 PATCH: 4 CREAM TOPICAL at 17:45

## 2020-08-28 RX ADMIN — MIDODRINE HYDROCHLORIDE 5 MILLIGRAM(S): 2.5 TABLET ORAL at 05:08

## 2020-08-28 NOTE — PROGRESS NOTE ADULT - SUBJECTIVE AND OBJECTIVE BOX
Patient is a 75y old  Male who presents with a chief complaint of R M1 occlusion vs stenosis (27 Aug 2020 13:51)      Any change in ROS: Breathing wise he is doing ok : no SOB : no wheezing:  on room air     MEDICATIONS  (STANDING):  artificial tears (preservative free) Ophthalmic Solution 1 Drop(s) Both EYES daily  aspirin 325 milliGRAM(s) Oral daily  atorvastatin 80 milliGRAM(s) Oral at bedtime  clopidogrel Tablet 75 milliGRAM(s) Oral daily  enoxaparin Injectable 40 milliGRAM(s) SubCutaneous daily  lidocaine   Patch 1 Patch Transdermal every 24 hours  midodrine. 5 milliGRAM(s) Oral <User Schedule>  polyethylene glycol 3350 17 Gram(s) Oral daily  senna 2 Tablet(s) Oral at bedtime    MEDICATIONS  (PRN):  acetaminophen   Tablet .. 650 milliGRAM(s) Oral every 6 hours PRN Temp greater or equal to 38C (100.4F), Mild Pain (1 - 3), Moderate Pain (4 - 6), Severe Pain (7 - 10)  metoclopramide Injectable 10 milliGRAM(s) IV Push three times a day PRN Hiccups    Vital Signs Last 24 Hrs  T(C): 36.4 (28 Aug 2020 08:00), Max: 36.9 (27 Aug 2020 20:57)  T(F): 97.6 (28 Aug 2020 08:00), Max: 98.5 (27 Aug 2020 20:57)  HR: 63 (28 Aug 2020 08:00) (62 - 73)  BP: 133/61 (28 Aug 2020 08:00) (122/61 - 156/70)  BP(mean): --  RR: 18 (28 Aug 2020 08:00) (14 - 20)  SpO2: 97% (28 Aug 2020 08:00) (94% - 98%)    I&O's Summary    27 Aug 2020 07:01  -  28 Aug 2020 07:00  --------------------------------------------------------  IN: 1290 mL / OUT: 465 mL / NET: 825 mL          Physical Exam:   GENERAL: NAD, well-groomed, well-developed  HEENT: DAMIAN/   Atraumatic, Normocephalic  ENMT: No tonsillar erythema, exudates, or enlargement; Moist mucous membranes, Good dentition, No lesions  NECK: Supple, No JVD, Normal thyroid  CHEST/LUNG: Clear to auscultaion  CVS: Regular rate and rhythm; No murmurs, rubs, or gallops  GI: : Soft, Nontender, Nondistended; Bowel sounds present  NERVOUS SYSTEM:  Alert & Oriented X3  EXTREMITIES:  - edema  LYMPH: No lymphadenopathy noted  SKIN: No rashes or lesions  ENDOCRINOLOGY: No Thyromegaly  PSYCH: Appropriate    Labs:                              13.4   8.71  )-----------( 294      ( 28 Aug 2020 05:11 )             39.6                         13.3   9.26  )-----------( 309      ( 26 Aug 2020 23:11 )             39.1                         13.2   7.86  )-----------( 274      ( 26 Aug 2020 06:12 )             39.0                         13.5   7.10  )-----------( 289      ( 25 Aug 2020 04:05 )             41.1     08-28    141  |  104  |  13  ----------------------------<  102<H>  3.9   |  26  |  0.68  08-26    137  |  102  |  11  ----------------------------<  125<H>  4.0   |  20<L>  |  0.63  08-26    140  |  104  |  11  ----------------------------<  88  3.8   |  23  |  0.61  08-25    134<L>  |  101  |  15  ----------------------------<  98  3.6   |  23  |  0.65    Ca    9.4      28 Aug 2020 05:11  Ca    9.6      26 Aug 2020 23:11  Phos  3.2     08-26  Mg     2.3     08-26      CAPILLARY BLOOD GLUCOSE              < from: MR Head No Cont (08.27.20 @ 21:29) >  n was performed using gated 2D phase contrast MR, imaged perpendicular to the vessel axis.  Images were post processed NOVA softwareand a NOVA flow study report is available.    Compared to the prior examination 8/25/2020 there is improved flow in the right middle cerebral artery after angioplasty and stenting. There is improved flow in the right middle cerebral artery branches.    Flow is as follows in milliliters/min.    , RMCA prox 96, RMCA distal 139, RM21 34, RM22 47, RACA 73, RACA2 71                            compared with 8/18/2020    , RMCA prox2 29, RMCA distal 29, RM21 12, Rm22 33, RACA 108, RACA2 88.    LICA 278, MWHD041, LM21 34, LM22 76, LACA 114, LACA2 53                           compared with 8/18/2020    LICA 205, LMCA 117, LM21 37, LM22 100, LACA 34, LACA2 43.      , RPCA 52, LPCA 70                          compared with prior    , RPCA 66, LPCA 64.l    IMPRESSION: Significant improvement in flow in the right middle cerebral artery and distal branches after stenting a high-grade stenosis compared with 8/25/2020. No new infarcts, less diffusion restriction.          < from: CT Chest No Cont (08.16.20 @ 14:54) >    EXAM:  CT CHEST                            PROCEDURE DATE:  08/16/2020            INTERPRETATION:  CLINICAL INFORMATION: Cough. Abnormality seen on chest CT scan. Possible emphysema. Stroke.    COMPARISON: None.    PROCEDURE:  CT of the Chest was performed without intravenous contrast.  Sagittal and coronal reformats were performed.    FINDINGS:    LUNGS AND AIRWAYS: Patent central airways.  Dependent atelectasis. Biapical paraseptal emphysematous change. Subcentimeter bleb in the right lower lobe.  PLEURA: No pleural effusion.  MEDIASTINUM AND RODOLFO: No lymphadenopathy.  VESSELS: Within normal limits.  HEART: Heart size is normal. No pericardial effusion.  CHEST WALL AND LOWER NECK: Within normal limits.  VISUALIZED UPPER ABDOMEN: Large calcified stones in the gallbladder.  BONES: Within normal limits.    IMPRESSION:    Apical paraseptal emphysema.                  ABDIFATAH KIRKPATRICK M.D., ATTENDING RADIOLOGIST  This document has been electronically signed. Aug 16 2020  4:44PM        < end of copied text >          GABE JIN MD, ATTENDING RADIOLOGIST  This document has been electronically signed. Aug 27 2020  5:13PM        < end of copied text >          RECENT CULTURES:        RESPIRATORY CULTURES:          Studies  Chest X-RAY  CT SCAN Chest   Venous Dopplers: LE:   CT Abdomen  Others

## 2020-08-28 NOTE — PROGRESS NOTE ADULT - SUBJECTIVE AND OBJECTIVE BOX
no new complaints     REVIEW OF SYSTEMS  Constitutional - No fever,  No fatigue  HEENT - No vertigo, No neck pain  Neurological - No headaches, No memory loss, + loss of strength, No numbness, No tremors  Skin - No rashes, No lesions   Musculoskeletal - No joint pain, No joint swelling, No muscle pain  Psychiatric - No depression, No anxiety    FUNCTIONAL PROGRESS  8/28 PT    Bed Mobility  Bed Mobility Training Scooting: minimum assist (75% patient effort);  1 person assist;  nonverbal cues (demo/gestures);  verbal cues  Bed Mobility Training Supine-to-Sit: minimum assist (75% patient effort);  1 person assist;  nonverbal cues (demo/gestures);  verbal cues  Bed Mobility Training Limitations: decreased strength;  impaired balance;  impaired coordination;  impaired motor control;  cognitive, decreased safety awareness    Sit-Stand Transfer Training  Transfer Training Sit-to-Stand Transfer: minimum assist (75% patient effort);  1 person assist;  nonverbal cues (demo/gestures);  verbal cues;  full weight-bearing   dilia walker;  on R  Transfer Training Stand-to-Sit Transfer: minimum assist (75% patient effort);  1 person assist;  nonverbal cues (demo/gestures);  verbal cues;  full weight-bearing   dilia walker;  on R  Sit-to-Stand Transfer Training Transfer Safety Analysis: decreased balance;  decreased sequencing ability;  decreased step length;  decreased weight-shifting ability;  decreased strength;  impaired balance;  impaired coordination;  impaired motor control;  cognitive, decreased safety awareness;  dilia walker    Gait Training  Gait Training: minimum assist (75% patient effort);  1 person assist;  nonverbal cues (demo/gestures);  verbal cues;  full weight-bearing   dilia walker;  40ft x2  Gait Analysis: 3-point gait   decreased moises;  shuffling;  decreased step length;  decreased stride length;  decreased toe clearance;  decreased weight-shifting ability;  decreased strength;  impaired balance;  impaired coordination;  impaired motor control;  cognitive, decreased safety awareness;  40ft x2;  dilia walker    Therapeutic Exercise  Therapeutic Exercise Detail: Pt davy seated balance with fair trunk control. Pt dvay seated therex: marches, knee ext & calf raises.     8/28 OT    Bed Mobility  Bed Mobility Training Bridging: minimum assist (75% patient effort);  1 person assist;  nonverbal cues (demo/gestures);  verbal cues  Bed Mobility Training Sit-to-Supine: minimum assist (75% patient effort);  1 person assist;  nonverbal cues (demo/gestures);  verbal cues  Bed Mobility Training Supine-to-Sit: minimum assist (75% patient effort);  1 person assist;  nonverbal cues (demo/gestures);  verbal cues  Bed Mobility Training Limitations: decreased strength;  impaired balance;  impaired motor control;  decreased ROM    Sit-Stand Transfer Training  Transfer Training Sit-to-Stand Transfer: minimum assist (75% patient effort);  1 person assist;  nonverbal cues (demo/gestures);  verbal cues  Transfer Training Stand-to-Sit Transfer: minimum assist (75% patient effort);  1 person assist;  nonverbal cues (demo/gestures);  verbal cues  Sit-to-Stand Transfer Training Transfer Safety Analysis: decreased balance;  decreased cognition;  decreased weight-shifting ability;  decreased strength;  impaired balance;  decreased ROM;  impaired motor control;  impaired postural control    Therapeutic Exercise  Therapeutic Exercise Detail: Pt tolerated extensive BUE AROM, with focus on LUE motor control and ROM. Functionally utilized during tasks with difficulty, tolerated PNF patterns and grasp/release     Fine Motor Coordination Training  Fine Motor Coordination Training Detail: Pt completed fine motor tasks via isolated digit tasks, stretching, joint mobilization. Main focus on digit extension.     Lower Body Dressing Training  Lower Body Dressing Training Assistance: moderate assist (50% patient effort);  1 person assist;  nonverbal cues (demo/gestures);  verbal cues;  decreased strength;  decreased ROM;  impaired motor control;  impaired postural control;  cognitive, decreased safety awareness        VITALS  T(C): 36.4 (08-28-20 @ 12:19), Max: 36.9 (08-27-20 @ 20:57)  HR: 72 (08-28-20 @ 12:19) (62 - 72)  BP: 120/75 (08-28-20 @ 12:19) (120/75 - 156/70)  RR: 18 (08-28-20 @ 12:19) (18 - 18)  SpO2: 97% (08-28-20 @ 12:19) (94% - 98%)  Wt(kg): --    MEDICATIONS   acetaminophen   Tablet .. 650 milliGRAM(s) every 6 hours PRN  artificial tears (preservative free) Ophthalmic Solution 1 Drop(s) daily  aspirin 325 milliGRAM(s) daily  atorvastatin 80 milliGRAM(s) at bedtime  clopidogrel Tablet 75 milliGRAM(s) daily  enoxaparin Injectable 40 milliGRAM(s) daily  lidocaine   Patch 1 Patch every 24 hours  polyethylene glycol 3350 17 Gram(s) daily  senna 2 Tablet(s) at bedtime      RECENT LABS - Reviewed                        13.4   8.71  )-----------( 294      ( 28 Aug 2020 05:11 )             39.6     08-28    141  |  104  |  13  ----------------------------<  102<H>  3.9   |  26  |  0.68    Ca    9.4      28 Aug 2020 05:11  Phos  3.2     08-26  Mg     2.3     08-26        ----------------------------------------------------------------------------------  PHYSICAL EXAM  Constitutional - NAD, Comfortable, seen during PT    HEENT - NCAT, EOMI  Neck - Supple, No limited ROM  Chest - Breathing comfortably, equal chest rise  Cardiovascular - well perfused  Abdomen - Soft   Extremities - No C/C/E, No calf tenderness   Neurologic Exam -                    Cognitive - Awake, Alert, AAO to self, place, date, year, situation     Communication - Fluent, No dysarthria     Cranial Nerves - +left facial droop, left hemineglect      Motor -                     LEFT    UE - ShAB 4/5, EF 4/5, EE 3/5, WE 3/5                    RIGHT UE - ShAB 5/5, EF 5/5, EE 5/5, WE 5/5,  5/5                    LEFT    LE - HF 5/5, KE 5/5, DF 5/5, PF 5/5                    RIGHT LE - HF 5/5, KE 5/5, DF 5/5, PF 5/5        Sensory - Intact to LT     Reflexes - DTR Intact, No primitive reflexes  Psychiatric - Mood stable, Affect WNL  ----------------------------------------------------------------------------------------  ASSESSMENT/PLAN  75y Male with functional deficits after right MCA ischemic CVA.  CVA - permissive HTN with midodrine/IVF per neuro. DAPT x3mos per SAMPRISS followed by ASA indefinitely.  c/w statin.   Dsyphagia - nectar thick liquids, SLP follow up   Pain - tylenol   DVT PPX - lovenox  Rehab -   continue bedside PT/OT/SLP  transfers, ambulation x 40 feet with min assist  awaiting marjorie bed   When medically stable, recommend ACUTE inpatient rehabilitation for the functional deficits consisting of 3 hours of therapy/day & 24 hour RN/daily PMR physician for comorbid medical management. Will continue to follow for ongoing rehab needs and recommendations.  ELOS 14 days, LTG: supervision with transfers, ambulation

## 2020-08-28 NOTE — CHART NOTE - NSCHARTNOTEFT_GEN_A_CORE
74 y/o RH man with a PMH of HTN not on antithrombotics who presented to the ED with an acute one day history of headache and altered mental status. Pt with R M1 occlusion vs stenosis. Pt with some left neglect and dysarthria, started on baclofen PRN hiccups, repeat CTH and CTA without acute change. Initial MRI Brain showed moderate sized right corona radiata/centrum semiovale internal borderzone infarct.  Repeat MRI with increase in RMCA infarcts. ENT consulted as recommendations per S&S and Pulm for hoarse voice r/o vocal cord paralysis. ENT bedside FOE revealed patent airway and vocal cords mobile with good contact b/l - possible small glottic endy inferiorly, no further recommendations, no further ENT intervention required.   MBS 8/18/20 revealed an oropharyngeal dysphagia notable for reduced L labial seal with intermittent anterior loss, reduced oral management, uncontrolled A-P spillover of liquids to the hypopharynx, laryngeal penetration with retrieval for nectar-thick liquids, and laryngeal penetration/aspiration with thin liquids. Chin tuck posture exacerbated. Disorders: Reduced L labial seal, reduced lingual strength/ROM/Rate of motion, reduced tongue to palate contact, reduced velopharyngeal closure, delay in trigger of the swallow reflex, sluggish epiglottic retroflexion, reduced laryngeal closure, reduced supraglottic sensation, reduced subglottic sensation. Recommendations included Dysphagia 2 with Nectar-thick liquids. 1) Close supervision with meals, 2) Crush meds or provide via alternate source, 3) Small single bites and sips at slow rate, 4) Aspiration precautions.   8/20: Pt reportedly not tolerating PO medications, re-evaluation of swallow requested. Pt with prolonged and inefficient mastication of mechanical soft texture with pieces of unchewed material in oral cavity most notable in left lateral sulci and posterior portion of oral cavity. Liquid wash inconsistently reduced oral residue. Pt with improved oral phase of the swallow with puree texture. Downgraded to Dysphagia 1 with Nectar-thick liquids.  8/26 s/p cerebral angiogram: s/p RMCA az stent.    Assessment:  Pt seen at bedside to monitor for potential upgrade of chewables. Pt was administered PO trials of soft and hard solids and nectar-thick liquids. Pt noted to have improved awareness of L anterior loss, wiping mouth spontaneously, asking for drink and using liquid wash spontaneously. Pt currently presents with oral phase and awareness that appear somewhat improved from last assessment, with prolonged yet efficient mastication, with piecemeal deglutition, and mild lingual residue. Pt benefits from liquid wash/alternation of textures to improve oral clearance, which he was observed to use without cues.    Recommendations:   Upgrade to Dysphagia III with nectar thick liquids.   MD/team Please enter the following as notes to RN: 1) Close supervision with meals, 2) Crush meds or provide via alternate source, 3) Small single bites and sips at slow rate, 4) Alternate food and liquid to aid in clearance of oral residue. 5) Allow for swallows between intakes. 6) Upright posture (90 degrees) during meals and for 30 minutes after. 7) Aspiration precautions. Monitor for s/s aspiration/laryngeal penetration. If noted:  D/C p.o. intake, provide non-oral nutrition/hydration/meds  Strict aspiration and reflux precautions!  Maintain good oral hygiene.   This service will continue to follow.   MBS to instrumentally assess for possible upgrade of liquids.    Findings were discussed with patient, RN CORNEL Oliva.    Tisha Chang MA, CCC-SLP  Speech-Language Pathologist  pager #[phone]   [phone]

## 2020-08-28 NOTE — PROGRESS NOTE ADULT - SUBJECTIVE AND OBJECTIVE BOX
Patient is a 75y old  Male who presents with a chief complaint of R M1 occlusion vs stenosis (28 Aug 2020 13:31)      SUBJECTIVE / OVERNIGHT EVENTS:  No chest pain. No shortness of breath. No complaints. No events overnight.     MEDICATIONS  (STANDING):  artificial tears (preservative free) Ophthalmic Solution 1 Drop(s) Both EYES daily  aspirin 325 milliGRAM(s) Oral daily  atorvastatin 80 milliGRAM(s) Oral at bedtime  clopidogrel Tablet 75 milliGRAM(s) Oral daily  enoxaparin Injectable 40 milliGRAM(s) SubCutaneous daily  lidocaine   Patch 1 Patch Transdermal every 24 hours  polyethylene glycol 3350 17 Gram(s) Oral daily  senna 2 Tablet(s) Oral at bedtime    MEDICATIONS  (PRN):  acetaminophen   Tablet .. 650 milliGRAM(s) Oral every 6 hours PRN Temp greater or equal to 38C (100.4F), Mild Pain (1 - 3), Moderate Pain (4 - 6), Severe Pain (7 - 10)      Vital Signs Last 24 Hrs  T(C): 36.4 (28 Aug 2020 12:19), Max: 36.9 (27 Aug 2020 20:57)  T(F): 97.6 (28 Aug 2020 12:19), Max: 98.5 (27 Aug 2020 20:57)  HR: 72 (28 Aug 2020 12:19) (62 - 72)  BP: 120/75 (28 Aug 2020 12:19) (120/75 - 156/70)  BP(mean): --  RR: 18 (28 Aug 2020 12:19) (18 - 18)  SpO2: 97% (28 Aug 2020 12:19) (94% - 98%)  CAPILLARY BLOOD GLUCOSE        I&O's Summary    27 Aug 2020 07:01  -  28 Aug 2020 07:00  --------------------------------------------------------  IN: 1290 mL / OUT: 465 mL / NET: 825 mL        PHYSICAL EXAM:  GENERAL: NAD, well-developed  HEAD:  Atraumatic, Normocephalic  EYES: EOMI, PERRLA, conjunctiva and sclera clear  NECK: Supple, No JVD  CHEST/LUNG: Clear to auscultation bilaterally; No wheeze  HEART: Regular rate and rhythm; No murmurs, rubs, or gallops  ABDOMEN: Soft, Nontender, Nondistended; Bowel sounds present  EXTREMITIES:  2+ Peripheral Pulses, No clubbing, cyanosis, or edema  PSYCH: AAOx3  NEUROLOGY: non-focal  SKIN: No rashes or lesions    LABS:                        13.4   8.71  )-----------( 294      ( 28 Aug 2020 05:11 )             39.6     08-28    141  |  104  |  13  ----------------------------<  102<H>  3.9   |  26  |  0.68    Ca    9.4      28 Aug 2020 05:11  Phos  3.2     08-26  Mg     2.3     08-26                RADIOLOGY & ADDITIONAL TESTS:    Imaging Personally Reviewed:    Consultant(s) Notes Reviewed:      Care Discussed with Consultants/Other Providers:

## 2020-08-28 NOTE — PROGRESS NOTE ADULT - SUBJECTIVE AND OBJECTIVE BOX
Subjective: Patient seen and examined. No new events except as noted.   Moved out of NSCU   no cp or sob     REVIEW OF SYSTEMS:    CONSTITUTIONAL: No weakness, fevers or chills  EYES/ENT: No visual changes;  No vertigo or throat pain   NECK: No pain or stiffness  RESPIRATORY: No cough, wheezing, hemoptysis; No shortness of breath  CARDIOVASCULAR: No chest pain or palpitations  GASTROINTESTINAL: No abdominal or epigastric pain. No nausea, vomiting, or hematemesis; No diarrhea or constipation. No melena or hematochezia.  GENITOURINARY: No dysuria, frequency or hematuria  NEUROLOGICAL: No numbness or weakness  SKIN: No itching, burning, rashes, or lesions   All other review of systems is negative unless indicated above.    MEDICATIONS:  MEDICATIONS  (STANDING):  artificial tears (preservative free) Ophthalmic Solution 1 Drop(s) Both EYES daily  aspirin 325 milliGRAM(s) Oral daily  atorvastatin 80 milliGRAM(s) Oral at bedtime  clopidogrel Tablet 75 milliGRAM(s) Oral daily  enoxaparin Injectable 40 milliGRAM(s) SubCutaneous daily  lidocaine   Patch 1 Patch Transdermal every 24 hours  midodrine. 5 milliGRAM(s) Oral <User Schedule>  polyethylene glycol 3350 17 Gram(s) Oral daily  senna 2 Tablet(s) Oral at bedtime      PHYSICAL EXAM:  T(C): 36.4 (08-28-20 @ 08:00), Max: 36.9 (08-27-20 @ 20:57)  HR: 63 (08-28-20 @ 08:00) (62 - 73)  BP: 133/61 (08-28-20 @ 08:00) (122/61 - 156/70)  RR: 18 (08-28-20 @ 08:00) (14 - 20)  SpO2: 97% (08-28-20 @ 08:00) (94% - 98%)  Wt(kg): --  I&O's Summary    27 Aug 2020 07:01  -  28 Aug 2020 07:00  --------------------------------------------------------  IN: 1290 mL / OUT: 465 mL / NET: 825 mL          Appearance: NAD	  HEENT:   Dry  oral mucosa, PERRL, EOMI	  Lymphatic: No lymphadenopathy  Cardiovascular: Normal S1 S2, No JVD, No murmurs, No edema  Respiratory: Lungs clear to auscultation	  Psychiatry: A & O x 3, Mood & affect appropriate  Gastrointestinal:  Soft, Non-tender, + BS	  Skin: No rashes, No ecchymoses, No cyanosis	  Extremities: Normal range of motion, No clubbing, cyanosis or edema  Vascular: Peripheral pulses palpable 2+ bilaterally  - Cranial Nerves II-XII:  VFF, EOMI, PERRL (3mm OD, OS), V1-V3 intact, some nasolabial flattening on the left side of face, t/p midline, SCM/trap intact.  	- Fundoscopic examination: upon fundoscopic examination grossly clear margins of optic disc & cup  	- Motor: Pronator drift present on the left side. demonstrates orbiting around the left arm. Strength left arm 4+/5 with  strength 4/5. Left leg 4+/5 hip flexors/extensors with 5/5 knee flexion/ext dorsi/plantarflexion. right arm and leg 5/5. Normal muscle bulk and tone throughout. Neck flexion/extension 5/5 without neck stiffness  	- Sensory:  Intact to light touch and PP bilaterally throughout.  	- Coordination:  FTN demonstrated mild dysmetria in proportion to weakness on left arm, intact on right  - Gait: patient able to stand up on his own, ambulate several steps without wide based gait, not requiring assistance.        LABS:    CARDIAC MARKERS:                                13.4   8.71  )-----------( 294      ( 28 Aug 2020 05:11 )             39.6     08-28    141  |  104  |  13  ----------------------------<  102<H>  3.9   |  26  |  0.68    Ca    9.4      28 Aug 2020 05:11  Phos  3.2     08-26  Mg     2.3     08-26      proBNP:   Lipid Profile:   HgA1c:   TSH:             TELEMETRY: 	SR    ECG:  	  RADIOLOGY:   DIAGNOSTIC TESTING:  [ ] Echocardiogram:  [ ]  Catheterization:  [ ] Stress Test:    OTHER:

## 2020-08-28 NOTE — PROGRESS NOTE ADULT - ASSESSMENT
74 y/o RH man with a PMH of HTN not on antithrombotics who presented to the ED with an acute one day history of headache and altered mental status. Neurological examination demonstrates mild dysarthria with mild left nasolabial flattening with left arm weakness. CT/CTA per Neuroradiology demonstrates stenosis vs occlusion R M1. Patient out of window for tpa.    CVA  - workup and plan as per neurology  - s/p cerebral angiography and stenting  - ASA/plavix  - lipitor  - PT/OT  - swallow eval - puree diet      HLD  -   - c/w lipitor    emphysema  - CT chest without contrast done  - pulm following    constipation  - miralax daily  - senna qhs  - lactulose x 1    DVT px  - lovenox

## 2020-08-28 NOTE — PROGRESS NOTE ADULT - SUBJECTIVE AND OBJECTIVE BOX
SUBJECTIVE: Patient seen and examined at bedside. No acute overnight events. Patient denies chest pain, shortness of breath, nausea/vomiting, headache. Complains of some right inferior thigh pain and is requesting lidocaine patch.     Vital Signs Last 24 Hrs  T(C): 36.4 (08-28-20 @ 08:00), Max: 36.9 (08-27-20 @ 20:57)  T(F): 97.6 (08-28-20 @ 08:00), Max: 98.5 (08-27-20 @ 20:57)  HR: 63 (08-28-20 @ 08:00) (62 - 73)  BP: 133/61 (08-28-20 @ 08:00) (122/61 - 156/70)  RR: 18 (08-28-20 @ 08:00) (18 - 20)  SpO2: 97% (08-28-20 @ 08:00) (94% - 98%)    LABS:                                   13.4   8.71  )-----------( 294      ( 28 Aug 2020 05:11 )             39.6     08-28    141  |  104  |  13  ----------------------------<  102<H>  3.9   |  26  |  0.68    Ca    9.4      28 Aug 2020 05:11  Phos  3.2     08-26  Mg     2.3     08-26      IMAGING:       MR Angio Head No Cont (08.25.200  Severe stenosis of the mid to distal right M1 segment of the middle cerebral artery without change in flow proximal or distal to the stenosis. Decreased flow in the right middle cerebral artery branches in the sylvian fissure compared with the left.    MRI Head No Cont (08.18.2020) Interval apparent increase in the infarcts in the right MCA territory involving the right centrum semiovale and corona radiata as well as the right temporal lobe compared to prior MRI exam 8/15/2020.  No acute intracranial hemorrhage. Occlusion right MCA as seen on CTA exam    CT Angio Head w/ IV Cont (08.17.20) Redemonstration of near complete occlusion of the M1 segment of the right MCA.  2 mm conical outpouching arising from the left supraclinoid ICA directed inferiorly suggesting an infundibulum versus small aneurysm (8:39).     CT head 08.17.20 : Redemonstration of acute/subacute right MCA territory infarct.    MRI Brain w/o (08/15/20): Acute infarct in the right MCA territory somewhat patchy involving the right lateral temporal lobe as well as the right corona radiata/centrum semiovale ovale region in a somewhat junctional pattern, deep internal border zone type pattern. No significant mass effect. No associated hemorrhage    CT Head No Cont. (08/14/20): There is no acute intracranial hemorrhage. No focal edema or evidence of acute, transcortical infarct. No hydrocephalus, midline shift or mass effect.    CT Angio Head w/IV Cont. (08/14/20): 6 mm in length severe stenosis/near occlusion of the M1 segment of the Right MCA with reconstitution at the bifurcation.    CT Angio Neck w/IV Cont. (08/14/20): Patent cervical vasculature. No flow limiting stenosis or occlusion.      PHYSICAL EXAM:     General: No acute distress  Neuro: Awake, eyes open spontaneously, alert, oriented to name, place and time, speech fluent,  following commands briskly, moving all extremities, left hemineglect, +LUE drift, LUE 4/5, LLE 5/5, RT side 5/5, sensation intact to light touch on right side and slightly decreased on left, pupils 3mm and reactive bilaterally, left gaze palsy, left homonymous hemianopia  Incision: +right groin site with some ecchymosis, no hematoma  Cardiovascular: S1, S2 regular  Respiratory: Lungs clear to auscultation  Abdomen: Soft, nontender, nondistended   Extremities: No cyanosis, clubbing, or edema    MEDICATIONS  (STANDING):  artificial tears (preservative free) Ophthalmic Solution 1 Drop(s) Both EYES daily  aspirin 325 milliGRAM(s) Oral daily  atorvastatin 80 milliGRAM(s) Oral at bedtime  clopidogrel Tablet 75 milliGRAM(s) Oral daily  enoxaparin Injectable 40 milliGRAM(s) SubCutaneous daily  lidocaine   Patch 1 Patch Transdermal every 24 hours  polyethylene glycol 3350 17 Gram(s) Oral daily  senna 2 Tablet(s) Oral at bedtime    MEDICATIONS  (PRN):  acetaminophen   Tablet .. 650 milliGRAM(s) Oral every 6 hours PRN Temp greater or equal to 38C (100.4F), Mild Pain (1 - 3), Moderate Pain (4 - 6), Severe Pain (7 - 10)      DIET: Dysphagia 1 pureed with NTL

## 2020-08-28 NOTE — PROGRESS NOTE ADULT - ASSESSMENT
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 stenosis    REC    No need for bronchodilators at this time  Monitor resp status    8/24:  came after a week:  Dr Junior was covering me:  ct chest noted  he is not SOB and not wheezy:  his oxygenation on room air is good:  no acuter intervention from pulmonary side:  stroke per neurology :  angela pa: will follow prn if necessary    8/27    doing pretty good: events noted:  on room air: has copd: but no wheezing or SOB :  no intervention from pulm side:   DVT proaphyxlis  ANGELA PMD at NS    8/28:  hei s doing very well: suprisingly he has emphysema on ct chest :  but he never sm oked:  He did have exposure to second hand smoke:  since he is aymptomatic: no need for BD at this time:  Latest MRI brain noted:  will follow prn now:  angela pt

## 2020-08-28 NOTE — PROGRESS NOTE ADULT - ASSESSMENT
HPI: Patient is a 75-year-old right-handed gentleman first evaluated at [facility] on 8/15/2020 with left hemiparesis. On 8/14/2020 he developed some type of change in mental status along with headache, and had been dropping objects, most likely due to left hemiparesis. CT head (8/14/2020) showed moderate-severe periventricular chronic ischemic changes.  There was a possible subacute right corona radiata infarct, perhaps more likely part and parcel of the chronic ischemic changes. CTA neck and head (8/14/2020) showed severe right M1 stenosis.  The left vertebral artery was dominant.  Incidentally noted were emphysematous lung changes. 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  The right M1 stenosis is most likely due to large artery atherosclerosis, although an embolus with partial lysis cannot be ruled out with certainty (embolic stroke of undetermined source).       PROCEDURE: Admitted 8/14 RT M1 stenosis; 8/26 s/p cerebral angiogram: s/p RMCA az stent     PLAN:     NEURO:  - Continue Aspirin 325mg and Plavix 75mg daily for stent patency  - P2Y12 125 today  - Pain control with tylenol PRN  - Mobilize as tolerated    PULM:  - Incentive spirometry, mobilize as tolerated  -  Incidental finding on CTA H/N emphysematous changes. Pulmonary consulted, Dr. Zelaya following -. CT Chest 8/16 showed apical paraseptal emphysema, no need for bronchodilator at this time     CV:  - Continue Lipitor for HLD   - Midodrine discontinued today; was weaned to 5 mg Q12hr yesterday  - TTE with no evidence of PFO, normal BiV function, no significant valvulopathy  - Cardiology follow up appreciated    RENAL:  - IVL    GI:  - Tolerating dysphagia 1 pureed diet; repeat SLP eval for diet upgrade today  - GI prophylaxis not indicated  - Continue senna for bowel regimen    ENDO:   - Goal euglycemia (-180)    HEME/ONC:  - VTE prophylaxis: [x] SCDs [x] chemoprophylaxis [] hold chemoprophylaxis due to: [] high risk of DVT/PE on admission due to:    ID:  - afebrile    DISPOSITION:   - PT/OT/PMR - Acute Rehab. Uninsured- social work following.     - will discuss with Dr. Gurmeet solorzano # [phone]

## 2020-08-29 LAB
PA ADP PRP-ACNC: 13 PRU — LOW (ref 194–417)
PA ADP PRP-ACNC: 15 PRU — LOW (ref 194–417)

## 2020-08-29 PROCEDURE — 99232 SBSQ HOSP IP/OBS MODERATE 35: CPT

## 2020-08-29 RX ORDER — ENOXAPARIN SODIUM 100 MG/ML
40 INJECTION SUBCUTANEOUS DAILY
Refills: 0 | Status: DISCONTINUED | OUTPATIENT
Start: 2020-08-29 | End: 2020-09-02

## 2020-08-29 RX ADMIN — SENNA PLUS 2 TABLET(S): 8.6 TABLET ORAL at 21:57

## 2020-08-29 RX ADMIN — Medication 325 MILLIGRAM(S): at 11:05

## 2020-08-29 RX ADMIN — LIDOCAINE 1 PATCH: 4 CREAM TOPICAL at 07:07

## 2020-08-29 RX ADMIN — ENOXAPARIN SODIUM 40 MILLIGRAM(S): 100 INJECTION SUBCUTANEOUS at 11:05

## 2020-08-29 RX ADMIN — LIDOCAINE 1 PATCH: 4 CREAM TOPICAL at 05:40

## 2020-08-29 RX ADMIN — ATORVASTATIN CALCIUM 80 MILLIGRAM(S): 80 TABLET, FILM COATED ORAL at 21:57

## 2020-08-29 RX ADMIN — CLOPIDOGREL BISULFATE 75 MILLIGRAM(S): 75 TABLET, FILM COATED ORAL at 11:05

## 2020-08-29 RX ADMIN — Medication 1 DROP(S): at 21:58

## 2020-08-29 RX ADMIN — LIDOCAINE 1 PATCH: 4 CREAM TOPICAL at 17:24

## 2020-08-29 NOTE — PROGRESS NOTE ADULT - SUBJECTIVE AND OBJECTIVE BOX
HPI:  Patient is a 75 year old male with history of hypertension that presented to the ED with headache and altered mental status.  Found to have severe right M1 stenosis.  Admitted for further management.    OVERNIGHT EVENTS:  No acute events overnight.  Feels well, tolerating diet.  Speech and swallow following, planned for MBS on monday for diet upgrade.      Vital Signs Last 24 Hrs  T(C): 36.4 (29 Aug 2020 07:52), Max: 36.9 (28 Aug 2020 16:34)  T(F): 97.6 (29 Aug 2020 07:52), Max: 98.5 (28 Aug 2020 16:34)  HR: 67 (29 Aug 2020 07:52) (58 - 72)  BP: 144/69 (29 Aug 2020 07:52) (118/52 - 144/69)  BP(mean): --  RR: 18 (29 Aug 2020 07:52) (18 - 18)  SpO2: 97% (29 Aug 2020 07:52) (97% - 98%)    I&O's Detail    28 Aug 2020 07:01  -  29 Aug 2020 07:00  --------------------------------------------------------  IN:    Oral Fluid: 360 mL  Total IN: 360 mL    OUT:    Voided: 400 mL  Total OUT: 400 mL    Total NET: -40 mL        I&O's Summary    28 Aug 2020 07:01  -  29 Aug 2020 07:00  --------------------------------------------------------  IN: 360 mL / OUT: 400 mL / NET: -40 mL        PHYSICAL EXAM:  Neurological: awake, alert, oriented x3, follows commands, speech clear and fluent, perrl, eomi, left homonymous hemianopsia, mild left facial palsy, tongue midline, mild LUE drift, LUE 4/5 throughout, RUE 5/5 throughout, b/l LE 5/5 throughout, sensation present, intact, equal throughout    Cardiovascular: +s1, s2  Respiratory: clear to auscultation b/l  Gastrointestinal: soft, non-distended, non-tender  Genitourinary: +voiding  Extremities: +dp/pt pulses palpable b/l  Incision/Wound: right groin puncture site mild ecchymosis but c/d/i, no bleeding/drainage    TUBES/LINES:  [x] none    DIET:  [x] dysphagia 3 soft nectar consistency fluid      LABS:                        13.4   8.71  )-----------( 294      ( 28 Aug 2020 05:11 )             39.6     08-28    141  |  104  |  13  ----------------------------<  102<H>  3.9   |  26  |  0.68    Ca    9.4      28 Aug 2020 05:11            Allergies    apple (Vomiting; Nausea)  No Known Drug Allergies          MEDICATIONS:  Antibiotics:  none    Neuro:  acetaminophen   Tablet .. 650 milliGRAM(s) Oral every 6 hours PRN  aspirin 325 milliGRAM(s) Oral daily    Anticoagulation:  clopidogrel Tablet 75 milliGRAM(s) Oral daily  enoxaparin Injectable 40 milliGRAM(s) SubCutaneous daily    OTHER:  artificial tears (preservative free) Ophthalmic Solution 1 Drop(s) Both EYES daily  artificial tears (preservative free) Ophthalmic Solution 1 Drop(s) Both EYES every 1 hour PRN  atorvastatin 80 milliGRAM(s) Oral at bedtime  lidocaine   Patch 1 Patch Transdermal every 24 hours  polyethylene glycol 3350 17 Gram(s) Oral daily  senna 2 Tablet(s) Oral at bedtime    IVF:  none    CULTURES:  Culture Results:   No growth (08-16 @ 11:33)  Culture Results:   No Growth Final (08-16 @ 08:05)    RADIOLOGY & ADDITIONAL TESTS:  no new imaging

## 2020-08-29 NOTE — PROGRESS NOTE ADULT - SUBJECTIVE AND OBJECTIVE BOX
Patient is a 75y old  Male who presents with a chief complaint of R M1 stenosis (29 Aug 2020 09:39)      SUBJECTIVE / OVERNIGHT EVENTS:  No chest pain. No shortness of breath. No complaints. No events overnight.     MEDICATIONS  (STANDING):  artificial tears (preservative free) Ophthalmic Solution 1 Drop(s) Both EYES daily  aspirin 325 milliGRAM(s) Oral daily  atorvastatin 80 milliGRAM(s) Oral at bedtime  clopidogrel Tablet 75 milliGRAM(s) Oral daily  enoxaparin Injectable 40 milliGRAM(s) SubCutaneous daily  lidocaine   Patch 1 Patch Transdermal every 24 hours  polyethylene glycol 3350 17 Gram(s) Oral daily  senna 2 Tablet(s) Oral at bedtime    MEDICATIONS  (PRN):  acetaminophen   Tablet .. 650 milliGRAM(s) Oral every 6 hours PRN Temp greater or equal to 38C (100.4F), Mild Pain (1 - 3), Moderate Pain (4 - 6), Severe Pain (7 - 10)  artificial tears (preservative free) Ophthalmic Solution 1 Drop(s) Both EYES every 1 hour PRN Dry Eyes      Vital Signs Last 24 Hrs  T(C): 36.4 (29 Aug 2020 07:52), Max: 36.9 (28 Aug 2020 16:34)  T(F): 97.6 (29 Aug 2020 07:52), Max: 98.5 (28 Aug 2020 16:34)  HR: 67 (29 Aug 2020 07:52) (58 - 72)  BP: 144/69 (29 Aug 2020 07:52) (118/52 - 144/69)  BP(mean): --  RR: 18 (29 Aug 2020 07:52) (18 - 18)  SpO2: 97% (29 Aug 2020 07:52) (97% - 98%)  CAPILLARY BLOOD GLUCOSE        I&O's Summary    28 Aug 2020 07:01  -  29 Aug 2020 07:00  --------------------------------------------------------  IN: 360 mL / OUT: 400 mL / NET: -40 mL        PHYSICAL EXAM:  GENERAL: NAD, well-developed  HEAD:  Atraumatic, Normocephalic  EYES: EOMI, PERRLA, conjunctiva and sclera clear  NECK: Supple, No JVD  CHEST/LUNG: Clear to auscultation bilaterally; No wheeze  HEART: Regular rate and rhythm; No murmurs, rubs, or gallops  ABDOMEN: Soft, Nontender, Nondistended; Bowel sounds present  EXTREMITIES:  2+ Peripheral Pulses, No clubbing, cyanosis, or edema  PSYCH: AAOx3  NEUROLOGY: non-focal  SKIN: No rashes or lesions    LABS:                        13.4   8.71  )-----------( 294      ( 28 Aug 2020 05:11 )             39.6     08-28    141  |  104  |  13  ----------------------------<  102<H>  3.9   |  26  |  0.68    Ca    9.4      28 Aug 2020 05:11                RADIOLOGY & ADDITIONAL TESTS:    Imaging Personally Reviewed:    Consultant(s) Notes Reviewed:      Care Discussed with Consultants/Other Providers:

## 2020-08-29 NOTE — PROGRESS NOTE ADULT - ASSESSMENT
HPI:  Patient is a 75 year old male with history of hypertension that presented to the ED with headache and altered mental status.  Found to have severe right M1 stenosis.  Admitted for further management.    PROCEDURE: s/p cerebral angiogram and right MCA stent for stenosis  PAD#3    PLAN:  Neuro:   -q4 hour neuro checks  -tylenol and lidocaine patch for pain control  -aspirin and plavix for new MCA stent  -out of bed with assistance  -pt/ot - acute rehab upon discharge    Respiratory:   -keep O2 sat > 94%  -incentive spirometer for lung expansion    CV:  -keep sbp 100-140  -atorvastatin for lipid control    Endocrine:   -maintain euglycemia    Heme/Onc:    -lovenox and SCDs for DVT prophylaxis    Renal:   -voiding    ID:   -afebrile  -normal wbc count    GI:   -dysphagia 3 soft nectar consistency fluid  -senna and miralax for bowel regimen      Spectra #[id number]

## 2020-08-29 NOTE — PROGRESS NOTE ADULT - ASSESSMENT
76 y/o RH man with a PMH of HTN not on antithrombotics who presented to the ED with an acute one day history of headache and altered mental status. Neurological examination demonstrates mild dysarthria with mild left nasolabial flattening with left arm weakness. CT/CTA per Neuroradiology demonstrates stenosis vs occlusion R M1. Patient out of window for tpa.    CVA  - workup and plan as per neurology  - s/p cerebral angiography and stenting  - ASA/plavix  - lipitor  - PT/OT  - swallow eval - puree diet      HLD  -   - c/w lipitor    emphysema  - CT chest without contrast done  - pulm following    constipation  - miralax daily  - senna qhs  - lactulose x 1    DVT px  - lovenox

## 2020-08-29 NOTE — PROGRESS NOTE ADULT - SUBJECTIVE AND OBJECTIVE BOX
Subjective: Patient seen and examined. No new events except as noted.     REVIEW OF SYSTEMS:  Unable to obtain       MEDICATIONS:  MEDICATIONS  (STANDING):  artificial tears (preservative free) Ophthalmic Solution 1 Drop(s) Both EYES daily  aspirin 325 milliGRAM(s) Oral daily  atorvastatin 80 milliGRAM(s) Oral at bedtime  clopidogrel Tablet 75 milliGRAM(s) Oral daily  enoxaparin Injectable 40 milliGRAM(s) SubCutaneous daily  lidocaine   Patch 1 Patch Transdermal every 24 hours  polyethylene glycol 3350 17 Gram(s) Oral daily  senna 2 Tablet(s) Oral at bedtime      PHYSICAL EXAM:  T(C): 36.9 (08-29-20 @ 20:36), Max: 36.9 (08-29-20 @ 20:36)  HR: 67 (08-29-20 @ 20:36) (58 - 67)  BP: 145/64 (08-29-20 @ 20:36) (118/60 - 145/64)  RR: 18 (08-29-20 @ 20:36) (18 - 18)  SpO2: 97% (08-29-20 @ 20:36) (97% - 98%)  Wt(kg): --  I&O's Summary    28 Aug 2020 07:01  -  29 Aug 2020 07:00  --------------------------------------------------------  IN: 360 mL / OUT: 400 mL / NET: -40 mL    29 Aug 2020 07:01  -  29 Aug 2020 22:44  --------------------------------------------------------  IN: 480 mL / OUT: 300 mL / NET: 180 mL        Appearance: NAD	  HEENT:   Dry  oral mucosa, PERRL, EOMI	  Lymphatic: No lymphadenopathy  Cardiovascular: Normal S1 S2, No JVD, No murmurs, No edema  Respiratory: Lungs clear to auscultation	  Psychiatry: A & O x 3, Mood & affect appropriate  Gastrointestinal:  Soft, Non-tender, + BS	  Skin: No rashes, No ecchymoses, No cyanosis	  Extremities: Normal range of motion, No clubbing, cyanosis or edema  Vascular: Peripheral pulses palpable 2+ bilaterally  - Cranial Nerves II-XII:  VFF, EOMI, PERRL (3mm OD, OS), V1-V3 intact, some nasolabial flattening on the left side of face, t/p midline, SCM/trap intact.  	- Fundoscopic examination: upon fundoscopic examination grossly clear margins of optic disc & cup  	- Motor: Pronator drift present on the left side. demonstrates orbiting around the left arm. Strength left arm 4+/5 with  strength 4/5. Left leg 4+/5 hip flexors/extensors with 5/5 knee flexion/ext dorsi/plantarflexion. right arm and leg 5/5. Normal muscle bulk and tone throughout. Neck flexion/extension 5/5 without neck stiffness  	- Sensory:  Intact to light touch and PP bilaterally throughout.  	- Coordination:  FTN demonstrated mild dysmetria in proportion to weakness on left arm, intact on right  - Gait: patient able to stand up on his own, ambulate several steps without wide based gait, not requiring assistance.      LABS:    CARDIAC MARKERS:                                13.4   8.71  )-----------( 294      ( 28 Aug 2020 05:11 )             39.6     08-28    141  |  104  |  13  ----------------------------<  102<H>  3.9   |  26  |  0.68    Ca    9.4      28 Aug 2020 05:11      proBNP:   Lipid Profile:   HgA1c:   TSH:             TELEMETRY: 	    ECG:  	  RADIOLOGY:   DIAGNOSTIC TESTING:  [ ] Echocardiogram:  [ ]  Catheterization:  [ ] Stress Test:    OTHER:

## 2020-08-30 LAB
ANION GAP SERPL CALC-SCNC: 13 MMOL/L — SIGNIFICANT CHANGE UP (ref 5–17)
BUN SERPL-MCNC: 18 MG/DL — SIGNIFICANT CHANGE UP (ref 7–23)
CALCIUM SERPL-MCNC: 9.8 MG/DL — SIGNIFICANT CHANGE UP (ref 8.4–10.5)
CHLORIDE SERPL-SCNC: 106 MMOL/L — SIGNIFICANT CHANGE UP (ref 96–108)
CO2 SERPL-SCNC: 24 MMOL/L — SIGNIFICANT CHANGE UP (ref 22–31)
CREAT SERPL-MCNC: 0.68 MG/DL — SIGNIFICANT CHANGE UP (ref 0.5–1.3)
GLUCOSE SERPL-MCNC: 108 MG/DL — HIGH (ref 70–99)
GLYCOPROTEIN IV ANTIBODY: NEGATIVE — SIGNIFICANT CHANGE UP
HCT VFR BLD CALC: 39.9 % — SIGNIFICANT CHANGE UP (ref 39–50)
HGB BLD-MCNC: 13.3 G/DL — SIGNIFICANT CHANGE UP (ref 13–17)
HLA AB SER QL IA: NEGATIVE — SIGNIFICANT CHANGE UP
MCHC RBC-ENTMCNC: 30.4 PG — SIGNIFICANT CHANGE UP (ref 27–34)
MCHC RBC-ENTMCNC: 33.3 GM/DL — SIGNIFICANT CHANGE UP (ref 32–36)
MCV RBC AUTO: 91.1 FL — SIGNIFICANT CHANGE UP (ref 80–100)
NRBC # BLD: 0 /100 WBCS — SIGNIFICANT CHANGE UP (ref 0–0)
PLAT GP IA/IIA AB SER QL IA: NEGATIVE — SIGNIFICANT CHANGE UP
PLAT GP IB/IX AB SER QL IA: NEGATIVE — SIGNIFICANT CHANGE UP
PLAT GP IIB/IIIA AB SER QL IA: NEGATIVE — SIGNIFICANT CHANGE UP
PLATELET # BLD AUTO: 290 K/UL — SIGNIFICANT CHANGE UP (ref 150–400)
POTASSIUM SERPL-MCNC: 3.5 MMOL/L — SIGNIFICANT CHANGE UP (ref 3.5–5.3)
POTASSIUM SERPL-SCNC: 3.5 MMOL/L — SIGNIFICANT CHANGE UP (ref 3.5–5.3)
RBC # BLD: 4.38 M/UL — SIGNIFICANT CHANGE UP (ref 4.2–5.8)
RBC # FLD: 12.1 % — SIGNIFICANT CHANGE UP (ref 10.3–14.5)
SODIUM SERPL-SCNC: 143 MMOL/L — SIGNIFICANT CHANGE UP (ref 135–145)
WBC # BLD: 7.03 K/UL — SIGNIFICANT CHANGE UP (ref 3.8–10.5)
WBC # FLD AUTO: 7.03 K/UL — SIGNIFICANT CHANGE UP (ref 3.8–10.5)

## 2020-08-30 PROCEDURE — 99232 SBSQ HOSP IP/OBS MODERATE 35: CPT

## 2020-08-30 RX ADMIN — ATORVASTATIN CALCIUM 80 MILLIGRAM(S): 80 TABLET, FILM COATED ORAL at 21:05

## 2020-08-30 RX ADMIN — ENOXAPARIN SODIUM 40 MILLIGRAM(S): 100 INJECTION SUBCUTANEOUS at 13:32

## 2020-08-30 RX ADMIN — LIDOCAINE 1 PATCH: 4 CREAM TOPICAL at 06:05

## 2020-08-30 RX ADMIN — LIDOCAINE 1 PATCH: 4 CREAM TOPICAL at 17:50

## 2020-08-30 RX ADMIN — CLOPIDOGREL BISULFATE 75 MILLIGRAM(S): 75 TABLET, FILM COATED ORAL at 13:31

## 2020-08-30 RX ADMIN — SENNA PLUS 2 TABLET(S): 8.6 TABLET ORAL at 21:05

## 2020-08-30 RX ADMIN — Medication 1 DROP(S): at 17:50

## 2020-08-30 RX ADMIN — LIDOCAINE 1 PATCH: 4 CREAM TOPICAL at 04:34

## 2020-08-30 RX ADMIN — Medication 325 MILLIGRAM(S): at 13:32

## 2020-08-30 NOTE — PROGRESS NOTE ADULT - SUBJECTIVE AND OBJECTIVE BOX
Subjective: Patient seen and examined. No new events except as noted.     REVIEW OF SYSTEMS:    CONSTITUTIONAL: + weakness, fevers or chills  EYES/ENT: No visual changes;  No vertigo or throat pain   NECK: No pain or stiffness  RESPIRATORY: No cough, wheezing, hemoptysis; No shortness of breath  CARDIOVASCULAR: No chest pain or palpitations  GASTROINTESTINAL: No abdominal or epigastric pain. No nausea, vomiting, or hematemesis; No diarrhea or constipation. No melena or hematochezia.  GENITOURINARY: No dysuria, frequency or hematuria  NEUROLOGICAL: No numbness or weakness  SKIN: No itching, burning, rashes, or lesions   All other review of systems is negative unless indicated above.    MEDICATIONS:  MEDICATIONS  (STANDING):  artificial tears (preservative free) Ophthalmic Solution 1 Drop(s) Both EYES daily  aspirin 325 milliGRAM(s) Oral daily  atorvastatin 80 milliGRAM(s) Oral at bedtime  clopidogrel Tablet 75 milliGRAM(s) Oral daily  enoxaparin Injectable 40 milliGRAM(s) SubCutaneous daily  lidocaine   Patch 1 Patch Transdermal every 24 hours  polyethylene glycol 3350 17 Gram(s) Oral daily  senna 2 Tablet(s) Oral at bedtime      PHYSICAL EXAM:  T(C): 36.7 (08-30-20 @ 08:00), Max: 36.9 (08-29-20 @ 20:36)  HR: 63 (08-30-20 @ 08:00) (57 - 67)  BP: 113/57 (08-30-20 @ 08:00) (113/57 - 153/82)  RR: 16 (08-30-20 @ 08:00) (16 - 18)  SpO2: 95% (08-30-20 @ 08:00) (95% - 99%)  Wt(kg): --  I&O's Summary    29 Aug 2020 07:01  -  30 Aug 2020 07:00  --------------------------------------------------------  IN: 480 mL / OUT: 300 mL / NET: 180 mL            Appearance: NAD	  HEENT:   Dry  oral mucosa, PERRL, EOMI	  Lymphatic: No lymphadenopathy  Cardiovascular: Normal S1 S2, No JVD, No murmurs, No edema  Respiratory: Lungs clear to auscultation	  Psychiatry: A & O x 3, Mood & affect appropriate  Gastrointestinal:  Soft, Non-tender, + BS	  Skin: No rashes, No ecchymoses, No cyanosis	  Extremities: Normal range of motion, No clubbing, cyanosis or edema  Vascular: Peripheral pulses palpable 2+ bilaterally  - Cranial Nerves II-XII:  VFF, EOMI, PERRL (3mm OD, OS), V1-V3 intact, some nasolabial flattening on the left side of face, t/p midline, SCM/trap intact.  	- Fundoscopic examination: upon fundoscopic examination grossly clear margins of optic disc & cup  	- Motor: Pronator drift present on the left side. demonstrates orbiting around the left arm. Strength left arm 4+/5 with  strength 4/5. Left leg 4+/5 hip flexors/extensors with 5/5 knee flexion/ext dorsi/plantarflexion. right arm and leg 5/5. Normal muscle bulk and tone throughout. Neck flexion/extension 5/5 without neck stiffness  	- Sensory:  Intact to light touch and PP bilaterally throughout.  	- Coordination:  FTN demonstrated mild dysmetria in proportion to weakness on left arm, intact on right  - Gait: patient able to stand up on his own, ambulate several steps without wide based gait, not requiring assistance.    LABS:    CARDIAC MARKERS:                                13.3   7.03  )-----------( 290      ( 30 Aug 2020 06:08 )             39.9     08-30    143  |  106  |  18  ----------------------------<  108<H>  3.5   |  24  |  0.68    Ca    9.8      30 Aug 2020 06:05      proBNP:   Lipid Profile:   HgA1c:   TSH:             TELEMETRY: SR	    ECG:  	  RADIOLOGY:   DIAGNOSTIC TESTING:  [ ] Echocardiogram:  [ ]  Catheterization:  [ ] Stress Test:    OTHER:

## 2020-08-30 NOTE — PROGRESS NOTE ADULT - SUBJECTIVE AND OBJECTIVE BOX
SUBJECTIVE:     OVERNIGHT EVENTS: none    Vital Signs Last 24 Hrs  T(C): 36.9 (30 Aug 2020 15:54), Max: 36.9 (29 Aug 2020 20:36)  T(F): 98.4 (30 Aug 2020 15:54), Max: 98.4 (29 Aug 2020 20:36)  HR: 61 (30 Aug 2020 15:54) (57 - 67)  BP: 134/69 (30 Aug 2020 15:54) (113/57 - 153/82)  BP(mean): --  RR: 18 (30 Aug 2020 15:54) (16 - 18)  SpO2: 98% (30 Aug 2020 15:54) (95% - 99%)    PHYSICAL EXAM:    Neurological: awake, alert, oriented x3, follows commands, speech clear and fluent, BAKARI EOMI  left homonymous hemianopsia,  left facial palsy, tongue midline, mild LUE drift, LUE 4/5 throughout, RUE 5/5 throughout, b/l LE 5/5 throughout, sensation present, intact,     Cardiovascular: +s1, s2  Respiratory: clear to auscultation b/l  Gastrointestinal: soft, non-distended, non-tender  Genitourinary: +voiding  Extremities: +dp/pt pulses palpable b/l  Incision/Wound: right groin puncture site mild ecchymosis but c/d/i, no bleeding/drainage    LABS:                        13.3   7.03  )-----------( 290      ( 30 Aug 2020 06:08 )             39.9    08-30    143  |  106  |  18  ----------------------------<  108<H>  3.5   |  24  |  0.68    Ca    9.8      30 Aug 2020 06:05        IMAGING:         MEDICATIONS:    acetaminophen   Tablet .. 650 milliGRAM(s) Oral every 6 hours PRN Temp greater or equal to 38C (100.4F), Mild Pain (1 - 3), Moderate Pain (4 - 6), Severe Pain (7 - 10)  aspirin 325 milliGRAM(s) Oral daily  polyethylene glycol 3350 17 Gram(s) Oral daily  senna 2 Tablet(s) Oral at bedtime  artificial tears (preservative free) Ophthalmic Solution 1 Drop(s) Both EYES daily  artificial tears (preservative free) Ophthalmic Solution 1 Drop(s) Both EYES every 1 hour PRN Dry Eyes  atorvastatin 80 milliGRAM(s) Oral at bedtime  clopidogrel Tablet 75 milliGRAM(s) Oral daily  enoxaparin Injectable 40 milliGRAM(s) SubCutaneous daily  lidocaine   Patch 1 Patch Transdermal every 24 hours      DIET:

## 2020-08-30 NOTE — PROGRESS NOTE ADULT - ASSESSMENT
75 year old male with history of hypertension that presented to the ED with headache and altered mental status Left hemiparesis .. MRI brain Infarcts  right MCA territory involving the right centrum semiovale and corona radiata as well as the right tempora MR Angio Head 8/25 Severe stenosis of the mid to distal right M1 segment of the middle cerebral artery s/p Cerebral angiography Right MCA stent placement 8/26/20     Plan    Neuro stable . On ASA & Plavix 75 year old male with history of hypertension that presented to the ED with headache and altered mental status Left hemiparesis .. MRI brain Infarcts  right MCA territory involving the right centrum semiovale and corona radiata as well as the right tempora MR Angio Head 8/25 Severe stenosis of the mid to distal right M1 segment of the middle cerebral artery s/p Cerebral angiography Right MCA stent placement 8/26/20     Plan    Neuro stable . On ASA & Plavix   Vitals stable  Tolerating soft diet  DVT ppx  Awaiting acute rehab placement

## 2020-08-30 NOTE — PROGRESS NOTE ADULT - SUBJECTIVE AND OBJECTIVE BOX
Patient is a 75y old  Male who presents with a chief complaint of R M1 occlusion vs stenosis (29 Aug 2020 22:44)      SUBJECTIVE / OVERNIGHT EVENTS:  No chest pain. No shortness of breath. No complaints. No events overnight.     MEDICATIONS  (STANDING):  artificial tears (preservative free) Ophthalmic Solution 1 Drop(s) Both EYES daily  aspirin 325 milliGRAM(s) Oral daily  atorvastatin 80 milliGRAM(s) Oral at bedtime  clopidogrel Tablet 75 milliGRAM(s) Oral daily  enoxaparin Injectable 40 milliGRAM(s) SubCutaneous daily  lidocaine   Patch 1 Patch Transdermal every 24 hours  polyethylene glycol 3350 17 Gram(s) Oral daily  senna 2 Tablet(s) Oral at bedtime    MEDICATIONS  (PRN):  acetaminophen   Tablet .. 650 milliGRAM(s) Oral every 6 hours PRN Temp greater or equal to 38C (100.4F), Mild Pain (1 - 3), Moderate Pain (4 - 6), Severe Pain (7 - 10)  artificial tears (preservative free) Ophthalmic Solution 1 Drop(s) Both EYES every 1 hour PRN Dry Eyes      Vital Signs Last 24 Hrs  T(C): 36.5 (30 Aug 2020 04:38), Max: 36.9 (29 Aug 2020 20:36)  T(F): 97.7 (30 Aug 2020 04:38), Max: 98.4 (29 Aug 2020 20:36)  HR: 57 (30 Aug 2020 04:38) (57 - 67)  BP: 144/67 (30 Aug 2020 04:38) (118/60 - 153/82)  BP(mean): --  RR: 18 (30 Aug 2020 04:38) (18 - 18)  SpO2: 96% (30 Aug 2020 04:38) (96% - 99%)  CAPILLARY BLOOD GLUCOSE        I&O's Summary    29 Aug 2020 07:01  -  30 Aug 2020 07:00  --------------------------------------------------------  IN: 480 mL / OUT: 300 mL / NET: 180 mL        PHYSICAL EXAM:  GENERAL: NAD, well-developed  HEAD:  Atraumatic, Normocephalic  EYES: EOMI, PERRLA, conjunctiva and sclera clear  NECK: Supple, No JVD  CHEST/LUNG: Clear to auscultation bilaterally; No wheeze  HEART: Regular rate and rhythm; No murmurs, rubs, or gallops  ABDOMEN: Soft, Nontender, Nondistended; Bowel sounds present  EXTREMITIES:  2+ Peripheral Pulses, No clubbing, cyanosis, or edema  PSYCH: AAOx3  NEUROLOGY: non-focal  SKIN: No rashes or lesions    LABS:                        13.3   7.03  )-----------( 290      ( 30 Aug 2020 06:08 )             39.9     08-30    143  |  106  |  18  ----------------------------<  108<H>  3.5   |  24  |  0.68    Ca    9.8      30 Aug 2020 06:05                RADIOLOGY & ADDITIONAL TESTS:    Imaging Personally Reviewed:    Consultant(s) Notes Reviewed:      Care Discussed with Consultants/Other Providers:

## 2020-08-30 NOTE — PROGRESS NOTE ADULT - ASSESSMENT
76 y/o RH man with a PMH of HTN not on antithrombotics who presented to the ED with an acute one day history of headache and altered mental status. Neurological examination demonstrates mild dysarthria with mild left nasolabial flattening with left arm weakness. CT/CTA per Neuroradiology demonstrates stenosis vs occlusion R M1. Patient out of window for tpa.    CVA  - workup and plan as per neurology  - s/p cerebral angiography and stenting  - ASA/plavix  - lipitor  - PT/OT  - swallow eval - puree diet    HLD  -   - c/w lipitor    emphysema  - CT chest without contrast done  - pulm following    constipation  - miralax daily  - senna qhs    DVT px  - lovenox

## 2020-08-31 LAB — PA ADP PRP-ACNC: 7 PRU — LOW (ref 194–417)

## 2020-08-31 PROCEDURE — 99231 SBSQ HOSP IP/OBS SF/LOW 25: CPT

## 2020-08-31 PROCEDURE — 74230 X-RAY XM SWLNG FUNCJ C+: CPT | Mod: 26

## 2020-08-31 RX ADMIN — Medication 1 DROP(S): at 21:50

## 2020-08-31 RX ADMIN — ENOXAPARIN SODIUM 40 MILLIGRAM(S): 100 INJECTION SUBCUTANEOUS at 11:16

## 2020-08-31 RX ADMIN — LIDOCAINE 1 PATCH: 4 CREAM TOPICAL at 04:54

## 2020-08-31 RX ADMIN — ATORVASTATIN CALCIUM 80 MILLIGRAM(S): 80 TABLET, FILM COATED ORAL at 21:50

## 2020-08-31 RX ADMIN — Medication 1 DROP(S): at 17:17

## 2020-08-31 RX ADMIN — CLOPIDOGREL BISULFATE 75 MILLIGRAM(S): 75 TABLET, FILM COATED ORAL at 11:15

## 2020-08-31 RX ADMIN — LIDOCAINE 1 PATCH: 4 CREAM TOPICAL at 17:00

## 2020-08-31 RX ADMIN — POLYETHYLENE GLYCOL 3350 17 GRAM(S): 17 POWDER, FOR SOLUTION ORAL at 11:15

## 2020-08-31 RX ADMIN — LIDOCAINE 1 PATCH: 4 CREAM TOPICAL at 07:00

## 2020-08-31 RX ADMIN — SENNA PLUS 2 TABLET(S): 8.6 TABLET ORAL at 21:50

## 2020-08-31 RX ADMIN — Medication 325 MILLIGRAM(S): at 11:15

## 2020-08-31 RX ADMIN — Medication 1 DROP(S): at 04:56

## 2020-08-31 NOTE — SWALLOW VFSS/MBS ASSESSMENT ADULT - COMMENTS
HX cont:    Neuro exam: Mental Status:  Alert, awake, oriented to person, place, and time; Speech is mildly dysarthric with otherwise fluent speech with intact naming, repetition, and comprehension; crosses midline, no extinction or neglect noted;  Immediate recall is 3/3 words; Able to spell WORLD forwards. Cranial Nerves II-XII:  VFF, EOMI, PERRL (3mm OD, OS), V1-V3 intact, some nasolabial flattening on the left side of face, t/p midline, SCM/trap intact. CT/CTA per Neuroradiology demonstrates stenosis vs occlusion R M1. Patient out of window for tpa. NIHSS too low for mechanical thrombectomy. Impression: headache with change in mental status and left hemiparesis 2/2 right brain dysfunction with CT/CTA demonstrating ?chronic R frontal infarct with R M1 stenosis versus occlusion 2/2 ESUS (embolic stroke of unknown source) with elevated blood pressure and mental status change likely in the setting of hypertensive encephalopathy. NIHSS: 2. Per neuro attending--> Alert; probably mild left hemineglect; left homonymous hemianopia; mild left central facial palsy; mild dysarthria; left arm and leg-drift; manual motor testing with variable effort probably due to motor neglect but overall left hemiparesis 4/5; no left-sided ataxia; sensory-extinction on left.  CT head (8/14/2020) to my eye showed moderate-severe periventricular chronic ischemic changes.  There was a possible subacute right corona radiata infarct, perhaps more likely part and parcel of the chronic ischemic changes.  CTA neck and head (8/14/2020) showed severe right M1 stenosis.  The left vertebral artery was dominant.  Incidentally noted were emphysematous lung changes->pulmonary consult for further evaluation. Initial dysphagia screen passed but patient was witnessed to be drooling and pocketing meals while eating. Due to high risk of aspiration from possible hemineglect, made NPO. Consulted S&S. Mild leukocytosis currently uptrending without known source of possible infection.   Hx cont below:
HX cont:    Neuro exam: Mental Status:  Alert, awake, oriented to person, place, and time; Speech is mildly dysarthric with otherwise fluent speech with intact naming, repetition, and comprehension; crosses midline, no extinction or neglect noted;  Immediate recall is 3/3 words; Able to spell WORLD forwards. Cranial Nerves II-XII:  VFF, EOMI, PERRL (3mm OD, OS), V1-V3 intact, some nasolabial flattening on the left side of face, t/p midline, SCM/trap intact. CT/CTA per Neuroradiology demonstrates stenosis vs occlusion R M1. Patient out of window for tpa. NIHSS too low for mechanical thrombectomy. Impression: headache with change in mental status and left hemiparesis 2/2 right brain dysfunction with CT/CTA demonstrating ?chronic R frontal infarct with R M1 stenosis versus occlusion 2/2 ESUS (embolic stroke of unknown source) with elevated blood pressure and mental status change likely in the setting of hypertensive encephalopathy. NIHSS: 2. Per neuro attending--> Alert; probably mild left hemineglect; left homonymous hemianopia; mild left central facial palsy; mild dysarthria; left arm and leg-drift; manual motor testing with variable effort probably due to motor neglect but overall left hemiparesis 4/5; no left-sided ataxia; sensory-extinction on left.  CT head (8/14/2020) to my eye showed moderate-severe periventricular chronic ischemic changes.  There was a possible subacute right corona radiata infarct, perhaps more likely part and parcel of the chronic ischemic changes.  CTA neck and head (8/14/2020) showed severe right M1 stenosis.  The left vertebral artery was dominant.  Incidentally noted were emphysematous lung changes->pulmonary consult for further evaluation. Initial dysphagia screen passed but patient was witnessed to be drooling and pocketing meals while eating. Due to high risk of aspiration from possible hemineglect, made NPO. Consulted S&S. Mild leukocytosis currently uptrending without known source of possible infection.   Hx cont below:

## 2020-08-31 NOTE — SWALLOW VFSS/MBS ASSESSMENT ADULT - MODE OF PRESENTATION
cup/fed by clinician/straw/self fed/spoon cup/spoon/self fed/fed by clinician spoon/fed by clinician fed by clinician

## 2020-08-31 NOTE — SWALLOW VFSS/MBS ASSESSMENT ADULT - ROSENBEK'S PENETRATION ASPIRATION SCALE
(1) no aspiration, contrast does not enter airway material persists in the laryngeal vestibule/(6) contrast passes glottis, no subglottic residue remains (aspiration)

## 2020-08-31 NOTE — SWALLOW VFSS/MBS ASSESSMENT ADULT - SLP PERTINENT HISTORY OF CURRENT PROBLEM
Opal Matthew is a 76 y/o RH man with a PMH of HTN not on antithrombotics who presented to the ED with an acute one day history of headache and altered mental status. Per his friend, the pt suddenly became confused yesterday around 5pm and complained of a new onset headache.The pt complains about some weakness in his right lower extremity and endorses a sharp headache localized to the top/middle of his hairline that does not radiate anywhere. Per ED, The friend stated that the patient's speech was "off" and that his face "did not look normal". The pt did not want to go to the ER at the time, but came today because the headache persisted throughout the night and today he began to feel lightheaded. The pt stated that he ambulates and talks normally at baseline and that he had never experienced these symptoms before yesterday.
Opal Matthew is a 74 y/o RH man with a PMH of HTN not on antithrombotics who presented to the ED with an acute one day history of headache and altered mental status. Per his friend, the pt suddenly became confused yesterday around 5pm and complained of a new onset headache.The pt complains about some weakness in his right lower extremity and endorses a sharp headache localized to the top/middle of his hairline that does not radiate anywhere. Per ED, The friend stated that the patient's speech was "off" and that his face "did not look normal". The pt did not want to go to the ER at the time, but came today because the headache persisted throughout the night and today he began to feel lightheaded. The pt stated that he ambulates and talks normally at baseline and that he had never experienced these symptoms before yesterday.

## 2020-08-31 NOTE — PROGRESS NOTE ADULT - SUBJECTIVE AND OBJECTIVE BOX
SUBJECTIVE:     OVERNIGHT EVENTS: none    Vital Signs Last 24 Hrs  T(C): 37 (31 Aug 2020 11:37), Max: 37 (31 Aug 2020 11:37)  T(F): 98.6 (31 Aug 2020 11:37), Max: 98.6 (31 Aug 2020 11:37)  HR: 78 (31 Aug 2020 11:37) (59 - 78)  BP: 111/64 (31 Aug 2020 11:37) (111/64 - 150/67)  BP(mean): --  RR: 18 (31 Aug 2020 11:37) (18 - 18)  SpO2: 99% (31 Aug 2020 11:37) (94% - 99%)    PHYSICAL EXAM:    Neurological: awake, alert, oriented x3, follows commands, speech clear and fluent, BAKARI EOMI  left homonymous hemianopsia,  left facial palsy, tongue midline, mild LUE drift, LUE 4/5 throughout, RUE 5/5 throughout, b/l LE 5/5 throughout, sensation present, intact,     Cardiovascular: +s1, s2    Respiratory: clear to auscultation b/l    Gastrointestinal: soft, non-distended, non-tender    Genitourinary: +voiding    Extremities: +dp/pt pulses palpable b/l    Incision/Wound: right groin puncture site mild ecchymosis but c/d/i, no bleeding/drainage    LABS:                        13.3   7.03  )-----------( 290      ( 30 Aug 2020 06:08 )             39.9    08-30    143  |  106  |  18  ----------------------------<  108<H>  3.5   |  24  |  0.68    Ca    9.8      30 Aug 2020 06:05        IMAGING:         MEDICATIONS:    acetaminophen   Tablet .. 650 milliGRAM(s) Oral every 6 hours PRN Temp greater or equal to 38C (100.4F), Mild Pain (1 - 3), Moderate Pain (4 - 6), Severe Pain (7 - 10)  aspirin 325 milliGRAM(s) Oral daily  polyethylene glycol 3350 17 Gram(s) Oral daily  senna 2 Tablet(s) Oral at bedtime  artificial tears (preservative free) Ophthalmic Solution 1 Drop(s) Both EYES daily  artificial tears (preservative free) Ophthalmic Solution 1 Drop(s) Both EYES every 1 hour PRN Dry Eyes  atorvastatin 80 milliGRAM(s) Oral at bedtime  clopidogrel Tablet 75 milliGRAM(s) Oral daily  enoxaparin Injectable 40 milliGRAM(s) SubCutaneous daily  lidocaine   Patch 1 Patch Transdermal every 24 hours      DIET:

## 2020-08-31 NOTE — SWALLOW VFSS/MBS ASSESSMENT ADULT - RECOMMENDED FEEDING/EATING TECHNIQUES
no straws/oral hygiene/maintain upright posture during/after eating for 30 mins/position upright (90 degrees)/small sips/bites/allow for swallow between intakes
alternate food with liquid/small sips/bites/crush medication (when feasible)/oral hygiene/maintain upright posture during/after eating for 30 mins/position upright (90 degrees)

## 2020-08-31 NOTE — SWALLOW VFSS/MBS ASSESSMENT ADULT - NS SWALLOW VFSS REC ASPIR MON
Monitor for s/s aspiration/laryngeal penetration. If noted:  D/C p.o. intake, provide non-oral nutrition/hydration/meds, and contact this service @ [phone]/fever/pneumonia/throat clearing/change of breathing pattern/gurgly voice/upper respiratory infection/cough
Monitor for s/s aspiration/laryngeal penetration. If noted:  D/C p.o. intake, provide non-oral nutrition/hydration/meds, and contact this service @ [phone]/fever/pneumonia/cough/gurgly voice/change of breathing pattern/upper respiratory infection/throat clearing

## 2020-08-31 NOTE — SWALLOW VFSS/MBS ASSESSMENT ADULT - ORAL PHASE
min spillage to level of valleculae/Uncontrolled bolus / spillover in jacob-pharynx inconsistent max spillage to the valleculae and to pyriforms/Uncontrolled bolus / spillover in hypopharynx/Uncontrolled bolus / spillover in jacob-pharynx piecemeal deglutition

## 2020-08-31 NOTE — PROGRESS NOTE ADULT - ASSESSMENT
75 year old male with history of hypertension that presented to the ED with headache and altered mental status Left hemiparesis .. MRI brain Infarcts  right MCA territory involving the right centrum semiovale and corona radiata as well as the right tempora MR Angio Head 8/25 Severe stenosis of the mid to distal right M1 segment of the middle cerebral artery s/p Cerebral angiography Right MCA stent placement 8/26/20     Plan    Neuro stable . On ASA & Plavix   Vitals stable  Tolerating soft diet  DVT ppx  am labs   Dispo- Undocumented uninsured . SW attempting [facility] .

## 2020-08-31 NOTE — PROGRESS NOTE ADULT - SUBJECTIVE AND OBJECTIVE BOX
Patient is a 75y old  Male who presents with a chief complaint of R M1 occlusion vs stenosis (31 Aug 2020 14:53)      SUBJECTIVE / OVERNIGHT EVENTS:  No chest pain. No shortness of breath. No complaints. No events overnight.     MEDICATIONS  (STANDING):  artificial tears (preservative free) Ophthalmic Solution 1 Drop(s) Both EYES daily  aspirin 325 milliGRAM(s) Oral daily  atorvastatin 80 milliGRAM(s) Oral at bedtime  clopidogrel Tablet 75 milliGRAM(s) Oral daily  enoxaparin Injectable 40 milliGRAM(s) SubCutaneous daily  lidocaine   Patch 1 Patch Transdermal every 24 hours  polyethylene glycol 3350 17 Gram(s) Oral daily  senna 2 Tablet(s) Oral at bedtime    MEDICATIONS  (PRN):  acetaminophen   Tablet .. 650 milliGRAM(s) Oral every 6 hours PRN Temp greater or equal to 38C (100.4F), Mild Pain (1 - 3), Moderate Pain (4 - 6), Severe Pain (7 - 10)  artificial tears (preservative free) Ophthalmic Solution 1 Drop(s) Both EYES every 1 hour PRN Dry Eyes      Vital Signs Last 24 Hrs  T(C): 36.7 (31 Aug 2020 19:37), Max: 37 (31 Aug 2020 11:37)  T(F): 98.1 (31 Aug 2020 19:37), Max: 98.6 (31 Aug 2020 11:37)  HR: 59 (31 Aug 2020 19:37) (59 - 78)  BP: 144/71 (31 Aug 2020 19:37) (111/64 - 150/67)  BP(mean): --  RR: 18 (31 Aug 2020 19:37) (18 - 18)  SpO2: 95% (31 Aug 2020 19:37) (94% - 99%)  CAPILLARY BLOOD GLUCOSE        I&O's Summary    30 Aug 2020 07:01  -  31 Aug 2020 07:00  --------------------------------------------------------  IN: 360 mL / OUT: 450 mL / NET: -90 mL    31 Aug 2020 07:01  -  31 Aug 2020 20:20  --------------------------------------------------------  IN: 0 mL / OUT: 250 mL / NET: -250 mL        PHYSICAL EXAM:  GENERAL: NAD, well-developed  HEAD:  Atraumatic, Normocephalic  EYES: EOMI, PERRLA, conjunctiva and sclera clear  NECK: Supple, No JVD  CHEST/LUNG: Clear to auscultation bilaterally; No wheeze  HEART: Regular rate and rhythm; No murmurs, rubs, or gallops  ABDOMEN: Soft, Nontender, Nondistended; Bowel sounds present  EXTREMITIES:  2+ Peripheral Pulses, No clubbing, cyanosis, or edema  PSYCH: AAOx3  NEUROLOGY: left sided weakness  SKIN: No rashes or lesions    LABS:                        13.3   7.03  )-----------( 290      ( 30 Aug 2020 06:08 )             39.9     08-30    143  |  106  |  18  ----------------------------<  108<H>  3.5   |  24  |  0.68    Ca    9.8      30 Aug 2020 06:05                RADIOLOGY & ADDITIONAL TESTS:    Imaging Personally Reviewed:    Consultant(s) Notes Reviewed:      Care Discussed with Consultants/Other Providers:

## 2020-08-31 NOTE — SWALLOW VFSS/MBS ASSESSMENT ADULT - DIAGNOSTIC IMPRESSIONS
74 yo male admitted with R MCA CVA currently presents with an oropharyngeal dysphagia notable for reduced L labial seal with intermittent anterior loss, reduced oral management, uncontrolled A-P spillover of liquids to the hypopharynx, laryngeal penetration with retrieval for nectar-thick liquids, and laryngeal penetration/aspiration with thin liquids. Chin tuck posture exacerbated    Disorders: Reduced L labial seal, reduced lingual strength/ROM/Rate of motion, reduced tongue to palate contact, reduced velopharyngeal closure, delay in trigger of the swallow reflex, sluggish epiglottic retroflexion, reduced laryngeal closure, reduced supraglottic sensation, reduced subglottic sensation.
74 yo male admitted with R MCA CVA seen for repeat MBS to assess for upgrade. Patient presents with an oropharyngeal dysphagia. There is inconsistent silent laryngeal penetration and aspiration of thin liquids. Small volume (10ml) cup sips and/or teaspoon administration improves airway protection for this consistency. There is no evidence of laryngeal penetration or aspiration on remaining consistencies.

## 2020-08-31 NOTE — SWALLOW VFSS/MBS ASSESSMENT ADULT - SPECIFY REASON(S)
to objectively assess the swallow mechanism; r/o dysphagia. ; assess for liquid upgrade; r/o silent aspiration
To subjectively assess the oropharyngeal swallow mechanism and r/o dysphagia

## 2020-08-31 NOTE — SWALLOW VFSS/MBS ASSESSMENT ADULT - SLP GENERAL OBSERVATIONS
Pt encountered in radiology, secure in LUIS FERNANDO chair. Pt awake and alert, cooperative, following directions in English for the purposes of the exam. Pt was offered  phone for Cantonese, which he declined at this time.
Pt encountered in radiology, secure in LUIS FERNANDO chair. Pt awake and alert, cooperative, following directions for the purposes of the exam. Pt was offered  phone for Cantonese, which he declined at this time.

## 2020-08-31 NOTE — SWALLOW VFSS/MBS ASSESSMENT ADULT - RECOMMENDED CONSISTENCY
Dysphagia 2 with Nectar-thick liquids  MD/team Please enter the following as notes to RN: 1) Close supervision with meals, 2) Crush meds or provide via alternate source, 3) Small single bites and sips at slow rate, 4) Aspiration precautions. Monitor for s/s aspiration/laryngeal penetration. If noted:  D/C p.o. intake, provide non-oral nutrition/hydration/meds
Continue Dysphagia 1, nectar. Allow thin liquids under supervision to ensure teaspoon administration only.

## 2020-08-31 NOTE — PROGRESS NOTE ADULT - SUBJECTIVE AND OBJECTIVE BOX
Subjective: Patient seen and examined. No new events except as noted.     REVIEW OF SYSTEMS:    Unable to obtain       MEDICATIONS:  MEDICATIONS  (STANDING):  artificial tears (preservative free) Ophthalmic Solution 1 Drop(s) Both EYES daily  aspirin 325 milliGRAM(s) Oral daily  atorvastatin 80 milliGRAM(s) Oral at bedtime  clopidogrel Tablet 75 milliGRAM(s) Oral daily  enoxaparin Injectable 40 milliGRAM(s) SubCutaneous daily  lidocaine   Patch 1 Patch Transdermal every 24 hours  polyethylene glycol 3350 17 Gram(s) Oral daily  senna 2 Tablet(s) Oral at bedtime      PHYSICAL EXAM:  T(C): 36.6 (08-31-20 @ 07:05), Max: 36.9 (08-30-20 @ 15:54)  HR: 63 (08-31-20 @ 07:05) (59 - 63)  BP: 150/67 (08-31-20 @ 07:05) (124/83 - 150/67)  RR: 18 (08-31-20 @ 07:05) (18 - 18)  SpO2: 98% (08-31-20 @ 07:05) (94% - 98%)  Wt(kg): --  I&O's Summary    30 Aug 2020 07:01  -  31 Aug 2020 07:00  --------------------------------------------------------  IN: 360 mL / OUT: 450 mL / NET: -90 mL          Appearance: NAD	  HEENT:   Dry  oral mucosa, PERRL, EOMI	  Lymphatic: No lymphadenopathy  Cardiovascular: Normal S1 S2, No JVD, No murmurs, No edema  Respiratory: Lungs clear to auscultation	  Psychiatry: A & O x 3, Mood & affect appropriate  Gastrointestinal:  Soft, Non-tender, + BS	  Skin: No rashes, No ecchymoses, No cyanosis	  Extremities: Normal range of motion, No clubbing, cyanosis or edema  Vascular: Peripheral pulses palpable 2+ bilaterally  - Cranial Nerves II-XII:  VFF, EOMI, PERRL (3mm OD, OS), V1-V3 intact, some nasolabial flattening on the left side of face, t/p midline, SCM/trap intact.  	- Fundoscopic examination: upon fundoscopic examination grossly clear margins of optic disc & cup  	- Motor: Pronator drift present on the left side. demonstrates orbiting around the left arm. Strength left arm 4+/5 with  strength 4/5. Left leg 4+/5 hip flexors/extensors with 5/5 knee flexion/ext dorsi/plantarflexion. right arm and leg 5/5. Normal muscle bulk and tone throughout. Neck flexion/extension 5/5 without neck stiffness  	- Sensory:  Intact to light touch and PP bilaterally throughout.  	- Coordination:  FTN demonstrated mild dysmetria in proportion to weakness on left arm, intact on right  - Gait: patient able to stand up on his own, ambulate several steps without wide based gait, not requiring assistance.      LABS:    CARDIAC MARKERS:                                13.3   7.03  )-----------( 290      ( 30 Aug 2020 06:08 )             39.9     08-30    143  |  106  |  18  ----------------------------<  108<H>  3.5   |  24  |  0.68    Ca    9.8      30 Aug 2020 06:05      proBNP:   Lipid Profile:   HgA1c:   TSH:             TELEMETRY: 	    ECG:  	  RADIOLOGY:   DIAGNOSTIC TESTING:  [ ] Echocardiogram:  [ ]  Catheterization:  [ ] Stress Test:    OTHER:

## 2020-08-31 NOTE — PROGRESS NOTE ADULT - ASSESSMENT
74 y/o RH man with a PMH of HTN not on antithrombotics who presented to the ED with an acute one day history of headache and altered mental status. Neurological examination demonstrates mild dysarthria with mild left nasolabial flattening with left arm weakness. CT/CTA per Neuroradiology demonstrates stenosis vs occlusion R M1. Patient out of window for tpa.    CVA  - workup and plan as per neurology  - s/p cerebral angiography and stenting  - ASA/plavix  - lipitor  - PT/OT  - swallow eval - puree diet    HLD  -   - c/w lipitor    emphysema  - CT chest without contrast done  - pulm following    constipation  - miralax daily  - senna qhs    DVT px  - lovenox

## 2020-09-01 LAB
ANION GAP SERPL CALC-SCNC: 11 MMOL/L — SIGNIFICANT CHANGE UP (ref 5–17)
BUN SERPL-MCNC: 17 MG/DL — SIGNIFICANT CHANGE UP (ref 7–23)
CALCIUM SERPL-MCNC: 9.5 MG/DL — SIGNIFICANT CHANGE UP (ref 8.4–10.5)
CHLORIDE SERPL-SCNC: 104 MMOL/L — SIGNIFICANT CHANGE UP (ref 96–108)
CO2 SERPL-SCNC: 25 MMOL/L — SIGNIFICANT CHANGE UP (ref 22–31)
CREAT SERPL-MCNC: 0.71 MG/DL — SIGNIFICANT CHANGE UP (ref 0.5–1.3)
GLUCOSE SERPL-MCNC: 109 MG/DL — HIGH (ref 70–99)
HCT VFR BLD CALC: 38.4 % — LOW (ref 39–50)
HGB BLD-MCNC: 12.6 G/DL — LOW (ref 13–17)
MCHC RBC-ENTMCNC: 30 PG — SIGNIFICANT CHANGE UP (ref 27–34)
MCHC RBC-ENTMCNC: 32.8 GM/DL — SIGNIFICANT CHANGE UP (ref 32–36)
MCV RBC AUTO: 91.4 FL — SIGNIFICANT CHANGE UP (ref 80–100)
NRBC # BLD: 0 /100 WBCS — SIGNIFICANT CHANGE UP (ref 0–0)
PLATELET # BLD AUTO: 321 K/UL — SIGNIFICANT CHANGE UP (ref 150–400)
POTASSIUM SERPL-MCNC: 3.5 MMOL/L — SIGNIFICANT CHANGE UP (ref 3.5–5.3)
POTASSIUM SERPL-SCNC: 3.5 MMOL/L — SIGNIFICANT CHANGE UP (ref 3.5–5.3)
RBC # BLD: 4.2 M/UL — SIGNIFICANT CHANGE UP (ref 4.2–5.8)
RBC # FLD: 12 % — SIGNIFICANT CHANGE UP (ref 10.3–14.5)
SODIUM SERPL-SCNC: 140 MMOL/L — SIGNIFICANT CHANGE UP (ref 135–145)
WBC # BLD: 6.31 K/UL — SIGNIFICANT CHANGE UP (ref 3.8–10.5)
WBC # FLD AUTO: 6.31 K/UL — SIGNIFICANT CHANGE UP (ref 3.8–10.5)

## 2020-09-01 PROCEDURE — 99232 SBSQ HOSP IP/OBS MODERATE 35: CPT

## 2020-09-01 RX ADMIN — Medication 325 MILLIGRAM(S): at 11:55

## 2020-09-01 RX ADMIN — ENOXAPARIN SODIUM 40 MILLIGRAM(S): 100 INJECTION SUBCUTANEOUS at 11:55

## 2020-09-01 RX ADMIN — ATORVASTATIN CALCIUM 80 MILLIGRAM(S): 80 TABLET, FILM COATED ORAL at 21:47

## 2020-09-01 RX ADMIN — CLOPIDOGREL BISULFATE 75 MILLIGRAM(S): 75 TABLET, FILM COATED ORAL at 11:55

## 2020-09-01 RX ADMIN — LIDOCAINE 1 PATCH: 4 CREAM TOPICAL at 04:58

## 2020-09-01 RX ADMIN — POLYETHYLENE GLYCOL 3350 17 GRAM(S): 17 POWDER, FOR SOLUTION ORAL at 11:55

## 2020-09-01 NOTE — PROGRESS NOTE ADULT - ASSESSMENT
75 year old male with history of hypertension that presented to the ED with headache and altered mental status Left hemiparesis .. MRI brain Infarcts  right MCA territory involving the right centrum semiovale and corona radiata as well as the right tempora MR Angio Head 8/25 Severe stenosis of the mid to distal right M1 segment of the middle cerebral artery s/p Cerebral angiography Right MCA stent placement 8/26/20     Plan    Neuro stable . On ASA & Plavix   Vitals stable  Tolerating soft diet  DVT ppx  Dispo- Undocumented uninsured . SW attempting [facility]  .

## 2020-09-01 NOTE — PROGRESS NOTE ADULT - SUBJECTIVE AND OBJECTIVE BOX
SUBJECTIVE:   No complaints   OVERNIGHT EVENTS: none    Vital Signs Last 24 Hrs  T(C): 36.7 (01 Sep 2020 11:44), Max: 36.7 (31 Aug 2020 19:37)  T(F): 98 (01 Sep 2020 11:44), Max: 98.1 (31 Aug 2020 19:37)  HR: 64 (01 Sep 2020 11:44) (56 - 64)  BP: 124/64 (01 Sep 2020 11:44) (119/64 - 144/71)  BP(mean): --  RR: 18 (01 Sep 2020 11:44) (18 - 18)  SpO2: 99% (01 Sep 2020 11:44) (95% - 99%)    PHYSICAL EXAM:    Neurological: awake, alert, oriented x3, follows commands, speech clear and fluent, BAKARI EOMI  left homonymous hemianopsia,  left facial palsy, tongue midline, mild LUE drift, LUE 4-/5  RUE 5/5 throughout, b/l LE 5/5 throughout, sensation present, intact,     Cardiovascular: +s1, s2    Respiratory: clear to auscultation b/l    Gastrointestinal: soft, non-distended, non-tender    Genitourinary: +voiding          LABS:                        12.6   6.31  )-----------( 321      ( 01 Sep 2020 06:28 )             38.4    09-01    140  |  104  |  17  ----------------------------<  109<H>  3.5   |  25  |  0.71    Ca    9.5      01 Sep 2020 06:26          IMAGING:         MEDICATIONS:    acetaminophen   Tablet .. 650 milliGRAM(s) Oral every 6 hours PRN Temp greater or equal to 38C (100.4F), Mild Pain (1 - 3), Moderate Pain (4 - 6), Severe Pain (7 - 10)  aspirin 325 milliGRAM(s) Oral daily  polyethylene glycol 3350 17 Gram(s) Oral daily  senna 2 Tablet(s) Oral at bedtime  artificial tears (preservative free) Ophthalmic Solution 1 Drop(s) Both EYES daily  artificial tears (preservative free) Ophthalmic Solution 1 Drop(s) Both EYES every 1 hour PRN Dry Eyes  atorvastatin 80 milliGRAM(s) Oral at bedtime  clopidogrel Tablet 75 milliGRAM(s) Oral daily  enoxaparin Injectable 40 milliGRAM(s) SubCutaneous daily  lidocaine   Patch 1 Patch Transdermal every 24 hours      DIET:

## 2020-09-01 NOTE — PROGRESS NOTE ADULT - SUBJECTIVE AND OBJECTIVE BOX
no new complaints     REVIEW OF SYSTEMS  Constitutional - No fever,  No fatigue  HEENT - No vertigo, No neck pain  Neurological - No headaches, No memory loss, + loss of strength, No numbness, No tremors  Skin - No rashes, No lesions   Musculoskeletal - No joint pain, No joint swelling, No muscle pain  Psychiatric - No depression, No anxiety    FUNCTIONAL PROGRESS  8/31 SLP  76 yo male admitted with R MCA CVA seen for repeat MBS to assess for upgrade. Patient presents with an oropharyngeal dysphagia. There is inconsistent silent laryngeal penetration and aspiration of thin liquids. Small volume (10ml) cup sips and/or teaspoon administration improves airway protection for this consistency. There is no evidence of laryngeal penetration or aspiration on remaining consistencies.  Continue Dysphagia 1, nectar.     8/31 PT    Bed Mobility  Bed Mobility Training Supine-to-Sit: minimum assist (75% patient effort);  1 person assist;  nonverbal cues (demo/gestures);  verbal cues;  bed rails  Bed Mobility Training Limitations: decreased strength;  impaired balance;  impaired motor control;  decreased ability to use arms for pushing/pulling;  decreased ability to use legs for bridging/pushing    Sit-Stand Transfer Training  Transfer Training Sit-to-Stand Transfer: minimum assist (75% patient effort);  1 person assist;  nonverbal cues (demo/gestures);  verbal cues;  full weight-bearing   dilia walker  Transfer Training Stand-to-Sit Transfer: minimum assist (75% patient effort);  1 person assist;  nonverbal cues (demo/gestures);  verbal cues;  full weight-bearing   dilia walker  Sit-to-Stand Transfer Training Transfer Safety Analysis: decreased balance;  decreased sequencing ability;  decreased step length;  decreased weight-shifting ability;  decreased strength;  impaired balance;  impaired coordination;  impaired motor control;  dilia walker    Gait Training  Gait Training: minimum assist (75% patient effort);  1 person assist;  verbal cues;  nonverbal cues (demo/gestures);  full weight-bearing   dilia walker;  50 feet;  x2  Gait Analysis: 3-point gait   decreased moises;  shuffling;  decreased step length;  decreased stride length;  decreased toe clearance;  decreased weight-shifting ability;  decreased strength;  impaired balance;  impaired coordination;  impaired motor control;  50 feet;  x2;  dilia walker    Therapeutic Exercise  Therapeutic Exercise Detail: Pt davy seated therex: b/l UE's: shoulder raises, elbow flex/ext,  squeezes, b/l LE's: marches, knee ext & calf raises, 2x10 each.     Fine Motor Coordination Training  Fine Motor Coordination Training Detail: Pt davy seated therex: b/l UE's: shoulder raises, elbow flex/ext,  squeezes, b/l LE's: marches, knee ext & calf raises, 2x10 each.     8/31 OT      Therapeutic Exercise  Therapeutic Exercise Detail: Pt tolerated 15 reps x1 LUE AROM and AAROM as well as PNF strategies. Practiced coordination tasks via functional hand to mouth.     Fine Motor Coordination Training  Fine Motor Coordination Training Detail: Practiced functional grsp as release, FM coordination, and isolated digit extension/flexion     Grooming Training  Grooming Training Assistance: minimum assist (75% patient effort);  1 person assist;  nonverbal cues (demo/gestures);  verbal cues;  decreased ROM;  decreased strength;  impaired coordination;  impaired motor control    VITALS  T(C): 36.6 (09-01-20 @ 07:54), Max: 37 (08-31-20 @ 11:37)  HR: 56 (09-01-20 @ 07:54) (56 - 78)  BP: 119/64 (09-01-20 @ 07:54) (111/64 - 144/71)  RR: 18 (09-01-20 @ 07:54) (18 - 18)  SpO2: 98% (09-01-20 @ 07:54) (95% - 99%)  Wt(kg): --    MEDICATIONS   acetaminophen   Tablet .. 650 milliGRAM(s) every 6 hours PRN  artificial tears (preservative free) Ophthalmic Solution 1 Drop(s) daily  artificial tears (preservative free) Ophthalmic Solution 1 Drop(s) every 1 hour PRN  aspirin 325 milliGRAM(s) daily  atorvastatin 80 milliGRAM(s) at bedtime  clopidogrel Tablet 75 milliGRAM(s) daily  enoxaparin Injectable 40 milliGRAM(s) daily  lidocaine   Patch 1 Patch every 24 hours  polyethylene glycol 3350 17 Gram(s) daily  senna 2 Tablet(s) at bedtime      RECENT LABS - Reviewed                        12.6   6.31  )-----------( 321      ( 01 Sep 2020 06:28 )             38.4     09-01    140  |  104  |  17  ----------------------------<  109<H>  3.5   |  25  |  0.71    Ca    9.5      01 Sep 2020 06:26    < from: MR Angio Head No Cont (08.25.20 @ 11:20) >    IMPRESSION: Severe stenosis of the mid to distal right M1 segment of the middle cerebral artery without change in flow proximal or distal to the stenosis. Decreased flow in the right middle cerebral artery branches in the sylvian fissure compared with the left.      < end of copied text >      ----------------------------------------------------------------------------  PHYSICAL EXAM  Constitutional - NAD, Comfortable, sitting in chair   HEENT - NCAT, EOMI  Neck - Supple, No limited ROM  Chest - Breathing comfortably, equal chest rise  Cardiovascular - well perfused  Abdomen - Soft   Extremities - No C/C/E, No calf tenderness   Neurologic Exam -                    Cognitive - Awake, Alert, AAO to self, place, date, year, situation     Communication - Fluent, No dysarthria     Cranial Nerves - +left facial droop, left hemineglect      Motor -                     LEFT    UE - ShAB 4/5, EF 4/5, EE 3/5, WE 3/5                    RIGHT UE - ShAB 5/5, EF 5/5, EE 5/5, WE 5/5,  5/5                    LEFT    LE - HF 5/5, KE 5/5, DF 5/5, PF 5/5                    RIGHT LE - HF 5/5, KE 5/5, DF 5/5, PF 5/5        Sensory - Intact to LT     Reflexes - DTR Intact, No primitive reflexes  Psychiatric - Mood stable, Affect WNL  ----------------------------------------------------------------------------------------  ASSESSMENT/PLAN  75y Male with functional deficits after right MCA ischemic CVA.  s/p Cerebral angiography Right MCA stent placement 8/26/20   ASA/Plavix  Dsyphagia, MBS yesterday, to continue nectar thick liquids  Pain - tylenol   DVT PPX - lovenox  Rehab -   continue bedside PT/OT/SLP  transfers, ambulation x 50 feet with min assist  awaiting marjorie bed   When medically stable, recommend ACUTE inpatient rehabilitation for the functional deficits consisting of 3 hours of therapy/day & 24 hour RN/daily PMR physician for comorbid medical management. Will continue to follow for ongoing rehab needs and recommendations.  ELOS 14 days, LTG: supervision with transfers, ambulation

## 2020-09-01 NOTE — CHART NOTE - NSCHARTNOTEFT_GEN_A_CORE
Nutrition Follow Up Note  Patient seen for: routine follow up    Source: Comprehensive chart review and pt    Chart reviewed, events noted.    Diet : Diet, Dysphagia 3 Soft-Nectar Consistency Fluid (08-28-20 @ 17:15)     Subjective: Per RN, pt with continued good appetite and PO intake; tolerating current diet consistency with no issues. Pt continues to require feeding assistance at mealtimes. RN reports pt with some constipation; last BM 8/30 per flowsheet. Pt on bowel regimen.      PO intake : >75%     Source for PO intake: RN     Enteral /Parenteral Nutrition: n/a      Daily   % Weight Change - no new weights to assess    Pertinent Medications: MEDICATIONS  (STANDING):  artificial tears (preservative free) Ophthalmic Solution 1 Drop(s) Both EYES daily  aspirin 325 milliGRAM(s) Oral daily  atorvastatin 80 milliGRAM(s) Oral at bedtime  clopidogrel Tablet 75 milliGRAM(s) Oral daily  enoxaparin Injectable 40 milliGRAM(s) SubCutaneous daily  lidocaine   Patch 1 Patch Transdermal every 24 hours  polyethylene glycol 3350 17 Gram(s) Oral daily  senna 2 Tablet(s) Oral at bedtime    MEDICATIONS  (PRN):  acetaminophen   Tablet .. 650 milliGRAM(s) Oral every 6 hours PRN Temp greater or equal to 38C (100.4F), Mild Pain (1 - 3), Moderate Pain (4 - 6), Severe Pain (7 - 10)  artificial tears (preservative free) Ophthalmic Solution 1 Drop(s) Both EYES every 1 hour PRN Dry Eyes    Pertinent Labs: 09-01 @ 06:26: Na 140, BUN 17, Cr 0.71, <H>, K+ 3.5, Phos --, Mg --, Alk Phos --, ALT/SGPT --, AST/SGOT --, HbA1c --    Finger Sticks:      Skin per nursing documentation: no pressure injury documented   Edema: none noted    Estimated Needs: based on dosing wt 63.5 kg  Energy: 8717-2993 (25-30 kcals/kg)  Protein: 63-76 gm (1.0-1.2 gm/kg)    Previous Nutrition Diagnosis: swallowing difficulty  Nutrition Diagnosis is: remains appropriate, addressed with mechanically altered diet     Interventions:     Recommend  1) Continue current diet with no therapeutic restrictions at this time; defer consistency to team  2) Encourage adequate fluid intake to promote gastric motility.   3) Continue to provide feeding assistance at mealtime as needed.       Monitoring and Evaluation:     Continue to monitor Nutritional intake, Tolerance to diet prescription, weights, labs, skin integrity    RD remains available upon request and will follow up per protocol    Te Back RD pager # [phone]

## 2020-09-01 NOTE — PROGRESS NOTE ADULT - SUBJECTIVE AND OBJECTIVE BOX
Patient is a 75y old  Male who presents with a chief complaint of R M1 occlusion vs stenosis (01 Sep 2020 15:35)      SUBJECTIVE / OVERNIGHT EVENTS:  left sided weakness,  No chest pain. No shortness of breath. No complaints. No events overnight.     MEDICATIONS  (STANDING):  artificial tears (preservative free) Ophthalmic Solution 1 Drop(s) Both EYES daily  aspirin 325 milliGRAM(s) Oral daily  atorvastatin 80 milliGRAM(s) Oral at bedtime  clopidogrel Tablet 75 milliGRAM(s) Oral daily  enoxaparin Injectable 40 milliGRAM(s) SubCutaneous daily  lidocaine   Patch 1 Patch Transdermal every 24 hours  polyethylene glycol 3350 17 Gram(s) Oral daily  senna 2 Tablet(s) Oral at bedtime    MEDICATIONS  (PRN):  acetaminophen   Tablet .. 650 milliGRAM(s) Oral every 6 hours PRN Temp greater or equal to 38C (100.4F), Mild Pain (1 - 3), Moderate Pain (4 - 6), Severe Pain (7 - 10)  artificial tears (preservative free) Ophthalmic Solution 1 Drop(s) Both EYES every 1 hour PRN Dry Eyes      Vital Signs Last 24 Hrs  T(C): 36.8 (01 Sep 2020 16:06), Max: 36.8 (01 Sep 2020 16:06)  T(F): 98.2 (01 Sep 2020 16:06), Max: 98.2 (01 Sep 2020 16:06)  HR: 63 (01 Sep 2020 16:06) (56 - 64)  BP: 125/64 (01 Sep 2020 16:06) (119/64 - 144/71)  BP(mean): --  RR: 18 (01 Sep 2020 16:06) (18 - 18)  SpO2: 97% (01 Sep 2020 16:06) (95% - 99%)  CAPILLARY BLOOD GLUCOSE        I&O's Summary    31 Aug 2020 07:01  -  01 Sep 2020 07:00  --------------------------------------------------------  IN: 0 mL / OUT: 450 mL / NET: -450 mL        PHYSICAL EXAM:  GENERAL: NAD, well-developed  HEAD:  Atraumatic, Normocephalic  EYES: EOMI, PERRLA, conjunctiva and sclera clear  NECK: Supple, No JVD  CHEST/LUNG: Clear to auscultation bilaterally; No wheeze  HEART: Regular rate and rhythm; No murmurs, rubs, or gallops  ABDOMEN: Soft, Nontender, Nondistended; Bowel sounds present  EXTREMITIES:  2+ Peripheral Pulses, No clubbing, cyanosis, or edema  PSYCH: AAOx3  NEUROLOGY: left sided weakness  SKIN: No rashes or lesions    LABS:                        12.6   6.31  )-----------( 321      ( 01 Sep 2020 06:28 )             38.4     09-01    140  |  104  |  17  ----------------------------<  109<H>  3.5   |  25  |  0.71    Ca    9.5      01 Sep 2020 06:26                RADIOLOGY & ADDITIONAL TESTS:    Imaging Personally Reviewed:    Consultant(s) Notes Reviewed:      Care Discussed with Consultants/Other Providers:

## 2020-09-01 NOTE — PROGRESS NOTE ADULT - SUBJECTIVE AND OBJECTIVE BOX
Subjective: Patient seen and examined. No new events except as noted.   resting comfortably       REVIEW OF SYSTEMS:    CONSTITUTIONAL: + weakness, fevers or chills  EYES/ENT: No visual changes;  No vertigo or throat pain   NECK: No pain or stiffness  RESPIRATORY: No cough, wheezing, hemoptysis; No shortness of breath  CARDIOVASCULAR: No chest pain or palpitations  GASTROINTESTINAL: No abdominal or epigastric pain. No nausea, vomiting, or hematemesis; No diarrhea or constipation. No melena or hematochezia.  GENITOURINARY: No dysuria, frequency or hematuria  NEUROLOGICAL: No numbness or weakness  SKIN: No itching, burning, rashes, or lesions   All other review of systems is negative unless indicated above.    MEDICATIONS:  MEDICATIONS  (STANDING):  artificial tears (preservative free) Ophthalmic Solution 1 Drop(s) Both EYES daily  aspirin 325 milliGRAM(s) Oral daily  atorvastatin 80 milliGRAM(s) Oral at bedtime  clopidogrel Tablet 75 milliGRAM(s) Oral daily  enoxaparin Injectable 40 milliGRAM(s) SubCutaneous daily  lidocaine   Patch 1 Patch Transdermal every 24 hours  polyethylene glycol 3350 17 Gram(s) Oral daily  senna 2 Tablet(s) Oral at bedtime      PHYSICAL EXAM:  T(C): 36.6 (09-01-20 @ 07:54), Max: 37 (08-31-20 @ 11:37)  HR: 56 (09-01-20 @ 07:54) (56 - 78)  BP: 119/64 (09-01-20 @ 07:54) (111/64 - 144/71)  RR: 18 (09-01-20 @ 07:54) (18 - 18)  SpO2: 98% (09-01-20 @ 07:54) (95% - 99%)  Wt(kg): --  I&O's Summary    31 Aug 2020 07:01  -  01 Sep 2020 07:00  --------------------------------------------------------  IN: 0 mL / OUT: 450 mL / NET: -450 mL          Appearance: NAD  HEENT:   Normal oral mucosa, PERRL, EOMI	  Lymphatic: No lymphadenopathy , no edema  Cardiovascular: Normal S1 S2, No JVD, No murmurs , Peripheral pulses palpable 2+ bilaterally  Respiratory: Lungs clear to auscultation, normal effort 	  Gastrointestinal:  Soft, Non-tender, + BS	  Skin: No rashes, No ecchymoses, No cyanosis, warm to touch  Musculoskeletal: Normal range of motion, normal strength  Psychiatry:  Mood & affect appropriate  Ext: No edema      LABS:    CARDIAC MARKERS:                                12.6   6.31  )-----------( 321      ( 01 Sep 2020 06:28 )             38.4     09-01    140  |  104  |  17  ----------------------------<  109<H>  3.5   |  25  |  0.71    Ca    9.5      01 Sep 2020 06:26      proBNP:   Lipid Profile:   HgA1c:   TSH:             TELEMETRY: 	    ECG:  	  RADIOLOGY:   DIAGNOSTIC TESTING:  [ ] Echocardiogram:  [ ]  Catheterization:  [ ] Stress Test:    OTHER:

## 2020-09-02 ENCOUNTER — INPATIENT (INPATIENT)
Facility: HOSPITAL | Age: 75
LOS: 15 days | Discharge: HOME | DRG: 949 | End: 2020-09-18
Attending: PHYSICAL MEDICINE & REHABILITATION | Admitting: PHYSICAL MEDICINE & REHABILITATION
Payer: SELF-PAY

## 2020-09-02 VITALS
SYSTOLIC BLOOD PRESSURE: 136 MMHG | OXYGEN SATURATION: 97 % | HEIGHT: 64 IN | HEART RATE: 58 BPM | WEIGHT: 129.19 LBS | RESPIRATION RATE: 18 BRPM | DIASTOLIC BLOOD PRESSURE: 67 MMHG | TEMPERATURE: 98 F

## 2020-09-02 VITALS
DIASTOLIC BLOOD PRESSURE: 74 MMHG | HEART RATE: 63 BPM | TEMPERATURE: 98 F | RESPIRATION RATE: 18 BRPM | SYSTOLIC BLOOD PRESSURE: 143 MMHG | OXYGEN SATURATION: 98 %

## 2020-09-02 DIAGNOSIS — R79.89 OTHER SPECIFIED ABNORMAL FINDINGS OF BLOOD CHEMISTRY: ICD-10-CM

## 2020-09-02 DIAGNOSIS — I69.314 FRONTAL LOBE AND EXECUTIVE FUNCTION DEFICIT FOLLOWING CEREBRAL INFARCTION: ICD-10-CM

## 2020-09-02 DIAGNOSIS — Z77.22 CONTACT WITH AND (SUSPECTED) EXPOSURE TO ENVIRONMENTAL TOBACCO SMOKE (ACUTE) (CHRONIC): ICD-10-CM

## 2020-09-02 DIAGNOSIS — E78.5 HYPERLIPIDEMIA, UNSPECIFIED: ICD-10-CM

## 2020-09-02 DIAGNOSIS — Z51.89 ENCOUNTER FOR OTHER SPECIFIED AFTERCARE: ICD-10-CM

## 2020-09-02 DIAGNOSIS — I69.322 DYSARTHRIA FOLLOWING CEREBRAL INFARCTION: ICD-10-CM

## 2020-09-02 DIAGNOSIS — I69.398 OTHER SEQUELAE OF CEREBRAL INFARCTION: ICD-10-CM
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
## 2020-09-02 DIAGNOSIS — K59.00 CONSTIPATION, UNSPECIFIED: ICD-10-CM

## 2020-09-02 DIAGNOSIS — I69.312 VISUOSPATIAL DEFICIT AND SPATIAL NEGLECT FOLLOWING CEREBRAL INFARCTION: ICD-10-CM

## 2020-09-02 DIAGNOSIS — Z79.82 LONG TERM (CURRENT) USE OF ASPIRIN: ICD-10-CM

## 2020-09-02 DIAGNOSIS — I69.390 APRAXIA FOLLOWING CEREBRAL INFARCTION: ICD-10-CM

## 2020-09-02 DIAGNOSIS — Z79.02 LONG TERM (CURRENT) USE OF ANTITHROMBOTICS/ANTIPLATELETS: ICD-10-CM

## 2020-09-02 DIAGNOSIS — I63.9 CEREBRAL INFARCTION, UNSPECIFIED: ICD-10-CM

## 2020-09-02 DIAGNOSIS — I69.354 HEMIPLEGIA AND HEMIPARESIS FOLLOWING CEREBRAL INFARCTION AFFECTING LEFT NON-DOMINANT SIDE: ICD-10-CM

## 2020-09-02 DIAGNOSIS — R13.10 DYSPHAGIA, UNSPECIFIED: ICD-10-CM

## 2020-09-02 DIAGNOSIS — T46.6X5A ADVERSE EFFECT OF ANTIHYPERLIPIDEMIC AND ANTIARTERIOSCLEROTIC DRUGS, INITIAL ENCOUNTER: ICD-10-CM

## 2020-09-02 DIAGNOSIS — S43.002D UNSPECIFIED SUBLUXATION OF LEFT SHOULDER JOINT, SUBSEQUENT ENCOUNTER: ICD-10-CM

## 2020-09-02 DIAGNOSIS — I63.511 CEREBRAL INFARCTION DUE TO UNSPECIFIED OCCLUSION OR STENOSIS OF RIGHT MIDDLE CEREBRAL ARTERY: ICD-10-CM

## 2020-09-02 DIAGNOSIS — Q24.3 PULMONARY INFUNDIBULAR STENOSIS: ICD-10-CM

## 2020-09-02 PROBLEM — I10 ESSENTIAL (PRIMARY) HYPERTENSION: Chronic | Status: ACTIVE | Noted: 2020-08-14

## 2020-09-02 LAB
HAV IGM SER-ACNC: SIGNIFICANT CHANGE UP
HBV CORE IGM SER-ACNC: SIGNIFICANT CHANGE UP
HBV SURFACE AG SER-ACNC: SIGNIFICANT CHANGE UP
HCV AB S/CO SERPL IA: 0.29 S/CO — SIGNIFICANT CHANGE UP (ref 0–0.99)
HCV AB SERPL-IMP: SIGNIFICANT CHANGE UP
SARS-COV-2 RNA SPEC QL NAA+PROBE: SIGNIFICANT CHANGE UP

## 2020-09-02 PROCEDURE — 99232 SBSQ HOSP IP/OBS MODERATE 35: CPT

## 2020-09-02 RX ORDER — ACETAMINOPHEN 500 MG
650 TABLET ORAL EVERY 6 HOURS
Refills: 0 | Status: DISCONTINUED | OUTPATIENT
Start: 2020-09-02 | End: 2020-09-18

## 2020-09-02 RX ORDER — POLYETHYLENE GLYCOL 3350 17 G/17G
17 POWDER, FOR SOLUTION ORAL DAILY
Refills: 0 | Status: DISCONTINUED | OUTPATIENT
Start: 2020-09-02 | End: 2020-09-18

## 2020-09-02 RX ORDER — SENNA PLUS 8.6 MG/1
2 TABLET ORAL
Qty: 0 | Refills: 0 | DISCHARGE
Start: 2020-09-02

## 2020-09-02 RX ORDER — ENOXAPARIN SODIUM 100 MG/ML
40 INJECTION SUBCUTANEOUS AT BEDTIME
Refills: 0 | Status: DISCONTINUED | OUTPATIENT
Start: 2020-09-02 | End: 2020-09-18

## 2020-09-02 RX ORDER — INFLUENZA VIRUS VACCINE 15; 15; 15; 15 UG/.5ML; UG/.5ML; UG/.5ML; UG/.5ML
0.5 SUSPENSION INTRAMUSCULAR ONCE
Refills: 0 | Status: DISCONTINUED | OUTPATIENT
Start: 2020-09-02 | End: 2020-09-18

## 2020-09-02 RX ORDER — CLOPIDOGREL BISULFATE 75 MG/1
75 TABLET, FILM COATED ORAL DAILY
Refills: 0 | Status: DISCONTINUED | OUTPATIENT
Start: 2020-09-02 | End: 2020-09-18

## 2020-09-02 RX ORDER — POLYETHYLENE GLYCOL 3350 17 G/17G
17 POWDER, FOR SOLUTION ORAL
Qty: 0 | Refills: 0 | DISCHARGE
Start: 2020-09-02

## 2020-09-02 RX ORDER — CLOPIDOGREL BISULFATE 75 MG/1
1 TABLET, FILM COATED ORAL
Qty: 0 | Refills: 0 | DISCHARGE
Start: 2020-09-02

## 2020-09-02 RX ORDER — ASPIRIN/CALCIUM CARB/MAGNESIUM 324 MG
325 TABLET ORAL DAILY
Refills: 0 | Status: DISCONTINUED | OUTPATIENT
Start: 2020-09-02 | End: 2020-09-18

## 2020-09-02 RX ORDER — ACETAMINOPHEN 500 MG
2 TABLET ORAL
Qty: 0 | Refills: 0 | DISCHARGE
Start: 2020-09-02

## 2020-09-02 RX ORDER — ATORVASTATIN CALCIUM 80 MG/1
1 TABLET, FILM COATED ORAL
Qty: 0 | Refills: 0 | DISCHARGE
Start: 2020-09-02

## 2020-09-02 RX ORDER — ATORVASTATIN CALCIUM 80 MG/1
80 TABLET, FILM COATED ORAL AT BEDTIME
Refills: 0 | Status: DISCONTINUED | OUTPATIENT
Start: 2020-09-02 | End: 2020-09-07

## 2020-09-02 RX ORDER — SENNA PLUS 8.6 MG/1
2 TABLET ORAL AT BEDTIME
Refills: 0 | Status: DISCONTINUED | OUTPATIENT
Start: 2020-09-02 | End: 2020-09-18

## 2020-09-02 RX ORDER — ASPIRIN/CALCIUM CARB/MAGNESIUM 324 MG
1 TABLET ORAL
Qty: 0 | Refills: 0 | DISCHARGE
Start: 2020-09-02

## 2020-09-02 RX ORDER — LIDOCAINE 4 G/100G
0 CREAM TOPICAL
Qty: 0 | Refills: 0 | DISCHARGE
Start: 2020-09-02

## 2020-09-02 RX ORDER — ENOXAPARIN SODIUM 100 MG/ML
40 INJECTION SUBCUTANEOUS
Qty: 0 | Refills: 0 | DISCHARGE
Start: 2020-09-02

## 2020-09-02 RX ADMIN — CLOPIDOGREL BISULFATE 75 MILLIGRAM(S): 75 TABLET, FILM COATED ORAL at 12:27

## 2020-09-02 RX ADMIN — POLYETHYLENE GLYCOL 3350 17 GRAM(S): 17 POWDER, FOR SOLUTION ORAL at 12:28

## 2020-09-02 RX ADMIN — ENOXAPARIN SODIUM 40 MILLIGRAM(S): 100 INJECTION SUBCUTANEOUS at 21:18

## 2020-09-02 RX ADMIN — ENOXAPARIN SODIUM 40 MILLIGRAM(S): 100 INJECTION SUBCUTANEOUS at 12:27

## 2020-09-02 RX ADMIN — LIDOCAINE 1 PATCH: 4 CREAM TOPICAL at 07:15

## 2020-09-02 RX ADMIN — Medication 1 DROP(S): at 12:27

## 2020-09-02 RX ADMIN — Medication 1 DROP(S): at 02:48

## 2020-09-02 RX ADMIN — SENNA PLUS 2 TABLET(S): 8.6 TABLET ORAL at 21:19

## 2020-09-02 RX ADMIN — Medication 325 MILLIGRAM(S): at 12:28

## 2020-09-02 RX ADMIN — LIDOCAINE 1 PATCH: 4 CREAM TOPICAL at 05:21

## 2020-09-02 RX ADMIN — ATORVASTATIN CALCIUM 80 MILLIGRAM(S): 80 TABLET, FILM COATED ORAL at 21:19

## 2020-09-02 NOTE — PROGRESS NOTE ADULT - ATTENDING COMMENTS
agree with above; ROS otherwise negative
agre with above; ROS otherwise negative
agree with above; ROS otherwise negative
agree with above; ROS otherwise negative
Advanced care planning was discussed with patient and family.  Advanced care planning forms were reviewed and discussed as appropriate.  Differential diagnosis and plan of care discussed with patient after the evaluation.   Pain assessed and judicious use of narcotics when appropriate was discussed.  Importance of Fall prevention discussed.  Counseling on Smoking and Alcohol cessation was offered when appropriate.  Counseling on Diet, exercise, and medication compliance was done.
agree with above; ROS otherwise negative
Advanced care planning was discussed with patient and family.  Advanced care planning forms were reviewed and discussed as appropriate.  Differential diagnosis and plan of care discussed with patient after the evaluation.   Pain assessed and judicious use of narcotics when appropriate was discussed.  Importance of Fall prevention discussed.  Counseling on Smoking and Alcohol cessation was offered when appropriate.  Counseling on Diet, exercise, and medication compliance was done.
agree with above; ROS otherwise negative
Advanced care planning was discussed with patient and family.  Advanced care planning forms were reviewed and discussed as appropriate.  Differential diagnosis and plan of care discussed with patient after the evaluation.   Pain assessed and judicious use of narcotics when appropriate was discussed.  Importance of Fall prevention discussed.  Counseling on Smoking and Alcohol cessation was offered when appropriate.  Counseling on Diet, exercise, and medication compliance was done.

## 2020-09-02 NOTE — H&P ADULT - NSHPLABSRESULTS_GEN_ALL_CORE
RECENT LABS    Vital Signs Last 24 Hrs  T(C): 36.9 (02 Sep 2020 12:58), Max: 36.9 (02 Sep 2020 08:06)  T(F): 98.4 (02 Sep 2020 12:58), Max: 98.5 (02 Sep 2020 08:06)  HR: 67 (02 Sep 2020 12:58) (58 - 67)  BP: 112/64 (02 Sep 2020 12:58) (112/60 - 131/73)  BP(mean): --  RR: 18 (02 Sep 2020 12:58) (18 - 18)  SpO2: 97% (02 Sep 2020 12:58) (96% - 97%)                          12.6   6.31  )-----------( 321      ( 01 Sep 2020 06:28 )             38.4     09-01    140  |  104  |  17  ----------------------------<  109<H>  3.5   |  25  |  0.71    Ca    9.5      01 Sep 2020 06:26      IMAGING:  MR Angio Head No Cont (08.25.2020)  Severe stenosis of the mid to distal right M1 segment of the middle cerebral artery without change in flow proximal or distal to the stenosis. Decreased flow in the right middle cerebral artery branches in the sylvian fissure compared with the left.    MRI Head No Cont (08.18.2020) Interval apparent increase in the infarcts in the right MCA territory involving the right centrum semiovale and corona radiata as well as the right temporal lobe compared to prior MRI exam 8/15/2020.  No acute intracranial hemorrhage. Occlusion right MCA as seen on CTA exam    CT Angio Head w/ IV Cont (08.17.20) Redemonstration of near complete occlusion of the M1 segment of the right MCA.  2 mm conical outpouching arising from the left supraclinoid ICA directed inferiorly suggesting an infundibulum versus small aneurysm (8:39).     CT head 08.17.20 : Redemonstration of acute/subacute right MCA territory infarct.    MRI Brain w/o (08/15/20): Acute infarct in the right MCA territory somewhat patchy involving the right lateral temporal lobe as well as the right corona radiata/centrum semiovale ovale region in a somewhat junctional pattern, deep internal border zone type pattern. No significant mass effect. No associated hemorrhage    CT Head No Cont. (08/14/20): There is no acute intracranial hemorrhage. No focal edema or evidence of acute, transcortical infarct. No hydrocephalus, midline shift or mass effect.    CT Angio Head w/IV Cont. (08/14/20): 6 mm in length severe stenosis/near occlusion of the M1 segment of the Right MCA with reconstitution at the bifurcation.    CT Angio Neck w/IV Cont. (08/14/20): Patent cervical vasculature. No flow limiting stenosis or occlusion.

## 2020-09-02 NOTE — PROGRESS NOTE ADULT - REASON FOR ADMISSION
R M1 occlusion vs stenosis
Admitted 8/14 R M1 occlusion vs stenosis; 8/26 s/p cerebral angiogram: placement of RMCA stent
R M1 occlusion vs stenosis
R M1 stenosis
R M1 occlusion vs stenosis

## 2020-09-02 NOTE — PROGRESS NOTE ADULT - SUBJECTIVE AND OBJECTIVE BOX
Patient is a 75y old  Male who presents with a chief complaint of R M1 occlusion vs stenosis (02 Sep 2020 10:28)      Any change in ROS: pt is doing okk no SOB     MEDICATIONS  (STANDING):  artificial tears (preservative free) Ophthalmic Solution 1 Drop(s) Both EYES daily  aspirin 325 milliGRAM(s) Oral daily  atorvastatin 80 milliGRAM(s) Oral at bedtime  clopidogrel Tablet 75 milliGRAM(s) Oral daily  enoxaparin Injectable 40 milliGRAM(s) SubCutaneous daily  lidocaine   Patch 1 Patch Transdermal every 24 hours  polyethylene glycol 3350 17 Gram(s) Oral daily  senna 2 Tablet(s) Oral at bedtime    MEDICATIONS  (PRN):  acetaminophen   Tablet .. 650 milliGRAM(s) Oral every 6 hours PRN Temp greater or equal to 38C (100.4F), Mild Pain (1 - 3), Moderate Pain (4 - 6), Severe Pain (7 - 10)  artificial tears (preservative free) Ophthalmic Solution 1 Drop(s) Both EYES every 1 hour PRN Dry Eyes    Vital Signs Last 24 Hrs  T(C): 36.9 (02 Sep 2020 08:06), Max: 36.9 (02 Sep 2020 08:06)  T(F): 98.5 (02 Sep 2020 08:06), Max: 98.5 (02 Sep 2020 08:06)  HR: 63 (02 Sep 2020 08:06) (58 - 64)  BP: 112/60 (02 Sep 2020 08:06) (112/60 - 131/73)  BP(mean): --  RR: 18 (02 Sep 2020 08:06) (18 - 18)  SpO2: 96% (02 Sep 2020 08:06) (96% - 99%)    I&O's Summary    01 Sep 2020 07:01  -  02 Sep 2020 07:00  --------------------------------------------------------  IN: 0 mL / OUT: 200 mL / NET: -200 mL          Physical Exam:   GENERAL: NAD, well-groomed, well-developed  HEENT: DAMIAN/   Atraumatic, Normocephalic  ENMT: No tonsillar erythema, exudates, or enlargement; Moist mucous membranes, Good dentition, No lesions  NECK: Supple, No JVD, Normal thyroid  CHEST/LUNG: Clear to auscultaion, ; No rales, rhonchi, wheezing, or rubs  CVS: Regular rate and rhythm; No murmurs, rubs, or gallops  GI: : Soft, Nontender, Nondistended; Bowel sounds present  NERVOUS SYSTEM:  Alert & Oriented X3  EXTREMITIES:  2+ Peripheral Pulses, No clubbing, cyanosis, or edema  LYMPH: No lymphadenopathy noted  SKIN: No rashes or lesions  ENDOCRINOLOGY: No Thyromegaly  PSYCH: Appropriate    Labs:                              12.6   6.31  )-----------( 321      ( 01 Sep 2020 06:28 )             38.4                         13.3   7.03  )-----------( 290      ( 30 Aug 2020 06:08 )             39.9     09-01    140  |  104  |  17  ----------------------------<  109<H>  3.5   |  25  |  0.71  08-30    143  |  106  |  18  ----------------------------<  108<H>  3.5   |  24  |  0.68    Ca    9.5      01 Sep 2020 06:26      CAPILLARY BLOOD GLUCOSE  < from: [facility] Duplex Lower Ext Vein Scan, Bilteri (08.28.20 @ 15:56) >    EXAM:  DUPLEX SCAN EXT VEINS LOWER BI                            PROCEDURE DATE:  08/28/2020            INTERPRETATION:  CLINICAL INFORMATION: 75-year-old with right thigh pain and swelling. Assess for DVT    COMPARISON: None available.    TECHNIQUE: Duplex sonography of the BILATERAL LOWER extremity veins with color and spectral Doppler, with and without compression.    FINDINGS:    There is normal compressibility of the bilateral common femoral, femoral and popliteal veins.  Doppler examination shows normal spontaneous and phasic flow.    No calf vein thrombosis is detected.    IMPRESSION:  No evidence of deep venous thrombosis in either lower extremity.                      SOPHY FISH M.D., ATTENDING RADIOLOGIST  This document has been electronically signed. Aug 28 2020  4:25PM              < end of copied text >                      RECENT CULTURES:        RESPIRATORY CULTURES:          Studies  Chest X-RAY  CT SCAN Chest   Venous Dopplers: LE:   CT Abdomen  Others

## 2020-09-02 NOTE — H&P ADULT - NSHPREVIEWOFSYSTEMS_GEN_ALL_CORE
REVIEW OF SYSTEMS  Constitutional: No fever, No Chills, No fatigue  HEENT: No eye pain, No visual disturbances, No difficulty hearing, No Dysphagia   Pulm: No cough,  No shortness of breath  Cardio: No chest pain, No palpitations  GI:  No abdominal pain, No nausea, No vomiting, No diarrhea, No constipation, No incontinence, Last Bowel Movement on   : No dysuria, No frequency, No hematuria, No incontinence  Neuro: No headaches, No memory loss, No loss of strength, No numbness, No tremors  Skin: No itching, No rashes, No lesions   Endo: No temperature intolerance  MSK: No joint pain, No joint swelling, No muscle pain, No Neck or back pain  Psych:  No depression, No anxiety REVIEW OF SYSTEMS  Constitutional: No fever, No Chills, No fatigue  HEENT: No eye pain, No visual disturbances, No difficulty hearing, No Dysphagia   Pulm: No cough,  No shortness of breath  Cardio: No chest pain, No palpitations  GI:  No abdominal pain, No nausea, No vomiting, No diarrhea, No constipation, No incontinence   : No dysuria, No frequency, No hematuria, No incontinence  Neuro: No headaches, No memory loss, +loss of strength, No numbness, No tremors  Skin: No itching, No rashes, No lesions   Endo: No temperature intolerance  MSK: No joint pain, No joint swelling, No muscle pain, No Neck or back pain  Psych:  No depression, No anxiety REVIEW OF SYSTEMS  Constitutional: No fever, No Chills  HEENT: No eye pain, No blurry vision, +difficulty with the vision on the left side, No difficulty hearing,   Pulm: No cough,  No shortness of breath  Cardio: No chest pain, No palpitations  GI:  No abdominal pain, No nausea, No vomiting, Last BM 8/25 per patient   : No dysuria, No hematuria,  Neuro: No headaches, +loss of strength, No numbness, No tremors  Skin: No itching, No rashes  Endo: No temperature intolerance  MSK: No joint pain, No joint swelling, No muscle pain, No Neck or back pain  Psych:  No depression, No anxiety REVIEW OF SYSTEMS  Constitutional: No fever, No Chills +RH dominant  HEENT: No eye pain, No blurry vision, No difficulty hearing, Denies diplopia  Pulm: No cough,  No shortness of breath  Cardio: No chest pain, No palpitations  GI:  No abdominal pain, No nausea, No vomiting, Last BM 8/31  : No dysuria, No hematuria,  Neuro: No headaches, +loss of strength but improving, No numbness or paresthesias, No tremors  Skin: No itching, No rashes  Endo: No temperature intolerance  MSK: No joint pain, No joint swelling, No muscle pain, No Neck or back pain  Psych:  No depression, No anxiety

## 2020-09-02 NOTE — H&P ADULT - NSHPSOCIALHISTORY_GEN_ALL_CORE
Social:   Smoking - denies, but exposure to second hand smoke  EtOH - denied  Illicit drugs - denied  Lives in private house with a friend with 5-6 steps to enter; bedroom is on the main floor.    Prior Level of Function:   Patient is fully independent, driving, and taking care of his aunt prior to admission.    Current Functional Status:  SLP 8/31/20: continue dysphagia 3 nectar, thin liquids under supervision to ensure administration via teaspoon only. Will require repeat MBS prior to consideration of further upgrade in liquid consistency.     PT 8/31/20:   Bed mobility - Madeline  Transfers - Madeline  Gait - Madeline, 50' x2 with hemiwalker and 1 person assist    OT 9/1/20:  Bed-chair - CG   Transfer - GC  Grooming - modA

## 2020-09-02 NOTE — PROGRESS NOTE ADULT - PROBLEM SELECTOR PLAN 1
s/p R MCA stenting, treated with DAPT  Monitor on Tele  Orders per NSx team
Orders per Stroke unit team   DAPT   Check TTE   Monitor on Tele
Orders per Stroke unit team   DAPT     Monitor on Tele
s/p R MCA stenting, treated with DAPT  Monitor on Tele  Orders per NSCU team
s/p R MCA stenting, treated with DAPT  Monitor on Tele  Orders per NSCU team  Follow up CT head
s/p R MCA stenting, treated with DAPT  Monitor on Tele  Orders per NSx team

## 2020-09-02 NOTE — PROGRESS NOTE ADULT - SUBJECTIVE AND OBJECTIVE BOX
Subjective: Patient seen and examined. No new events except as noted.     REVIEW OF SYSTEMS:    CONSTITUTIONAL:+ weakness, fevers or chills  EYES/ENT: No visual changes;  No vertigo or throat pain   NECK: No pain or stiffness  RESPIRATORY: No cough, wheezing, hemoptysis; No shortness of breath  CARDIOVASCULAR: No chest pain or palpitations  GASTROINTESTINAL: No abdominal or epigastric pain. No nausea, vomiting, or hematemesis; No diarrhea or constipation. No melena or hematochezia.  GENITOURINARY: No dysuria, frequency or hematuria  NEUROLOGICAL: No numbness or weakness  SKIN: No itching, burning, rashes, or lesions   All other review of systems is negative unless indicated above.    MEDICATIONS:  MEDICATIONS  (STANDING):  artificial tears (preservative free) Ophthalmic Solution 1 Drop(s) Both EYES daily  aspirin 325 milliGRAM(s) Oral daily  atorvastatin 80 milliGRAM(s) Oral at bedtime  clopidogrel Tablet 75 milliGRAM(s) Oral daily  enoxaparin Injectable 40 milliGRAM(s) SubCutaneous daily  lidocaine   Patch 1 Patch Transdermal every 24 hours  polyethylene glycol 3350 17 Gram(s) Oral daily  senna 2 Tablet(s) Oral at bedtime      PHYSICAL EXAM:  T(C): 36.9 (09-02-20 @ 08:06), Max: 36.9 (09-02-20 @ 08:06)  HR: 63 (09-02-20 @ 08:06) (58 - 64)  BP: 112/60 (09-02-20 @ 08:06) (112/60 - 131/73)  RR: 18 (09-02-20 @ 08:06) (18 - 18)  SpO2: 96% (09-02-20 @ 08:06) (96% - 99%)  Wt(kg): --  I&O's Summary    01 Sep 2020 07:01  -  02 Sep 2020 07:00  --------------------------------------------------------  IN: 0 mL / OUT: 200 mL / NET: -200 mL          Appearance: NAD	  HEENT:   Dry  oral mucosa, PERRL, EOMI	  Lymphatic: No lymphadenopathy  Cardiovascular: Normal S1 S2, No JVD, No murmurs, No edema  Respiratory: Lungs clear to auscultation	  Psychiatry: A & O x 3, Mood & affect appropriate  Gastrointestinal:  Soft, Non-tender, + BS	  Skin: No rashes, No ecchymoses, No cyanosis	  Extremities: Normal range of motion, No clubbing, cyanosis or edema  Vascular: Peripheral pulses palpable 2+ bilaterally  - Cranial Nerves II-XII:  VFF, EOMI, PERRL (3mm OD, OS), V1-V3 intact, some nasolabial flattening on the left side of face, t/p midline, SCM/trap intact.  	- Fundoscopic examination: upon fundoscopic examination grossly clear margins of optic disc & cup  	- Motor: Pronator drift present on the left side. demonstrates orbiting around the left arm. Strength left arm 4+/5 with  strength 4/5. Left leg 4+/5 hip flexors/extensors with 5/5 knee flexion/ext dorsi/plantarflexion. right arm and leg 5/5. Normal muscle bulk and tone throughout. Neck flexion/extension 5/5 without neck stiffness  	- Sensory:  Intact to light touch and PP bilaterally throughout.  	- Coordination:  FTN demonstrated mild dysmetria in proportion to weakness on left arm, intact on right  - Gait: patient able to stand up on his own, ambulate several steps without wide based gait, not requiring assistance.      LABS:    CARDIAC MARKERS:                                12.6   6.31  )-----------( 321      ( 01 Sep 2020 06:28 )             38.4     09-01    140  |  104  |  17  ----------------------------<  109<H>  3.5   |  25  |  0.71    Ca    9.5      01 Sep 2020 06:26      proBNP:   Lipid Profile:   HgA1c:   TSH:             TELEMETRY: 	    ECG:  	  RADIOLOGY:   DIAGNOSTIC TESTING:  [ ] Echocardiogram:  [ ]  Catheterization:  [ ] Stress Test:    OTHER:

## 2020-09-02 NOTE — H&P ADULT - ASSESSMENT
Assessment/Plan:  RAFY KIRKPATRICK is a 75y with a history of *** who presented to *** on *** with complaint of *** and found to have ***. Hospital course complicated by ***. Now admitted to [facility] after for initiation of a multidisciplinary rehab program consisting focused on functional mobility, transfers and ADLs (activities of daily living).    R MCA, S/P R MCA stenting with L sided weakness  - Start comprehensive rehab program, 3 hours a day, 5 days a week.  - PT 1hr/day: Focused on improving strength, endurance, coordination, balance, functional mobility, and transfers  - OT 1hr/day: Focused on improving strength, fine motor skills, coordination, posture and ADLs.    - Speech Therapy 1hr/day: to diagnose and treat deficits in swallowing, cognition and communication.   - P&O as needed  - Activity Limitations: Decreased social, vocational and leisure activities, decreased self care and ADLs, decreased mobility, decreased ability to manage household and finances.   - Patient recommended yearly CTA head to monitor L supraclinoid ICA infundibulum vs small aneurysm.   - Precautions: falls, low BP  - F/u neurosurgery, neurology    H/o HTN but recent hypotension  - Hypotension currently resolved  - Currently normotensive. Monitor for now.    Pain Management:  - Tylenol PRN  - Avoid sedating medications that may interfere with cognitive recovery    GI/Bowel:  - At risk for constipation due to neurologic diagnosis, immobility and/or medication use  - Senna QHS, Miralax Daily    /Bladder:   - At risk for incontinence and retention due to neurologic diagnosis and limited mobility  - Currently patient voids: independently  - Encourage timed voids every 4 hours while awake for independence and to promote continence during therapy.    Skin/Pressure Injury:   - At risk for pressure injury due to neurologic diagnosis and relative immobility.  - Skin assessment on admission admission: ***  - Soft heel protectors  - Skin barrier cream as needed  - Nursing to monitor skin Qshift    Diet/Dysphagia:  - Diet Consistency/Modifications: dysphagia 3 soft-nectar  - Dysphagia: Aspiration Precautions  - SLP consult for swallow function evaluation and treatment  - Nutrition consult    DVT ppx:  - Lovenox  - SCDs  - Last Doppler on 8/28/20  ---------------  Code Status/Emergency Contact:    Outpatient Follow-up (Specialty/Name of physician):  Pierre Singleton)  Ophthalmology  [address] [facility] [facility], [address]  [place] [address]  Phone: [phone]  Fax: [phone]  Follow Up Time: 2 weeks    Otf Levi  Neurological Surgery  [address] [place] [address]  Phone: [phone]  Fax: [phone]    [facility]  Cardiology  [address] [place] [address]  Phone: [phone]  Fax:   Follow Up Time: 2 weeks    [facility]  Neurology  [address] [facility] - [address]rd [place] [address]  Phone: [phone]  Fax:   Follow Up Time: 2 weeks    [facility] [place]  Internal Medicine  [address] [place] [address]  Phone: [phone]  Fax: [phone]  Follow Up Time: 2 weeks    --------------  Goals: Safe discharge to ***  Estimated Length of Stay: 10-14 days  Rehab Potential: Good  Medical Prognosis: Good  Estimated Disposition: Home with Home Care  ---------------    PRESCREEN COMPARISON:  I have reviewed the prescreen information and I have found no relevant changes between the preadmission screening and my post admission evaluation.    RATIONALE FOR INPATIENT ADMISSION: Patient demonstrates the following:  [X] Medically appropriate for rehabilitation admission  [X] Has attainable rehab goals with an appropriate initial discharge plan  [X]Has rehabilitation potential (expected to make a significant improvement within a reasonable period of time)  [X] Requires close medical management by a rehab physician, rehab nursing care, Hospitalist and comprehensive interdisciplinary team (including PT, OT and/or SLP, Prosthetics and Orthotics) Assessment/Plan:  RAFY KIRKPATRICK is a 75y with a history of HTN who presented to [facility] on 8/15/20 with headache and confusion and found to have severe stenosis of R M1 seen on initial CTA head and 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 Hospital course complicated by UTI and hypotension, both now resolved. Now admitted to [facility] after for initiation of a multidisciplinary rehab program consisting focused on functional mobility, transfers and ADLs (activities of daily living).    R MCA, S/P R MCA stenting with L sided weakness  - Start comprehensive rehab program, 3 hours a day, 5 days a week.  - PT 1hr/day: Focused on improving strength, endurance, coordination, balance, functional mobility, and transfers  - OT 1hr/day: Focused on improving strength, fine motor skills, coordination, posture and ADLs.    - Speech Therapy 1hr/day: to diagnose and treat deficits in swallowing, cognition and communication.   - P&O as needed  - Activity Limitations: Decreased social, vocational and leisure activities, decreased self care and ADLs, decreased mobility, decreased ability to manage household and finances.   - Patient recommended yearly CTA head to monitor L supraclinoid ICA infundibulum vs small aneurysm.   - Precautions: falls, low BP  - F/u neurosurgery, neurology    H/o HTN but recent hypotension  - Hypotension currently resolved  - Currently normotensive. Monitor for now.    Pain Management:  - Tylenol PRN  - Avoid sedating medications that may interfere with cognitive recovery    GI/Bowel:  - At risk for constipation due to neurologic diagnosis, immobility and/or medication use  - Senna QHS, Miralax Daily    /Bladder:   - At risk for incontinence and retention due to neurologic diagnosis and limited mobility  - Currently patient voids: independently  - Encourage timed voids every 4 hours while awake for independence and to promote continence during therapy.    Skin/Pressure Injury:   - At risk for pressure injury due to neurologic diagnosis and relative immobility.  - Skin assessment on admission admission: ***  - Soft heel protectors  - Skin barrier cream as needed  - Nursing to monitor skin Qshift    Diet/Dysphagia:  - Diet Consistency/Modifications: dysphagia 3 soft-nectar  - Dysphagia: Aspiration Precautions  - SLP consult for swallow function evaluation and treatment  - Nutrition consult    DVT ppx:  - Lovenox  - SCDs  - Last Doppler on 8/28/20  ---------------  Code Status/Emergency Contact:    Outpatient Follow-up (Specialty/Name of physician):  Pierre Singleton)  Ophthalmology  [address] [facility], [address]  [place] [address]  Phone: [phone]  Fax: [phone]  Follow Up Time: 2 weeks    Otf Levi  Neurological Surgery  [address] [place] [address]  Phone: [phone]  Fax: [phone]    [facility]  Cardiology  [address] [place] [address]  Phone: [phone]  Fax:   Follow Up Time: 2 weeks    [facility]  Neurology  [address] [facility] - [address]rd [place] [address]  Phone: [phone]  Fax:   Follow Up Time: 2 weeks    [facility] at [place]  Internal Medicine  [address] [place] [address]  Phone: [phone]  Fax: [phone]  Follow Up Time: 2 weeks    --------------  Goals: Safe discharge to ***  Estimated Length of Stay: 10-14 days  Rehab Potential: Good  Medical Prognosis: Good  Estimated Disposition: Home with Home Care  ---------------    PRESCREEN COMPARISON:  I have reviewed the prescreen information and I have found no relevant changes between the preadmission screening and my post admission evaluation.    RATIONALE FOR INPATIENT ADMISSION: Patient demonstrates the following:  [X] Medically appropriate for rehabilitation admission  [X] Has attainable rehab goals with an appropriate initial discharge plan  [X]Has rehabilitation potential (expected to make a significant improvement within a reasonable period of time)  [X] Requires close medical management by a rehab physician, rehab nursing care, Hospitalist and comprehensive interdisciplinary team (including PT, OT and/or SLP, Prosthetics and Orthotics) Assessment/Plan:  RAFY KIRKPATRICK is a 75y with a history of HTN who presented to [facility] on 8/15/20 with headache and confusion and found to have severe stenosis of R M1 seen on initial CTA head and 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 Hospital course complicated by UTI and hypotension, both now resolved. Now admitted to [facility] after for initiation of a multidisciplinary rehab program consisting focused on functional mobility, transfers and ADLs (activities of daily living).    R MCA, S/P R MCA stenting with L sided weakness, dysarthria, dysphagia  - Start comprehensive rehab program, 3 hours a day, 5 days a week.  - PT 1hr/day: Focused on improving strength, endurance, coordination, balance, functional mobility, and transfers  - OT 1hr/day: Focused on improving strength, fine motor skills, coordination, posture and ADLs.    - Speech Therapy 1hr/day: to diagnose and treat deficits in swallowing, cognition and communication.   - P&O as needed  - Activity Limitations: Decreased social, vocational and leisure activities, decreased self care and ADLs, decreased mobility, decreased ability to manage household and finances.   - Patient recommended yearly CTA head to monitor L supraclinoid ICA infundibulum vs small aneurysm.   - Precautions: falls, cardiac, aspiration  - F/u neurosurgery, neurology    #H/o HTN but recent hypotension  - Hypotension currently resolved  - Currently normotensive. Monitor for now, hospitalist consult    #Pain Management:  - Tylenol PRN  - Avoid sedating medications that may interfere with cognitive recovery    #GI/Bowel:  - At risk for constipation due to neurologic diagnosis, immobility and/or medication use  - Senna QHS, Miralax Daily    #/Bladder:   - At risk for incontinence and retention due to neurologic diagnosis and limited mobility  - Currently patient voids: independently  - Encourage timed voids every 4 hours while awake for independence and to promote continence during therapy.    #Skin/Pressure Injury:   - At risk for pressure injury due to neurologic diagnosis and relative immobility.  - Skin assessment on admission admission: ***  - Soft heel protectors  - Skin barrier cream as needed  - Nursing to monitor skin Qshift    #Diet/Dysphagia:  - Diet Consistency/Modifications: dysphagia 3 soft-nectar  - Dysphagia: Aspiration Precautions  - SLP and nutrition consults  - Nutrition consult    #DVT ppx:  - Lovenox  - SCDs  - Last Doppler on 8/28/20  ---------------  Code Status/Emergency Contact:    Outpatient Follow-up (Specialty/Name of physician):  Pierre Singleton (MD)  Ophthalmology  [address] [facility], [address]  [place] [address]  Phone: [phone]  Fax: [phone]  Follow Up Time: 2 weeks    Otf Levi  Neurological Surgery  [address] [place] [address]  Phone: [phone]  Fax: [phone]    [facility]  Cardiology  [address] [place] [address]  Phone: [phone]  Fax:   Follow Up Time: 2 weeks    [facility]  Neurology  [address] [facility] - [address]rd [place] [address]  Phone: [phone]  Fax:   Follow Up Time: 2 weeks    [facility] [place]  Internal Medicine  [facility] [place] [address]  Phone: [phone]  Fax: [phone]  Follow Up Time: 2 weeks    --------------  Goals: Safe discharge to ***  Estimated Length of Stay: 10-14 days  Rehab Potential: Good  Medical Prognosis: Good  Estimated Disposition: Home with Home Care  ---------------    PRESCREEN COMPARISON:  I have reviewed the prescreen information and I have found no relevant changes between the preadmission screening and my post admission evaluation.    RATIONALE FOR INPATIENT ADMISSION: Patient demonstrates the following:  [X] Medically appropriate for rehabilitation admission  [X] Has attainable rehab goals with an appropriate initial discharge plan  [X]Has rehabilitation potential (expected to make a significant improvement within a reasonable period of time)  [X] Requires close medical management by a rehab physician, rehab nursing care, Hospitalist and comprehensive interdisciplinary team (including PT, OT and/or SLP, Prosthetics and Orthotics) Assessment/Plan:  RAFY KIRKPATRICK is a 75y with a history of HTN who presented to 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 Hospital course complicated by UTI and hypotension, both now resolved. Now admitted to [facility] after for initiation of a multidisciplinary rehab program consisting focused on functional mobility, transfers and ADLs (activities of daily living).    R MCA, S/P R MCA stenting with L sided weakness, dysarthria, dysphagia  - Start comprehensive rehab program, 3 hours a day, 5 days a week.  - PT 1hr/day: Focused on improving strength, endurance, coordination, balance, functional mobility, and transfers  - OT 1hr/day: Focused on improving strength, fine motor skills, coordination, posture and ADLs.    - Speech Therapy 1hr/day: to diagnose and treat deficits in swallowing, cognition and communication.   - P&O as needed  - Activity Limitations: Decreased social, vocational and leisure activities, decreased self care and ADLs, decreased mobility, decreased ability to manage household and finances.   - Patient recommended yearly CTA head to monitor L supraclinoid ICA infundibulum vs small aneurysm.   - Precautions: falls, cardiac, aspiration  - F/u neurosurgery, neurology    #H/o HTN but recent hypotension  - Hypotension currently resolved  - Currently normotensive. Monitor for now, hospitalist consult    #Pain Management:  - Tylenol PRN  - Avoid sedating medications that may interfere with cognitive recovery    #GI/Bowel:  - At risk for constipation due to neurologic diagnosis, immobility and/or medication use  - Senna QHS, Miralax Daily  - Start suppository PRN    #/Bladder:   - At risk for incontinence and retention due to neurologic diagnosis and limited mobility  - Currently patient voids: independently  - Encourage timed voids every 4 hours while awake for independence and to promote continence during therapy.    #Skin/Pressure Injury:   - At risk for pressure injury due to neurologic diagnosis and relative immobility.  - Soft heel protectors  - Skin barrier cream as needed  - Nursing to monitor skin Qshift    #Diet/Dysphagia:  - Diet Consistency/Modifications: dysphagia 3 soft-nectar  - Dysphagia: Aspiration Precautions  - SLP and nutrition consults  - Nutrition consult    #DVT ppx:  - Lovenox  - SCDs  - Last Doppler on 8/28/20  ---------------    Outpatient Follow-up (Specialty/Name of physician):  Pierre Singleton (MD)  Ophthalmology  [address] [facility], [address]  [place] [address]  Phone: [phone]  Fax: [phone]  Follow Up Time: 2 weeks    Otf Levi  Neurological Surgery  [address] [place] [address]  Phone: [phone]  Fax: [phone]    [facility]  Cardiology  [address] [place] [address]  Phone: [phone]  Fax:   Follow Up Time: 2 weeks    [facility]  Neurology  [address] [facility] - [address]rd [place] [address]  Phone: [phone]  Fax:   Follow Up Time: 2 weeks    [facility] at [place]  Internal Medicine  [facility] [place] [address]  Phone: [phone]  Fax: [phone]  Follow Up Time: 2 weeks    --------------  Goals: Safe discharge to Home  Estimated Length of Stay: 10-14 days  Rehab Potential: Good  Medical Prognosis: Good  Estimated Disposition: Home with Home Care  ---------------    PRESCREEN COMPARISON:  I have reviewed the prescreen information and I have found no relevant changes between the preadmission screening and my post admission evaluation.    RATIONALE FOR INPATIENT ADMISSION: Patient demonstrates the following:  [X] Medically appropriate for rehabilitation admission  [X] Has attainable rehab goals with an appropriate initial discharge plan  [X]Has rehabilitation potential (expected to make a significant improvement within a reasonable period of time)  [X] Requires close medical management by a rehab physician, rehab nursing care, Hospitalist and comprehensive interdisciplinary team (including PT, OT and/or SLP, Prosthetics and Orthotics)    Case discussed with Dr. 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 Now admitted to [facility] after for initiation of a multidisciplinary rehab program consisting focused on functional mobility, transfers and ADLs (activities of daily living).    R MCA, S/P R MCA stenting with L sided weakness, dysarthria, dysphagia  - Start comprehensive rehab program, 3 hours a day, 5 days a week.  - PT 1hr/day: Focused on improving strength, endurance, coordination, balance, functional mobility, and transfers  - OT 1hr/day: Focused on improving strength, fine motor skills, coordination, posture and ADLs.    - Speech Therapy 1hr/day: to diagnose and treat deficits in swallowing, cognition and communication.   - P&O as needed  - Activity Limitations: Decreased social, vocational and leisure activities, decreased self care and ADLs, decreased mobility, decreased ability to manage household and finances.   - Patient recommended yearly CTA head to monitor L supraclinoid ICA infundibulum vs small aneurysm.   - Precautions: falls, cardiac, aspiration  - F/u neurosurgery, neurology    #H/o HTN but recent hypotension  - Hypotension currently resolved  - Currently normotensive. Monitor for now, hospitalist consult    #Pain Management:  - Tylenol PRN  - Avoid sedating medications that may interfere with cognitive recovery    #GI/Bowel:  - At risk for constipation due to neurologic diagnosis, immobility and/or medication use  - Senna QHS, Miralax Daily  - Start suppository PRN    #/Bladder:   - At risk for incontinence and retention due to neurologic diagnosis and limited mobility  - Currently patient voids: independently  - Encourage timed voids every 4 hours while awake for independence and to promote continence during therapy.    #Skin/Pressure Injury:   - At risk for pressure injury due to neurologic diagnosis and relative immobility.  - Soft heel protectors  - Skin barrier cream as needed  - Nursing to monitor skin Qshift    #Diet/Dysphagia:  - Diet Consistency/Modifications: dysphagia 3 soft-nectar  - Dysphagia: Aspiration Precautions  - SLP and nutrition consults  - Nutrition consult    #DVT ppx:  - Lovenox  - SCDs  - Last Doppler on 8/28/20  ---------------    Outpatient Follow-up (Specialty/Name of physician):  Pierre Singleton)  Ophthalmology  [address] [place] [address], [address]  [place] [address]  Phone: [phone]  Fax: [phone]  Follow Up Time: 2 weeks    Otf Levi  Neurological Surgery  [address] [place] [address]  Phone: [phone]  Fax: [phone]    [facility]  Cardiology  [address] [place] [address]  Phone: [phone]  Fax:   Follow Up Time: 2 weeks    [facility]  Neurology  [address] [facility] - [address]rd [place] [address]  Phone: [phone]  Fax:   Follow Up Time: 2 weeks    [facility] at [place]  Internal Medicine  [address] [place] [address]  Phone: [phone]  Fax: [phone]  Follow Up Time: 2 weeks    --------------  Goals: Safe discharge to Home  Estimated Length of Stay: 10-14 days  Rehab Potential: Good  Medical Prognosis: Good  Estimated Disposition: Home with Home Care  ---------------    PRESCREEN COMPARISON:  I have reviewed the prescreen information and I have found no relevant changes between the preadmission screening and my post admission evaluation.    RATIONALE FOR INPATIENT ADMISSION: Patient demonstrates the following:  [X] Medically appropriate for rehabilitation admission  [X] Has attainable rehab goals with an appropriate initial discharge plan  [X]Has rehabilitation potential (expected to make a significant improvement within a reasonable period of time)  [X] Requires close medical management by a rehab physician, rehab nursing care, Hospitalist and comprehensive interdisciplinary team (including PT, OT and/or SLP, Prosthetics and Orthotics)    Case discussed with Dr. Mahoney      addendum:  RECENT LABS    Vital Signs Last 24 Hrs  T(C): 36.3 (03 Sep 2020 08:39), Max: 36.9 (02 Sep 2020 12:58)  T(F): 97.4 (03 Sep 2020 08:39), Max: 98.4 (02 Sep 2020 12:58)  HR: 62 (03 Sep 2020 08:39) (58 - 67)  BP: 136/69 (03 Sep 2020 08:39) (112/64 - 143/74)  BP(mean): --  RR: 16 (03 Sep 2020 08:39) (16 - 18)  SpO2: 97% (03 Sep 2020 08:39) (97% - 98%)                          13.3   6.12  )-----------( 297      ( 03 Sep 2020 06:09 )             39.9     09-03    139  |  105  |  17  ----------------------------<  101<H>  4.1   |  23  |  0.68    Ca    8.9      03 Sep 2020 06:09  Phos  3.0     09-03  Mg     2.2     09-03    TPro  7.3  /  Alb  3.1<L>  /  TBili  0.6  /  DBili  x   /  AST  54<H>  /  ALT  84<H>  /  AlkPhos  96  09-03        CAPILLARY BLOOD GLUCOSE          addendum: labs reviewed, stable RAFY KIRKPATRICK is a 75y with a history of HTN who presented to [facility] on 8/15/20 with headache and confusion and found to have severe stenosis of R M1 seen on initial CTA head and 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 Now admitted to [facility] after for initiation of a multidisciplinary rehab program consisting focused on functional mobility, transfers and ADLs (activities of daily living).    R MCA, S/P R MCA stenting with L sided weakness, dysarthria, dysphagia  - Start comprehensive rehab program, 3 hours a day, 5 days a week.  - PT 1hr/day: Focused on improving strength, endurance, coordination, balance, functional mobility, and transfers  - OT 1hr/day: Focused on improving strength, fine motor skills, coordination, posture and ADLs.    - Speech Therapy 1hr/day: to diagnose and treat deficits in swallowing, cognition and communication.   - P&O as needed  - Activity Limitations: Decreased social, vocational and leisure activities, decreased self care and ADLs, decreased mobility, decreased ability to manage household and finances.   - Patient recommended yearly CTA head to monitor L supraclinoid ICA infundibulum vs small aneurysm.   - Precautions: falls, cardiac, aspiration, visual field cut, reduced cognition  - F/u neurosurgery, neurology    #H/o HTN but recent hypotension  - Hypotension currently resolved  - Currently normotensive. Monitor for now, hospitalist consult    #Pain Management:  - Tylenol PRN  - Avoid sedating medications that may interfere with cognitive recovery    #GI/Bowel:  - At risk for constipation due to neurologic diagnosis, immobility and/or medication use  - Senna QHS, Miralax Daily  - Start suppository PRN    #/Bladder:   - At risk for incontinence and retention due to neurologic diagnosis and limited mobility  - Currently patient voids: independently  - Encourage timed voids every 4 hours while awake for independence and to promote continence during therapy.    #Skin/Pressure Injury:   - At risk for pressure injury due to neurologic diagnosis and relative immobility.  - Soft heel protectors  - Skin barrier cream as needed  - Nursing to monitor skin Qshift    #Diet/Dysphagia:  - Diet Consistency/Modifications: dysphagia 3 soft-nectar  - Dysphagia: Aspiration Precautions  - SLP , MBS  - Nutrition consult    #DVT ppx:  - Lovenox  - SCDs  - Last Doppler on 8/28/20  ---------------    Outpatient Follow-up (Specialty/Name of physician):  Pierre Singleton)  Ophthalmology  [address] [place] [address], [address]  [place] [address]  Phone: [phone]  Fax: [phone]  Follow Up Time: 2 weeks    Otf Levi  Neurological Surgery  [address] [place] [address]  Phone: [phone]  Fax: [phone]    [facility]  Cardiology  [address] [place] [address]  Phone: [phone]  Fax:   Follow Up Time: 2 weeks    [facility]  Neurology  [address] [facility] - [address]rd [place] [address]  Phone: [phone]  Fax:   Follow Up Time: 2 weeks    [facility] [place]  Internal Medicine  [address] [place] [address]  Phone: [phone]  Fax: [phone]  Follow Up Time: 2 weeks    --------------  Goals: Safe discharge to Home  Estimated Length of Stay: 10-14 days  Rehab Potential: Good  Medical Prognosis: Good  Estimated Disposition: Home with Home Care  ---------------    PRESCREEN COMPARISON:  I have reviewed the prescreen information and I have found no relevant changes between the preadmission screening and my post admission evaluation.    RATIONALE FOR INPATIENT ADMISSION: Patient demonstrates the following:  [X] Medically appropriate for rehabilitation admission  [X] Has attainable rehab goals with an appropriate initial discharge plan  [X]Has rehabilitation potential (expected to make a significant improvement within a reasonable period of time)  [X] Requires close medical management by a rehab physician, rehab nursing care, Hospitalist and comprehensive interdisciplinary team (including PT, OT and/or SLP, Prosthetics and Orthotics)    Case discussed with Dr. Mahoney      addendum:  RECENT LABS    Vital Signs Last 24 Hrs  T(C): 36.3 (03 Sep 2020 08:39), Max: 36.9 (02 Sep 2020 12:58)  T(F): 97.4 (03 Sep 2020 08:39), Max: 98.4 (02 Sep 2020 12:58)  HR: 62 (03 Sep 2020 08:39) (58 - 67)  BP: 136/69 (03 Sep 2020 08:39) (112/64 - 143/74)  BP(mean): --  RR: 16 (03 Sep 2020 08:39) (16 - 18)  SpO2: 97% (03 Sep 2020 08:39) (97% - 98%)                          13.3   6.12  )-----------( 297      ( 03 Sep 2020 06:09 )             39.9     09-03    139  |  105  |  17  ----------------------------<  101<H>  4.1   |  23  |  0.68    Ca    8.9      03 Sep 2020 06:09  Phos  3.0     09-03  Mg     2.2     09-03    TPro  7.3  /  Alb  3.1<L>  /  TBili  0.6  /  DBili  x   /  AST  54<H>  /  ALT  84<H>  /  AlkPhos  96  09-03        CAPILLARY BLOOD GLUCOSE          addendum: labs reviewed, stable

## 2020-09-02 NOTE — PROGRESS NOTE ADULT - PROBLEM SELECTOR PROBLEM 1
Stroke

## 2020-09-02 NOTE — PROGRESS NOTE ADULT - SUBJECTIVE AND OBJECTIVE BOX
SUBJECTIVE: Doing well, no complaints. NAD    OVERNIGHT EVENTS: None    Vital Signs Last 24 Hrs  T(C): 36.9 (02 Sep 2020 08:06), Max: 36.9 (02 Sep 2020 08:06)  T(F): 98.5 (02 Sep 2020 08:06), Max: 98.5 (02 Sep 2020 08:06)  HR: 63 (02 Sep 2020 08:06) (58 - 64)  BP: 112/60 (02 Sep 2020 08:06) (112/60 - 131/73)  BP(mean): --  RR: 18 (02 Sep 2020 08:06) (18 - 18)  SpO2: 96% (02 Sep 2020 08:06) (96% - 99%)  IVF: [X ] IVL [ ] NS+K@   DIET: [ ] Regular [ ] CCD [ ] Renal [ ] Puree [ X] Dysphagia 3 w/NCF [ ] Tube Feeds:   PCA: [ ] YES [ X] NO   ZHU: [ ] YES [X ] NO [X VOID   BM: [ X] YES-on 8/29    DRAINS: None    PHYSICAL EXAM:    General: No Acute Distress     Neurological: AAO x3, follows commands, speech clear and fluent, BAKARI EOMI  left homonymous hemianopsia,  left facial palsy, tongue midline, mild LUE drift, LUE 4-/5  RUE 5/5 throughout, b/l LE 5/5 throughout, sensation present, intact,     Pulmonary: Clear to Auscultation, No Rales, No Rhonchi, No Wheezes     Cardiovascular: S1, S2, Regular Rate and Rhythm     Gastrointestinal: Soft, Nontender, Nondistended     Incision: None    LABS:                        12.6   6.31  )-----------( 321      ( 01 Sep 2020 06:28 )             38.4    09-01    140  |  104  |  17  ----------------------------<  109<H>  3.5   |  25  |  0.71    Ca    9.5      01 Sep 2020 06:26  COVID-19 PCR: [place] (25 Aug 2020 06:24)    09-01 @ 07:01  -  09-02 @ 07:00  --------------------------------------------------------  IN: 0 mL / OUT: 200 mL / NET: -200 mL      IMAGING:   < from: Xray Cinesophagram Swallow Function w/ Contrast (08.31.20 @ 09:47) >    Aspiration of thin liquids.  No laryngeal penetration nor aspiration across all other oral trials.    For further information and recommendations, please refer to the speech pathologist final report which is available for review in the electronic medical recor    < from: [facility] Duplex Lower Ext Vein Scan, Bilat (08.28.20 @ 15:56) >  No evidence of deep venous thrombosis in either lower extremity.    < from: Transthoracic Echocardiogram (08.16.20 @ 11:09) >  1. Normal mitral valve. Minimal mitral regurgitation.  2. Normal trileaflet aortic valve. No aortic valve  regurgitation seen.  3. Normal left ventricular systolic function. No segmental  wall motion abnormalities.  4. Normal diastolic function  5. Normal right ventricular size and function.  6. Normal pericardium with no pericardial effusion.  *** No previous Echo exam.    MEDICATIONS  (STANDING):  artificial tears (preservative free) Ophthalmic Solution 1 Drop(s) Both EYES daily  aspirin 325 milliGRAM(s) Oral daily  atorvastatin 80 milliGRAM(s) Oral at bedtime  clopidogrel Tablet 75 milliGRAM(s) Oral daily  enoxaparin Injectable 40 milliGRAM(s) SubCutaneous daily  lidocaine   Patch 1 Patch Transdermal every 24 hours  polyethylene glycol 3350 17 Gram(s) Oral daily  senna 2 Tablet(s) Oral at bedtime    MEDICATIONS  (PRN):  acetaminophen   Tablet .. 650 milliGRAM(s) Oral every 6 hours PRN Temp greater or equal to 38C (100.4F), Mild Pain (1 - 3), Moderate Pain (4 - 6), Severe Pain (7 - 10)  artificial tears (preservative free) Ophthalmic Solution 1 Drop(s) Both EYES every 1 hour PRN Dry Eyes

## 2020-09-02 NOTE — PROGRESS NOTE ADULT - ASSESSMENT
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 stenosis    REC    No need for bronchodilators at this time  Monitor resp status    8/24:  came after a week:  Dr Junior was covering me:  ct chest noted  he is not SOB and not wheezy:  his oxygenation on room air is good:  no acuter intervention from pulmonary side:  stroke per neurology :  angela pa: will follow prn if necessary    8/27    doing pretty good: events noted:  on room air: has copd: but no wheezing or SOB :  no intervention from pulm side:   DVT proaphyxlis  ANGELA PMD at [facility]    8/28:  hei s doing very well: suprisingly he has emphysema on ct chest :  but he never sm oked:  He did have exposure to second hand smoke:  since he is aymptomatic: no need for BD at this time:  Latest MRI brain noted:  will follow prn now:  angela pt     9/2:  doing ok : no SOB : no wheezing:  on room air: pulm wise stable: Awaiting to be dced:  will sign off:  angela yu np

## 2020-09-02 NOTE — PROGRESS NOTE ADULT - PROBLEM SELECTOR PROBLEM 2
Hypotension

## 2020-09-02 NOTE — H&P ADULT - NSHPPHYSICALEXAM_GEN_ALL_CORE
Vital Signs  T(C): 36.9 (09-02-20 @ 12:58), Max: 36.9 (09-02-20 @ 08:06)  HR: 67 (09-02-20 @ 12:58) (58 - 67)  BP: 112/64 (09-02-20 @ 12:58) (112/60 - 131/73)  RR: 18 (09-02-20 @ 12:58) (18 - 18)  SpO2: 97% (09-02-20 @ 12:58) (96% - 97%)    Gen - NAD, Comfortable  HEENT - NCAT, EOMI, MMM  Neck - Supple, No limited ROM  Pulm - CTAB, No wheeze, No rhonchi, No crackles  Cardiovascular - RRR, S1S2, No m/r/g  Abdomen - Soft, NT/ND, +BS  Extremities - No C/C/E, No calf tenderness  Neuro-     Cognitive - AAOx3     Communication - Fluent, No dysarthria     Attention: Intact      Judgement: Good evidence of judgement     Memory: Recall 3 objects immediate and 3 min later         Cranial Nerves - CN 2-12 intact     Motor -                    LEFT    UE - ShAB 5/5, EF 5/5, EE 5/5, WE 5/5,  5/5                    RIGHT UE - ShAB 5/5, EF 5/5, EE 5/5, WE 5/5,  5/5                    LEFT    LE - HF 5/5, KE 5/5, DF 5/5, PF 5/5                    RIGHT LE - HF 5/5, KE 5/5, DF 5/5, PF 5/5        Sensory - Intact to LT     Reflexes - DTR Intact, No primitive reflex     Coordination - FTN intact     Tone - normal  Psychiatric - Mood stable, Affect WNL  Skin: Intact  Wounds: None Present Physical Exam  T(C): 36.9 (09-02-20 @ 16:08), Max: 36.9 (09-02-20 @ 08:06)  HR: 63 (09-02-20 @ 16:08) (58 - 67)  BP: 143/74 (09-02-20 @ 16:08) (112/60 - 143/74)  RR: 18 (09-02-20 @ 16:08) (18 - 18)  SpO2: 98% (09-02-20 @ 16:08) (96% - 98%)    Constitutional - NAD, Comfortable  HEENT - NCAT  Chest - CTA bilaterally, no increased work of breathing   Cardiovascular - RRR, S1S2  Abdomen - BS+, Soft, NTND  Extremities - No edema, No calf tenderness   Neurologic Exam -                    Cognitive - Awake, Alert, Oriented to self, place, date, year, situation. Answering questions appropriately and following commands     Communication - Fluent     Attention - able to days of the week backwards and forwards     Naming/Abstract - intact      Memory - able to recall 3/3 items immediate and 2/3 after 3 minutes without cues and 1/3 with cues      Cranial Nerves - PERRL, EOMI, left homonymous hemianopsia,  left facial droop, tongue midline, facial sensation intact bilaterally throughout,      Motor -                     LEFT    UE - ShAB 4+/5, EF 4+/5, EE 5-/5, WE 4+/5,  impaired                    RIGHT UE - ShAB 5/5, EF 5/5, EE 5/5, WE 5/5,  5/5                    LEFT    LE - HF 5-/5, KE 5/5, DF 5/5, PF 5/5                    RIGHT LE - HF 5/5, KE 5/5, DF 5/5, PF 5/5        Sensory - Intact to light touch diffusely      Reflexes -  Negative Babinski's bilaterally      Coordination - Finger-to-nose impaired on the left   Psychiatric - Affect WNL Physical Exam  T(C): 36.9 (09-02-20 @ 16:08), Max: 36.9 (09-02-20 @ 08:06)  HR: 63 (09-02-20 @ 16:08) (58 - 67)  BP: 143/74 (09-02-20 @ 16:08) (112/60 - 143/74)  RR: 18 (09-02-20 @ 16:08) (18 - 18)  SpO2: 98% (09-02-20 @ 16:08) (96% - 98%)    Constitutional - NAD, Comfortable  HEENT - NCAT  Chest - CTA bilaterally, no increased work of breathing   Cardiovascular - RRR, S1S2  Abdomen - BS+, Soft, NTND  Extremities - No edema, No calf tenderness   Neurologic Exam -                    Cognitive - Awake, Alert, Oriented to self, place, date, year, situation. Answering questions appropriately and following commands     Communication - Fluent     Attention - able to days of the week backwards and forwards     Naming/Abstract - intact      Memory - able to recall 3/3 items immediate and 2/3 after 3 minutes without cues and 1/3 with cues      Cranial Nerves - PERRL, EOMI, left homonymous hemianopsia,  left facial droop, tongue midline, facial sensation intact bilaterally throughout,      Motor -                     LEFT    UE - ShAB 4+/5, EF 4+/5, EE 5-/5, WE 4+/5,  impaired                    RIGHT UE - ShAB 5/5, EF 5/5, EE 5/5, WE 5/5,  5/5                    LEFT    LE - HF 5-/5, KE 5/5, DF 5/5, PF 5/5                    RIGHT LE - HF 5/5, KE 5/5, DF 5/5, PF 5/5        Sensory - Intact to light touch diffusely      Reflexes -  Negative Babinski's bilaterally      Coordination - Finger-to-nose impaired on the left (limited by weakness)  Psychiatric - Affect WNL Physical Exam  T(C): 36.9 (09-02-20 @ 16:08), Max: 36.9 (09-02-20 @ 08:06)  HR: 63 (09-02-20 @ 16:08) (58 - 67)  BP: 143/74 (09-02-20 @ 16:08) (112/60 - 143/74)  RR: 18 (09-02-20 @ 16:08) (18 - 18)  SpO2: 98% (09-02-20 @ 16:08) (96% - 98%)    Constitutional - NAD, Comfortable  HEENT - NCAT  Chest - CTA bilaterally, no increased work of breathing   Cardiovascular - RRR, S1S2  Abdomen - BS+, Soft, NTND  Extremities - No edema, No calf tenderness   Neurologic Exam -                    Cognitive - Awake, Alert, Oriented to self, place, date, year, situation. Answering questions appropriately and following commands     Communication - Fluent     Attention - able to days of the week backwards and forwards     Naming/Abstract - intact      Memory - able to recall 3/3 items immediate and 2/3 after 3 minutes without cues and 1/3 with cues      Cranial Nerves - PERRL, EOMI, left homonymous hemianopsia,  left facial droop, tongue midline, facial sensation intact bilaterally throughout,      Motor -                     LEFT    UE - ShAB 4+/5, EF 4+/5, EE 5-/5, WE 4+/5,  impaired                    RIGHT UE - ShAB 5/5, EF 5/5, EE 5/5, WE 5/5,  intact                    LEFT    LE - HF 5-/5, KE 5/5, DF 5/5, PF 5/5                    RIGHT LE - HF 5/5, KE 5/5, DF 5/5, PF 5/5        Sensory - Intact to light touch diffusely      Reflexes -  Negative Babinski's bilaterally      Coordination - Finger-to-nose impaired on the left (limited by weakness)  Psychiatric - Affect WNL Physical Exam  T(C): 36.9 (09-02-20 @ 16:08), Max: 36.9 (09-02-20 @ 08:06)  HR: 63 (09-02-20 @ 16:08) (58 - 67)  BP: 143/74 (09-02-20 @ 16:08) (112/60 - 143/74)  RR: 18 (09-02-20 @ 16:08) (18 - 18)  SpO2: 98% (09-02-20 @ 16:08) (96% - 98%)    Constitutional - NAD, Comfortable. Alert, word finding appropriate for conversational speech. Reduced overall insight and sustained attention    HEENT - NCAT. no icteric sclera, no discharge  EOMI, no nystagmus    Chest - CTA bilaterally, no increased work of breathing   Cardiovascular - RRR, S1S2  Abdomen - BS+, Soft, NT/ND  Extremities - No edema, No calf tenderness . Normal tone, no pain with ROm, no tremors  calves soft, no erythema or warmth, no pedal edema. good color and temperature  no skin breakdown noted  Neurologic Exam -                    Cognitive - Awake, Alert, Oriented to self, place, date, year, grossly to situation. Answering questions appropriately and following commands     Communication - Fluent     Attention - able to days of the week backwards and forwards     Naming/Abstract - intact      Memory - able to recall 3/3 items immediate and 2/3 after 3 minutes without cues and 1/3 with cues      Cranial Nerves - PERRL, EOMI, left homonymous hemianopsia,  left facial droop, tongue midline, facial sensation intact bilaterally throughout, +left visual field inattention     Motor -                     LEFT    UE - ShAB 4+/5, EF 4+/5, EE 5-/5, WE 4+/5,  impaired                    RIGHT UE - ShAB 5/5, EF 5/5, EE 5/5, WE 5/5,  intact                    LEFT    LE - HF 5-/5, KE 5/5, DF 5/5, PF 5/5                    RIGHT LE - HF 5/5, KE 5/5, DF 5/5, PF 5/5        Sensory - Intact to light touch diffusely      Reflexes -  Negative Babinski's bilaterally      Coordination - Finger-to-nose impaired on the left (limited by weakness)  Psychiatric - Affect WNL

## 2020-09-02 NOTE — H&P ADULT - HISTORY OF PRESENT ILLNESS
Patient is a 75 y.o. RH man with PMHx HTN not on antithrombotics, presented to [facility] ED 8/15/20 with 1 day history of headache and altered mental status. Patient suddenly became confused 5PM day before admission and c/o new onset nonradiating headache at the top/middle of hairline and some weakness in right lower extremity. Per patient's friend, his speech was "off" and face "did not look normal", and patient refused to go to ED at that time but eventually came due to persistent headache and feeling lightheadedness. Neuro exam showed mild dysarthria and mild left nasolabial flattening with left arm weakness.     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 Neuro consulted, felt infarct most likely as a result of large artery atherosclerosis, although an embolus with partial lysis could not be ruled out. Patient was started on Midodrine 10mg TID and IV fluids due to hypotension after admission to maintain //100. +UA but negative UCx and was treated with Ceftriaxone (8/16-8/23). TTE showed EF 73%, normal LA, HbA1c 5.8, . Patient was started on ASA81 and Plavix 75mg daily, Plavix to be discontinued after 3 months. Cardio was consulted for hypotension, likely neurogenic and possibly infectious. ENT consulted for hoarseness and hypophonia which preceded stroke, possible small glottic gap inferiorly o/w no abnormalities seen, no further ENT intervention required. Pulm consulted for incidental finding of apical paraseptal emphysema on CT, no current interventions required. Neurosurgery consulted for stenosis and questionable 2mm outpouching at L supraclinoid ICA, possible aneurysm vs infundibulum. MRA NOVA showed R MCA stenosis vs occlusion. S/P diagnostic angiography 8/25/20. S/P cerebral angiography with stenting 8/26/20. Patient weaned off Midodrine 8/28/20.    Patient was stable for discharge to [facility] 9/2/20 for multidisciplinary acute rehab for functional deficits and gait/ADLs deficits. Patient is a 75 y.o. RH man with PMHx HTN not on antithrombotics, presented to [facility] ED 8/15/20 with 1 day history of headache and altered mental status. Patient suddenly became confused 5PM day before admission and c/o new onset nonradiating headache at the top/middle of hairline and some weakness in right lower extremity. Per patient's friend, his speech was "off" and face "did not look normal"; patient initially refused to go to ED at that time but eventually sent due to persistent headache and lightheadedness. Neuro exam showed mild dysarthria and mild left nasolabial flattening with left arm weakness.     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 Neuro consulted, felt infarct most likely as a result of large artery atherosclerosis, although an embolus with partial lysis could not be ruled out. Patient was started on Midodrine 10mg TID and IV fluids due to hypotension after admission to maintain //100. +UA but negative UCx and was treated with Ceftriaxone (8/16-8/23). TTE showed EF 73%, normal LA, HbA1c 5.8, . Patient was started on ASA81 and Plavix 75mg daily, Plavix to be discontinued after 3 months.    Cardio was consulted for hypotension, likely neurogenic and possibly infectious etiology. 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 Pulm consulted for incidental finding of apical paraseptal emphysema on CT, no interventions recommended. Neurosurgery consulted for stenosis and questionable 2mm outpouching at L supraclinoid ICA, possible aneurysm vs infundibulum. MRA NOVA showed R MCA stenosis vs occlusion. S/P diagnostic angiography 8/25/20. S/P cerebral angiography with stenting 8/26/20. Patient weaned off Midodrine 8/28/20.    Patient was stable for discharge to [place] 9/2/20 for multidisciplinary acute rehab for functional deficits and gait/ADLs deficits. Patient is a 75 y.o. RH man with PMHx HTN not on antithrombotics, presented to [facility] ED 8/15/20 with 1 day history of headache and altered mental status. Patient suddenly became confused 5PM day before admission and c/o new onset nonradiating headache at the top/middle of hairline and some weakness in right lower extremity. Per patient's friend, his speech was "off" and face "did not look normal"; patient initially refused to go to ED at that time but eventually sent due to persistent headache and lightheadedness. Neuro exam showed mild dysarthria and mild left nasolabial flattening with left arm weakness.     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 Neuro consulted, felt infarct most likely as a result of large artery atherosclerosis, although an embolus with partial lysis could not be ruled out. Patient was started on Midodrine 10mg TID and IV fluids due to hypotension after admission to maintain //100. +UA but negative UCx and was treated with Ceftriaxone (8/16-8/23). TTE showed EF 73%, normal LA, HbA1c 5.8, . Patient was started on ASA81 and Plavix 75mg daily, Plavix to be discontinued after 3 months.    Cardio was consulted for hypotension, likely neurogenic and possibly infectious etiology. 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 Pulm consulted for incidental finding of apical paraseptal emphysema on CT, no interventions recommended. Neurosurgery consulted for stenosis and questionable 2mm outpouching at L supraclinoid ICA, possible aneurysm vs infundibulum. MRA NOVA showed R MCA stenosis vs occlusion. S/P diagnostic angiography 8/25/20. S/P cerebral angiography with stenting 8/26/20. Patient weaned off Midodrine 8/28/20.    Patient was stable for discharge to [place] 9/2/20 for multidisciplinary acute rehab for functional deficits and gait/ADLs deficits. Patient speaks some English and Cantonese. Patient seen and examined at bedside with [employer] phone  #[phone]. Patient is a 75 y.o. RH -dominant man with PMHx HTN not on antithrombotics, presented to [facility] ED 8/15/20 with 1 day history of headache and altered mental status. Patient suddenly became confused 5PM day before admission and c/o new onset nonradiating headache at the top/middle of hairline and some weakness in right lower extremity. Per patient's friend, his speech was "off" and face "did not look normal"; patient initially refused to go to ED at that time but eventually sent due to persistent headache and lightheadedness. Neuro exam showed mild dysarthria and mild left nasolabial flattening with left arm weakness.     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 Neuro consulted, felt infarct most likely as a result of large artery atherosclerosis, although an embolus with partial lysis could not be ruled out. Patient was started on Midodrine 10mg TID and IV fluids due to hypotension after admission to maintain //100. +UA but negative UCx and was treated with Ceftriaxone (8/16-8/23). TTE showed EF 73%, normal LA, HbA1c 5.8, . Patient was started on ASA81 and Plavix 75mg daily, Plavix to be discontinued after 3 months.    Cardio was consulted for hypotension, likely neurogenic and possibly infectious etiology. 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 Pulm consulted for incidental finding of apical paraseptal emphysema on CT, no interventions recommended. Neurosurgery consulted for stenosis and questionable 2mm outpouching at L supraclinoid ICA, possible aneurysm vs infundibulum. MRA NOVA showed R MCA stenosis vs occlusion. S/P diagnostic angiography 8/25/20. S/P cerebral angiography with stenting 8/26/20. Patient weaned off Midodrine 8/28/20.    Patient was stable for discharge to [place] 9/2/20 for multidisciplinary acute rehab for functional deficits and gait/ADLs deficits. Patient speaks some English and Cantonese. Patient seen and examined at bedside with [employer] phone  #[phone].

## 2020-09-02 NOTE — PROGRESS NOTE ADULT - ASSESSMENT
HPI:  Opal Matthew is a 74 y/o RH man with a PMH of HTN not on antithrombotics who presented to the ED with an acute one day history of headache and altered mental status. Per his friend, the pt suddenly became confused yesterday around 5pm and complained of a new onset headache.The pt complains about some weakness in his right lower extremity and endorses a sharp headache localized to the top/middle of his hairline that does not radiate anywhere. The pt denies photophobia, recent fever or flu like illness, changes in hearing, changes in vision, recent falls or trauma, or pain other than the headache, difficulty swallowing, dysuria, diarrhea, numbness/tingling anywhere. No history of headaches.     Per ED,  The friend stated that the patient's speech was "off" and that his face "did not look normal". The pt did not want to go to the ER at the time, but came today because the headache persisted throughout the night and today he began to feel lightheaded. The pt stated that he ambulates and talks normally at baseline and that he had never experienced these symptoms before yesterday. (15 Aug 2020 00:11)    PROCEDURE:  Adm 8/14 . 8/26 Rt MCA stent for stenosis  POD#7    PLAN:  Neuro: Cont ASA, Plavix, SQL.  Inc activity/OOB. Awaiting Rehab at [place]-possible has a bed on 9/3 as d/w , if denied at Rehab will go home with a friend. 9/2 Covid swab done FU.    Cardio note of 9/2-Problem: Stroke.  Plan: s/p R MCA stenting, treated with DAPT, Monitor on Tele, Problem: Hypotension.  Plan: Cont with Midodrine Likely neurogenic and possible infectious in etiology Gentle IVF TTE. Electronic Signatures: Stepan Aguirre)    Medicine NOTE OF 9/1-CVA - workup and plan as per neurology - s/p cerebral angiography and stenting - ASA/plavix - lipitor - swallow eval - puree diet -HLD -  - c/w lipitor, emphysema-- CT chest without contrast done. - pulm following, DVT px - lovenox    Electronic Signatures: Madeline Rob)     Pulm note ofF 8/28-APical paraseptal emphysema as incidental finding on CT Doubt COPD clinically, REC No need for bronchodilators at this time, Monitor resp status. 8/24: came after a week: Dr Junior was covering me: ct chest noted he is not SOB and not wheezy:  his oxygenation on room air is good: no acuter intervention from pulmonary side: stroke per neurology : dw pa: will follow prn if necessary 8/27 doing pretty good: events noted: on room air: has copd: but no wheezing or SOB : no intervention from pulm side:  8/28:  hei s doing very well: suprisingly he has emphysema on ct chest : but he never smoked: He did have exposure to second hand smoke: since he is aymptomatic: no need for BD at this time: Latest MRI brain noted: will follow prn now: dw pt  Electronic Signatures:  iFlemon Zelaya (MD)     Respiratory: Patient instructed to use incentive spirometer [ X] YES [ ] NO              DVT ppx: [X ] SQL [ ] SQH and Venodynes [ ] Left [ ] Right [ X] Bilateral    Discharge Planning:  The patient was evaluated by PT/OT/PMR nd recommended Acute Rehab.   Pt is Undocumented uninsured . SW attempting [facility] rehab placement-poss has bed at Rehab 9/3.    More than 30 minutes spent on total encounter: more than 50% of the visit was spent on educating the patient and family regarding condition, medications, follow up plans, signs and symptoms to be concerned with, preparing paperwork, and questions answered regarding discharge.      Assessment:  Please Check When Present   []  GCS  E   V  M     Heart Failure: []Acute, [] acute on chronic , []chronic  Heart Failure:  [] Diastolic (HFpEF), [] Systolic (HFrEF), []Combined (HFpEF and HFrEF), [] RHF, [] Pulm HTN, [] Other    [] NIRU, [] ATN, [] AIN, [] other  [] CKD1, [] CKD2, [] CKD 3, [] CKD 4, [] CKD 5, []ESRD    Encephalopathy: [] Metabolic, [] Hepatic, [] toxic, [] Neurological, [] Other    Abnormal Nurtitional Status: [] malnurtition (see nutrition note), [ ]underweight: BMI < 19, [] morbid obesity: BMI >40, [] Cachexia    [] Sepsis  [] hypovolemic shock,[] cardiogenic shock, [] hemorrhagic shock, [] neuogenic shock  [] Acute Respiratory Failure  []Cerebral edema, [] Brain compression/ herniation,   [] Functional quadriplegia  [] Acute blood loss anemia HPI:  Opal Matthew is a 74 y/o RH man with a PMH of HTN not on antithrombotics who presented to the ED with an acute one day history of headache and altered mental status. Per his friend, the pt suddenly became confused yesterday around 5pm and complained of a new onset headache.The pt complains about some weakness in his right lower extremity and endorses a sharp headache localized to the top/middle of his hairline that does not radiate anywhere. The pt denies photophobia, recent fever or flu like illness, changes in hearing, changes in vision, recent falls or trauma, or pain other than the headache, difficulty swallowing, dysuria, diarrhea, numbness/tingling anywhere. No history of headaches.     Per ED,  The friend stated that the patient's speech was "off" and that his face "did not look normal". The pt did not want to go to the ER at the time, but came today because the headache persisted throughout the night and today he began to feel lightheaded. The pt stated that he ambulates and talks normally at baseline and that he had never experienced these symptoms before yesterday. (15 Aug 2020 00:11)    PROCEDURE:  Adm 8/14 . 8/26 Rt MCA stent for stenosis  POD#7    PLAN:  Neuro: Cont ASA, Plavix, SQL.  Inc activity/OOB. Awaiting Rehab at [place]-possible has a bed on 9/3 as d/w , if denied at Rehab will go home with a friend. 9/2 Covid swab done FU.  Addendum:  Notified about 1300 that RN was stuck with a needle after pt was injected-Hepatitis panel and Rapid HIV ordered FU, also Covid PCR testing-this was d/w  as pt now has a bed at [facility] today for pickup at 5pm.   will ck with Rehab to see if these test need to be resulted prior to Rehab and update me.    Cardio note of 9/2-Problem: Stroke.  Plan: s/p R MCA stenting, treated with DAPT, Monitor on Tele, Problem: Hypotension.  Plan: Cont with Midodrine Likely neurogenic and possible infectious in etiology Gentle IVF TTE. Electronic Signatures: Stepan Aguirre)    Medicine NOTE OF 9/1-CVA - workup and plan as per neurology - s/p cerebral angiography and stenting - ASA/plavix - lipitor - swallow eval - puree diet -HLD -  - c/w lipitor, emphysema-- CT chest without contrast done. - pulm following, DVT px - lovenox    Electronic Signatures: Madeline Rob (MD)     Pulm note ofF 8/28-APical paraseptal emphysema as incidental finding on CT Doubt COPD clinically, REC No need for bronchodilators at this time, Monitor resp status. 8/24: came after a week: Dr Junior was covering me: ct chest noted he is not SOB and not wheezy:  his oxygenation on room air is good: no acuter intervention from pulmonary side: stroke per neurology : dw pa: will follow prn if necessary 8/27 doing pretty good: events noted: on room air: has copd: but no wheezing or SOB : no intervention from pulm side:  8/28:  hei s doing very well: suprisingly he has emphysema on ct chest : but he never smoked: He did have exposure to second hand smoke: since he is aymptomatic: no need for BD at this time: Latest MRI brain noted: will follow prn now: dw pt  Electronic Signatures:  Filemon Zelaya (MD)     Respiratory: Patient instructed to use incentive spirometer [ X] YES [ ] NO              DVT ppx: [X ] SQL [ ] SQH and Venodynes [ ] Left [ ] Right [ X] Bilateral    Discharge Planning:  The patient was evaluated by PT/OT/PMR nd recommended Acute Rehab.   Pt is Undocumented uninsured . [facility]"" has bed at Rehab 9/3.    More than 30 minutes spent on total encounter: more than 50% of the visit was spent on educating the patient and family regarding condition, medications, follow up plans, signs and symptoms to be concerned with, preparing paperwork, and questions answered regarding discharge.      Assessment:  Please Check When Present   []  GCS  E   V  M     Heart Failure: []Acute, [] acute on chronic , []chronic  Heart Failure:  [] Diastolic (HFpEF), [] Systolic (HFrEF), []Combined (HFpEF and HFrEF), [] RHF, [] Pulm HTN, [] Other    [] NIRU, [] ATN, [] AIN, [] other  [] CKD1, [] CKD2, [] CKD 3, [] CKD 4, [] CKD 5, []ESRD    Encephalopathy: [] Metabolic, [] Hepatic, [] toxic, [] Neurological, [] Other    Abnormal Nurtitional Status: [] malnurtition (see nutrition note), [ ]underweight: BMI < 19, [] morbid obesity: BMI >40, [] Cachexia    [] Sepsis  [] hypovolemic shock,[] cardiogenic shock, [] hemorrhagic shock, [] neuogenic shock  [] Acute Respiratory Failure  []Cerebral edema, [] Brain compression/ herniation,   [] Functional quadriplegia  [] Acute blood loss anemia

## 2020-09-02 NOTE — PROGRESS NOTE ADULT - PROBLEM SELECTOR PLAN 2
Cont with Midodrine   Likely neurogenic and possible infectious in etiology   Gentle IVF   TTE
Cont with Midodrine   Levar neurogenic and possible infectious in etiology   Gentle IVF   TTE
Cont with Midodrine   Likely neurogenic and possible infectious in etiology   Gentle IVF   TTE

## 2020-09-03 LAB
ALBUMIN SERPL ELPH-MCNC: 3.1 G/DL — LOW (ref 3.3–5)
ALP SERPL-CCNC: 96 U/L — SIGNIFICANT CHANGE UP (ref 40–120)
ALT FLD-CCNC: 84 U/L — HIGH (ref 10–45)
ANION GAP SERPL CALC-SCNC: 11 MMOL/L — SIGNIFICANT CHANGE UP (ref 5–17)
AST SERPL-CCNC: 54 U/L — HIGH (ref 10–40)
BASOPHILS # BLD AUTO: 0.04 K/UL — SIGNIFICANT CHANGE UP (ref 0–0.2)
BASOPHILS NFR BLD AUTO: 0.7 % — SIGNIFICANT CHANGE UP (ref 0–2)
BILIRUB SERPL-MCNC: 0.6 MG/DL — SIGNIFICANT CHANGE UP (ref 0.2–1.2)
BUN SERPL-MCNC: 17 MG/DL — SIGNIFICANT CHANGE UP (ref 7–23)
CALCIUM SERPL-MCNC: 8.9 MG/DL — SIGNIFICANT CHANGE UP (ref 8.4–10.5)
CHLORIDE SERPL-SCNC: 105 MMOL/L — SIGNIFICANT CHANGE UP (ref 96–108)
CO2 SERPL-SCNC: 23 MMOL/L — SIGNIFICANT CHANGE UP (ref 22–31)
CREAT SERPL-MCNC: 0.68 MG/DL — SIGNIFICANT CHANGE UP (ref 0.5–1.3)
EOSINOPHIL # BLD AUTO: 0.29 K/UL — SIGNIFICANT CHANGE UP (ref 0–0.5)
EOSINOPHIL NFR BLD AUTO: 4.7 % — SIGNIFICANT CHANGE UP (ref 0–6)
GLUCOSE SERPL-MCNC: 101 MG/DL — HIGH (ref 70–99)
HCT VFR BLD CALC: 39.9 % — SIGNIFICANT CHANGE UP (ref 39–50)
HGB BLD-MCNC: 13.3 G/DL — SIGNIFICANT CHANGE UP (ref 13–17)
IMM GRANULOCYTES NFR BLD AUTO: 0.3 % — SIGNIFICANT CHANGE UP (ref 0–1.5)
LYMPHOCYTES # BLD AUTO: 1.36 K/UL — SIGNIFICANT CHANGE UP (ref 1–3.3)
LYMPHOCYTES # BLD AUTO: 22.2 % — SIGNIFICANT CHANGE UP (ref 13–44)
MAGNESIUM SERPL-MCNC: 2.2 MG/DL — SIGNIFICANT CHANGE UP (ref 1.6–2.6)
MCHC RBC-ENTMCNC: 30.2 PG — SIGNIFICANT CHANGE UP (ref 27–34)
MCHC RBC-ENTMCNC: 33.3 GM/DL — SIGNIFICANT CHANGE UP (ref 32–36)
MCV RBC AUTO: 90.7 FL — SIGNIFICANT CHANGE UP (ref 80–100)
MONOCYTES # BLD AUTO: 0.8 K/UL — SIGNIFICANT CHANGE UP (ref 0–0.9)
MONOCYTES NFR BLD AUTO: 13.1 % — SIGNIFICANT CHANGE UP (ref 2–14)
NEUTROPHILS # BLD AUTO: 3.61 K/UL — SIGNIFICANT CHANGE UP (ref 1.8–7.4)
NEUTROPHILS NFR BLD AUTO: 59 % — SIGNIFICANT CHANGE UP (ref 43–77)
NRBC # BLD: 0 /100 WBCS — SIGNIFICANT CHANGE UP (ref 0–0)
PHOSPHATE SERPL-MCNC: 3 MG/DL — SIGNIFICANT CHANGE UP (ref 2.5–4.5)
PLATELET # BLD AUTO: 297 K/UL — SIGNIFICANT CHANGE UP (ref 150–400)
POTASSIUM SERPL-MCNC: 4.1 MMOL/L — SIGNIFICANT CHANGE UP (ref 3.5–5.3)
POTASSIUM SERPL-SCNC: 4.1 MMOL/L — SIGNIFICANT CHANGE UP (ref 3.5–5.3)
PROT SERPL-MCNC: 7.3 G/DL — SIGNIFICANT CHANGE UP (ref 6–8.3)
RBC # BLD: 4.4 M/UL — SIGNIFICANT CHANGE UP (ref 4.2–5.8)
RBC # FLD: 12 % — SIGNIFICANT CHANGE UP (ref 10.3–14.5)
SODIUM SERPL-SCNC: 139 MMOL/L — SIGNIFICANT CHANGE UP (ref 135–145)
WBC # BLD: 6.12 K/UL — SIGNIFICANT CHANGE UP (ref 3.8–10.5)
WBC # FLD AUTO: 6.12 K/UL — SIGNIFICANT CHANGE UP (ref 3.8–10.5)

## 2020-09-03 PROCEDURE — 99255 IP/OBS CONSLTJ NEW/EST HI 80: CPT

## 2020-09-03 PROCEDURE — 99223 1ST HOSP IP/OBS HIGH 75: CPT

## 2020-09-03 RX ADMIN — ENOXAPARIN SODIUM 40 MILLIGRAM(S): 100 INJECTION SUBCUTANEOUS at 21:04

## 2020-09-03 RX ADMIN — CLOPIDOGREL BISULFATE 75 MILLIGRAM(S): 75 TABLET, FILM COATED ORAL at 12:16

## 2020-09-03 RX ADMIN — Medication 1 DROP(S): at 12:16

## 2020-09-03 RX ADMIN — Medication 325 MILLIGRAM(S): at 12:16

## 2020-09-03 RX ADMIN — SENNA PLUS 2 TABLET(S): 8.6 TABLET ORAL at 21:04

## 2020-09-03 RX ADMIN — POLYETHYLENE GLYCOL 3350 17 GRAM(S): 17 POWDER, FOR SOLUTION ORAL at 12:16

## 2020-09-03 RX ADMIN — ATORVASTATIN CALCIUM 80 MILLIGRAM(S): 80 TABLET, FILM COATED ORAL at 21:04

## 2020-09-03 NOTE — CONSULT NOTE ADULT - ASSESSMENT
RAFY KIRKPATRICK is a 75y with a history of HTN who presented to [facility] on 8/15/20 with headache and confusion and found to have severe stenosis of R M1 seen on initial CTA head and 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 Hospital course complicated by UTI and hypotension, both now resolved. Now admitted to [facility] after for initiation of a multidisciplinary rehab program consisting focused on functional mobility, transfers and ADLs (activities of daily living).    Right-sided MCA Stroke, S/P R MCA stenting with residual deficit  - L sided weakness, dysarthria, dysphagia  - Start comprehensive rehab program, 3 hours a day, 5 days a week  - Speech Therapy 1hr/day: to diagnose and treat deficits in swallowing, cognition and communication  - Patient recommended yearly CTA head to monitor L supraclinoid ICA infundibulum vs small aneurysm  - Precautions: falls, cardiac, aspiration  - continue ASA, plavix, lipitor  - F/u neurosurgery, neurology    HTN - but recent hypotension  - Hypotension currently resolved  - Currently normotensive  - Monitor off of medications    Dysphagia  - Diet Consistency/Modifications: dysphagia 3 soft-nectar  - Dysphagia: Aspiration Precautions  - SLP and nutrition consults  - Nutrition consult    DVT ppx  - Lovenox  - SCDs  - Last Doppler on 8/28/20 RAFY KIRKPATRICK is a 75y with a history of HTN who presented to [facility] on 8/15/20 with headache and confusion and found to have severe stenosis of R M1 seen on initial CTA head and 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 Hospital course complicated by UTI and hypotension, both now resolved. Now admitted to [facility] after for initiation of a multidisciplinary rehab program consisting focused on functional mobility, transfers and ADLs (activities of daily living).    Right-sided MCA Stroke, S/P R MCA stenting with residual deficit  - L sided weakness, dysarthria, dysphagia  - Start comprehensive rehab program, 3 hours a day, 5 days a week  - Speech Therapy 1hr/day: to diagnose and treat deficits in swallowing, cognition and communication  - Patient recommended yearly CTA head to monitor L supraclinoid ICA infundibulum vs small aneurysm  - Precautions: falls, cardiac, aspiration  - continue ASA, plavix, lipitor  - F/u neurosurgery, neurology    HTN - but recent hypotension  - Hypotension currently resolved  - Currently normotensive  - Monitor off of medications    HLD  - continue lipitor 80    Dysphagia  - Diet Consistency/Modifications: dysphagia 3 soft-nectar  - Dysphagia: Aspiration Precautions  - SLP and nutrition consults  - Nutrition consult    Constipation  - senna, dulcolax, miralax as needed    DVT ppx  - Lovenox  - SCDs  - Last Doppler on 8/28/20

## 2020-09-03 NOTE — CONSULT NOTE ADULT - SUBJECTIVE AND OBJECTIVE BOX
Patient is a 75 y.o. RH man with PMH of HTN presented to [facility] ED 8/15/20 with 1 day history of headache and altered mental status. Patient suddenly became confused 5PM day before admission and c/o new onset nonradiating headache at the top/middle of hairline and some weakness in right lower extremity. Per patient's friend, his speech was "off" and face "did not look normal"; patient initially refused to go to ED at that time but eventually sent due to persistent headache and lightheadedness. Neuro exam showed mild dysarthria and mild left nasolabial flattening with left arm weakness.     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 Neuro consulted, felt infarct most likely as a result of large artery atherosclerosis, although an embolus with partial lysis could not be ruled out. Patient was started on Midodrine 10mg TID and IV fluids due to hypotension after admission to maintain //100. +UA but negative UCx and was treated with Ceftriaxone (8/16-8/23). TTE showed EF 73%, normal LA, HbA1c 5.8, . Patient was started on ASA81 and Plavix 75mg daily, Plavix to be discontinued after 3 months.    Cardio was consulted for hypotension, likely neurogenic and possibly infectious etiology. 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 Pulm consulted for incidental finding of apical paraseptal emphysema on CT, no interventions recommended. Neurosurgery consulted for stenosis and questionable 2mm outpouching at L supraclinoid ICA, possible aneurysm vs infundibulum. MRA NOVA showed R MCA stenosis vs occlusion. S/P diagnostic angiography 8/25/20. S/P cerebral angiography with stenting 8/26/20. Patient weaned off Midodrine 8/28/20.    Patient was stable for discharge to [place] 9/2/20 for multidisciplinary acute rehab for functional deficits and gait/ADLs deficits. Patient speaks some English and Cantonese.    PMH: HTN  PSH: none  Allergies: NKDA, apple: Food, Vomiting, Nausea  Sochx: second hand smoke exposure, denies smoking, EtOH and drugs use  Famhx: no significant hx of cardiac disease    REVIEW OF SYSTEMS:  CONSTITUTIONAL: No fever, weight loss, or fatigue  EYES: +difficulty with the vision on the left side, No eye pain or discharge  ENMT:  No difficulty hearing, tinnitus, vertigo; No sinus or throat pain  NECK: No pain or stiffness  BREASTS: No pain, masses, or nipple discharge  RESPIRATORY: No cough, wheezing, chills or hemoptysis; No shortness of breath  CARDIOVASCULAR: No chest pain, palpitations, dizziness, or leg swelling  GASTROINTESTINAL: No abdominal or epigastric pain. No nausea, vomiting, or hematemesis; No diarrhea or constipation. No melena or hematochezia.  GENITOURINARY: No dysuria, frequency, hematuria, or incontinence  NEUROLOGICAL: +loss of strength, No headaches, memory loss, numbness, or tremors  SKIN: No itching, burning, rashes, or lesions   LYMPH NODES: No enlarged glands  ENDOCRINE: No heat or cold intolerance; No hair loss  MUSCULOSKELETAL: No joint pain or swelling; No muscle, back, or extremity pain  PSYCHIATRIC: No depression, anxiety, mood swings, or difficulty sleeping  HEME/LYMPH: No easy bruising, or bleeding gums  ALLERGY AND IMMUNOLOGIC: No hives or eczema    ALL ROS REVIEWED AND NORMAL EXCEPT AS STATED ABOVE    T(C): 36.3 (09-03-20 @ 08:39), Max: 36.9 (09-02-20 @ 12:58)  HR: 62 (09-03-20 @ 08:39) (58 - 67)  BP: 136/69 (09-03-20 @ 08:39) (112/64 - 143/74)  RR: 16 (09-03-20 @ 08:39) (16 - 18)  SpO2: 97% (09-03-20 @ 08:39) (97% - 98%)  Wt(kg): --Vital Signs Last 24 Hrs  T(C): 36.3 (03 Sep 2020 08:39), Max: 36.9 (02 Sep 2020 12:58)  T(F): 97.4 (03 Sep 2020 08:39), Max: 98.4 (02 Sep 2020 12:58)  HR: 62 (03 Sep 2020 08:39) (58 - 67)  BP: 136/69 (03 Sep 2020 08:39) (112/64 - 143/74)  BP(mean): --  RR: 16 (03 Sep 2020 08:39) (16 - 18)  SpO2: 97% (03 Sep 2020 08:39) (97% - 98%)    PHYSICAL EXAM:  GENERAL: NAD, well-groomed, well-developed  HEAD:  Atraumatic, Normocephalic  EYES: EOMI, PERRLA, conjunctiva and sclera clear  ENMT: No tonsillar erythema, exudates, or enlargement; Moist mucous membranes, Good dentition, No lesions  NECK: Supple, No JVD, Normal thyroid  NERVOUS SYSTEM:  Alert & Oriented X3, Good concentration; Left homonymous hemianopsia, left facial droop, tongue midline, facial sensation intact bilaterally throughout,   Motor Strength weaker on left>right but generally intact, DTRs 2+ intact and symmetric  CHEST/LUNG: Clear to percussion bilaterally; No rales, rhonchi, wheezing, or rubs  HEART: Regular rate and rhythm; No murmurs, rubs, or gallops  ABDOMEN: Soft, Nontender, Nondistended; Bowel sounds present  EXTREMITIES:  2+ Peripheral Pulses, No clubbing, cyanosis, or edema  LYMPH: No lymphadenopathy noted  SKIN: No rashes or lesions

## 2020-09-04 PROBLEM — Z00.00 ENCOUNTER FOR PREVENTIVE HEALTH EXAMINATION: Status: ACTIVE | Noted: 2020-09-04

## 2020-09-04 PROCEDURE — 99232 SBSQ HOSP IP/OBS MODERATE 35: CPT

## 2020-09-04 RX ORDER — LIDOCAINE 4 G/100G
1 CREAM TOPICAL DAILY
Refills: 0 | Status: DISCONTINUED | OUTPATIENT
Start: 2020-09-04 | End: 2020-09-14

## 2020-09-04 RX ADMIN — ATORVASTATIN CALCIUM 80 MILLIGRAM(S): 80 TABLET, FILM COATED ORAL at 21:11

## 2020-09-04 RX ADMIN — Medication 1 DROP(S): at 13:03

## 2020-09-04 RX ADMIN — CLOPIDOGREL BISULFATE 75 MILLIGRAM(S): 75 TABLET, FILM COATED ORAL at 13:03

## 2020-09-04 RX ADMIN — Medication 325 MILLIGRAM(S): at 13:03

## 2020-09-04 RX ADMIN — LIDOCAINE 1 PATCH: 4 CREAM TOPICAL at 15:42

## 2020-09-04 RX ADMIN — SENNA PLUS 2 TABLET(S): 8.6 TABLET ORAL at 21:12

## 2020-09-04 RX ADMIN — LIDOCAINE 1 PATCH: 4 CREAM TOPICAL at 14:41

## 2020-09-04 RX ADMIN — ENOXAPARIN SODIUM 40 MILLIGRAM(S): 100 INJECTION SUBCUTANEOUS at 21:12

## 2020-09-04 RX ADMIN — Medication 1 DROP(S): at 19:49

## 2020-09-04 RX ADMIN — LIDOCAINE 1 PATCH: 4 CREAM TOPICAL at 13:05

## 2020-09-04 NOTE — PROGRESS NOTE ADULT - SUBJECTIVE AND OBJECTIVE BOX
Patient is a 75y old  Male who presents with a chief complaint of R MCA with left sided weakness  01.1 Left Body Involvement (Right Brain) (04 Sep 2020 08:40)      HPI:  Patient is a 75 y.o. RH -dominant man with PMHx HTN not on antithrombotics, presented to [facility] ED 8/15/20 with 1 day history of headache and altered mental status. Patient suddenly became confused 5PM day before admission and c/o new onset nonradiating headache at the top/middle of hairline and some weakness in right lower extremity. Per patient's friend, his speech was "off" and face "did not look normal"; patient initially refused to go to ED at that time but eventually sent due to persistent headache and lightheadedness. Neuro exam showed mild dysarthria and mild left nasolabial flattening with left arm weakness.     Hospital course was significant for severe stenosis of R M1 seen on initial CTA head and acute infarct in the R MCA territory on subsequent MRI brain 20. Neuro consulted, felt infarct most likely as a result of large artery atherosclerosis, although an embolus with partial lysis could not be ruled out. Patient was started on Midodrine 10mg TID and IV fluids due to hypotension after admission to maintain //100. +UA but negative UCx and was treated with Ceftriaxone (-). TTE showed EF 73%, normal LA, HbA1c 5.8, . Patient was started on ASA81 and Plavix 75mg daily, Plavix to be discontinued after 3 months.    Cardio was consulted for hypotension, likely neurogenic and possibly infectious etiology. 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 Pulm consulted for incidental finding of apical paraseptal emphysema on CT, no interventions recommended. Neurosurgery consulted for stenosis and questionable 2mm outpouching at L supraclinoid ICA, possible aneurysm vs infundibulum. MRA NOVA showed R MCA stenosis vs occlusion. S/P diagnostic angiography 20. S/P cerebral angiography with stenting 20. Patient weaned off Midodrine 20.    Patient was stable for discharge to [place] 20 for multidisciplinary acute rehab for functional deficits and gait/ADLs deficits. Patient speaks some English and Cantonese. Patient seen and examined at bedside with [employer] phone  #[phone]. (02 Sep 2020 14:53)      PAST MEDICAL & SURGICAL HISTORY:  HTN (hypertension)      MEDICATIONS  (STANDING):  artificial  tears Solution 1 Drop(s) Both EYES daily  aspirin 325 milliGRAM(s) Oral daily  atorvastatin 80 milliGRAM(s) Oral at bedtime  clopidogrel Tablet 75 milliGRAM(s) Oral daily  enoxaparin Injectable 40 milliGRAM(s) SubCutaneous at bedtime  influenza   Vaccine 0.5 milliLiter(s) IntraMuscular once  lidocaine   Patch 1 Patch Transdermal daily  polyethylene glycol 3350 17 Gram(s) Oral daily  senna 2 Tablet(s) Oral at bedtime    MEDICATIONS  (PRN):  acetaminophen   Tablet .. 650 milliGRAM(s) Oral every 6 hours PRN Temp greater or equal to 38C (100.4F), Mild Pain (1 - 3), Moderate Pain (4 - 6), Severe Pain (7 - 10)  artificial  tears Solution 1 Drop(s) Both EYES every 1 hour PRN Dry Eyes  bisacodyl Suppository 10 milliGRAM(s) Rectal daily PRN Constipation      Allergies    apple (Vomiting; Nausea)  No Known Drug Allergies    Intolerances          VITALS  75y  Vital Signs Last 24 Hrs  T(C): 36.7 (04 Sep 2020 07:42), Max: 36.7 (04 Sep 2020 07:42)  T(F): 98 (04 Sep 2020 07:42), Max: 98 (04 Sep 2020 07:42)  HR: 57 (04 Sep 2020 07:42) (55 - 57)  BP: 139/71 (04 Sep 2020 07:42) (134/63 - 139/71)  BP(mean): --  RR: 16 (04 Sep 2020 07:42) (16 - 16)  SpO2: 98% (04 Sep 2020 07:42) (96% - 98%)  Daily     Daily Weight in k.9 (04 Sep 2020 11:21)        RECENT LABS:                          13.3   6.12  )-----------( 297      ( 03 Sep 2020 06:09 )             39.9     09-03    139  |  105  |  17  ----------------------------<  101<H>  4.1   |  23  |  0.68    Ca    8.9      03 Sep 2020 06:09  Phos  3.0     -  Mg     2.2     -    TPro  7.3  /  Alb  3.1<L>  /  TBili  0.6  /  DBili  x   /  AST  54<H>  /  ALT  84<H>  /  AlkPhos  96      LIVER FUNCTIONS - ( 03 Sep 2020 06:09 )  Alb: 3.1 g/dL / Pro: 7.3 g/dL / ALK PHOS: 96 U/L / ALT: 84 U/L / AST: 54 U/L / GGT: x                   CAPILLARY BLOOD GLUCOSE

## 2020-09-04 NOTE — DIETITIAN INITIAL EVALUATION ADULT. - ADD RECOMMEND
1. Continue diet as ordered. Texture per SLP recommendations/evaluation, 2. Enlive daily, 3. 1. Continue diet as ordered. Texture per SLP recommendations/evaluation, 2. Recommend an Enlive daily

## 2020-09-04 NOTE — DIETITIAN INITIAL EVALUATION ADULT. - PERTINENT MEDS FT
MEDICATIONS  (STANDING):  artificial  tears Solution 1 Drop(s) Both EYES daily  aspirin 325 milliGRAM(s) Oral daily  atorvastatin 80 milliGRAM(s) Oral at bedtime  clopidogrel Tablet 75 milliGRAM(s) Oral daily  enoxaparin Injectable 40 milliGRAM(s) SubCutaneous at bedtime  influenza   Vaccine 0.5 milliLiter(s) IntraMuscular once  lidocaine   Patch 1 Patch Transdermal daily  polyethylene glycol 3350 17 Gram(s) Oral daily  senna 2 Tablet(s) Oral at bedtime    MEDICATIONS  (PRN):  acetaminophen   Tablet .. 650 milliGRAM(s) Oral every 6 hours PRN Temp greater or equal to 38C (100.4F), Mild Pain (1 - 3), Moderate Pain (4 - 6), Severe Pain (7 - 10)  artificial  tears Solution 1 Drop(s) Both EYES every 1 hour PRN Dry Eyes  bisacodyl Suppository 10 milliGRAM(s) Rectal daily PRN Constipation

## 2020-09-04 NOTE — PROGRESS NOTE ADULT - ASSESSMENT
RAFY KIRKPATRICK is a 75y with a history of HTN who presented to [facility] on 8/15/20 with headache and confusion and found to have severe stenosis of R M1 seen on initial CTA head and 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 Hospital course complicated by UTI and hypotension, both now resolved. Now admitted to [facility] after for initiation of a multidisciplinary rehab program consisting focused on functional mobility, transfers and ADLs (activities of daily living).    #Right-sided MCA Stroke, S/P R MCA stenting with L sided weakness, dysarthria, dysphagia, left visual field cut, incoordination  - Yearly CTA head to monitor L supraclinoid ICA infundibulum vs small aneurysm  - continue ASA, plavix, lipitor  - continue comprehensive rehab program, 3 hours a day, 5 days a week, PT, OT, SLP  - Precautions: falls, cardiac, aspiration, left dilia-inattention, visual deficits  - F/u neurosurgery, neurology on dc    #left shoulder subluxation  -OT evaluation for kinesiotaping. Consider FES  -lap board/pillows for support. Patient educated on proper positioning  -increase left sided awarenss    #HTN -  - Currently normotensive  - Monitor off of medications due to h/o hypotension    #HLD  - continue lipitor 80    #Dysphagia  - continue dysphagia 3 soft-nectar    #GI/Bowel:  - At risk for constipation due to neurologic diagnosis, immobility and/or medication use  - Senna QHS, Miralax Daily  - Start suppository PRN    #/Bladder:   - At risk for incontinence and retention due to neurologic diagnosis and limited mobility  - Currently patient voids: independently  - Encourage timed voids every 4 hours while awake for independence and to promote continence during therapy.      #DVT PPX  - Lovenox, SCDs  - Last Doppler on 8/28/20    #Labs:  CBC BMP 9/7  --------------    Outpatient Follow-up (Specialty/Name of physician):  Pierre Singleton)  Ophthalmology  [address] [place] [address], [address]  [place] [address]  Phone: [phone]  Fax: [phone]  Follow Up Time: 2 weeks    Otf Levi  Neurological Surgery  [address] [place] [address] [place] [address]  Phone: [phone]  Fax: [phone]    [facility]  Cardiology  [address] [place] [address]  Phone: [phone]  Fax:   Follow Up Time: 2 weeks    [facility]  Neurology  [address] [facility] - 3rd [place] [address]  Phone: [phone]  Fax:   Follow Up Time: 2 weeks    [facility] [place]  Internal Medicine  [address] [place] [address]  Phone: [phone]  Fax: [phone]  Follow Up Time: 2 weeks    --------------

## 2020-09-04 NOTE — DIETITIAN INITIAL EVALUATION ADULT. - EST PROTEIN NEEDS6
[FreeTextEntry1] : f/u x valvular disease/DM \par \par INR: 3.6 [de-identified] : 34 yo female presents to office for f/u on Valvular Heart Disease/HLD/DM2 and B12 def.   +ve FAST.  Denies CP today  UNCHANGED CHRONIC STABLE SOB today.  no change in dizziness  no palpitations.  pt states sound of heart beat last night was increased which is typically associated c blood level that is "too thin. "  +ve bleeding gums yesterday.  Denies bloody/black stools no bev hematuria no hematemesis no hemoptysis no epistaxis \par \par no N/V/D +BM daily no bloody/black stools   +ve chills   no urinary complaints  70.68

## 2020-09-04 NOTE — DIETITIAN INITIAL EVALUATION ADULT. - OTHER INFO
Patient is a 75y old  Male who presents with a chief complaint of R MCA with left sided weakness. Met with patient who reports he is tolerating his diet and getting enough to eat. Offered Ensure daily, and is agreeable. Question weight accuracy on admission as weight 8/14: 139#, and on admission 130#. Reports UBW ~140#. Will continue to trend. Skin intact, no edema, no N/V per RN documentation. On mechanically altered diet with thickened liquids and SLP continues to follow. LBM 9/3. No questions/concerns about his diet at this time, and explained importance of adequate PO.

## 2020-09-04 NOTE — PROGRESS NOTE ADULT - ASSESSMENT
RAFY KIRKPATRICK is a 75y with a history of HTN who presented to [facility] on 8/15/20 with headache and confusion and found to have severe stenosis of R M1 seen on initial CTA head and 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 Hospital course complicated by UTI and hypotension, both now resolved. Now admitted to [facility] after for initiation of a multidisciplinary rehab program consisting focused on functional mobility, transfers and ADLs (activities of daily living).    Right-sided MCA Stroke, S/P R MCA stenting with residual deficit  - L sided weakness, dysarthria, dysphagia  - Start comprehensive rehab program, 3 hours a day, 5 days a week  - Speech Therapy 1hr/day: to diagnose and treat deficits in swallowing, cognition and communication  - Patient recommended yearly CTA head to monitor L supraclinoid ICA infundibulum vs small aneurysm  - Precautions: falls, cardiac, aspiration  - continue ASA, plavix, lipitor  - F/u neurosurgery, neurology    HTN - but recent hypotension  - Hypotension currently resolved  - Currently normotensive  - Monitor off of medications    HLD  - continue lipitor 80    Dysphagia  - Diet Consistency/Modifications: dysphagia 3 soft-nectar  - Dysphagia: Aspiration Precautions  - SLP and nutrition consults  - Nutrition consult    Constipation  - senna, dulcolax, miralax as needed    DVT ppx  - Lovenox  - SCDs  - Last Doppler on 8/28/20

## 2020-09-05 PROCEDURE — 99232 SBSQ HOSP IP/OBS MODERATE 35: CPT

## 2020-09-05 RX ADMIN — LIDOCAINE 1 PATCH: 4 CREAM TOPICAL at 13:00

## 2020-09-05 RX ADMIN — LIDOCAINE 1 PATCH: 4 CREAM TOPICAL at 20:00

## 2020-09-05 RX ADMIN — POLYETHYLENE GLYCOL 3350 17 GRAM(S): 17 POWDER, FOR SOLUTION ORAL at 13:00

## 2020-09-05 RX ADMIN — ATORVASTATIN CALCIUM 80 MILLIGRAM(S): 80 TABLET, FILM COATED ORAL at 22:09

## 2020-09-05 RX ADMIN — Medication 1 DROP(S): at 12:59

## 2020-09-05 RX ADMIN — Medication 325 MILLIGRAM(S): at 13:00

## 2020-09-05 RX ADMIN — SENNA PLUS 2 TABLET(S): 8.6 TABLET ORAL at 22:09

## 2020-09-05 RX ADMIN — ENOXAPARIN SODIUM 40 MILLIGRAM(S): 100 INJECTION SUBCUTANEOUS at 22:09

## 2020-09-05 RX ADMIN — CLOPIDOGREL BISULFATE 75 MILLIGRAM(S): 75 TABLET, FILM COATED ORAL at 13:00

## 2020-09-05 NOTE — PROGRESS NOTE ADULT - ASSESSMENT
RAFY KIRKPATRICK is a 75y with a history of HTN who presented to [facility] on 8/15/20 with headache and confusion and found to have severe stenosis of R M1 seen on initial CTA head and 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 Hospital course complicated by UTI and hypotension, both now resolved. Now admitted to [facility] after for initiation of a multidisciplinary rehab program consisting focused on functional mobility, transfers and ADLs (activities of daily living).    Right-sided MCA Stroke, S/P R MCA stenting with residual deficit  - L sided weakness, dysarthria, dysphagia  - Start comprehensive rehab program per primary team  - Patient recommended yearly CTA head to monitor L supraclinoid ICA infundibulum vs small aneurysm  - Precautions: falls, cardiac, aspiration  - continue ASA, plavix, lipitor  - F/u neurosurgery, neurology    HTN   - Currently normotensive  - Monitor off of medications    HLD  - continue lipitor 80    Dysphagia  - Diet Consistency/Modifications: dysphagia 3 soft-nectar  - Dysphagia: Aspiration Precautions  - SLP and nutrition consults  - Nutrition consult    Constipation  - senna, dulcolax, miralax as needed    DVT ppx: Lovenox, BLE Doppler 8/28/20 neg for DVT

## 2020-09-05 NOTE — PROGRESS NOTE ADULT - SUBJECTIVE AND OBJECTIVE BOX
Chief complaint: no new complaints    Patient is a 75y old  Male who presents with a chief complaint of R MCA with left sided weakness  01.1 Left Body Involvement (Right Brain) (05 Sep 2020 11:40)      PAST MEDICAL & SURGICAL HISTORY:  HTN (hypertension)      VITALS  Vital Signs Last 24 Hrs  T(C): 36.6 (04 Sep 2020 20:55), Max: 36.6 (04 Sep 2020 20:55)  T(F): 97.8 (04 Sep 2020 20:55), Max: 97.8 (04 Sep 2020 20:55)  HR: 60 (04 Sep 2020 20:55) (60 - 60)  BP: 118/56 (04 Sep 2020 20:55) (118/56 - 118/56)  BP(mean): --  RR: 15 (04 Sep 2020 20:55) (15 - 15)  SpO2: 97% (04 Sep 2020 20:55) (97% - 97%)      PHYSICAL EXAM  Constitutional - NAD, Comfortable  HEENT - NCAT, EOMI  Neck - Supple, No limited ROM  Chest - CTA bilaterally  Cardiovascular - RRR, S1S2, No murmurs  Abdomen - BS+, Soft, NTND  Extremities - No C/C/E, No calf tenderness   Neurologic Exam -                    Cognitive - Awake, Alert, AAO X3     No new focal deficits               CURRENT MEDICATIONS    MEDICATIONS  (STANDING):  artificial  tears Solution 1 Drop(s) Both EYES daily  aspirin 325 milliGRAM(s) Oral daily  atorvastatin 80 milliGRAM(s) Oral at bedtime  clopidogrel Tablet 75 milliGRAM(s) Oral daily  enoxaparin Injectable 40 milliGRAM(s) SubCutaneous at bedtime  influenza   Vaccine 0.5 milliLiter(s) IntraMuscular once  lidocaine   Patch 1 Patch Transdermal daily  polyethylene glycol 3350 17 Gram(s) Oral daily  senna 2 Tablet(s) Oral at bedtime    MEDICATIONS  (PRN):  acetaminophen   Tablet .. 650 milliGRAM(s) Oral every 6 hours PRN Temp greater or equal to 38C (100.4F), Mild Pain (1 - 3), Moderate Pain (4 - 6), Severe Pain (7 - 10)  artificial  tears Solution 1 Drop(s) Both EYES every 1 hour PRN Dry Eyes  bisacodyl Suppository 10 milliGRAM(s) Rectal daily PRN Constipation    ASSESSMENT & PLAN          GI/Bowel Management - Dulcolax PRN, Fleet PRN   Management - Toilet Q2  Skin - Turn Q2  Pain - Tylenol PRN  DVT PPX - Lovenox      Continue comprehensive acute rehab program consisting of 3hrs/day of OT/PT and SLP.

## 2020-09-05 NOTE — PROGRESS NOTE ADULT - SUBJECTIVE AND OBJECTIVE BOX
Patient is a 75y old  Male who presents with a chief complaint of R MCA with left sided weakness  01.1 Left Body Involvement (Right Brain) (04 Sep 2020 12:27)    No overnight events noted.  Patient denies acute/new symptoms to me.  Patient seen and examined at bedside.    ALLERGIES:  apple (Vomiting; Nausea)  No Known Drug Allergies    MEDICATIONS  (STANDING):  artificial  tears Solution 1 Drop(s) Both EYES daily  aspirin 325 milliGRAM(s) Oral daily  atorvastatin 80 milliGRAM(s) Oral at bedtime  clopidogrel Tablet 75 milliGRAM(s) Oral daily  enoxaparin Injectable 40 milliGRAM(s) SubCutaneous at bedtime  influenza   Vaccine 0.5 milliLiter(s) IntraMuscular once  lidocaine   Patch 1 Patch Transdermal daily  polyethylene glycol 3350 17 Gram(s) Oral daily  senna 2 Tablet(s) Oral at bedtime    MEDICATIONS  (PRN):  acetaminophen   Tablet .. 650 milliGRAM(s) Oral every 6 hours PRN Temp greater or equal to 38C (100.4F), Mild Pain (1 - 3), Moderate Pain (4 - 6), Severe Pain (7 - 10)  artificial  tears Solution 1 Drop(s) Both EYES every 1 hour PRN Dry Eyes  bisacodyl Suppository 10 milliGRAM(s) Rectal daily PRN Constipation    Vital Signs Last 24 Hrs  T(F): 97.8 (04 Sep 2020 20:55), Max: 97.8 (04 Sep 2020 20:55)  HR: 60 (04 Sep 2020 20:55) (60 - 60)  BP: 118/56 (04 Sep 2020 20:55) (118/56 - 118/56)  RR: 15 (04 Sep 2020 20:55) (15 - 15)  SpO2: 97% (04 Sep 2020 20:55) (97% - 97%)    PHYSICAL EXAM:  General: NAD, A/O x 3  Neck: Supple, No JVD  Lungs: Clear to auscultation bilaterally  Cardio: RRR, S1/S2, No murmurs  Abdomen: Soft, Nontender, Bowel sounds present  Extremities: No calf tenderness, No pitting edema    LABS:                        13.3   6.12  )-----------( 297      ( 03 Sep 2020 06:09 )             39.9       09-03    139  |  105  |  17  ----------------------------<  101  4.1   |  23  |  0.68    Ca    8.9      03 Sep 2020 06:09  Phos  3.0     09-03  Mg     2.2     09-03    TPro  7.3  /  Alb  3.1  /  TBili  0.6  /  DBili  x   /  AST  54  /  ALT  84  /  AlkPhos  96  09-03     eGFR if Non : 93 mL/min/1.73M2 (09-03-20 @ 06:09)  eGFR if : 108 mL/min/1.73M2 (09-03-20 @ 06:09)       08-15 Chol 220 mg/dL  mg/dL HDL 44 mg/dL Trig 98 mg/dL

## 2020-09-06 PROCEDURE — 99232 SBSQ HOSP IP/OBS MODERATE 35: CPT

## 2020-09-06 RX ADMIN — SENNA PLUS 2 TABLET(S): 8.6 TABLET ORAL at 21:01

## 2020-09-06 RX ADMIN — LIDOCAINE 1 PATCH: 4 CREAM TOPICAL at 12:33

## 2020-09-06 RX ADMIN — Medication 325 MILLIGRAM(S): at 12:33

## 2020-09-06 RX ADMIN — CLOPIDOGREL BISULFATE 75 MILLIGRAM(S): 75 TABLET, FILM COATED ORAL at 12:33

## 2020-09-06 RX ADMIN — LIDOCAINE 1 PATCH: 4 CREAM TOPICAL at 19:40

## 2020-09-06 RX ADMIN — Medication 1 DROP(S): at 12:32

## 2020-09-06 RX ADMIN — POLYETHYLENE GLYCOL 3350 17 GRAM(S): 17 POWDER, FOR SOLUTION ORAL at 12:33

## 2020-09-06 RX ADMIN — ENOXAPARIN SODIUM 40 MILLIGRAM(S): 100 INJECTION SUBCUTANEOUS at 21:01

## 2020-09-06 RX ADMIN — LIDOCAINE 1 PATCH: 4 CREAM TOPICAL at 01:44

## 2020-09-06 RX ADMIN — ATORVASTATIN CALCIUM 80 MILLIGRAM(S): 80 TABLET, FILM COATED ORAL at 21:01

## 2020-09-06 NOTE — PROGRESS NOTE ADULT - ASSESSMENT
RAFY KIRKPATRICK is a 75y with a history of HTN who presented to [facility] on 8/15/20 with headache and confusion and found to have severe stenosis of R M1 seen on initial CTA head and 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 Hospital course complicated by UTI and hypotension, both now resolved. Now admitted to [facility] after for initiation of a multidisciplinary rehab program consisting focused on functional mobility, transfers and ADLs (activities of daily living).    Right-sided MCA Stroke, S/P R MCA stenting with residual deficit  - L sided weakness, dysarthria, dysphagia  - Comprehensive rehab program per primary team  - Patient recommended yearly CTA head to monitor L supraclinoid ICA infundibulum vs small aneurysm  - continue ASA, plavix, lipitor  - F/u neurosurgery, neurology    HTN   - Currently normotensive  - Monitor off of medications    HLD  - LDL goal of 70  - continue lipitor 80mg qhs    Dysphagia  - Diet Consistency/Modifications: dysphagia 3 soft-nectar  - SLP and nutrition consults    Constipation  - senna, dulcolax, miralax prn    DVT ppx: Lovenox, BLE Doppler 8/28/20 neg for DVT Oriented - self; Oriented - place; Oriented - time

## 2020-09-06 NOTE — PROGRESS NOTE ADULT - SUBJECTIVE AND OBJECTIVE BOX
Patient is a 75y old  Male who presents with a chief complaint of R MCA with left sided weakness  01.1 Left Body Involvement (Right Brain) (05 Sep 2020 14:04)    No overnight events noted.  Patient denies acute/new symptoms to me.  Patient seen and examined at bedside.    ALLERGIES:  apple (Vomiting; Nausea)  No Known Drug Allergies    T(F): 97.6 (05 Sep 2020 20:45), Max: 98.2 (05 Sep 2020 18:12)  HR: 58 (05 Sep 2020 20:45) (58 - 58)  BP: 127/76 (05 Sep 2020 20:45) (127/76 - 135/71)  RR: 14 (05 Sep 2020 20:45) (14 - 15)  SpO2: 96% (05 Sep 2020 20:45) (96% - 98%)    PHYSICAL EXAM:  General: NAD, A/O x 3  Neck: Supple, No JVD  Lungs: Clear to auscultation bilaterally  Cardio: RRR, S1/S2, No murmurs  Abdomen: Soft, Nontender, Bowel sounds present  Extremities: No calf tenderness, No pitting edema    LABS:                   13.3   6.12  )-----------( 297      ( 03 Sep 2020 06:09 )             39.9       09-03    139  |  105  |  17  ----------------------------<  101  4.1   |  23  |  0.68    Ca    8.9      03 Sep 2020 06:09  Phos  3.0     09-03  Mg     2.2     09-03    TPro  7.3  /  Alb  3.1  /  TBili  0.6  /  DBili  x   /  AST  54  /  ALT  84  /  AlkPhos  96  09-03     eGFR if Non : 93 mL/min/1.73M2 (09-03-20 @ 06:09)  eGFR if : 108 mL/min/1.73M2 (09-03-20 @ 06:09)       08-15 Chol 220 mg/dL  mg/dL HDL 44 mg/dL Trig 98 mg/dL

## 2020-09-06 NOTE — PROGRESS NOTE ADULT - SUBJECTIVE AND OBJECTIVE BOX
Chief complaint: no new complaints     Patient is a 75y old  Male who presents with a chief complaint of R MCA with left sided weakness  01.1 Left Body Involvement (Right Brain) (06 Sep 2020 11:10)        PAST MEDICAL & SURGICAL HISTORY:  HTN (hypertension)      VITALS  Vital Signs Last 24 Hrs  T(C): 36.4 (05 Sep 2020 20:45), Max: 36.8 (05 Sep 2020 18:12)  T(F): 97.6 (05 Sep 2020 20:45), Max: 98.2 (05 Sep 2020 18:12)  HR: 58 (05 Sep 2020 20:45) (58 - 58)  BP: 127/76 (05 Sep 2020 20:45) (127/76 - 135/71)  BP(mean): --  RR: 14 (05 Sep 2020 20:45) (14 - 15)  SpO2: 96% (05 Sep 2020 20:45) (96% - 98%)      PHYSICAL EXAM  Constitutional - NAD, Comfortable  HEENT - NCAT, EOMI  Neck - Supple, No limited ROM  Chest - CTA bilaterally  Cardiovascular -S1S2  Abdomen - BS+, Soft, NTND  Extremities - No calf tenderness                CURRENT MEDICATIONS    MEDICATIONS  (STANDING):  artificial  tears Solution 1 Drop(s) Both EYES daily  aspirin 325 milliGRAM(s) Oral daily  atorvastatin 80 milliGRAM(s) Oral at bedtime  clopidogrel Tablet 75 milliGRAM(s) Oral daily  enoxaparin Injectable 40 milliGRAM(s) SubCutaneous at bedtime  influenza   Vaccine 0.5 milliLiter(s) IntraMuscular once  lidocaine   Patch 1 Patch Transdermal daily  polyethylene glycol 3350 17 Gram(s) Oral daily  senna 2 Tablet(s) Oral at bedtime    MEDICATIONS  (PRN):  acetaminophen   Tablet .. 650 milliGRAM(s) Oral every 6 hours PRN Temp greater or equal to 38C (100.4F), Mild Pain (1 - 3), Moderate Pain (4 - 6), Severe Pain (7 - 10)  artificial  tears Solution 1 Drop(s) Both EYES every 1 hour PRN Dry Eyes  bisacodyl Suppository 10 milliGRAM(s) Rectal daily PRN Constipation    ASSESSMENT & PLAN          GI/Bowel Management - Dulcolax PRN, Fleet PRN   Management - Toilet Q2  Skin - Turn Q2  Pain - Tylenol PRN  DVT PPX - Lovenox      Continue comprehensive acute rehab program consisting of 3hrs/day of OT/PT and SLP.

## 2020-09-07 LAB
ALBUMIN SERPL ELPH-MCNC: 3.3 G/DL — SIGNIFICANT CHANGE UP (ref 3.3–5)
ALP SERPL-CCNC: 114 U/L — SIGNIFICANT CHANGE UP (ref 40–120)
ALT FLD-CCNC: 152 U/L — HIGH (ref 10–45)
ANION GAP SERPL CALC-SCNC: 8 MMOL/L — SIGNIFICANT CHANGE UP (ref 5–17)
AST SERPL-CCNC: 84 U/L — HIGH (ref 10–40)
BILIRUB SERPL-MCNC: 0.4 MG/DL — SIGNIFICANT CHANGE UP (ref 0.2–1.2)
BUN SERPL-MCNC: 20 MG/DL — SIGNIFICANT CHANGE UP (ref 7–23)
CALCIUM SERPL-MCNC: 8.7 MG/DL — SIGNIFICANT CHANGE UP (ref 8.4–10.5)
CHLORIDE SERPL-SCNC: 105 MMOL/L — SIGNIFICANT CHANGE UP (ref 96–108)
CO2 SERPL-SCNC: 27 MMOL/L — SIGNIFICANT CHANGE UP (ref 22–31)
CREAT SERPL-MCNC: 0.71 MG/DL — SIGNIFICANT CHANGE UP (ref 0.5–1.3)
GLUCOSE SERPL-MCNC: 98 MG/DL — SIGNIFICANT CHANGE UP (ref 70–99)
HCT VFR BLD CALC: 42.1 % — SIGNIFICANT CHANGE UP (ref 39–50)
HGB BLD-MCNC: 13.9 G/DL — SIGNIFICANT CHANGE UP (ref 13–17)
MCHC RBC-ENTMCNC: 30.4 PG — SIGNIFICANT CHANGE UP (ref 27–34)
MCHC RBC-ENTMCNC: 33 GM/DL — SIGNIFICANT CHANGE UP (ref 32–36)
MCV RBC AUTO: 92.1 FL — SIGNIFICANT CHANGE UP (ref 80–100)
NRBC # BLD: 0 /100 WBCS — SIGNIFICANT CHANGE UP (ref 0–0)
PLATELET # BLD AUTO: 288 K/UL — SIGNIFICANT CHANGE UP (ref 150–400)
POTASSIUM SERPL-MCNC: 4 MMOL/L — SIGNIFICANT CHANGE UP (ref 3.5–5.3)
POTASSIUM SERPL-SCNC: 4 MMOL/L — SIGNIFICANT CHANGE UP (ref 3.5–5.3)
PROT SERPL-MCNC: 7.4 G/DL — SIGNIFICANT CHANGE UP (ref 6–8.3)
RBC # BLD: 4.57 M/UL — SIGNIFICANT CHANGE UP (ref 4.2–5.8)
RBC # FLD: 12.2 % — SIGNIFICANT CHANGE UP (ref 10.3–14.5)
SODIUM SERPL-SCNC: 140 MMOL/L — SIGNIFICANT CHANGE UP (ref 135–145)
WBC # BLD: 6.3 K/UL — SIGNIFICANT CHANGE UP (ref 3.8–10.5)
WBC # FLD AUTO: 6.3 K/UL — SIGNIFICANT CHANGE UP (ref 3.8–10.5)

## 2020-09-07 PROCEDURE — 99232 SBSQ HOSP IP/OBS MODERATE 35: CPT

## 2020-09-07 RX ORDER — ATORVASTATIN CALCIUM 80 MG/1
40 TABLET, FILM COATED ORAL AT BEDTIME
Refills: 0 | Status: DISCONTINUED | OUTPATIENT
Start: 2020-09-07 | End: 2020-09-18

## 2020-09-07 RX ADMIN — LIDOCAINE 1 PATCH: 4 CREAM TOPICAL at 11:35

## 2020-09-07 RX ADMIN — CLOPIDOGREL BISULFATE 75 MILLIGRAM(S): 75 TABLET, FILM COATED ORAL at 11:35

## 2020-09-07 RX ADMIN — ATORVASTATIN CALCIUM 40 MILLIGRAM(S): 80 TABLET, FILM COATED ORAL at 22:19

## 2020-09-07 RX ADMIN — Medication 1 DROP(S): at 11:34

## 2020-09-07 RX ADMIN — Medication 325 MILLIGRAM(S): at 11:35

## 2020-09-07 RX ADMIN — LIDOCAINE 1 PATCH: 4 CREAM TOPICAL at 23:26

## 2020-09-07 RX ADMIN — SENNA PLUS 2 TABLET(S): 8.6 TABLET ORAL at 22:19

## 2020-09-07 RX ADMIN — LIDOCAINE 1 PATCH: 4 CREAM TOPICAL at 23:32

## 2020-09-07 RX ADMIN — LIDOCAINE 1 PATCH: 4 CREAM TOPICAL at 19:50

## 2020-09-07 RX ADMIN — ENOXAPARIN SODIUM 40 MILLIGRAM(S): 100 INJECTION SUBCUTANEOUS at 22:19

## 2020-09-07 NOTE — PROGRESS NOTE ADULT - ASSESSMENT
RAFY KIRKPATRICK is a 75y with a history of HTN who presented to [facility] on 8/15/20 with headache and confusion and found to have severe stenosis of R M1 seen on initial CTA head and 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 Hospital course complicated by UTI and hypotension, both now resolved. Now admitted to [facility] after for initiation of a multidisciplinary rehab program consisting focused on functional mobility, transfers and ADLs (activities of daily living).    Right-sided MCA Stroke, S/P R MCA stenting with residual deficit  - L sided weakness, dysarthria, dysphagia  - Comprehensive rehab program per primary team  - Patient recommended yearly CTA head to monitor L supraclinoid ICA infundibulum vs small aneurysm  - continue ASA, plavix, lipitor  - F/u neurosurgery, neurology    HTN   - Currently normotensive  - Monitor off of medications    HLD  - LDL goal of 70  - recommend lowering lipitor 80mg --> 40mg qhs, transaminitis CMP 9/07    Transaminitis  - CMP 9/07  - LFTs wnl August 14 2020  - med list reviewed, likely related to high dose statin  - lower dose to 40mg qhs, monitor LFTs    Dysphagia  - Diet Consistency/Modifications: dysphagia 3 soft-nectar  - SLP and nutrition consults    Constipation  - senna, dulcolax, miralax prn      DVT ppx: Lovenox, BLE Doppler 8/28/20 neg for DVT    CBC/ CMP 9/07 reviewed

## 2020-09-07 NOTE — PROGRESS NOTE ADULT - SUBJECTIVE AND OBJECTIVE BOX
Patient is a 75y old  Male who presents with a chief complaint of R MCA with left sided weakness  01.1 Left Body Involvement (Right Brain) (06 Sep 2020 13:43)    No overnight events noted.  Patient seen and examined at bedside.    ALLERGIES:  apple (Vomiting; Nausea)  No Known Drug Allergies    Vital Signs Last 24 Hrs  T(F): 98 (06 Sep 2020 20:59), Max: 98.1 (06 Sep 2020 17:51)  HR: 64 (06 Sep 2020 20:59) (58 - 64)  BP: 143/67 (06 Sep 2020 20:59) (121/65 - 143/67)  RR: 15 (06 Sep 2020 20:59) (14 - 15)  SpO2: 98% (06 Sep 2020 20:59) (97% - 98%)    PHYSICAL EXAM:  General: NAD, A/O x 3  Neck: Supple, No JVD  Lungs: Clear to auscultation bilaterally  Cardio: RRR, S1/S2, No murmurs  Abdomen: Soft, Nontender, Bowel sounds present  Extremities: No calf tenderness, No pitting edema    LABS:                        13.9   6.30  )-----------( 288      ( 07 Sep 2020 06:55 )             42.1       09-07    140  |  105  |  20  ----------------------------<  98  4.0   |  27  |  0.71    Ca    8.7      07 Sep 2020 06:55    TPro  7.4  /  Alb  3.3  /  TBili  0.4  /  DBili  x   /  AST  84  /  ALT  152  /  AlkPhos  114  09-07     eGFR if Non African American: 91 mL/min/1.73M2 (09-07-20 @ 06:55)  eGFR if African American: 106 mL/min/1.73M2 (09-07-20 @ 06:55)         08-15 Chol 220 mg/dL  mg/dL HDL 44 mg/dL Trig 98 mg/dL

## 2020-09-07 NOTE — PROGRESS NOTE ADULT - SUBJECTIVE AND OBJECTIVE BOX
Chief complaint: no new complaints, tolerates therapy well     Patient is a 75y old  Male who presents with a chief complaint of R MCA with left sided weakness  01.1 Left Body Involvement (Right Brain) (07 Sep 2020 08:51)            PAST MEDICAL & SURGICAL HISTORY:  HTN (hypertension)    Vital Signs Last 24 Hrs  T(C): 37 (07 Sep 2020 09:22), Max: 37 (07 Sep 2020 09:22)  T(F): 98.6 (07 Sep 2020 09:22), Max: 98.6 (07 Sep 2020 09:22)  HR: 75 (07 Sep 2020 09:22) (58 - 75)  BP: 114/63 (07 Sep 2020 09:22) (114/63 - 143/67)  BP(mean): --  RR: 15 (07 Sep 2020 09:22) (14 - 15)  SpO2: 98% (07 Sep 2020 09:22) (97% - 98%)      PHYSICAL EXAM  Constitutional - NAD, Comfortable  HEENT - NCAT, EOMI  Neck - Supple, No limited ROM  Chest - CTA bilaterally  Cardiovascular - RRR, S1S2  Abdomen - BS+, Soft, NTND  Extremities -  No calf tenderness   Neurologic Exam -                    Cognitive - Awake, Alert     No new focal deficits                      RECENT LABS                        13.9   6.30  )-----------( 288      ( 07 Sep 2020 06:55 )             42.1     09-07    140  |  105  |  20  ----------------------------<  98  4.0   |  27  |  0.71    Ca    8.7      07 Sep 2020 06:55    TPro  7.4  /  Alb  3.3  /  TBili  0.4  /  DBili  x   /  AST  84<H>  /  ALT  152<H>  /  AlkPhos  114  09-07                  CURRENT MEDICATIONS    MEDICATIONS  (STANDING):  artificial  tears Solution 1 Drop(s) Both EYES daily  aspirin 325 milliGRAM(s) Oral daily  atorvastatin 40 milliGRAM(s) Oral at bedtime  clopidogrel Tablet 75 milliGRAM(s) Oral daily  enoxaparin Injectable 40 milliGRAM(s) SubCutaneous at bedtime  influenza   Vaccine 0.5 milliLiter(s) IntraMuscular once  lidocaine   Patch 1 Patch Transdermal daily  polyethylene glycol 3350 17 Gram(s) Oral daily  senna 2 Tablet(s) Oral at bedtime    MEDICATIONS  (PRN):  acetaminophen   Tablet .. 650 milliGRAM(s) Oral every 6 hours PRN Temp greater or equal to 38C (100.4F), Mild Pain (1 - 3), Moderate Pain (4 - 6), Severe Pain (7 - 10)  artificial  tears Solution 1 Drop(s) Both EYES every 1 hour PRN Dry Eyes  bisacodyl Suppository 10 milliGRAM(s) Rectal daily PRN Constipation    ASSESSMENT & PLAN          GI/Bowel Management - Dulcolax PRN, Fleet PRN   Management - Toilet Q2  Skin - Turn Q2  Pain - Tylenol PRN  DVT PPX - Lovenox  Increased LFTs noted, statin dose lowered, Medicine input appreciated, case discussed       Continue comprehensive acute rehab program consisting of 3hrs/day of OT/PT and SLP.

## 2020-09-08 PROCEDURE — 99233 SBSQ HOSP IP/OBS HIGH 50: CPT

## 2020-09-08 PROCEDURE — 74230 X-RAY XM SWLNG FUNCJ C+: CPT | Mod: 26

## 2020-09-08 PROCEDURE — 99232 SBSQ HOSP IP/OBS MODERATE 35: CPT

## 2020-09-08 RX ADMIN — LIDOCAINE 1 PATCH: 4 CREAM TOPICAL at 19:53

## 2020-09-08 RX ADMIN — LIDOCAINE 1 PATCH: 4 CREAM TOPICAL at 23:32

## 2020-09-08 RX ADMIN — Medication 325 MILLIGRAM(S): at 11:05

## 2020-09-08 RX ADMIN — ENOXAPARIN SODIUM 40 MILLIGRAM(S): 100 INJECTION SUBCUTANEOUS at 21:54

## 2020-09-08 RX ADMIN — CLOPIDOGREL BISULFATE 75 MILLIGRAM(S): 75 TABLET, FILM COATED ORAL at 11:05

## 2020-09-08 RX ADMIN — LIDOCAINE 1 PATCH: 4 CREAM TOPICAL at 11:06

## 2020-09-08 RX ADMIN — Medication 1 DROP(S): at 12:28

## 2020-09-08 RX ADMIN — SENNA PLUS 2 TABLET(S): 8.6 TABLET ORAL at 21:54

## 2020-09-08 RX ADMIN — ATORVASTATIN CALCIUM 40 MILLIGRAM(S): 80 TABLET, FILM COATED ORAL at 21:54

## 2020-09-08 NOTE — PROGRESS NOTE ADULT - SUBJECTIVE AND OBJECTIVE BOX
Patient is a 75y old  Male who presents with a chief complaint of R MCA with left sided weakness  01.1 Left Body Involvement (Right Brain) (07 Sep 2020 13:17)      HPI:  Patient is a 75 y.o. RH -dominant man with PMHx HTN not on antithrombotics, presented to [facility] ED 8/15/20 with 1 day history of headache and altered mental status. Patient suddenly became confused 5PM day before admission and c/o new onset nonradiating headache at the top/middle of hairline and some weakness in right lower extremity. Per patient's friend, his speech was "off" and face "did not look normal"; patient initially refused to go to ED at that time but eventually sent due to persistent headache and lightheadedness. Neuro exam showed mild dysarthria and mild left nasolabial flattening with left arm weakness.     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 Neuro consulted, felt infarct most likely as a result of large artery atherosclerosis, although an embolus with partial lysis could not be ruled out. Patient was started on Midodrine 10mg TID and IV fluids due to hypotension after admission to maintain //100. +UA but negative UCx and was treated with Ceftriaxone (8/16-8/23). TTE showed EF 73%, normal LA, HbA1c 5.8, . Patient was started on ASA81 and Plavix 75mg daily, Plavix to be discontinued after 3 months.    Cardio was consulted for hypotension, likely neurogenic and possibly infectious etiology. 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 Pulm consulted for incidental finding of apical paraseptal emphysema on CT, no interventions recommended. Neurosurgery consulted for stenosis and questionable 2mm outpouching at L supraclinoid ICA, possible aneurysm vs infundibulum. MRA NOVA showed R MCA stenosis vs occlusion. S/P diagnostic angiography 8/25/20. S/P cerebral angiography with stenting 8/26/20. Patient weaned off Midodrine 8/28/20.    Patient was stable for discharge to [facility] 9/2/20 for multidisciplinary acute rehab for functional deficits and gait/ADLs deficits. Patient speaks some English and Cantonese. Patient seen and examined at bedside with [employer] phone  #[phone]. (02 Sep 2020 14:53)      PAST MEDICAL & SURGICAL HISTORY:  HTN (hypertension)      MEDICATIONS  (STANDING):  artificial  tears Solution 1 Drop(s) Both EYES daily  aspirin 325 milliGRAM(s) Oral daily  atorvastatin 40 milliGRAM(s) Oral at bedtime  clopidogrel Tablet 75 milliGRAM(s) Oral daily  enoxaparin Injectable 40 milliGRAM(s) SubCutaneous at bedtime  influenza   Vaccine 0.5 milliLiter(s) IntraMuscular once  lidocaine   Patch 1 Patch Transdermal daily  polyethylene glycol 3350 17 Gram(s) Oral daily  senna 2 Tablet(s) Oral at bedtime    MEDICATIONS  (PRN):  acetaminophen   Tablet .. 650 milliGRAM(s) Oral every 6 hours PRN Temp greater or equal to 38C (100.4F), Mild Pain (1 - 3), Moderate Pain (4 - 6), Severe Pain (7 - 10)  artificial  tears Solution 1 Drop(s) Both EYES every 1 hour PRN Dry Eyes  bisacodyl Suppository 10 milliGRAM(s) Rectal daily PRN Constipation      Allergies    apple (Vomiting; Nausea)  No Known Drug Allergies    Intolerances          VITALS  75y  Vital Signs Last 24 Hrs  T(C): 37 (08 Sep 2020 08:26), Max: 37 (08 Sep 2020 08:26)  T(F): 98.6 (08 Sep 2020 08:26), Max: 98.6 (08 Sep 2020 08:26)  HR: 60 (08 Sep 2020 08:26) (55 - 60)  BP: 135/73 (08 Sep 2020 08:26) (131/62 - 135/73)  BP(mean): --  RR: 16 (08 Sep 2020 08:26) (16 - 16)  SpO2: 97% (08 Sep 2020 08:26) (97% - 97%)  Daily     Daily         RECENT LABS:                          13.9   6.30  )-----------( 288      ( 07 Sep 2020 06:55 )             42.1     09-07    140  |  105  |  20  ----------------------------<  98  4.0   |  27  |  0.71    Ca    8.7      07 Sep 2020 06:55    TPro  7.4  /  Alb  3.3  /  TBili  0.4  /  DBili  x   /  AST  84<H>  /  ALT  152<H>  /  AlkPhos  114  09-07    LIVER FUNCTIONS - ( 07 Sep 2020 06:55 )  Alb: 3.3 g/dL / Pro: 7.4 g/dL / ALK PHOS: 114 U/L / ALT: 152 U/L / AST: 84 U/L / GGT: x                   CAPILLARY BLOOD GLUCOSE

## 2020-09-08 NOTE — PROGRESS NOTE ADULT - ASSESSMENT
RAFY KIRKPATRICK is a 75y with a history of HTN who presented to [facility] on 8/15/20 with headache and confusion and found to have severe stenosis of R M1 seen on initial CTA head and 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 Hospital course complicated by UTI and hypotension, both now resolved. Now admitted to [facility] after for initiation of a multidisciplinary rehab program consisting focused on functional mobility, transfers and ADLs (activities of daily living).    Right-sided MCA Stroke, S/P R MCA stenting with residual deficit  - L sided weakness, dysarthria, dysphagia  - Comprehensive rehab program per primary team  - Patient recommended yearly CTA head to monitor L supraclinoid ICA infundibulum vs small aneurysm  - continue ASA, plavix, lipitor reduced to 40mg due to transaminitis   - . goal < 70  - F/u neurosurgery, neurology    Dysphagia  - improving  - passed speech and swallow eval, diet advanced to regular diet    HTN   - Currently normotensive  - Monitor off of medications    HLD  - LDL goal of 70  - monitor LFT closely.  today  - repeat LFT tomorrow am, if ALT continues to be near 3 times upper limit of normal, reduce lipitor to 20mg or change to Pravastatin    Transaminitis  - CMP 9/07  - LFTs wnl August 14 2020  - med list reviewed, likely related to high dose statin  - ALT worsening. cont lipitor 40mg today, if continues to worsen, reduce Lipitor dose further or change to Pravastatin       Constipation  - senna, dulcolax, miralax prn      DVT ppx: Lovenox, BLE Doppler 8/28/20 neg for DVT    CBC/ CMP 9/07 reviewed

## 2020-09-08 NOTE — PROGRESS NOTE ADULT - ASSESSMENT
RAFY KIRKPATRICK is a 75y with a history of HTN who presented to [facility] on 8/15/20 with headache and confusion and found to have severe stenosis of R M1 seen on initial CTA head and 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 Hospital course complicated by UTI and hypotension, both now resolved. Now admitted to [facility] after for initiation of a multidisciplinary rehab program consisting focused on functional mobility, transfers and ADLs (activities of daily living).    #Right-sided MCA Stroke, S/P R MCA stenting with L sided weakness, dysarthria, dysphagia, left visual field cut, incoordination  - Yearly CTA head to monitor L supraclinoid ICA infundibulum vs small aneurysm  - continue ASA, plavix, lipitor  - continue comprehensive rehab program, 3 hours a day, 5 days a week, PT, OT, SLP  - Precautions: falls, cardiac, aspiration, left dilia-inattention, visual deficits  - F/u neurosurgery, neurology on dc    #left shoulder subluxation  -OT evaluation for kinesiotaping. Consider FES  -lap board/pillows for support. Patient educated on proper positioning  -increase left sided awareness  -lidoderm patch for pain    #HTN -  - Currently normotensive  - Monitor off of medications due to h/o hypotension    #HLD  - continue lipitor 80    #Dysphagia  - upgraded to regular solids and thin liquids post MBS 9/8  -continue enlive daily supplement    #GI/Bowel:  - At risk for constipation due to neurologic diagnosis, immobility and/or medication use  - Senna QHS, Miralax Daily  - dulcolax suppository PRN      #DVT PPX  - Lovenox, SCDs  - Last Doppler on 8/28/20    #Case discussed in IDT rounds 9/8:  -set up/supervision eating, min assist UE dresssing/mod assist LE dressing and bathing; transfers with CG-min assist, ambulates 80 feet with min assist, 8 step with HR and CG, +left visual field cut and reduced cognition  -will likely require supervision on dc due to visual deficits and cognitive impairment  -caregiver training  -psychology evaluation  -target: dc home 9/17 with [facility] home care, Pt and OT  -will need medical and neurological follow up in community    #Labs:  CBC BMP 9/10  --------------    Outpatient Follow-up (Specialty/Name of physician):  Pierre Singleton)  Ophthalmology  [address]  [place] [address]  Phone: [phone]  Fax: [phone]  Follow Up Time: 2 weeks    Otf Levi  Neurological Surgery  [address] [place] [address]  Phone: [phone]  Fax: [phone]    [facility]  Cardiology  [address] [place] [address]  Phone: [phone]  Fax:   Follow Up Time: 2 weeks    [facility]  Neurology  [address] [facility] [address] [place] [address]  Phone: [phone]  Fax:   Follow Up Time: 2 weeks    [facility] [place]  Internal Medicine  [address] [place] [address]  Phone: [phone]  Fax: [phone]  Follow Up Time: 2 weeks    -------------- RAFY KIRKPATRICK is a 75y with a history of HTN who presented to [facility] on 8/15/20 with headache and confusion and found to have severe stenosis of R M1 seen on initial CTA head and 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 Hospital course complicated by UTI and hypotension, both now resolved. Now admitted to [facility] after for initiation of a multidisciplinary rehab program consisting focused on functional mobility, transfers and ADLs (activities of daily living).    #Right-sided MCA Stroke, S/P R MCA stenting with L sided weakness, dysarthria, dysphagia, left visual field cut, incoordination  - Yearly CTA head to monitor L supraclinoid ICA infundibulum vs small aneurysm  - continue ASA, plavix, lipitor  - continue comprehensive rehab program, 3 hours a day, 5 days a week, PT, OT, SLP  - Precautions: falls, cardiac, aspiration, left dilia-inattention, visual deficits  - F/u neurosurgery, neurology on dc    #left shoulder subluxation  -OT evaluation for kinesiotaping. Consider FES  -lap board/pillows for support. Patient educated on proper positioning  -increase left sided awareness  -lidoderm patch for pain    #HTN -  - Currently normotensive  - Monitor off of medications due to h/o hypotension    #HLD  - continue lipitor 80    #Dysphagia  - upgraded to regular solids and thin liquids post MBS 9/8  -continue enlive daily supplement    #GI/Bowel:  - At risk for constipation due to neurologic diagnosis, immobility and/or medication use  - Senna QHS, Miralax Daily  - dulcolax suppository PRN      #DVT PPX  - Lovenox, SCDs  - Last Doppler on 8/28/20    #Case discussed in IDT rounds 9/8:  -set up/supervision eating, min assist UE dresssing/mod assist LE dressing and bathing; transfers with CG-min assist, ambulates 80 feet with min assist, 8 step with HR and CG, +left visual field cut and reduced cognition  -will likely require supervision on dc due to visual deficits and cognitive impairment  -caregiver training  -psychology evaluation  -target: dc home 9/17 with [facility] home care, Pt and OT  -will need medical and neurological follow up in community    #Labs:  labs 9/7 reviewed  CBC BMP 9/10  psychology evaluation  --------------    Outpatient Follow-up (Specialty/Name of physician):  Pierre Singleton)  Ophthalmology  [address] [place] [address], [address]  [place] [address]  Phone: [phone]  Fax: [phone]  Follow Up Time: 2 weeks    Otf Levi  Neurological Surgery  [address] [place] [address]  Phone: [phone]  Fax: [phone]    [facility]  Cardiology  [address] [place] [address]  Phone: [phone]  Fax:   Follow Up Time: 2 weeks    [facility]  Neurology  [address] [facility] [address] [place] [address]  Phone: [phone]  Fax:   Follow Up Time: 2 weeks    [facility] [place]  Internal Medicine  [address] [place] [address]  Phone: [phone]  Fax: [phone]  Follow Up Time: 2 weeks    --------------

## 2020-09-08 NOTE — PROGRESS NOTE ADULT - SUBJECTIVE AND OBJECTIVE BOX
Patient is a 75y old  Male who presents with a chief complaint of R MCA with left sided weakness  01.1 Left Body Involvement (Right Brain) (08 Sep 2020 14:40)      Patient seen and examined at bedside. pt has no complaints.    ALLERGIES:  apple (Vomiting; Nausea)  No Known Drug Allergies    MEDICATIONS  (STANDING):  artificial  tears Solution 1 Drop(s) Both EYES daily  aspirin 325 milliGRAM(s) Oral daily  atorvastatin 40 milliGRAM(s) Oral at bedtime  clopidogrel Tablet 75 milliGRAM(s) Oral daily  enoxaparin Injectable 40 milliGRAM(s) SubCutaneous at bedtime  influenza   Vaccine 0.5 milliLiter(s) IntraMuscular once  lidocaine   Patch 1 Patch Transdermal daily  polyethylene glycol 3350 17 Gram(s) Oral daily  senna 2 Tablet(s) Oral at bedtime    MEDICATIONS  (PRN):  acetaminophen   Tablet .. 650 milliGRAM(s) Oral every 6 hours PRN Temp greater or equal to 38C (100.4F), Mild Pain (1 - 3), Moderate Pain (4 - 6), Severe Pain (7 - 10)  artificial  tears Solution 1 Drop(s) Both EYES every 1 hour PRN Dry Eyes  bisacodyl Suppository 10 milliGRAM(s) Rectal daily PRN Constipation    Vital Signs Last 24 Hrs  T(F): 98.6 (08 Sep 2020 08:26), Max: 98.6 (08 Sep 2020 08:26)  HR: 60 (08 Sep 2020 08:26) (55 - 60)  BP: 135/73 (08 Sep 2020 08:26) (131/62 - 135/73)  RR: 16 (08 Sep 2020 08:26) (16 - 16)  SpO2: 97% (08 Sep 2020 08:26) (97% - 97%)  I&O's Summary    07 Sep 2020 07:01  -  08 Sep 2020 07:00  --------------------------------------------------------  IN: 420 mL / OUT: 1100 mL / NET: -680 mL    08 Sep 2020 07:01  -  08 Sep 2020 15:15  --------------------------------------------------------  IN: 120 mL / OUT: 250 mL / NET: -130 mL      PHYSICAL EXAM:  General: NAD, A/O x 3  ENT: MMM  Neck: Supple, No JVD  Lungs: Clear to auscultation bilaterally  Cardio: RRR, S1/S2, No murmurs  Abdomen: Soft, Nontender, Nondistended; Bowel sounds present  Extremities: No calf tenderness, No pitting edema    LABS:                        13.9   6.30  )-----------( 288      ( 07 Sep 2020 06:55 )             42.1     09-07    140  |  105  |  20  ----------------------------<  98  4.0   |  27  |  0.71    Ca    8.7      07 Sep 2020 06:55    TPro  7.4  /  Alb  3.3  /  TBili  0.4  /  DBili  x   /  AST  84  /  ALT  152  /  AlkPhos  114  09-07    eGFR if Non African American: 91 mL/min/1.73M2 (09-07-20 @ 06:55)  eGFR if African American: 106 mL/min/1.73M2 (09-07-20 @ 06:55)        08-15 Chol 220 mg/dL  mg/dL HDL 44 mg/dL Trig 98 mg/dL          RADIOLOGY & ADDITIONAL TESTS:  < from: MR Head No Cont (08.27.20 @ 21:29) >    IMPRESSION: Significant improvement in flow in the right middle cerebral artery and distal branches after stenting a high-grade stenosis compared with 8/25/2020. No new infarcts, less diffusion restriction.        < end of copied text >      Care Discussed with Consultants/Other Providers: discussed with rehab team

## 2020-09-09 LAB
ALBUMIN SERPL ELPH-MCNC: 3.5 G/DL — SIGNIFICANT CHANGE UP (ref 3.3–5)
ALP SERPL-CCNC: 109 U/L — SIGNIFICANT CHANGE UP (ref 40–120)
ALT FLD-CCNC: 120 U/L — HIGH (ref 10–45)
ANION GAP SERPL CALC-SCNC: 9 MMOL/L — SIGNIFICANT CHANGE UP (ref 5–17)
AST SERPL-CCNC: 50 U/L — HIGH (ref 10–40)
BILIRUB SERPL-MCNC: 0.4 MG/DL — SIGNIFICANT CHANGE UP (ref 0.2–1.2)
BUN SERPL-MCNC: 16 MG/DL — SIGNIFICANT CHANGE UP (ref 7–23)
CALCIUM SERPL-MCNC: 9.2 MG/DL — SIGNIFICANT CHANGE UP (ref 8.4–10.5)
CHLORIDE SERPL-SCNC: 105 MMOL/L — SIGNIFICANT CHANGE UP (ref 96–108)
CO2 SERPL-SCNC: 27 MMOL/L — SIGNIFICANT CHANGE UP (ref 22–31)
CREAT SERPL-MCNC: 0.82 MG/DL — SIGNIFICANT CHANGE UP (ref 0.5–1.3)
GLUCOSE SERPL-MCNC: 108 MG/DL — HIGH (ref 70–99)
POTASSIUM SERPL-MCNC: 3.7 MMOL/L — SIGNIFICANT CHANGE UP (ref 3.5–5.3)
POTASSIUM SERPL-SCNC: 3.7 MMOL/L — SIGNIFICANT CHANGE UP (ref 3.5–5.3)
PROT SERPL-MCNC: 7.7 G/DL — SIGNIFICANT CHANGE UP (ref 6–8.3)
SODIUM SERPL-SCNC: 141 MMOL/L — SIGNIFICANT CHANGE UP (ref 135–145)

## 2020-09-09 PROCEDURE — 99232 SBSQ HOSP IP/OBS MODERATE 35: CPT

## 2020-09-09 PROCEDURE — 96116 NUBHVL XM PHYS/QHP 1ST HR: CPT

## 2020-09-09 RX ADMIN — ATORVASTATIN CALCIUM 40 MILLIGRAM(S): 80 TABLET, FILM COATED ORAL at 21:12

## 2020-09-09 RX ADMIN — ENOXAPARIN SODIUM 40 MILLIGRAM(S): 100 INJECTION SUBCUTANEOUS at 21:12

## 2020-09-09 RX ADMIN — CLOPIDOGREL BISULFATE 75 MILLIGRAM(S): 75 TABLET, FILM COATED ORAL at 11:53

## 2020-09-09 RX ADMIN — LIDOCAINE 1 PATCH: 4 CREAM TOPICAL at 18:18

## 2020-09-09 RX ADMIN — Medication 1 DROP(S): at 11:55

## 2020-09-09 RX ADMIN — SENNA PLUS 2 TABLET(S): 8.6 TABLET ORAL at 21:12

## 2020-09-09 RX ADMIN — Medication 325 MILLIGRAM(S): at 11:53

## 2020-09-09 RX ADMIN — LIDOCAINE 1 PATCH: 4 CREAM TOPICAL at 11:53

## 2020-09-09 RX ADMIN — LIDOCAINE 1 PATCH: 4 CREAM TOPICAL at 23:11

## 2020-09-09 NOTE — PROGRESS NOTE ADULT - SUBJECTIVE AND OBJECTIVE BOX
Patient is a 75y old  Male who presents with a chief complaint of R MCA with left sided weakness  01.1 Left Body Involvement (Right Brain) (08 Sep 2020 15:12)      Patient seen and examined at bedside. chronic right shoulder pain controlled by lidocaine patch, no other complaints. no acute event overnight.    ALLERGIES:  apple (Vomiting; Nausea)  No Known Drug Allergies    MEDICATIONS  (STANDING):  artificial  tears Solution 1 Drop(s) Both EYES daily  aspirin 325 milliGRAM(s) Oral daily  atorvastatin 40 milliGRAM(s) Oral at bedtime  clopidogrel Tablet 75 milliGRAM(s) Oral daily  enoxaparin Injectable 40 milliGRAM(s) SubCutaneous at bedtime  influenza   Vaccine 0.5 milliLiter(s) IntraMuscular once  lidocaine   Patch 1 Patch Transdermal daily  polyethylene glycol 3350 17 Gram(s) Oral daily  senna 2 Tablet(s) Oral at bedtime    MEDICATIONS  (PRN):  acetaminophen   Tablet .. 650 milliGRAM(s) Oral every 6 hours PRN Temp greater or equal to 38C (100.4F), Mild Pain (1 - 3), Moderate Pain (4 - 6), Severe Pain (7 - 10)  artificial  tears Solution 1 Drop(s) Both EYES every 1 hour PRN Dry Eyes  bisacodyl Suppository 10 milliGRAM(s) Rectal daily PRN Constipation    Vital Signs Last 24 Hrs  T(F): 98 (09 Sep 2020 09:44), Max: 98 (08 Sep 2020 21:52)  HR: 60 (09 Sep 2020 09:44) (56 - 60)  BP: 132/70 (09 Sep 2020 09:44) (129/66 - 132/70)  RR: 16 (09 Sep 2020 09:44) (15 - 16)  SpO2: 98% (09 Sep 2020 09:44) (97% - 98%)  I&O's Summary    08 Sep 2020 07:01  -  09 Sep 2020 07:00  --------------------------------------------------------  IN: 120 mL / OUT: 250 mL / NET: -130 mL      PHYSICAL EXAM:  General: NAD, A/O x 3  ENT: MMM  Neck: Supple, No JVD  Lungs: Clear to auscultation bilaterally  Cardio: RRR, S1/S2, No murmurs  Abdomen: Soft, Nontender, Nondistended; Bowel sounds present  Extremities: No calf tenderness, No pitting edema  neuro: left upper arm 3/5. otherwise, 5/5     LABS:                        13.9   6.30  )-----------( 288      ( 07 Sep 2020 06:55 )             42.1     09-09    141  |  105  |  16  ----------------------------<  108  3.7   |  27  |  0.82    Ca    9.2      09 Sep 2020 08:25    TPro  7.7  /  Alb  3.5  /  TBili  0.4  /  DBili  x   /  AST  50  /  ALT  120  /  AlkPhos  109  09-09    eGFR if Non African American: 86 mL/min/1.73M2 (09-09-20 @ 08:25)  eGFR if : 99 mL/min/1.73M2 (09-09-20 @ 08:25)            08-15 Chol 220 mg/dL  mg/dL HDL 44 mg/dL Trig 98 mg/dL          RADIOLOGY & ADDITIONAL TESTS:     < from: MR Head No Cont (08.27.20 @ 21:29) >  IMPRESSION: Significant improvement in flow in the right middle cerebral artery and distal branches after stenting a high-grade stenosis compared with 8/25/2020. No new infarcts, less diffusion restriction.        < end of copied text >    Care Discussed with Consultants/Other Providers: discussed with rehab team

## 2020-09-09 NOTE — PROGRESS NOTE ADULT - ASSESSMENT
RAFY KIRKPATRICK is a 75y with a history of HTN who presented to [facility] on 8/15/20 with headache and confusion and found to have severe stenosis of R M1 seen on initial CTA head and 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 Hospital course complicated by UTI and hypotension, both now resolved. Now admitted to [facility] after for initiation of a multidisciplinary rehab program consisting focused on functional mobility, transfers and ADLs (activities of daily living).    Right-sided MCA Stroke, S/P R MCA stenting with residual deficit  - L sided weakness, dysarthria, dysphagia  - Comprehensive rehab program per primary team  - Patient recommended yearly CTA head to monitor L supraclinoid ICA infundibulum vs small aneurysm  - continue ASA, plavix, lipitor reduced to 40mg due to transaminitis   - . goal < 70  - F/u neurosurgery, neurology    Dysphagia  - improving  - passed speech and swallow eval, diet advanced to regular diet    HTN   - Currently normotensive  - Monitor off of medications    HLD  - LDL goal of 70  - monitor LFT closely. ALT improved to 120 9/9. continue with lipitor 40mg =    Transaminitis   - CMP 9/07  - LFTs wnl August 14 2020  - med list reviewed, likely related to high dose statin  - ALT starting to downtrend.  today. cont lipitor 40mg.    Constipation  - senna, dulcolax, miralax prn      DVT ppx: Lovenox, BLE Doppler 8/28/20 neg for DVT    CBC/ CMP 9/09 reviewed

## 2020-09-09 NOTE — PROGRESS NOTE ADULT - ASSESSMENT
RAFY KIRKPATRICK is a 75y with a history of HTN who presented to [facility] on 8/15/20 with headache and confusion and found to have severe stenosis of R M1 seen on initial CTA head and 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 Hospital course complicated by UTI and hypotension, both now resolved. Now admitted to [facility] after for initiation of a multidisciplinary rehab program consisting focused on functional mobility, transfers and ADLs (activities of daily living).    #Right-sided MCA Stroke, S/P R MCA stenting with L sided weakness, dysarthria, dysphagia, left visual field cut, incoordination  - Yearly CTA head to monitor L supraclinoid ICA infundibulum vs small aneurysm  - continue ASA, plavix, lipitor  - continue comprehensive rehab program, 3 hours a day, 5 days a week, PT, OT, SLP. SLP to also focus on safety assessments and training as will influence amount of supervision required on dc to community  - Precautions: falls, cardiac, aspiration, left dilia-inattention, visual deficits  - F/u neurosurgery, neurology on dc    #left shoulder subluxation  -OT evaluation for kinesiotaping. Consider FES  -lap board/pillows for support. Patient educated on proper positioning  -increase left sided awareness  -lidoderm patch for pain    #HTN -  - Currently normotensive  - Monitor off of medications due to h/o hypotension    #HLD  - continue lipitor 80    #Dysphagia  - upgraded to regular solids and thin liquids post MBS 9/8  -continue enlive daily supplement    #GI/Bowel:  - At risk for constipation due to neurologic diagnosis, immobility and/or medication use  - Senna QHS, Miralax Daily  - dulcolax suppository PRN    #DVT PPX  - Lovenox, SCDs  - Last Doppler on 8/28/20    #Case discussed in IDT rounds 9/8:  -set up/supervision eating, min assist UE dresssing/mod assist LE dressing and bathing; transfers with CG-min assist, ambulates 80 feet with min assist, 8 step with HR and CG, +left visual field cut and reduced cognition  -will likely require supervision on dc due to visual deficits and cognitive impairment. Frequency and intensity of supervision to be assessed depending on improvement of cognition  -caregiver training  -psychology evaluation  -target: dc home 9/17 with [facility] home care, Pt and OT  -will need medical and neurological follow up in community    #Labs:    CBC BMP 9/10  psychology evaluation  --------------    Outpatient Follow-up (Specialty/Name of physician):  Pierre Singleton)  Ophthalmology  [address] [place] [address], [address]  [place] [address]  Phone: [phone]  Fax: [phone]  Follow Up Time: 2 weeks    Otf Levi  Neurological Surgery  [address] [place] [address]  Phone: [phone]  Fax: [phone]    [facility]  Cardiology  [address] [place] [address]  Phone: [phone]  Fax:   Follow Up Time: 2 weeks    [facility]  Neurology  [address] [facility] [address] [place] [address]  Phone: [phone]  Fax:   Follow Up Time: 2 weeks    [facility] [place]  Internal Medicine  [address] [place] [address]  Phone: [phone]  Fax: [phone]  Follow Up Time: 2 weeks    --------------

## 2020-09-09 NOTE — PROGRESS NOTE ADULT - SUBJECTIVE AND OBJECTIVE BOX
Patient is a 75y old  Male who presents with a chief complaint of R MCA with left sided weakness  01.1 Left Body Involvement (Right Brain) (09 Sep 2020 09:54)      HPI:  Patient is a 75 y.o. RH -dominant man with PMHx HTN not on antithrombotics, presented to [facility] ED 8/15/20 with 1 day history of headache and altered mental status. Patient suddenly became confused 5PM day before admission and c/o new onset nonradiating headache at the top/middle of hairline and some weakness in right lower extremity. Per patient's friend, his speech was "off" and face "did not look normal"; patient initially refused to go to ED at that time but eventually sent due to persistent headache and lightheadedness. Neuro exam showed mild dysarthria and mild left nasolabial flattening with left arm weakness.     Hospital course was significant for severe stenosis of R M1 seen on initial CTA head and acute infarct in the R MCA territory on subsequent MRI brain 20. Neuro consulted, felt infarct most likely as a result of large artery atherosclerosis, although an embolus with partial lysis could not be ruled out. Patient was started on Midodrine 10mg TID and IV fluids due to hypotension after admission to maintain //100. +UA but negative UCx and was treated with Ceftriaxone (-). TTE showed EF 73%, normal LA, HbA1c 5.8, . Patient was started on ASA81 and Plavix 75mg daily, Plavix to be discontinued after 3 months.    Cardio was consulted for hypotension, likely neurogenic and possibly infectious etiology. 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 Pulm consulted for incidental finding of apical paraseptal emphysema on CT, no interventions recommended. Neurosurgery consulted for stenosis and questionable 2mm outpouching at L supraclinoid ICA, possible aneurysm vs infundibulum. MRA NOVA showed R MCA stenosis vs occlusion. S/P diagnostic angiography 20. S/P cerebral angiography with stenting 20. Patient weaned off Midodrine 20.    Patient was stable for discharge to [facility] 20 for multidisciplinary acute rehab for functional deficits and gait/ADLs deficits. Patient speaks some English and Cantonese. Patient seen and examined at bedside with [employer] phone  #[phone]. (02 Sep 2020 14:53)      PAST MEDICAL & SURGICAL HISTORY:  HTN (hypertension)      MEDICATIONS  (STANDING):  artificial  tears Solution 1 Drop(s) Both EYES daily  aspirin 325 milliGRAM(s) Oral daily  atorvastatin 40 milliGRAM(s) Oral at bedtime  clopidogrel Tablet 75 milliGRAM(s) Oral daily  enoxaparin Injectable 40 milliGRAM(s) SubCutaneous at bedtime  influenza   Vaccine 0.5 milliLiter(s) IntraMuscular once  lidocaine   Patch 1 Patch Transdermal daily  polyethylene glycol 3350 17 Gram(s) Oral daily  senna 2 Tablet(s) Oral at bedtime    MEDICATIONS  (PRN):  acetaminophen   Tablet .. 650 milliGRAM(s) Oral every 6 hours PRN Temp greater or equal to 38C (100.4F), Mild Pain (1 - 3), Moderate Pain (4 - 6), Severe Pain (7 - 10)  artificial  tears Solution 1 Drop(s) Both EYES every 1 hour PRN Dry Eyes  bisacodyl Suppository 10 milliGRAM(s) Rectal daily PRN Constipation      Allergies    apple (Vomiting; Nausea)  No Known Drug Allergies    Intolerances          VITALS  75y  Vital Signs Last 24 Hrs  T(C): 36.7 (09 Sep 2020 09:44), Max: 36.7 (08 Sep 2020 21:52)  T(F): 98 (09 Sep 2020 09:44), Max: 98 (08 Sep 2020 21:52)  HR: 60 (09 Sep 2020 09:44) (56 - 60)  BP: 132/70 (09 Sep 2020 09:44) (129/66 - 132/70)  BP(mean): --  RR: 16 (09 Sep 2020 09:44) (15 - 16)  SpO2: 98% (09 Sep 2020 09:44) (97% - 98%)  Daily     Daily Weight in k.2 (08 Sep 2020 23:00)        RECENT LABS:          141  |  105  |  16  ----------------------------<  108<H>  3.7   |  27  |  0.82    Ca    9.2      09 Sep 2020 08:25    TPro  7.7  /  Alb  3.5  /  TBili  0.4  /  DBili  x   /  AST  50<H>  /  ALT  120<H>  /  AlkPhos  109      LIVER FUNCTIONS - ( 09 Sep 2020 08:25 )  Alb: 3.5 g/dL / Pro: 7.7 g/dL / ALK PHOS: 109 U/L / ALT: 120 U/L / AST: 50 U/L / GGT: x                   CAPILLARY BLOOD GLUCOSE

## 2020-09-09 NOTE — CONSULT NOTE ADULT - ASSESSMENT
Assessment:          FIM scores: Social Interaction ; Problem Solving ; Memory .     Plan: Individual supportive psychotherapy to monitor cognition, affect/mood, and behavior. Cognitive remediation during speech therapy sessions. Participation in recreation/art therapy in order to have pleasure and mastery experiences and regain/reestablish a sense of routine. 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 He exhibited difficulties with visuospatial skills and to a much lesser extent, working memory for calculations, and problem solving. His affect is depressive and constricted, and he reported a few adjustment symptoms including sad mood, anhedonia, feelings of boredom and isolation, low self-esteem, catastrophization, and low energy. FIM scores: Social Interaction 5; Problem Solving 5; Memory 6.      Plan: Individual supportive psychotherapy to monitor cognition, affect/mood, and behavior. Pt might benefit from antidepressant medication to address mood and motor recovery issues; he expressed no interest in medication at this time. Cognitive remediation during speech therapy sessions. Participation in recreation/art therapy in order to have pleasure and mastery experiences and regain/reestablish a sense of routine.

## 2020-09-10 LAB
ALBUMIN SERPL ELPH-MCNC: 3.2 G/DL — LOW (ref 3.3–5)
ALP SERPL-CCNC: 99 U/L — SIGNIFICANT CHANGE UP (ref 40–120)
ALT FLD-CCNC: 102 U/L — HIGH (ref 10–45)
ANION GAP SERPL CALC-SCNC: 8 MMOL/L — SIGNIFICANT CHANGE UP (ref 5–17)
AST SERPL-CCNC: 40 U/L — SIGNIFICANT CHANGE UP (ref 10–40)
BILIRUB SERPL-MCNC: 0.4 MG/DL — SIGNIFICANT CHANGE UP (ref 0.2–1.2)
BUN SERPL-MCNC: 16 MG/DL — SIGNIFICANT CHANGE UP (ref 7–23)
CALCIUM SERPL-MCNC: 9.1 MG/DL — SIGNIFICANT CHANGE UP (ref 8.4–10.5)
CHLORIDE SERPL-SCNC: 106 MMOL/L — SIGNIFICANT CHANGE UP (ref 96–108)
CO2 SERPL-SCNC: 24 MMOL/L — SIGNIFICANT CHANGE UP (ref 22–31)
CREAT SERPL-MCNC: 0.73 MG/DL — SIGNIFICANT CHANGE UP (ref 0.5–1.3)
GLUCOSE SERPL-MCNC: 104 MG/DL — HIGH (ref 70–99)
HCT VFR BLD CALC: 40.8 % — SIGNIFICANT CHANGE UP (ref 39–50)
HGB BLD-MCNC: 13.3 G/DL — SIGNIFICANT CHANGE UP (ref 13–17)
MCHC RBC-ENTMCNC: 30 PG — SIGNIFICANT CHANGE UP (ref 27–34)
MCHC RBC-ENTMCNC: 32.6 GM/DL — SIGNIFICANT CHANGE UP (ref 32–36)
MCV RBC AUTO: 91.9 FL — SIGNIFICANT CHANGE UP (ref 80–100)
NRBC # BLD: 0 /100 WBCS — SIGNIFICANT CHANGE UP (ref 0–0)
PLATELET # BLD AUTO: 271 K/UL — SIGNIFICANT CHANGE UP (ref 150–400)
POTASSIUM SERPL-MCNC: 4.2 MMOL/L — SIGNIFICANT CHANGE UP (ref 3.5–5.3)
POTASSIUM SERPL-SCNC: 4.2 MMOL/L — SIGNIFICANT CHANGE UP (ref 3.5–5.3)
PROT SERPL-MCNC: 7.1 G/DL — SIGNIFICANT CHANGE UP (ref 6–8.3)
RBC # BLD: 4.44 M/UL — SIGNIFICANT CHANGE UP (ref 4.2–5.8)
RBC # FLD: 12.3 % — SIGNIFICANT CHANGE UP (ref 10.3–14.5)
SODIUM SERPL-SCNC: 138 MMOL/L — SIGNIFICANT CHANGE UP (ref 135–145)
WBC # BLD: 5.32 K/UL — SIGNIFICANT CHANGE UP (ref 3.8–10.5)
WBC # FLD AUTO: 5.32 K/UL — SIGNIFICANT CHANGE UP (ref 3.8–10.5)

## 2020-09-10 PROCEDURE — 99232 SBSQ HOSP IP/OBS MODERATE 35: CPT

## 2020-09-10 RX ADMIN — ATORVASTATIN CALCIUM 40 MILLIGRAM(S): 80 TABLET, FILM COATED ORAL at 21:35

## 2020-09-10 RX ADMIN — LIDOCAINE 1 PATCH: 4 CREAM TOPICAL at 18:43

## 2020-09-10 RX ADMIN — CLOPIDOGREL BISULFATE 75 MILLIGRAM(S): 75 TABLET, FILM COATED ORAL at 11:30

## 2020-09-10 RX ADMIN — LIDOCAINE 1 PATCH: 4 CREAM TOPICAL at 23:00

## 2020-09-10 RX ADMIN — Medication 325 MILLIGRAM(S): at 11:30

## 2020-09-10 RX ADMIN — Medication 1 DROP(S): at 20:28

## 2020-09-10 RX ADMIN — ENOXAPARIN SODIUM 40 MILLIGRAM(S): 100 INJECTION SUBCUTANEOUS at 21:35

## 2020-09-10 RX ADMIN — LIDOCAINE 1 PATCH: 4 CREAM TOPICAL at 11:29

## 2020-09-10 RX ADMIN — SENNA PLUS 2 TABLET(S): 8.6 TABLET ORAL at 21:35

## 2020-09-10 NOTE — PROGRESS NOTE ADULT - SUBJECTIVE AND OBJECTIVE BOX
Patient is a 75y old  Male who presents with a chief complaint of R MCA with left sided weakness  01.1 Left Body Involvement (Right Brain) (10 Sep 2020 09:20)      HPI:  Patient is a 75 y.o. RH -dominant man with PMHx HTN not on antithrombotics, presented to [facility] ED 8/15/20 with 1 day history of headache and altered mental status. Patient suddenly became confused 5PM day before admission and c/o new onset nonradiating headache at the top/middle of hairline and some weakness in right lower extremity. Per patient's friend, his speech was "off" and face "did not look normal"; patient initially refused to go to ED at that time but eventually sent due to persistent headache and lightheadedness. Neuro exam showed mild dysarthria and mild left nasolabial flattening with left arm weakness.     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 Neuro consulted, felt infarct most likely as a result of large artery atherosclerosis, although an embolus with partial lysis could not be ruled out. Patient was started on Midodrine 10mg TID and IV fluids due to hypotension after admission to maintain //100. +UA but negative UCx and was treated with Ceftriaxone (8/16-8/23). TTE showed EF 73%, normal LA, HbA1c 5.8, . Patient was started on ASA81 and Plavix 75mg daily, Plavix to be discontinued after 3 months.    Cardio was consulted for hypotension, likely neurogenic and possibly infectious etiology. 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 Pulm consulted for incidental finding of apical paraseptal emphysema on CT, no interventions recommended. Neurosurgery consulted for stenosis and questionable 2mm outpouching at L supraclinoid ICA, possible aneurysm vs infundibulum. MRA NOVA showed R MCA stenosis vs occlusion. S/P diagnostic angiography 8/25/20. S/P cerebral angiography with stenting 8/26/20. Patient weaned off Midodrine 8/28/20.    Patient was stable for discharge to [facility] 9/2/20 for multidisciplinary acute rehab for functional deficits and gait/ADLs deficits. Patient speaks some English and Cantonese. Patient seen and examined at bedside with [employer] phone  #[phone]. (02 Sep 2020 14:53)    SUBJECTIVE/INTERVAL EVENTS:  Patient seen and assessed at bedside. No acute overnight events. Patient comfortably sitting in chair this AM with left arm on table. Patient c/o discomfort at left shoulder but pain is improved with lidocaine patch. He feels left arm movement is improving. He is able to  small jar placed on left side of field of vision with his left hand and left fingers, and able to put it down as well.       PAST MEDICAL & SURGICAL HISTORY:  HTN (hypertension)      Allergies    apple (Vomiting; Nausea)  No Known Drug Allergies    Intolerances          VITALS  75y  Vital Signs Last 24 Hrs  T(C): 36.6 (10 Sep 2020 07:47), Max: 36.6 (10 Sep 2020 07:47)  T(F): 97.8 (10 Sep 2020 07:47), Max: 97.8 (10 Sep 2020 07:47)  HR: 60 (10 Sep 2020 07:47) (58 - 60)  BP: 130/72 (10 Sep 2020 07:47) (124/71 - 130/72)  BP(mean): --  RR: 16 (10 Sep 2020 07:47) (15 - 16)  SpO2: 97% (10 Sep 2020 07:47) (97% - 97%)  Daily       Physical Exam:   · Constitutional	detailed exam	  · Constitutional Comments	alert, pleasant, follows simple commands NAD	  · Respiratory	detailed exam	  · Respiratory Details	respirations non-labored; clear to auscultation bilaterally	  · Cardiovascular	detailed exam	  · Cardiovascular Details	regular rate and rhythm	  · Gastrointestinal	detailed exam	  · GI Normal	soft; nontender; bowel sounds normal	  · Extremities	detailed exam	  · Extremities Comments	left shoulder subluxation +apraxic movement left side +reduced coordination left, able to  jar with fingers today calves soft, NT no erythema or warmth	          RECENT LABS:                          13.3   5.32  )-----------( 271      ( 10 Sep 2020 07:25 )             40.8     09-10    138  |  106  |  16  ----------------------------<  104<H>  4.2   |  24  |  0.73    Ca    9.1      10 Sep 2020 07:25    TPro  7.1  /  Alb  3.2<L>  /  TBili  0.4  /  DBili  x   /  AST  40  /  ALT  102<H>  /  AlkPhos  99  09-10    LIVER FUNCTIONS - ( 10 Sep 2020 07:25 )  Alb: 3.2 g/dL / Pro: 7.1 g/dL / ALK PHOS: 99 U/L / ALT: 102 U/L / AST: 40 U/L / GGT: x                   CAPILLARY BLOOD GLUCOSE          MEDICATIONS  (STANDING):  artificial  tears Solution 1 Drop(s) Both EYES daily  aspirin 325 milliGRAM(s) Oral daily  atorvastatin 40 milliGRAM(s) Oral at bedtime  clopidogrel Tablet 75 milliGRAM(s) Oral daily  enoxaparin Injectable 40 milliGRAM(s) SubCutaneous at bedtime  influenza   Vaccine 0.5 milliLiter(s) IntraMuscular once  lidocaine   Patch 1 Patch Transdermal daily  polyethylene glycol 3350 17 Gram(s) Oral daily  senna 2 Tablet(s) Oral at bedtime    MEDICATIONS  (PRN):  acetaminophen   Tablet .. 650 milliGRAM(s) Oral every 6 hours PRN Temp greater or equal to 38C (100.4F), Mild Pain (1 - 3), Moderate Pain (4 - 6), Severe Pain (7 - 10)  artificial  tears Solution 1 Drop(s) Both EYES every 1 hour PRN Dry Eyes  bisacodyl Suppository 10 milliGRAM(s) Rectal daily PRN Constipation Patient is a 75y old  Male who presents with a chief complaint of R MCA with left sided weakness  01.1 Left Body Involvement (Right Brain) (10 Sep 2020 09:20)      HPI:  Patient is a 75 y.o. RH -dominant man with PMHx HTN not on antithrombotics, presented to [facility] ED 8/15/20 with 1 day history of headache and altered mental status. Patient suddenly became confused 5PM day before admission and c/o new onset nonradiating headache at the top/middle of hairline and some weakness in right lower extremity. Per patient's friend, his speech was "off" and face "did not look normal"; patient initially refused to go to ED at that time but eventually sent due to persistent headache and lightheadedness. Neuro exam showed mild dysarthria and mild left nasolabial flattening with left arm weakness.     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 Neuro consulted, felt infarct most likely as a result of large artery atherosclerosis, although an embolus with partial lysis could not be ruled out. Patient was started on Midodrine 10mg TID and IV fluids due to hypotension after admission to maintain //100. +UA but negative UCx and was treated with Ceftriaxone (8/16-8/23). TTE showed EF 73%, normal LA, HbA1c 5.8, . Patient was started on ASA81 and Plavix 75mg daily, Plavix to be discontinued after 3 months.    Cardio was consulted for hypotension, likely neurogenic and possibly infectious etiology. 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 Pulm consulted for incidental finding of apical paraseptal emphysema on CT, no interventions recommended. Neurosurgery consulted for stenosis and questionable 2mm outpouching at L supraclinoid ICA, possible aneurysm vs infundibulum. MRA NOVA showed R MCA stenosis vs occlusion. S/P diagnostic angiography 8/25/20. S/P cerebral angiography with stenting 8/26/20. Patient weaned off Midodrine 8/28/20.    Patient was stable for discharge to [facility] 9/2/20 for multidisciplinary acute rehab for functional deficits and gait/ADLs deficits. Patient speaks some English and Cantonese. Patient seen and examined at bedside with "[employer] phone  #[phone]. (02 Sep 2020 14:53)    SUBJECTIVE/INTERVAL EVENTS:  Patient seen and assessed at bedside. No acute overnight events. Patient comfortably sitting in chair this AM with left arm on table. Patient c/o discomfort at left shoulder but pain is improved with lidocaine patch. He feels left arm movement is improving. He is able to  small jar placed on left side of field of vision with his left hand and left fingers, and able to put it down , improved coordination compared to yesterday    Denies any B/B complaints.Mood overall stable      PAST MEDICAL & SURGICAL HISTORY:  HTN (hypertension)      Allergies    apple (Vomiting; Nausea)  No Known Drug Allergies    Intolerances          VITALS  75y  Vital Signs Last 24 Hrs  T(C): 36.6 (10 Sep 2020 07:47), Max: 36.6 (10 Sep 2020 07:47)  T(F): 97.8 (10 Sep 2020 07:47), Max: 97.8 (10 Sep 2020 07:47)  HR: 60 (10 Sep 2020 07:47) (58 - 60)  BP: 130/72 (10 Sep 2020 07:47) (124/71 - 130/72)  BP(mean): --  RR: 16 (10 Sep 2020 07:47) (15 - 16)  SpO2: 97% (10 Sep 2020 07:47) (97% - 97%)  Daily       Physical Exam:   · Constitutional	detailed exam	  · Constitutional Comments	alert, pleasant, follows simple commands NAD +left dilia inattention	  · Respiratory	detailed exam	  · Respiratory Details	respirations non-labored; clear to auscultation bilaterally	  · Cardiovascular	detailed exam	  · Cardiovascular Details	regular rate and rhythm	  · Gastrointestinal	detailed exam	  · GI Normal	soft; nontender; bowel sounds normal	  · Extremities	detailed exam	  · Extremities Comments	left shoulder subluxation +apraxic movement left side +reduced coordination left, able to  jar with fingers today bilateral calves soft, NT no erythema or warmth  left UE using lapboard today 	          RECENT LABS:                          13.3   5.32  )-----------( 271      ( 10 Sep 2020 07:25 )             40.8     09-10    138  |  106  |  16  ----------------------------<  104<H>  4.2   |  24  |  0.73    Ca    9.1      10 Sep 2020 07:25    TPro  7.1  /  Alb  3.2<L>  /  TBili  0.4  /  DBili  x   /  AST  40  /  ALT  102<H>  /  AlkPhos  99  09-10    LIVER FUNCTIONS - ( 10 Sep 2020 07:25 )  Alb: 3.2 g/dL / Pro: 7.1 g/dL / ALK PHOS: 99 U/L / ALT: 102 U/L / AST: 40 U/L / GGT: x                   CAPILLARY BLOOD GLUCOSE          MEDICATIONS  (STANDING):  artificial  tears Solution 1 Drop(s) Both EYES daily  aspirin 325 milliGRAM(s) Oral daily  atorvastatin 40 milliGRAM(s) Oral at bedtime  clopidogrel Tablet 75 milliGRAM(s) Oral daily  enoxaparin Injectable 40 milliGRAM(s) SubCutaneous at bedtime  influenza   Vaccine 0.5 milliLiter(s) IntraMuscular once  lidocaine   Patch 1 Patch Transdermal daily  polyethylene glycol 3350 17 Gram(s) Oral daily  senna 2 Tablet(s) Oral at bedtime    MEDICATIONS  (PRN):  acetaminophen   Tablet .. 650 milliGRAM(s) Oral every 6 hours PRN Temp greater or equal to 38C (100.4F), Mild Pain (1 - 3), Moderate Pain (4 - 6), Severe Pain (7 - 10)  artificial  tears Solution 1 Drop(s) Both EYES every 1 hour PRN Dry Eyes  bisacodyl Suppository 10 milliGRAM(s) Rectal daily PRN Constipation

## 2020-09-10 NOTE — CHART NOTE - NSCHARTNOTEFT_GEN_A_CORE
NUTRITION FOLLOW UP    SOURCE: Patient [X)   Family [ ]    Medical Record (X)    DIET: Regular    Patient able to answer simple questions. Visited during lunch and said "yes" to getting enough to eat and liking the food. Receives an Ensure daily (350 calories/20g protein). Will add a Magic Cup daily as well as weights may be trending down. Meal averages ~75% with some assistance at times. Will continue to monitor.    PO INTAKE:  50-75%          CURRENT WEIGHT:  126# (9/8)  132# (9/5)  129# admission.     PERTINENT MEDS:   Pertinent Medications: MEDICATIONS  (STANDING):  artificial  tears Solution 1 Drop(s) Both EYES daily  aspirin 325 milliGRAM(s) Oral daily  atorvastatin 40 milliGRAM(s) Oral at bedtime  clopidogrel Tablet 75 milliGRAM(s) Oral daily  enoxaparin Injectable 40 milliGRAM(s) SubCutaneous at bedtime  influenza   Vaccine 0.5 milliLiter(s) IntraMuscular once  lidocaine   Patch 1 Patch Transdermal daily  polyethylene glycol 3350 17 Gram(s) Oral daily  senna 2 Tablet(s) Oral at bedtime    MEDICATIONS  (PRN):  acetaminophen   Tablet .. 650 milliGRAM(s) Oral every 6 hours PRN Temp greater or equal to 38C (100.4F), Mild Pain (1 - 3), Moderate Pain (4 - 6), Severe Pain (7 - 10)  artificial  tears Solution 1 Drop(s) Both EYES every 1 hour PRN Dry Eyes  bisacodyl Suppository 10 milliGRAM(s) Rectal daily PRN Constipation      PERTINENT LABS:  09-10 Na138 mmol/L Glu 104 mg/dL<H> K+ 4.2 mmol/L Cr  0.73 mg/dL BUN 16 mg/dL 09-10 Alb 3.2 g/dL<L> 08-15 Chol 220 mg/dL<H>  mg/dL<H> HDL 44 mg/dL Trig 98 mg/dL  08-15-20 @ 10:50 A1C 5.8      SKIN:  intact  EDEMA: none per RN documentation  LAST BM:  9/8    ESTIMATED NEEDS:   [X] no change since previous assessment  [ ] recalculated:     PREVIOUS NUTRITION DIAGNOSIS:  swallowing difficulty    NUTRITION DIAGNOSIS is : (x) Resolved,  RD will follow as per nutrition department protocol.    NEW NUTRITION DIAGNOSIS: Increased energy needs related to physical therapy demands     NUTRITION RECOMMENDATIONS:   1. Continue diet as ordered. Consistency per SLP recommendations.  2. Continue Ensure Enlive daily.   3. Recommend Magic Cup daily.    MONITORING AND EVALUATION:   1. Tolerance to diet prescription   2. PO intake  3. Weights  4. Labs  5. Follow Up per protocol     RD to remain available   Mally Francis RDN #[id number]

## 2020-09-10 NOTE — PROGRESS NOTE ADULT - SUBJECTIVE AND OBJECTIVE BOX
Patient is a 75y old  Male who presents with a chief complaint of R MCA with left sided weakness  01.1 Left Body Involvement (Right Brain) (09 Sep 2020 13:23)      Patient seen and examined at bedside, stable, NAD. denies acute complaints or overnight events. Endorses bowel, bladder, appetite are wnl. sleep is adequate.     ALLERGIES:  apple (Vomiting; Nausea)  No Known Drug Allergies    MEDICATIONS  (STANDING):  artificial  tears Solution 1 Drop(s) Both EYES daily  aspirin 325 milliGRAM(s) Oral daily  atorvastatin 40 milliGRAM(s) Oral at bedtime  clopidogrel Tablet 75 milliGRAM(s) Oral daily  enoxaparin Injectable 40 milliGRAM(s) SubCutaneous at bedtime  influenza   Vaccine 0.5 milliLiter(s) IntraMuscular once  lidocaine   Patch 1 Patch Transdermal daily  polyethylene glycol 3350 17 Gram(s) Oral daily  senna 2 Tablet(s) Oral at bedtime    MEDICATIONS  (PRN):  acetaminophen   Tablet .. 650 milliGRAM(s) Oral every 6 hours PRN Temp greater or equal to 38C (100.4F), Mild Pain (1 - 3), Moderate Pain (4 - 6), Severe Pain (7 - 10)  artificial  tears Solution 1 Drop(s) Both EYES every 1 hour PRN Dry Eyes  bisacodyl Suppository 10 milliGRAM(s) Rectal daily PRN Constipation    Vital Signs Last 24 Hrs  T(F): 97.8 (10 Sep 2020 07:47), Max: 98 (09 Sep 2020 09:44)  HR: 60 (10 Sep 2020 07:47) (58 - 60)  BP: 130/72 (10 Sep 2020 07:47) (124/71 - 132/70)  RR: 16 (10 Sep 2020 07:47) (15 - 16)  SpO2: 97% (10 Sep 2020 07:47) (97% - 98%)  I&O's Summary    09 Sep 2020 07:01  -  10 Sep 2020 07:00  --------------------------------------------------------  IN: 540 mL / OUT: 0 mL / NET: 540 mL        PHYSICAL EXAM:  General: NAD, A/O x 3  ENT: MMM  Neck: Supple, No JVD  Lungs: Clear to auscultation bilaterally  Cardio: RRR, S1/S2, No murmurs  Abdomen: Soft, Nontender, Nondistended; Bowel sounds present  Extremities: No calf tenderness, No pitting edema  neuro: left upper arm strength 3/5. otherwise, 5/5 RUE, LE b/l     LABS:                        13.3   5.32  )-----------( 271      ( 10 Sep 2020 07:25 )             40.8       09-10    138  |  106  |  16  ----------------------------<  104  4.2   |  24  |  0.73    Ca    9.1      10 Sep 2020 07:25    TPro  7.1  /  Alb  3.2  /  TBili  0.4  /  DBili  x   /  AST  40  /  ALT  102  /  AlkPhos  99  09-10     eGFR if : 104 mL/min/1.73M2 (09-10-20 @ 07:25)  eGFR if Non African American: 90 mL/min/1.73M2 (09-10-20 @ 07:25)    08-15 Chol 220 mg/dL  mg/dL HDL 44 mg/dL Trig 98 mg/dL    RADIOLOGY & ADDITIONAL TESTS:  < from: MR Head No Cont (08.27.20 @ 21:29) >  IMPRESSION: Significant improvement in flow in the right middle cerebral artery and distal branches after stenting a high-grade stenosis compared with 8/25/2020. No new infarcts, less diffusion restriction.    < end of copied text >      Care Discussed with Consultants/Other Providers: discussed with rehab team

## 2020-09-10 NOTE — PROGRESS NOTE ADULT - ASSESSMENT
74 y/o M with PMH HTN who presented to [facility] on 8/15/20 with headache and confusion, found to have severe stenosis of R M1 seen on initial CTA head and 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 Hospital course complicated by UTI and hypotension, both now resolved. Now admitted to [facility] 9/2/20 for initiation of a multidisciplinary rehab program consisting focused on functional mobility, transfers and ADLs (activities of daily living).    # Right-sided MCA Stroke, S/P R MCA stenting with residual deficit  - L sided weakness, dysarthria, dysphagia  - Comprehensive rehab program per primary team  - Patient recommended yearly CTA head to monitor L supraclinoid ICA infundibulum vs small aneurysm  - continue ASA, plavix, lipitor reduced to 40mg due to transaminitis   - . goal < 70  - F/u neurosurgery, neurology    #Dysphagia  - improving  - passed speech and swallow eval, diet advanced to regular diet    #HTN   - Currently normotensive  - Monitor off of medications    #HLD  - LDL goal of 70  - liptor decreased to 40mg due to transaminitis     #Transaminitis   - Labs reviewed, LFTs wnl August 14 2020  - Noted transaminitis after initiation of high dose statin, dose since decreased  -LFTs improving;  > 102, AST 50> 40  -cont lipitor 40mg and monitor LFTs    #Constipation  - senna, dulcolax, miralax prn    #DVT ppx: Lovenox, BLE Doppler 8/28/20 neg for DVT    CBC/ CMP 9/10 reviewed

## 2020-09-10 NOTE — PROGRESS NOTE ADULT - ASSESSMENT
RAFY KIRKPATRICK is a 75y with a history of HTN who presented to [facility] on 8/15/20 with headache and confusion and found to have severe stenosis of R M1 seen on initial CTA head and 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 Hospital course complicated by UTI and hypotension, both now resolved. Now admitted to [facility] after for initiation of a multidisciplinary rehab program consisting focused on functional mobility, transfers and ADLs (activities of daily living).    #Right-sided MCA Stroke, S/P R MCA stenting with L sided weakness, dysarthria, dysphagia, left visual field cut, incoordination  - Yearly CTA head to monitor L supraclinoid ICA infundibulum vs small aneurysm  - continue ASA, plavix, lipitor  - continue comprehensive rehab program, 3 hours a day, 5 days a week, PT, OT, SLP. SLP to also focus on safety assessments and training as will influence amount of supervision required on dc to community  - Precautions: falls, cardiac, aspiration, left dilia-inattention, visual deficits  - F/u neurosurgery, neurology on dc    #left shoulder subluxation  -OT evaluation for kinesiotaping. Consider FES  -lap board/pillows for support. Patient educated on proper positioning  -increase left sided awareness  -lidoderm patch for pain    #HTN -  - Currently normotensive  - Monitor off of medications due to h/o hypotension    #HLD  - continue lipitor 80    #Dysphagia  - upgraded to regular solids and thin liquids post MBS 9/8  -continue enlive daily supplement    #GI/Bowel:  - At risk for constipation due to neurologic diagnosis, immobility and/or medication use  - Senna QHS, Miralax Daily  - dulcolax suppository PRN    #DVT PPX  - Lovenox, SCDs  - Last Doppler on 8/28/20    #Case discussed in IDT rounds 9/8:  -set up/supervision eating, min assist UE dresssing/mod assist LE dressing and bathing; transfers with CG-min assist, ambulates 80 feet with min assist, 8 step with HR and CG, +left visual field cut and reduced cognition  -will likely require supervision on dc due to visual deficits and cognitive impairment. Frequency and intensity of supervision to be assessed depending on improvement of cognition  -caregiver training  -psychology evaluation  -target: dc home 9/17 with [facility] home care, Pt and OT  -will need medical and neurological follow up in community    #Labs:    CBC BMP 9/14  psychology evaluation  --------------    Outpatient Follow-up (Specialty/Name of physician):  Pierre Singleton)  Ophthalmology  [address] [place] [address], [address]  [place] [address]  Phone: [phone]  Fax: [phone]  Follow Up Time: 2 weeks    Otf Levi  Neurological Surgery  [address] [place] [address]  Phone: [phone]  Fax: [phone]    [facility]  Cardiology  [address] [place] [address]  Phone: [phone]  Fax:   Follow Up Time: 2 weeks    [facility]  Neurology  [address] [facility] [place] - [address] [place] [address]  Phone: [phone]  Fax:   Follow Up Time: 2 weeks    [facility] [place]  Internal Medicine  [address] [place] [address]  Phone: [phone]  Fax: [phone]  Follow Up Time: 2 weeks    -------------- RAFY KIRKPATRICK is a 75y with a history of HTN who presented to [facility] on 8/15/20 with headache and confusion and found to have severe stenosis of R M1 seen on initial CTA head and 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 Hospital course complicated by UTI and hypotension, both now resolved. Now admitted to [facility] after for initiation of a multidisciplinary rehab program consisting focused on functional mobility, transfers and ADLs (activities of daily living).    #Right-sided MCA Stroke, S/P R MCA stenting with L sided weakness, dysarthria, dysphagia, left visual field cut, incoordination  - Yearly CTA head to monitor L supraclinoid ICA infundibulum vs small aneurysm  - continue ASA, plavix, lipitor secondary PPX  - continue comprehensive rehab program, 3 hours a day, 5 days a week, PT, OT, SLP. SLP to also focus on safety assessments and training as will influence amount of supervision required on dc to community  - Precautions: falls, cardiac, aspiration, left dilia-inattention, visual deficits  - F/u neurosurgery, neurology on dc    #left shoulder subluxation  -OT evaluation for kinesiotaping. Consider FES  -lap board/pillows for support. Patient educated on proper positioning  -lidoderm patch for pain    #HTN -  - Currently normotensive  - Monitor off of medications due to h/o hypotension    #HLD  - continue lipitor 80    #Dysphagia  - tolerating regular solids and thin liquids   -continue enlive daily supplement    #GI/Bowel:  - Senna QHS, Miralax Daily  - dulcolax suppository PRN    #DVT PPX  - Lovenox, SCDs  - Last Doppler on 8/28/20    #Case discussed in IDT rounds 9/8:  -set up/supervision eating, min assist UE dresssing/mod assist LE dressing and bathing; transfers with CG-min assist, ambulates 80 feet with min assist, 8 step with HR and CG, +left visual field cut and reduced cognition  -will likely require supervision on dc due to visual deficits and cognitive impairment. 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 He exhibited difficulties with visuospatial skills and to a much lesser extent, working memory for calculations, and problem solving. His affect is depressive and constricted, and he reported a few adjustment symptoms including sad mood, anhedonia, feelings of boredom and isolation, low self-esteem, catastrophization, and low energy. FIM scores: Social Interaction 5; Problem Solving 5; Memory 6.    -will discuss with patient possible antidepressant medication vs supportive counseling. Recreation therapy added  -caregiver training  -target: dc home 9/17 with [facility] home care, Pt and OT  -will need medical and neurological follow up in community    #Labs:    CBC BMP 9/14    --------------    Outpatient Follow-up (Specialty/Name of physician):  Pierre Singleton (MD)  Ophthalmology  [address] [facility], [address]  [place] [address]  Phone: [phone]  Fax: [phone]  Follow Up Time: 2 weeks    Otf Levi  Neurological Surgery  [address] [place] [address]  Phone: [phone]  Fax: [phone]    [facility]  Cardiology  [address] [place] [address]  Phone: [phone]  Fax:   Follow Up Time: 2 weeks    [facility]  Neurology  [address] [facility] - [address] [place] [address]  Phone: [phone]  Fax:   Follow Up Time: 2 weeks    [facility] [place]  Internal Medicine  [address] [place] [address]  Phone: [phone]  Fax: [phone]  Follow Up Time: 2 weeks    --------------

## 2020-09-11 LAB
ALBUMIN SERPL ELPH-MCNC: 3.2 G/DL — LOW (ref 3.3–5)
ALP SERPL-CCNC: 93 U/L — SIGNIFICANT CHANGE UP (ref 40–120)
ALT FLD-CCNC: 89 U/L — HIGH (ref 10–45)
ANION GAP SERPL CALC-SCNC: 8 MMOL/L — SIGNIFICANT CHANGE UP (ref 5–17)
AST SERPL-CCNC: 35 U/L — SIGNIFICANT CHANGE UP (ref 10–40)
BILIRUB SERPL-MCNC: 0.4 MG/DL — SIGNIFICANT CHANGE UP (ref 0.2–1.2)
BUN SERPL-MCNC: 15 MG/DL — SIGNIFICANT CHANGE UP (ref 7–23)
CALCIUM SERPL-MCNC: 8.9 MG/DL — SIGNIFICANT CHANGE UP (ref 8.4–10.5)
CHLORIDE SERPL-SCNC: 105 MMOL/L — SIGNIFICANT CHANGE UP (ref 96–108)
CO2 SERPL-SCNC: 27 MMOL/L — SIGNIFICANT CHANGE UP (ref 22–31)
CREAT SERPL-MCNC: 0.84 MG/DL — SIGNIFICANT CHANGE UP (ref 0.5–1.3)
GLUCOSE SERPL-MCNC: 105 MG/DL — HIGH (ref 70–99)
POTASSIUM SERPL-MCNC: 3.7 MMOL/L — SIGNIFICANT CHANGE UP (ref 3.5–5.3)
POTASSIUM SERPL-SCNC: 3.7 MMOL/L — SIGNIFICANT CHANGE UP (ref 3.5–5.3)
PROT SERPL-MCNC: 7 G/DL — SIGNIFICANT CHANGE UP (ref 6–8.3)
SODIUM SERPL-SCNC: 140 MMOL/L — SIGNIFICANT CHANGE UP (ref 135–145)

## 2020-09-11 PROCEDURE — 99232 SBSQ HOSP IP/OBS MODERATE 35: CPT

## 2020-09-11 RX ADMIN — Medication 325 MILLIGRAM(S): at 12:11

## 2020-09-11 RX ADMIN — CLOPIDOGREL BISULFATE 75 MILLIGRAM(S): 75 TABLET, FILM COATED ORAL at 12:11

## 2020-09-11 RX ADMIN — ATORVASTATIN CALCIUM 40 MILLIGRAM(S): 80 TABLET, FILM COATED ORAL at 22:01

## 2020-09-11 RX ADMIN — LIDOCAINE 1 PATCH: 4 CREAM TOPICAL at 19:00

## 2020-09-11 RX ADMIN — POLYETHYLENE GLYCOL 3350 17 GRAM(S): 17 POWDER, FOR SOLUTION ORAL at 12:11

## 2020-09-11 RX ADMIN — SENNA PLUS 2 TABLET(S): 8.6 TABLET ORAL at 22:01

## 2020-09-11 RX ADMIN — Medication 1 DROP(S): at 12:11

## 2020-09-11 RX ADMIN — LIDOCAINE 1 PATCH: 4 CREAM TOPICAL at 12:11

## 2020-09-11 RX ADMIN — ENOXAPARIN SODIUM 40 MILLIGRAM(S): 100 INJECTION SUBCUTANEOUS at 22:01

## 2020-09-11 NOTE — PROGRESS NOTE ADULT - ASSESSMENT
74 y/o M with PMH HTN who presented to [facility] on 8/15/20 with headache and confusion, found to have severe stenosis of R M1 seen on initial CTA head and 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 Hospital course complicated by UTI and hypotension, both now resolved. Now admitted to [facility] 9/2/20 for initiation of a multidisciplinary rehab program consisting focused on functional mobility, transfers and ADLs (activities of daily living).    # Right-sided MCA Stroke, S/P R MCA stenting with residual deficit  - L sided weakness, dysarthria, dysphagia  - Comprehensive rehab program per primary team  - Patient recommended yearly CTA head to monitor L supraclinoid ICA infundibulum vs small aneurysm  - continue ASA, plavix, lipitor reduced to 40mg due to transaminitis   - . goal < 70  - F/u neurosurgery, neurology    #Dysphagia  - improving  - passed speech and swallow eval, diet advanced to regular diet, cont ensure    #HTN   - Currently normotensive  - Monitor off of medications    #HLD  - LDL goal of 70  - liptor decreased to 40mg due to transaminitis     #Transaminitis   - Labs reviewed, LFTs wnl August 14 2020  - Noted transaminitis after initiation of high dose statin, dose since decreased with LFTs trending to baseline   -cont lipitor 40mg and monitor LFTs     #Constipation  - senna, dulcolax, miralax prn    #DVT ppx: Lovenox, BLE Doppler 8/28/20 neg for DVT

## 2020-09-11 NOTE — PROGRESS NOTE ADULT - ASSESSMENT
RAFY KIRKPATRICK is a 75y with a history of HTN who presented to [facility] on 8/15/20 with headache and confusion and found to have severe stenosis of R M1 seen on initial CTA head and 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 Hospital course complicated by UTI and hypotension, both now resolved. Now admitted to [facility] after for initiation of a multidisciplinary rehab program consisting focused on functional mobility, transfers and ADLs (activities of daily living).    #Right-sided MCA Stroke, S/P R MCA stenting with L sided weakness, dysarthria, dysphagia, left visual field cut, incoordination  - Yearly CTA head to monitor L supraclinoid ICA infundibulum vs small aneurysm  - continue ASA, plavix, lipitor secondary PPX  - continue comprehensive rehab program, 3 hours a day, 5 days a week, PT, OT. hours increased to 90 minutes each with dc SLP  - Precautions: falls, cardiac, aspiration, left dilia-inattention, visual deficits  - F/u neurosurgery, neurology on dc    #left shoulder subluxation  -lap board/pillows for support. Patient educated on proper positioning  -lidoderm patch for pain    #HTN -  - Currently normotensive  - Monitor off of medications due to h/o hypotension  -PCP f/u on dc    #HLD  - continue lipitor 80    #Dysphagia  - tolerating regular solids and thin liquids   -continue enlive daily supplement    #GI/Bowel:  - Senna QHS, Miralax Daily  - dulcolax suppository PRN    #DVT PPX  - Lovenox, SCDs  - Last Doppler on 8/28/20    #Case discussed in IDT rounds 9/8:  -set up/supervision eating, min assist UE dresssing/mod assist LE dressing and bathing; transfers with CG-min assist, ambulates 80 feet with min assist, 8 step with HR and CG, +left visual field cut and reduced cognition  -will likely require supervision on dc due to visual deficits and cognitive impairment.  -caregiver training  -target: dc home 9/17 with [facility] home care, Pt and OT  -will need medical and neurological follow up in community    #Labs:    CBC BMP 9/14    --------------    Outpatient Follow-up (Specialty/Name of physician):  Pierre Singleton)  Ophthalmology  [address] [facility], [address]  [place] [address]  Phone: [phone]  Fax: [phone]  Follow Up Time: 2 weeks    Otf Levi  Neurological Surgery  [address] [place] [address]  Phone: [phone]  Fax: [phone]    [facility]  Cardiology  [address] [place] [address]  Phone: [phone]  Fax:   Follow Up Time: 2 weeks    [facility]  Neurology  [address] [facility] - [address]rd [place] [address]  Phone: [phone]  Fax:   Follow Up Time: 2 weeks    [facility] [place]  Internal Medicine  [address] [place] [address]  Phone: [phone]  Fax: [phone]  Follow Up Time: 2 weeks    --------------

## 2020-09-11 NOTE — PROGRESS NOTE ADULT - SUBJECTIVE AND OBJECTIVE BOX
Patient is a 75y old  Male who presents with a chief complaint of R MCA with left sided weakness  01.1 Left Body Involvement (Right Brain) (11 Sep 2020 12:34)      HPI:  Patient is a 75 y.o. RH -dominant man with PMHx HTN not on antithrombotics, presented to [facility] ED 8/15/20 with 1 day history of headache and altered mental status. Patient suddenly became confused 5PM day before admission and c/o new onset nonradiating headache at the top/middle of hairline and some weakness in right lower extremity. Per patient's friend, his speech was "off" and face "did not look normal"; patient initially refused to go to ED at that time but eventually sent due to persistent headache and lightheadedness. Neuro exam showed mild dysarthria and mild left nasolabial flattening with left arm weakness.     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 Neuro consulted, felt infarct most likely as a result of large artery atherosclerosis, although an embolus with partial lysis could not be ruled out. Patient was started on Midodrine 10mg TID and IV fluids due to hypotension after admission to maintain //100. +UA but negative UCx and was treated with Ceftriaxone (8/16-8/23). TTE showed EF 73%, normal LA, HbA1c 5.8, . Patient was started on ASA81 and Plavix 75mg daily, Plavix to be discontinued after 3 months.    Cardio was consulted for hypotension, likely neurogenic and possibly infectious etiology. 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 Pulm consulted for incidental finding of apical paraseptal emphysema on CT, no interventions recommended. Neurosurgery consulted for stenosis and questionable 2mm outpouching at L supraclinoid ICA, possible aneurysm vs infundibulum. MRA NOVA showed R MCA stenosis vs occlusion. S/P diagnostic angiography 8/25/20. S/P cerebral angiography with stenting 8/26/20. Patient weaned off Midodrine 8/28/20.    Patient was stable for discharge to [place] 9/2/20 for multidisciplinary acute rehab for functional deficits and gait/ADLs deficits. Patient speaks some English and Cantonese. Patient seen and examined at bedside with [facility] phone  #[phone]. (02 Sep 2020 14:53)      PAST MEDICAL & SURGICAL HISTORY:  HTN (hypertension)      MEDICATIONS  (STANDING):  artificial  tears Solution 1 Drop(s) Both EYES daily  aspirin 325 milliGRAM(s) Oral daily  atorvastatin 40 milliGRAM(s) Oral at bedtime  clopidogrel Tablet 75 milliGRAM(s) Oral daily  enoxaparin Injectable 40 milliGRAM(s) SubCutaneous at bedtime  influenza   Vaccine 0.5 milliLiter(s) IntraMuscular once  lidocaine   Patch 1 Patch Transdermal daily  polyethylene glycol 3350 17 Gram(s) Oral daily  senna 2 Tablet(s) Oral at bedtime    MEDICATIONS  (PRN):  acetaminophen   Tablet .. 650 milliGRAM(s) Oral every 6 hours PRN Temp greater or equal to 38C (100.4F), Mild Pain (1 - 3), Moderate Pain (4 - 6), Severe Pain (7 - 10)  artificial  tears Solution 1 Drop(s) Both EYES every 1 hour PRN Dry Eyes  bisacodyl Suppository 10 milliGRAM(s) Rectal daily PRN Constipation      Allergies    apple (Vomiting; Nausea)  No Known Drug Allergies    Intolerances          VITALS  75y  Vital Signs Last 24 Hrs  T(C): 36.7 (10 Sep 2020 20:23), Max: 36.7 (10 Sep 2020 20:23)  T(F): 98 (10 Sep 2020 20:23), Max: 98 (10 Sep 2020 20:23)  HR: 60 (10 Sep 2020 20:23) (60 - 60)  BP: 121/67 (10 Sep 2020 20:23) (121/67 - 121/67)  BP(mean): --  RR: 16 (10 Sep 2020 20:23) (16 - 16)  SpO2: 96% (10 Sep 2020 20:23) (96% - 96%)  Daily     Daily         RECENT LABS:                          13.3   5.32  )-----------( 271      ( 10 Sep 2020 07:25 )             40.8     09-11    140  |  105  |  15  ----------------------------<  105<H>  3.7   |  27  |  0.84    Ca    8.9      11 Sep 2020 06:56    TPro  7.0  /  Alb  3.2<L>  /  TBili  0.4  /  DBili  x   /  AST  35  /  ALT  89<H>  /  AlkPhos  93  09-11    LIVER FUNCTIONS - ( 11 Sep 2020 06:56 )  Alb: 3.2 g/dL / Pro: 7.0 g/dL / ALK PHOS: 93 U/L / ALT: 89 U/L / AST: 35 U/L / GGT: x                   CAPILLARY BLOOD GLUCOSE

## 2020-09-11 NOTE — PROGRESS NOTE ADULT - SUBJECTIVE AND OBJECTIVE BOX
Patient is a 75y old  Male who presents with a chief complaint of R MCA with left sided weakness  01.1 Left Body Involvement (Right Brain) (10 Sep 2020 14:04)    Patient seen and examined sitting in chair at bedside, stable, pleasant. endorses mild left shoulder pain alleviated with lidocaine patch. otherwise no acute complaints. Appetite, urine, stool, mood, sleep wnl.     ALLERGIES:  apple (Vomiting; Nausea)  No Known Drug Allergies    MEDICATIONS  (STANDING):  artificial  tears Solution 1 Drop(s) Both EYES daily  aspirin 325 milliGRAM(s) Oral daily  atorvastatin 40 milliGRAM(s) Oral at bedtime  clopidogrel Tablet 75 milliGRAM(s) Oral daily  enoxaparin Injectable 40 milliGRAM(s) SubCutaneous at bedtime  influenza   Vaccine 0.5 milliLiter(s) IntraMuscular once  lidocaine   Patch 1 Patch Transdermal daily  polyethylene glycol 3350 17 Gram(s) Oral daily  senna 2 Tablet(s) Oral at bedtime    MEDICATIONS  (PRN):  acetaminophen   Tablet .. 650 milliGRAM(s) Oral every 6 hours PRN Temp greater or equal to 38C (100.4F), Mild Pain (1 - 3), Moderate Pain (4 - 6), Severe Pain (7 - 10)  artificial  tears Solution 1 Drop(s) Both EYES every 1 hour PRN Dry Eyes  bisacodyl Suppository 10 milliGRAM(s) Rectal daily PRN Constipation    Vital Signs Last 24 Hrs  T(F): 98 (10 Sep 2020 20:23), Max: 98 (10 Sep 2020 20:23)  HR: 60 (10 Sep 2020 20:23) (60 - 60)  BP: 121/67 (10 Sep 2020 20:23) (121/67 - 121/67)  RR: 16 (10 Sep 2020 20:23) (16 - 16)  SpO2: 96% (10 Sep 2020 20:23) (96% - 96%)  I&O's Summary    10 Sep 2020 07:01  -  11 Sep 2020 07:00  --------------------------------------------------------  IN: 240 mL / OUT: 0 mL / NET: 240 mL      PHYSICAL EXAM:  General: NAD, A/O x 3  ENT: MMM, no scleral icterus  Neck: Supple, No JVD  Lungs: Clear to auscultation bilaterally, no wheezes, rales, rhonchi  Cardio: RRR, S1/S2, No murmurs  Abdomen: Soft, Nontender, Nondistended; Bowel sounds present  Extremities: No calf tenderness, No pitting edema b/l. + patch left shoulder  Neuro: left upper arm strength 3/5. otherwise, 5/5 RUE, LE b/l     LABS:                        13.3   5.32  )-----------( 271      ( 10 Sep 2020 07:25 )             40.8       09-11    140  |  105  |  15  ----------------------------<  105  3.7   |  27  |  0.84    Ca    8.9      11 Sep 2020 06:56    TPro  7.0  /  Alb  3.2  /  TBili  0.4  /  DBili  x   /  AST  35  /  ALT  89  /  AlkPhos  93  09-11     eGFR if : 99 mL/min/1.73M2 (09-11-20 @ 06:56)  eGFR if Non African American: 85 mL/min/1.73M2 (09-11-20 @ 06:56)       08-15 Chol 220 mg/dL  mg/dL HDL 44 mg/dL Trig 98 mg/dL      RADIOLOGY & ADDITIONAL TESTS:    < from: MR Head No Cont (08.27.20 @ 21:29) >  IMPRESSION: Significant improvement in flow in the right middle cerebral artery and distal branches after stenting a high-grade stenosis compared with 8/25/2020. No new infarcts, less diffusion restriction.    < end of copied text >    Care Discussed with Consultants/Other Providers: discussed with rehab team

## 2020-09-12 PROCEDURE — 99233 SBSQ HOSP IP/OBS HIGH 50: CPT

## 2020-09-12 PROCEDURE — 99232 SBSQ HOSP IP/OBS MODERATE 35: CPT

## 2020-09-12 RX ADMIN — CLOPIDOGREL BISULFATE 75 MILLIGRAM(S): 75 TABLET, FILM COATED ORAL at 11:58

## 2020-09-12 RX ADMIN — LIDOCAINE 1 PATCH: 4 CREAM TOPICAL at 00:00

## 2020-09-12 RX ADMIN — LIDOCAINE 1 PATCH: 4 CREAM TOPICAL at 12:01

## 2020-09-12 RX ADMIN — Medication 1 DROP(S): at 11:59

## 2020-09-12 RX ADMIN — SENNA PLUS 2 TABLET(S): 8.6 TABLET ORAL at 22:07

## 2020-09-12 RX ADMIN — ENOXAPARIN SODIUM 40 MILLIGRAM(S): 100 INJECTION SUBCUTANEOUS at 22:07

## 2020-09-12 RX ADMIN — LIDOCAINE 1 PATCH: 4 CREAM TOPICAL at 17:29

## 2020-09-12 RX ADMIN — ATORVASTATIN CALCIUM 40 MILLIGRAM(S): 80 TABLET, FILM COATED ORAL at 22:07

## 2020-09-12 RX ADMIN — Medication 325 MILLIGRAM(S): at 11:58

## 2020-09-12 NOTE — PROGRESS NOTE ADULT - SUBJECTIVE AND OBJECTIVE BOX
Patient is a 75y old  Male who presents with a chief complaint of R MCA with left sided weakness  01.1 Left Body Involvement (Right Brain) (11 Sep 2020 13:27)    Patient seen and examined at bedside.  C/O left shoulder pain controlled with pain patch  - denies cough, shortness of breath, fever, chills, myalgias or arthralgias and comprehensive review of systems was performed and all other systems were reviewed as negative    ALLERGIES:  apple (Vomiting; Nausea)  No Known Drug Allergies    MEDICATIONS  (STANDING):  artificial  tears Solution 1 Drop(s) Both EYES daily  aspirin 325 milliGRAM(s) Oral daily  atorvastatin 40 milliGRAM(s) Oral at bedtime  clopidogrel Tablet 75 milliGRAM(s) Oral daily  enoxaparin Injectable 40 milliGRAM(s) SubCutaneous at bedtime  influenza   Vaccine 0.5 milliLiter(s) IntraMuscular once  lidocaine   Patch 1 Patch Transdermal daily  polyethylene glycol 3350 17 Gram(s) Oral daily  senna 2 Tablet(s) Oral at bedtime    MEDICATIONS  (PRN):  acetaminophen   Tablet .. 650 milliGRAM(s) Oral every 6 hours PRN Temp greater or equal to 38C (100.4F), Mild Pain (1 - 3), Moderate Pain (4 - 6), Severe Pain (7 - 10)  artificial  tears Solution 1 Drop(s) Both EYES every 1 hour PRN Dry Eyes  bisacodyl Suppository 10 milliGRAM(s) Rectal daily PRN Constipation    Vital Signs Last 24 Hrs  T(F): 98 (12 Sep 2020 09:08), Max: 98 (11 Sep 2020 12:00)  HR: 75 (12 Sep 2020 09:08) (62 - 75)  BP: 120/68 (12 Sep 2020 09:08) (120/68 - 120/70)  RR: 16 (12 Sep 2020 09:08) (16 - 16)  SpO2: 97% (12 Sep 2020 09:08) (96% - 97%)  I&O's Summary    11 Sep 2020 07:01  -  12 Sep 2020 07:00  --------------------------------------------------------  IN: 600 mL / OUT: 0 mL / NET: 600 mL      PHYSICAL EXAM:  General: NAD, A/O x 3  ENT: MMM  Neck: Supple, No JVD  Lungs: Clear to auscultation bilaterally  Cardio: RRR, S1/S2, No murmurs  Abdomen: Soft, Nontender, Nondistended; Bowel sounds present  Extremities: No calf tenderness, No pitting edema  Neuro: no new deficits    LABS:                        13.3   5.32  )-----------( 271      ( 10 Sep 2020 07:25 )             40.8       09-11    140  |  105  |  15  ----------------------------<  105  3.7   |  27  |  0.84    Ca    8.9      11 Sep 2020 06:56    TPro  7.0  /  Alb  3.2  /  TBili  0.4  /  DBili  x   /  AST  35  /  ALT  89  /  AlkPhos  93  09-11     eGFR if : 99 mL/min/1.73M2 (09-11-20 @ 06:56)  eGFR if Non African American: 85 mL/min/1.73M2 (09-11-20 @ 06:56)             08-15 Chol 220 mg/dL  mg/dL HDL 44 mg/dL Trig 98 mg/dL

## 2020-09-12 NOTE — PROGRESS NOTE ADULT - ASSESSMENT
76 y/o M with PMH HTN who presented to [facility] on 8/15/20 with headache and confusion, found to have severe stenosis of R M1 seen on initial CTA head and 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 Hospital course complicated by UTI and hypotension, both now resolved. Now admitted to [facility] 9/2/20 for initiation of a multidisciplinary rehab program consisting focused on functional mobility, transfers and ADLs (activities of daily living).    Right-sided MCA Stroke, S/P R MCA stenting with residual deficit  - L sided weakness, dysarthria, dysphagia  - continue comprehensive rehab program per primary team  - Patient recommended yearly CTA head to monitor L supraclinoid ICA infundibulum vs small aneurysm  - continue ASA, plavix, lipitor reduced to 40mg due to transaminitis   - . goal < 70  - F/u neurosurgery, neurology    Dysphagia  - improving  - passed speech and swallow eval, diet advanced to regular diet, cont ensure    Essential HTN   - Currently normotensive  - Monitor off of medications    HLD  - LDL goal of 70  - liptor decreased to 40mg due to transaminitis     Transaminitis   - Labs reviewed, LFTs wnl August 14 2020  - cont decreased dose lipitor 40mg and monitor LFTs     Constipation  - senna, dulcolax, miralax prn    VTE ppx:   - continue Lovenox

## 2020-09-12 NOTE — PROGRESS NOTE ADULT - SUBJECTIVE AND OBJECTIVE BOX
Subjective: No new complaints or overnight issues      REVIEW OF SYSTEMS  Pertinent in last 24 h: Neurological deficits    VITALS  T(C): 36.7 (09-12-20 @ 09:08), Max: 36.7 (09-12-20 @ 08:00)  HR: 75 (09-12-20 @ 09:08) (69 - 75)  BP: 120/68 (09-12-20 @ 09:08) (120/68 - 130/67)  RR: 16 (09-12-20 @ 09:08) (16 - 16)  SpO2: 97% (09-12-20 @ 09:08) (96% - 97%)  Wt(kg): --    Physical Exam:  Constitutional - NAD, Comfortable  HEENT - NCAT, EOMI  Chest - CTA bilaterally, No wheeze, No rhonchi, No crackles  Cardiovascular - RRR, S1S2,   Abdomen - BS+, Soft, NTND  Extremities - No edema, No calf tenderness   Neurologic Exam -    Stable                Cognitive - Awake, Alert,      Motor - left HP     Psychiatric - Mood stable, Affect WNL  -    RECENT LABS/IMAGING    09-11    140  |  105  |  15  ----------------------------<  105<H>  3.7   |  27  |  0.84    Ca    8.9      11 Sep 2020 06:56    TPro  7.0  /  Alb  3.2<L>  /  TBili  0.4  /  DBili  x   /  AST  35  /  ALT  89<H>  /  AlkPhos  93  09-11            MEDICATIONS   acetaminophen   Tablet .. 650 milliGRAM(s) every 6 hours PRN  artificial  tears Solution 1 Drop(s) daily  artificial  tears Solution 1 Drop(s) every 1 hour PRN  aspirin 325 milliGRAM(s) daily  atorvastatin 40 milliGRAM(s) at bedtime  bisacodyl Suppository 10 milliGRAM(s) daily PRN  clopidogrel Tablet 75 milliGRAM(s) daily  enoxaparin Injectable 40 milliGRAM(s) at bedtime  influenza   Vaccine 0.5 milliLiter(s) once  lidocaine   Patch 1 Patch daily  polyethylene glycol 3350 17 Gram(s) daily  senna 2 Tablet(s) at bedtime      --------------------------------------------------------------------

## 2020-09-13 PROCEDURE — 99233 SBSQ HOSP IP/OBS HIGH 50: CPT

## 2020-09-13 PROCEDURE — 99232 SBSQ HOSP IP/OBS MODERATE 35: CPT

## 2020-09-13 RX ADMIN — SENNA PLUS 2 TABLET(S): 8.6 TABLET ORAL at 22:05

## 2020-09-13 RX ADMIN — LIDOCAINE 1 PATCH: 4 CREAM TOPICAL at 17:46

## 2020-09-13 RX ADMIN — ATORVASTATIN CALCIUM 40 MILLIGRAM(S): 80 TABLET, FILM COATED ORAL at 22:05

## 2020-09-13 RX ADMIN — LIDOCAINE 1 PATCH: 4 CREAM TOPICAL at 12:04

## 2020-09-13 RX ADMIN — CLOPIDOGREL BISULFATE 75 MILLIGRAM(S): 75 TABLET, FILM COATED ORAL at 12:04

## 2020-09-13 RX ADMIN — Medication 325 MILLIGRAM(S): at 12:04

## 2020-09-13 RX ADMIN — Medication 1 DROP(S): at 12:05

## 2020-09-13 RX ADMIN — LIDOCAINE 1 PATCH: 4 CREAM TOPICAL at 00:11

## 2020-09-13 RX ADMIN — ENOXAPARIN SODIUM 40 MILLIGRAM(S): 100 INJECTION SUBCUTANEOUS at 22:05

## 2020-09-13 NOTE — PROGRESS NOTE ADULT - SUBJECTIVE AND OBJECTIVE BOX
Subjective: No new complaints or overnight issues    VITALS  T(C): 36.6 (09-13-20 @ 09:09), Max: 36.6 (09-13-20 @ 09:09)  HR: 60 (09-13-20 @ 09:09) (55 - 60)  BP: 128/64 (09-13-20 @ 09:09) (128/64 - 137/61)  RR: 16 (09-13-20 @ 09:09) (16 - 16)  SpO2: 98% (09-13-20 @ 09:09) (98% - 98%)  Wt(kg): --    REVIEW OF SYSTEMS  Pertinent in last 24 h: Neurological deficits        Physical Exam:  Constitutional - NAD, Comfortable  HEENT - NCAT, EOMI  Chest - CTA bilaterally, No wheeze, No rhonchi, No crackles  Cardiovascular - RRR, S1S2,   Abdomen - BS+, Soft, NTND  Extremities - No edema, No calf tenderness   Neurologic Exam -    Stable                Cognitive - Awake, Alert,      Motor - left HP     Psychiatric - Mood stable, Affect WNL  -    RECENT LABS/IMAGING                  MEDICATIONS   acetaminophen   Tablet .. 650 milliGRAM(s) every 6 hours PRN  artificial  tears Solution 1 Drop(s) daily  artificial  tears Solution 1 Drop(s) every 1 hour PRN  aspirin 325 milliGRAM(s) daily  atorvastatin 40 milliGRAM(s) at bedtime  bisacodyl Suppository 10 milliGRAM(s) daily PRN  clopidogrel Tablet 75 milliGRAM(s) daily  enoxaparin Injectable 40 milliGRAM(s) at bedtime  influenza   Vaccine 0.5 milliLiter(s) once  lidocaine   Patch 1 Patch daily  polyethylene glycol 3350 17 Gram(s) daily  senna 2 Tablet(s) at bedtime      --------------------------------------------------------------------

## 2020-09-13 NOTE — PROGRESS NOTE ADULT - SUBJECTIVE AND OBJECTIVE BOX
Patient is a 75y old  Male from [place] who presents with a chief complaint of R MCA with left sided weakness  01.1 Left Body Involvement (Right Brain) (12 Sep 2020 20:01)      Mr Matthew has no new complaints this AM. Slept well, appetite is good, denies pain and all other systems were reviewed as negative    ALLERGIES:  apple (Vomiting; Nausea)  No Known Drug Allergies    MEDICATIONS  (STANDING):  artificial  tears Solution 1 Drop(s) Both EYES daily  aspirin 325 milliGRAM(s) Oral daily  atorvastatin 40 milliGRAM(s) Oral at bedtime  clopidogrel Tablet 75 milliGRAM(s) Oral daily  enoxaparin Injectable 40 milliGRAM(s) SubCutaneous at bedtime  influenza   Vaccine 0.5 milliLiter(s) IntraMuscular once  lidocaine   Patch 1 Patch Transdermal daily  polyethylene glycol 3350 17 Gram(s) Oral daily  senna 2 Tablet(s) Oral at bedtime    MEDICATIONS  (PRN):  acetaminophen   Tablet .. 650 milliGRAM(s) Oral every 6 hours PRN Temp greater or equal to 38C (100.4F), Mild Pain (1 - 3), Moderate Pain (4 - 6), Severe Pain (7 - 10)  artificial  tears Solution 1 Drop(s) Both EYES every 1 hour PRN Dry Eyes  bisacodyl Suppository 10 milliGRAM(s) Rectal daily PRN Constipation    Vital Signs Last 24 Hrs  T(F): 97.8 (13 Sep 2020 09:09), Max: 97.8 (13 Sep 2020 09:09)  HR: 60 (13 Sep 2020 09:09) (55 - 60)  BP: 128/64 (13 Sep 2020 09:09) (128/64 - 137/61)  RR: 16 (13 Sep 2020 09:09) (16 - 16)  SpO2: 98% (13 Sep 2020 09:09) (98% - 98%)  I&O's Summary    12 Sep 2020 07:01  -  13 Sep 2020 07:00  --------------------------------------------------------  IN: 0 mL / OUT: 300 mL / NET: -300 mL        PHYSICAL EXAM:  General: NAD, A/O x 3  ENT: MMM  Neck: Supple, No JVD  Lungs: Clear to auscultation bilaterally  Cardio: RRR, S1/S2, No murmurs  Abdomen: Soft, Nontender, Nondistended; Bowel sounds present  Extremities: No calf tenderness, No pitting edema  Neuro: left sided weakness / no new deficits    LABS: There are no new laboratory or radiologic studies resulted at the time this progress note was generated        09-11    140  |  105  |  15  ----------------------------<  105  3.7   |  27  |  0.84    Ca    8.9      11 Sep 2020 06:56    TPro  7.0  /  Alb  3.2  /  TBili  0.4  /  DBili  x   /  AST  35  /  ALT  89  /  AlkPhos  93  09-11     eGFR if : 99 mL/min/1.73M2 (09-11-20 @ 06:56)  eGFR if Non African American: 85 mL/min/1.73M2 (09-11-20 @ 06:56)      08-15 Chol 220 mg/dL  mg/dL HDL 44 mg/dL Trig 98 mg/dL

## 2020-09-13 NOTE — PROGRESS NOTE ADULT - ASSESSMENT
76 y/o M with PMH HTN who presented to [facility] on 8/15/20 with headache and confusion, found to have severe stenosis of R M1 seen on initial CTA head and 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 Hospital course complicated by UTI and hypotension, both now resolved. Now admitted to [facility] 9/2/20 for initiation of a multidisciplinary rehab program consisting focused on functional mobility, transfers and ADLs (activities of daily living).    Right-sided MCA Stroke, S/P R MCA stenting with residual deficit; L sided weakness, dysarthria, dysphagia  - continue comprehensive rehab program per primary team  - Patient recommended yearly CTA head to monitor L supraclinoid ICA infundibulum vs small aneurysm  - continue ASA, plavix, lipitor reduced to 40mg due to transaminitis   - . goal < 70  - F/u neurosurgery, neurology    Dysphagia  - improving  - passed speech and swallow eval, diet advanced to regular diet, cont ensure    Essential HTN   - Currently normotensive  - Monitor off of medications    HLD  - LDL goal of 70  - liptor decreased to 40mg due to transaminitis     Transaminitis   - Labs reviewed, LFTs wnl August 14 2020  - cont decreased dose lipitor 40mg and monitor LFTs     Constipation  - senna, dulcolax, miralax prn    VTE ppx:   - continue Lovenox

## 2020-09-13 NOTE — PROGRESS NOTE ADULT - ASSESSMENT
80 yo M  admitted to acute Rehabilitation with right MCA infarct    Pt. Stable    cont ASA plavix    dvt ppx: lovenox    Cont. comprehensive inpatient PT, OT and Speech    No acute issues,   Cont. current plan of care and medications as per primary team

## 2020-09-14 ENCOUNTER — TRANSCRIPTION ENCOUNTER (OUTPATIENT)
Age: 75
End: 2020-09-14

## 2020-09-14 LAB
ALBUMIN SERPL ELPH-MCNC: 3.2 G/DL — LOW (ref 3.3–5)
ALP SERPL-CCNC: 79 U/L — SIGNIFICANT CHANGE UP (ref 40–120)
ALT FLD-CCNC: 69 U/L — HIGH (ref 10–45)
ANION GAP SERPL CALC-SCNC: 8 MMOL/L — SIGNIFICANT CHANGE UP (ref 5–17)
AST SERPL-CCNC: 31 U/L — SIGNIFICANT CHANGE UP (ref 10–40)
BILIRUB SERPL-MCNC: 0.4 MG/DL — SIGNIFICANT CHANGE UP (ref 0.2–1.2)
BUN SERPL-MCNC: 15 MG/DL — SIGNIFICANT CHANGE UP (ref 7–23)
CALCIUM SERPL-MCNC: 8.8 MG/DL — SIGNIFICANT CHANGE UP (ref 8.4–10.5)
CHLORIDE SERPL-SCNC: 104 MMOL/L — SIGNIFICANT CHANGE UP (ref 96–108)
CO2 SERPL-SCNC: 26 MMOL/L — SIGNIFICANT CHANGE UP (ref 22–31)
CREAT SERPL-MCNC: 0.85 MG/DL — SIGNIFICANT CHANGE UP (ref 0.5–1.3)
GLUCOSE SERPL-MCNC: 100 MG/DL — HIGH (ref 70–99)
HCT VFR BLD CALC: 39.6 % — SIGNIFICANT CHANGE UP (ref 39–50)
HGB BLD-MCNC: 13.3 G/DL — SIGNIFICANT CHANGE UP (ref 13–17)
MCHC RBC-ENTMCNC: 30.9 PG — SIGNIFICANT CHANGE UP (ref 27–34)
MCHC RBC-ENTMCNC: 33.6 GM/DL — SIGNIFICANT CHANGE UP (ref 32–36)
MCV RBC AUTO: 92.1 FL — SIGNIFICANT CHANGE UP (ref 80–100)
NRBC # BLD: 0 /100 WBCS — SIGNIFICANT CHANGE UP (ref 0–0)
PLATELET # BLD AUTO: 260 K/UL — SIGNIFICANT CHANGE UP (ref 150–400)
POTASSIUM SERPL-MCNC: 3.9 MMOL/L — SIGNIFICANT CHANGE UP (ref 3.5–5.3)
POTASSIUM SERPL-SCNC: 3.9 MMOL/L — SIGNIFICANT CHANGE UP (ref 3.5–5.3)
PROT SERPL-MCNC: 7.1 G/DL — SIGNIFICANT CHANGE UP (ref 6–8.3)
RBC # BLD: 4.3 M/UL — SIGNIFICANT CHANGE UP (ref 4.2–5.8)
RBC # FLD: 12.4 % — SIGNIFICANT CHANGE UP (ref 10.3–14.5)
SODIUM SERPL-SCNC: 138 MMOL/L — SIGNIFICANT CHANGE UP (ref 135–145)
WBC # BLD: 5.12 K/UL — SIGNIFICANT CHANGE UP (ref 3.8–10.5)
WBC # FLD AUTO: 5.12 K/UL — SIGNIFICANT CHANGE UP (ref 3.8–10.5)

## 2020-09-14 PROCEDURE — 99232 SBSQ HOSP IP/OBS MODERATE 35: CPT

## 2020-09-14 RX ORDER — LIDOCAINE 4 G/100G
1 CREAM TOPICAL
Refills: 0 | Status: DISCONTINUED | OUTPATIENT
Start: 2020-09-14 | End: 2020-09-18

## 2020-09-14 RX ADMIN — ATORVASTATIN CALCIUM 40 MILLIGRAM(S): 80 TABLET, FILM COATED ORAL at 22:06

## 2020-09-14 RX ADMIN — LIDOCAINE 1 PATCH: 4 CREAM TOPICAL at 12:18

## 2020-09-14 RX ADMIN — CLOPIDOGREL BISULFATE 75 MILLIGRAM(S): 75 TABLET, FILM COATED ORAL at 12:17

## 2020-09-14 RX ADMIN — ENOXAPARIN SODIUM 40 MILLIGRAM(S): 100 INJECTION SUBCUTANEOUS at 22:01

## 2020-09-14 RX ADMIN — LIDOCAINE 1 PATCH: 4 CREAM TOPICAL at 19:07

## 2020-09-14 RX ADMIN — LIDOCAINE 1 PATCH: 4 CREAM TOPICAL at 00:21

## 2020-09-14 RX ADMIN — Medication 1 DROP(S): at 12:22

## 2020-09-14 RX ADMIN — Medication 325 MILLIGRAM(S): at 12:17

## 2020-09-14 RX ADMIN — SENNA PLUS 2 TABLET(S): 8.6 TABLET ORAL at 22:01

## 2020-09-14 NOTE — DISCHARGE NOTE PROVIDER - NSDCMRMEDTOKEN_GEN_ALL_CORE_FT
acetaminophen 325 mg oral tablet: 2 tab(s) orally every 6 hours, As needed, Temp greater or equal to 38C (100.4F), Mild Pain (1 - 3), Moderate Pain (4 - 6), Severe Pain (7 - 10)  aspirin 325 mg oral tablet: 1 tab(s) orally once a day  atorvastatin 80 mg oral tablet: 1 tab(s) orally once a day (at bedtime)  clopidogrel 75 mg oral tablet: 1 tab(s) orally once a day  enoxaparin: 40 milligram(s) subcutaneous once a day (at bedtime)  lidocaine 5% topical film:  topically   ocular lubricant ophthalmic solution: 1 drop(s) to each affected eye once a day  ocular lubricant ophthalmic solution: 1 drop(s) to each affected eye every hour, As needed, Dry Eyes  polyethylene glycol 3350 oral powder for reconstitution: 17 gram(s) orally once a day  senna oral tablet: 2 tab(s) orally once a day (at bedtime)   acetaminophen 325 mg oral tablet: 2 tab(s) orally every 6 hours, As needed, Temp greater or equal to 38C (100.4F), Mild Pain (1 - 3), Moderate Pain (4 - 6), Severe Pain (7 - 10)  aspirin 325 mg oral tablet: 1 tab(s) orally once a day  atorvastatin 80 mg oral tablet: 1 tab(s) orally once a day (at bedtime)  clopidogrel 75 mg oral tablet: 1 tab(s) orally once a day  lidocaine 5% topical film:  topically   ocular lubricant ophthalmic solution: 1 drop(s) to each affected eye every hour, As needed, Dry Eyes  polyethylene glycol 3350 oral powder for reconstitution: 17 gram(s) orally once a day  senna oral tablet: 2 tab(s) orally once a day (at bedtime)   acetaminophen 325 mg oral tablet: 2 tab(s) orally every 6 hours, As needed, Temp greater or equal to 38C (100.4F), Mild Pain (1 - 3), Moderate Pain (4 - 6), Severe Pain (7 - 10)  aspirin 325 mg oral tablet: 1 tab(s) orally once a day  atorvastatin 40 mg oral tablet: 1 tab(s) orally once a day (at bedtime)  clopidogrel 75 mg oral tablet: 1 tab(s) orally once a day  lidocaine 5% topical ointment: 1 application topically 2 times a day  ocular lubricant ophthalmic solution: 1 drop(s) to each affected eye once a day  senna oral tablet: 2 tab(s) orally once a day (at bedtime)

## 2020-09-14 NOTE — DISCHARGE NOTE PROVIDER - CARE PROVIDERS DIRECT ADDRESSES
,DirectAddress_Unknown,DirectAddress_Unknown,[email or web address] ,DirectAddress_Unknown,DirectAddress_Unknown,[email or web address]

## 2020-09-14 NOTE — PROGRESS NOTE ADULT - SUBJECTIVE AND OBJECTIVE BOX
Patient is a 75y old  Male who presents with a chief complaint of R MCA with left sided weakness  01.1 Left Body Involvement (Right Brain) (14 Sep 2020 12:14)      HPI:  Patient is a 75 y.o. RH -dominant man with PMHx HTN not on antithrombotics, presented to [facility] ED 8/15/20 with 1 day history of headache and altered mental status. Patient suddenly became confused 5PM day before admission and c/o new onset nonradiating headache at the top/middle of hairline and some weakness in right lower extremity. Per patient's friend, his speech was "off" and face "did not look normal"; patient initially refused to go to ED at that time but eventually sent due to persistent headache and lightheadedness. Neuro exam showed mild dysarthria and mild left nasolabial flattening with left arm weakness.     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 Neuro consulted, felt infarct most likely as a result of large artery atherosclerosis, although an embolus with partial lysis could not be ruled out. Patient was started on Midodrine 10mg TID and IV fluids due to hypotension after admission to maintain //100. +UA but negative UCx and was treated with Ceftriaxone (8/16-8/23). TTE showed EF 73%, normal LA, HbA1c 5.8, . Patient was started on ASA81 and Plavix 75mg daily, Plavix to be discontinued after 3 months.    Cardio was consulted for hypotension, likely neurogenic and possibly infectious etiology. 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 Pulm consulted for incidental finding of apical paraseptal emphysema on CT, no interventions recommended. Neurosurgery consulted for stenosis and questionable 2mm outpouching at L supraclinoid ICA, possible aneurysm vs infundibulum. MRA NOVA showed R MCA stenosis vs occlusion. S/P diagnostic angiography 8/25/20. S/P cerebral angiography with stenting 8/26/20. Patient weaned off Midodrine 8/28/20.    Patient was stable for discharge to [facility] 9/2/20 for multidisciplinary acute rehab for functional deficits and gait/ADLs deficits. Patient speaks some English and Cantonese. Patient seen and examined at bedside with [employer] phone  #[id number]. (02 Sep 2020 14:53)    SUBJECTIVE/INTERVAL EVENTS:  Patient seen and assessed at bedside. No acute events overnight. Patient has been practicing picking up pins with his left hand. Patient still having pain in left shoulder but improving with lidocaine.    PAST MEDICAL & SURGICAL HISTORY:  HTN (hypertension)        Allergies    apple (Vomiting; Nausea)  No Known Drug Allergies    Intolerances          VITALS  75y  Vital Signs Last 24 Hrs  T(C): 36.4 (14 Sep 2020 08:17), Max: 36.4 (13 Sep 2020 20:51)  T(F): 97.6 (14 Sep 2020 08:17), Max: 97.6 (14 Sep 2020 08:17)  HR: 56 (14 Sep 2020 08:17) (56 - 60)  BP: 123/72 (14 Sep 2020 08:17) (123/72 - 143/66)  BP(mean): --  RR: 16 (14 Sep 2020 08:17) (15 - 16)  SpO2: 98% (14 Sep 2020 08:17) (95% - 98%)  Daily       Physical Exam:   · Constitutional	detailed exam  · Constitutional Comments	alert pleasant NAD. Improved left hand function noted, still difficulties with fine motor coordination and gross corodination, motivated for HEP  · Respiratory	detailed exam  · Respiratory Details	good air movement; respirations non-labored; clear to auscultation bilaterally  · Cardiovascular	detailed exam  · Cardiovascular Details	regular rate and rhythm  · Gastrointestinal	detailed exam  · GI Normal	soft; nontender; bowel sounds normal  · Extremities	detailed exam  · Extremities Comments	calves soft bilaterally NT no erythema or warmth  improved motor strength: Left quad, ham and ankle PF and DF 5/5  left gross grasp 5-/5 shoulder 5-/5 due to pain, elbow 5/5          RECENT LABS:                          13.3   5.12  )-----------( 260      ( 14 Sep 2020 05:20 )             39.6     09-14    138  |  104  |  15  ----------------------------<  100<H>  3.9   |  26  |  0.85    Ca    8.8      14 Sep 2020 05:20    TPro  7.1  /  Alb  3.2<L>  /  TBili  0.4  /  DBili  x   /  AST  31  /  ALT  69<H>  /  AlkPhos  79  09-14    LIVER FUNCTIONS - ( 14 Sep 2020 05:20 )  Alb: 3.2 g/dL / Pro: 7.1 g/dL / ALK PHOS: 79 U/L / ALT: 69 U/L / AST: 31 U/L / GGT: x                   CAPILLARY BLOOD GLUCOSE          MEDICATIONS  (STANDING):  artificial  tears Solution 1 Drop(s) Both EYES daily  aspirin 325 milliGRAM(s) Oral daily  atorvastatin 40 milliGRAM(s) Oral at bedtime  clopidogrel Tablet 75 milliGRAM(s) Oral daily  enoxaparin Injectable 40 milliGRAM(s) SubCutaneous at bedtime  influenza   Vaccine 0.5 milliLiter(s) IntraMuscular once  lidocaine   Patch 1 Patch Transdermal daily  polyethylene glycol 3350 17 Gram(s) Oral daily  senna 2 Tablet(s) Oral at bedtime    MEDICATIONS  (PRN):  acetaminophen   Tablet .. 650 milliGRAM(s) Oral every 6 hours PRN Temp greater or equal to 38C (100.4F), Mild Pain (1 - 3), Moderate Pain (4 - 6), Severe Pain (7 - 10)  artificial  tears Solution 1 Drop(s) Both EYES every 1 hour PRN Dry Eyes  bisacodyl Suppository 10 milliGRAM(s) Rectal daily PRN Constipation

## 2020-09-14 NOTE — DISCHARGE NOTE PROVIDER - PROVIDER TOKENS
PROVIDER:[TOKEN:[[id number]]],PROVIDER:[TOKEN:[[id number]]],PROVIDER:[TOKEN:[[id number]]] PROVIDER:[TOKEN:[[id number]]],PROVIDER:[TOKEN:[[id number]]],PROVIDER:[TOKEN:[[id number]]],PROVIDER:[TOKEN:[[id number]]]

## 2020-09-14 NOTE — PROGRESS NOTE ADULT - ASSESSMENT
76 y/o M with PMH HTN who presented to [facility] on 8/15/20 with headache and confusion, found to have severe stenosis of R M1 seen on initial CTA head and 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 Hospital course complicated by UTI and hypotension, both now resolved. Now admitted to [facility] 9/2/20 for initiation of a multidisciplinary rehab program consisting focused on functional mobility, transfers and ADLs (activities of daily living).    Right-sided MCA Stroke, S/P R MCA stenting with residual deficit; L sided weakness, dysarthria, dysphagia  - continue comprehensive rehab program per primary team  - Patient recommended yearly CTA head to monitor L supraclinoid ICA infundibulum vs small aneurysm  - continue ASA, plavix, lipitor reduced to 40mg due to transaminitis   - , goal < 70  - F/u neurosurgery, neurology    Dysphagia  - improving  - passed speech and swallow eval, diet advanced to regular diet, continue ensure    Essential HTN   - Currently normotensive  - Monitor off of medications    HLD  - LDL goal of 70  - liptor decreased to 40mg due to transaminitis     Transaminitis   - Labs reviewed, LFTs wnl August 14 2020  - cont decreased dose lipitor 40mg and monitor LFTs     Constipation  - senna, dulcolax, miralax prn    VTE ppx:   - continue Lovenox

## 2020-09-14 NOTE — PROGRESS NOTE ADULT - SUBJECTIVE AND OBJECTIVE BOX
Patient is a 75y old  Male who presents with a chief complaint of R MCA with left sided weakness  01.1 Left Body Involvement (Right Brain) (14 Sep 2020 08:20)    No overnight events noted.  Patient without new/acute symptoms reported to me.  Patient seen and examined at bedside.    ALLERGIES:  apple (Vomiting; Nausea)  No Known Drug Allergies    MEDICATIONS  (STANDING):  artificial  tears Solution 1 Drop(s) Both EYES daily  aspirin 325 milliGRAM(s) Oral daily  atorvastatin 40 milliGRAM(s) Oral at bedtime  clopidogrel Tablet 75 milliGRAM(s) Oral daily  enoxaparin Injectable 40 milliGRAM(s) SubCutaneous at bedtime  influenza   Vaccine 0.5 milliLiter(s) IntraMuscular once  lidocaine   Patch 1 Patch Transdermal daily  polyethylene glycol 3350 17 Gram(s) Oral daily  senna 2 Tablet(s) Oral at bedtime    MEDICATIONS  (PRN):  acetaminophen   Tablet .. 650 milliGRAM(s) Oral every 6 hours PRN Temp greater or equal to 38C (100.4F), Mild Pain (1 - 3), Moderate Pain (4 - 6), Severe Pain (7 - 10)  artificial  tears Solution 1 Drop(s) Both EYES every 1 hour PRN Dry Eyes  bisacodyl Suppository 10 milliGRAM(s) Rectal daily PRN Constipation    Vital Signs Last 24 Hrs  T(F): 97.6 (14 Sep 2020 08:17), Max: 97.6 (14 Sep 2020 08:17)  HR: 56 (14 Sep 2020 08:17) (56 - 60)  BP: 123/72 (14 Sep 2020 08:17) (123/72 - 143/66)  RR: 16 (14 Sep 2020 08:17) (15 - 16)  SpO2: 98% (14 Sep 2020 08:17) (95% - 98%)    PHYSICAL EXAM:  General: NAD, A/O x 3  Neck: Supple  Lungs: Clear to auscultation bilaterally, no wheezes, rales, rhonchi  Cardio: RRR, S1/S2,   Abdomen: Soft, Nontender, Nondistended; Bowel sounds present  Extremities: No calf tenderness, No pitting edema b/l. + patch left shoulder  Neuro: left upper arm strength 3/5. otherwise, 5/5 RUE, LE b/l     LABS:                        13.3   5.12  )-----------( 260      ( 14 Sep 2020 05:20 )             39.6       09-14    138  |  104  |  15  ----------------------------<  100  3.9   |  26  |  0.85    Ca    8.8      14 Sep 2020 05:20    TPro  7.1  /  Alb  3.2  /  TBili  0.4  /  DBili  x   /  AST  31  /  ALT  69  /  AlkPhos  79  09-14     eGFR if Non African American: 85 mL/min/1.73M2 (09-14-20 @ 05:20)  eGFR if African American: 98 mL/min/1.73M2 (09-14-20 @ 05:20)         08-15 Chol 220 mg/dL  mg/dL HDL 44 mg/dL Trig 98 mg/dL

## 2020-09-14 NOTE — DISCHARGE NOTE PROVIDER - HOSPITAL COURSE
Patient is a 75 y.o. RH -dominant man with PMHx HTN not on antithrombotics, presented to [facility] ED 8/15/20 with 1 day history of headache and altered mental status. Patient suddenly became confused 5PM day before admission and c/o new onset nonradiating headache at the top/middle of hairline and some weakness in right lower extremity. Per patient's friend, his speech was "off" and face "did not look normal"; patient initially refused to go to ED at that time but eventually sent due to persistent headache and lightheadedness. Neuro exam showed mild dysarthria and mild left nasolabial flattening with left arm weakness.     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 Neuro consulted, felt infarct most likely as a result of large artery atherosclerosis, although an embolus with partial lysis could not be ruled out. Patient was started on Midodrine 10mg TID and IV fluids due to hypotension after admission to maintain //100. +UA but negative UCx and was treated with Ceftriaxone (8/16-8/23). TTE showed EF 73%, normal LA, HbA1c 5.8, . Patient was started on ASA81 and Plavix 75mg daily, Plavix to be discontinued after 3 months.    Cardio was consulted for hypotension, likely neurogenic and possibly infectious etiology. 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 Pulm consulted for incidental finding of apical paraseptal emphysema on CT, no interventions recommended. Neurosurgery consulted for stenosis and questionable 2mm outpouching at L supraclinoid ICA, possible aneurysm vs infundibulum. MRA NOVA showed R MCA stenosis vs occlusion. S/P diagnostic angiography 8/25/20. S/P cerebral angiography with stenting 8/26/20. Patient weaned off Midodrine 8/28/20.    Patient was evaluated by PM&R and therapy for gait/ADL impairments and recommended acute rehabilitation. Patient was medically optimized for discharge to [facility] on ___. Admitted with gait instability, ADL, and functional impairments.     Rehab course significant for ___.    All other medical co-morbidities were stable. Patient tolerated course of inpatient PT/OT/SLP rehab with significant improvements and met rehab goals prior to discharge. Patient was medically cleared on ___ for discharge to ___. Patient is a 75 y.o. RH -dominant man with PMHx HTN not on antithrombotics, presented to [facility] ED 8/15/20 with 1 day history of headache and altered mental status. Patient suddenly became confused 5PM day before admission and c/o new onset nonradiating headache at the top/middle of hairline and some weakness in right lower extremity. Per patient's friend, his speech was "off" and face "did not look normal"; patient initially refused to go to ED at that time but eventually sent due to persistent headache and lightheadedness. Neuro exam showed mild dysarthria and mild left nasolabial flattening with left arm weakness.     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 Neuro consulted, felt infarct most likely as a result of large artery atherosclerosis, although an embolus with partial lysis could not be ruled out. Patient was started on Midodrine 10mg TID and IV fluids due to hypotension after admission to maintain //100. +UA but negative UCx and was treated with Ceftriaxone (8/16-8/23). TTE showed EF 73%, normal LA, HbA1c 5.8, . Patient was started on ASA81 and Plavix 75mg daily, Plavix to be discontinued after 3 months.    Cardio was consulted for hypotension, likely neurogenic and possibly infectious etiology. 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 Pulm consulted for incidental finding of apical paraseptal emphysema on CT, no interventions recommended. Neurosurgery consulted for stenosis and questionable 2mm outpouching at L supraclinoid ICA, possible aneurysm vs infundibulum. MRA NOVA showed R MCA stenosis vs occlusion. S/P diagnostic angiography 8/25/20. S/P cerebral angiography with stenting 8/26/20. Patient weaned off Midodrine 8/28/20.    Patient was evaluated by PM&R and therapy for gait/ADL impairments and recommended acute rehabilitation. Patient was medically optimized for discharge to [facility] on 9/2/20. Admitted with gait instability, ADL, and functional impairments.     Rehab course was unremarkable. All other medical co-morbidities were stable. Patient tolerated course of inpatient PT/OT/SLP rehab with significant improvements and met rehab goals prior to discharge. Patient continues to have reduced carryover and significant left visual field cut. Patient was medically cleared on 9/16/20 for discharge to home with supervision with home PT/OT. Patient is a 75 y.o. RH -dominant man with PMHx HTN not on antithrombotics, presented to [facility] ED 8/15/20 with 1 day history of headache and altered mental status. Patient suddenly became confused 5PM day before admission and c/o new onset nonradiating headache at the top/middle of hairline and some weakness in right lower extremity. Per patient's friend, his speech was "off" and face "did not look normal"; patient initially refused to go to ED at that time but eventually sent due to persistent headache and lightheadedness. Neuro exam showed mild dysarthria and mild left nasolabial flattening with left arm weakness.     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 Neuro consulted, felt infarct most likely as a result of large artery atherosclerosis, although an embolus with partial lysis could not be ruled out. Patient was started on Midodrine 10mg TID and IV fluids due to hypotension after admission to maintain //100. +UA but negative UCx and was treated with Ceftriaxone (8/16-8/23). TTE showed EF 73%, normal LA, HbA1c 5.8, . Patient was started on ASA81 and Plavix 75mg daily, Plavix to be discontinued after 3 months.    Cardio was consulted for hypotension, likely neurogenic and possibly infectious etiology. 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 Pulm consulted for incidental finding of apical paraseptal emphysema on CT, no interventions recommended. Neurosurgery consulted for stenosis and questionable 2mm outpouching at L supraclinoid ICA, possible aneurysm vs infundibulum. MRA NOVA showed R MCA stenosis vs occlusion. S/P diagnostic angiography 8/25/20. S/P cerebral angiography with stenting 8/26/20. Patient weaned off Midodrine 8/28/20.    Patient was evaluated by PM&R and therapy for gait/ADL impairments and recommended acute rehabilitation. Patient was medically optimized for discharge to [facility] on 9/2/20. Admitted with gait instability, ADL, and functional impairments.     Rehab course was unremarkable. All other medical co-morbidities were stable. Patient tolerated course of inpatient PT/OT/SLP rehab with significant improvements and met rehab goals prior to discharge. Patient continues to have reduced carryover and significant left visual field cut. Patient was given Lidocaine cream for left shoulder pain and educated on keeping left arm supported for left shoulder subluxation. Patient was medically cleared on 9/16/20 for discharge to home with supervision with home PT/OT. Patient is a 75 y.o. RH -dominant man with PMHx HTN not on antithrombotics, presented to [facility] ED 8/15/20 with 1 day history of headache and altered mental status. Patient suddenly became confused 5PM day before admission and c/o new onset nonradiating headache at the top/middle of hairline and some weakness in right lower extremity. Per patient's friend, his speech was "off" and face "did not look normal"; patient initially refused to go to ED at that time but eventually sent due to persistent headache and lightheadedness. Neuro exam showed mild dysarthria and mild left nasolabial flattening with left arm weakness.     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 Neuro consulted, felt infarct most likely as a result of large artery atherosclerosis, although an embolus with partial lysis could not be ruled out. Patient was started on Midodrine 10mg TID and IV fluids due to hypotension after admission to maintain //100. +UA but negative UCx and was treated with Ceftriaxone (8/16-8/23). TTE showed EF 73%, normal LA, HbA1c 5.8, . Patient was started on ASA81 and Plavix 75mg daily, Plavix to be discontinued after 3 months.    Cardio was consulted for hypotension, likely neurogenic and possibly infectious etiology. 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 Pulm consulted for incidental finding of apical paraseptal emphysema on CT, no interventions recommended. Neurosurgery consulted for stenosis and questionable 2mm outpouching at L supraclinoid ICA, possible aneurysm vs infundibulum. MRA NOVA showed R MCA stenosis vs occlusion. S/P diagnostic angiography 8/25/20. S/P cerebral angiography with stenting 8/26/20. Patient weaned off Midodrine 8/28/20.    Patient was evaluated by PM&R and therapy for gait/ADL impairments and recommended acute rehabilitation. Patient was medically optimized for discharge to [facility] on 9/2/20. Admitted with gait instability, ADL, and functional impairments.     Rehab course was unremarkable. All other medical co-morbidities were stable. Patient tolerated course of inpatient PT/OT/SLP rehab with significant improvements and met rehab goals prior to discharge. Patient continues to have reduced carryover and significant left visual field cut, which benefits from cues for attention and safety +mild cognitive impairment. Patient was given Lidocaine cream for left shoulder pain and educated on keeping left arm supported for left shoulder subluxation. Patient was medically cleared on 9/16/20 for discharge to home with supervision with home PT/OT. He is sueprvision for bADLs, trasnfers, and Cs/CG shower transfers, supervision ambulation with RW. Patient is a 75 y.o. RH -dominant man with PMHx HTN not on antithrombotics, presented to [facility] ED 8/15/20 with 1 day history of headache and altered mental status. Patient suddenly became confused 5PM day before admission and c/o new onset nonradiating headache at the top/middle of hairline and some weakness in right lower extremity. Per patient's friend, his speech was "off" and face "did not look normal"; patient initially refused to go to ED at that time but eventually sent due to persistent headache and lightheadedness. Neuro exam showed mild dysarthria and mild left nasolabial flattening with left arm weakness.     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 Neuro consulted, felt infarct most likely as a result of large artery atherosclerosis, although an embolus with partial lysis could not be ruled out. Patient was started on Midodrine 10mg TID and IV fluids due to hypotension after admission to maintain //100. +UA but negative UCx and was treated with Ceftriaxone (8/16-8/23). TTE showed EF 73%, normal LA, HbA1c 5.8, . Patient was started on ASA81 and Plavix 75mg daily, Plavix to be discontinued after 3 months.    Cardio was consulted for hypotension, likely neurogenic and possibly infectious etiology. 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 Pulm consulted for incidental finding of apical paraseptal emphysema on CT, no interventions recommended. Neurosurgery consulted for stenosis and questionable 2mm outpouching at L supraclinoid ICA, possible aneurysm vs infundibulum. MRA NOVA showed R MCA stenosis vs occlusion. S/P diagnostic angiography 8/25/20. S/P cerebral angiography with stenting 8/26/20. Patient weaned off Midodrine 8/28/20.    Patient was evaluated by PM&R and therapy for gait/ADL impairments and recommended acute rehabilitation. Patient was medically optimized for discharge to [facility] on 9/2/20. Admitted with gait instability, ADL, and functional impairments.     Rehab course was unremarkable. All other medical co-morbidities were stable. Patient tolerated course of inpatient PT/OT/SLP rehab with significant improvements and met rehab goals prior to discharge. Patient continues to have reduced carryover and significant left visual field cut, which benefits from cues for attention and safety +mild cognitive impairment. Patient was given Lidocaine cream for left shoulder pain and educated on keeping left arm supported for left shoulder subluxation. Patient was medically cleared on 9/16/20 for discharge to home with supervision with home PT/OT. He is supervision for bADLs, transfers, and Cs/CG shower transfers, supervision ambulation with RW. Patient is a 75 y.o. RH -dominant man with PMHx HTN not on antithrombotics, presented to [facility] ED 8/15/20 with 1 day history of headache and altered mental status. Patient suddenly became confused 5PM day before admission and c/o new onset nonradiating headache at the top/middle of hairline and some weakness in right lower extremity. Per patient's friend, his speech was "off" and face "did not look normal"; patient initially refused to go to ED at that time but eventually sent due to persistent headache and lightheadedness. Neuro exam showed mild dysarthria and mild left nasolabial flattening with left arm weakness.     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 Neuro consulted, felt infarct most likely as a result of large artery atherosclerosis, although an embolus with partial lysis could not be ruled out. Patient was started on Midodrine 10mg TID and IV fluids due to hypotension after admission to maintain //100. +UA but negative UCx and was treated with Ceftriaxone (8/16-8/23). TTE showed EF 73%, normal LA, HbA1c 5.8, . Patient was started on ASA81 and Plavix 75mg daily, Plavix to be discontinued after 3 months.    Cardio was consulted for hypotension, likely neurogenic and possibly infectious etiology. 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 Pulm consulted for incidental finding of apical paraseptal emphysema on CT, no interventions recommended. Neurosurgery consulted for stenosis and questionable 2mm outpouching at L supraclinoid ICA, possible aneurysm vs infundibulum. MRA NOVA showed R MCA stenosis vs occlusion. S/P diagnostic angiography 8/25/20. S/P cerebral angiography with stenting 8/26/20. Patient weaned off Midodrine 8/28/20.    Patient was evaluated by PM&R and therapy for gait/ADL impairments and recommended acute rehabilitation. Patient was medically optimized for discharge to [facility] on 9/2/20. Admitted with gait instability, ADL, and functional impairments.     Rehab course was unremarkable. All other medical co-morbidities were stable. Patient tolerated course of inpatient PT/OT/SLP rehab with significant improvements and met rehab goals prior to discharge. Patient continues to have reduced carryover and significant left visual field cut, which benefits from cues for attention and safety +mild cognitive impairment. Patient was given Lidocaine cream for left shoulder pain and educated on keeping left arm supported for left shoulder subluxation. Patient was medically cleared on 9/16/20 for discharge to home with supervision.  He is supervision for bADLs, transfers, and Cs/CG shower transfers, supervision ambulation with RW. He was recommended for home care, however caregivers declined, and will be referred to outpatient Pt, OT, SLP instead Patient is a 75 y.o. RH -dominant man with PMHx HTN not on antithrombotics, presented to [facility] ED 8/15/20 with 1 day history of headache and altered mental status. Patient suddenly became confused 5PM day before admission and c/o new onset nonradiating headache at the top/middle of hairline and some weakness in right lower extremity. Per patient's friend, his speech was "off" and face "did not look normal"; patient initially refused to go to ED at that time but eventually sent due to persistent headache and lightheadedness. Neuro exam showed mild dysarthria and mild left nasolabial flattening with left arm weakness.     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 Neuro consulted, felt infarct most likely as a result of large artery atherosclerosis, although an embolus with partial lysis could not be ruled out. Patient was started on Midodrine 10mg TID and IV fluids due to hypotension after admission to maintain //100. +UA but negative UCx and was treated with Ceftriaxone (8/16-8/23). TTE showed EF 73%, normal LA, HbA1c 5.8, . Patient was started on ASA81 and Plavix 75mg daily, Plavix to be discontinued after 3 months.    Cardio was consulted for hypotension, likely neurogenic and possibly infectious etiology. 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 Pulm consulted for incidental finding of apical paraseptal emphysema on CT, no interventions recommended. Neurosurgery consulted for stenosis and questionable 2mm outpouching at L supraclinoid ICA, possible aneurysm vs infundibulum. MRA NOVA showed R MCA stenosis vs occlusion. S/P diagnostic angiography 8/25/20. S/P cerebral angiography with stenting 8/26/20. Patient weaned off Midodrine 8/28/20.    Patient was evaluated by PM&R and therapy for gait/ADL impairments and recommended acute rehabilitation. Patient was medically optimized for discharge to [facility] on 9/2/20. Admitted with gait instability, ADL, and functional impairments.     Rehab course was unremarkable. All other medical co-morbidities were stable. Patient tolerated course of inpatient PT/OT/SLP rehab with significant improvements and met rehab goals prior to discharge. Patient continues to have reduced carryover and significant left visual field cut, which benefits from cues for attention and safety +mild cognitive impairment. Patient was given Lidocaine cream for left shoulder pain and educated on keeping left arm supported for left shoulder subluxation. Patient was medically cleared on 9/16/20 for discharge to home with supervision.  He is supervision for bADLs, transfers, and Cs/CG shower transfers, supervision ambulation with RW. He was recommended for home care, with Pt, OT, SLP

## 2020-09-14 NOTE — DISCHARGE NOTE PROVIDER - NSDCCPCAREPLAN_GEN_ALL_CORE_FT
PRINCIPAL DISCHARGE DIAGNOSIS  Diagnosis: Acute right MCA stroke  Assessment and Plan of Treatment: You were admitted to [place] [facility] acute [facility] for a right-sided stroke causing left-sided body weakness. You were able to reach your rehab goals. Continue taking Aspirin, Plavix, and Lipitor. Due to the stroke, you have a left shoulder subluxation. Continue using Lidocaine gel twice a day on the shoulder. Clean the gel off your shoulder prior to a new application of the gel. Follow up with your neurologist within a week of discharge. Follow up with Dr. Maxwell in approximately 4 weeks.       PRINCIPAL DISCHARGE DIAGNOSIS  Diagnosis: Acute right MCA stroke  Assessment and Plan of Treatment: You were admitted to [facility] for a right-sided stroke causing left-sided body weakness. You were able to reach your rehab goals. Continue taking Aspirin, Plavix, and Lipitor. Due to the stroke, you have a left shoulder subluxation. Continue using Lidocaine cream twice a day on the shoulder. Clean the cream off your shoulder prior to a new application of the gel. Follow up with your neurologist within a week of discharge. Follow up with Dr. Maxwell in approximately 4 weeks.       PRINCIPAL DISCHARGE DIAGNOSIS  Diagnosis: Acute right MCA stroke  Assessment and Plan of Treatment: You were admitted to [place] [facility] for a right-sided stroke causing left-sided body weakness. You were able to reach your rehab goals. Continue taking Aspirin, Plavix, and Lipitor. Due to the stroke, you have a left shoulder subluxation. Keep your left arm supported with a lap board or pillow. Continue using Lidocaine cream twice a day on the shoulder for pain. Clean the cream off your shoulder prior to a new application of the gel. Follow up with your neurologist within a week of discharge. Follow up with Dr. Maxwell in approximately 4 weeks.       PRINCIPAL DISCHARGE DIAGNOSIS  Diagnosis: Acute right MCA stroke  Assessment and Plan of Treatment: You were admitted to [facility] for a right-sided stroke causing left-sided body weakness. You were able to reach your rehab goals. Continue taking Aspirin, Plavix, and Lipitor. Due to the stroke, you have a left shoulder subluxation. Keep your left arm supported with a lap board or pillow. Continue using Lidocaine cream twice a day on the shoulder for pain. Clean the cream off your shoulder prior to a new application of the gel. Follow up with your neurologist within a week of discharge. Follow up with Dr. Maxwell in approximately 4 weeks.      SECONDARY DISCHARGE DIAGNOSES  Diagnosis: Hypertension  Assessment and Plan of Treatment: Your blood pressure was controlled despite not being on any high blood pressure medications. Follow up with your primary care physician to continue monitoring your blood pressure.     PRINCIPAL DISCHARGE DIAGNOSIS  Diagnosis: Acute right MCA stroke  Assessment and Plan of Treatment: You were admitted to [facility] acute [facility] for a right-sided stroke causing left-sided body weakness. You were able to reach your rehab goals. Continue taking Aspirin, Plavix, and Lipitor. Due to the stroke, you have a left shoulder subluxation. Keep your left arm supported with a lap board or pillow. Continue using Lidocaine cream twice a day on the shoulder for pain. Clean the cream off your shoulder prior to a new application of the gel. Follow up with your neurologist and neurosurgeon within a week of discharge. Follow up with Dr. Maxwell in approximately 4 weeks.      SECONDARY DISCHARGE DIAGNOSES  Diagnosis: Hypertension  Assessment and Plan of Treatment: Your blood pressure was controlled despite not being on any high blood pressure medications. Follow up with your primary care physician to continue monitoring your blood pressure.

## 2020-09-14 NOTE — PROVIDER CONTACT NOTE (MEDICATION) - SITUATION
pt has lidocaine patch on since 1200, to be removed later in pm.  xylocaine to start 9/15/20 due to lidocaine patch on

## 2020-09-14 NOTE — DISCHARGE NOTE PROVIDER - CARE PROVIDER_API CALL
Linda Maxwell  Physical Medicine and Rehabilitation  [address] [place] [address]  Phone: [phone]  Fax: [phone]  Follow Up Time:     Cam Rosales  NEUROLOGY  [address] [facility]  [place] [address]  Phone: [phone]  Fax: [phone]  Follow Up Time:     Otf Levi)  Neurosurgery  [address] [place] [address]  Phone: [phone]  Fax: [phone]  Follow Up Time:    Linda Maxwell  Physical Medicine and Rehabilitation  [address] [place] [address]  Phone: [phone]  Fax: [phone]  Follow Up Time:     Cam Rosales  NEUROLOGY  [address] [facility], [address]  [place] [address]  Phone: [phone]  Fax: [phone]  Follow Up Time:     Otf Levi)  Neurosurgery  [address] [place] [address]  Phone: [phone]  Fax: [phone]  Follow Up Time:     Pierre Singleton)  Ophthalmology  [address] [facility], [address]  [place] [address]  Phone: [phone]  Fax: [phone]  Follow Up Time:

## 2020-09-14 NOTE — PROGRESS NOTE ADULT - ATTENDING COMMENTS
I have personally interviewed and examined this patient, reviewed pertinent clinical information, and performed the evaluation and management services provided at today's visit for inpatient medical follow up    I am available to discuss any issues related to the medical care of this patient on the unit, or by phone at [phone]
I have personally interviewed and examined this patient, reviewed pertinent clinical information, and performed the evaluation and management services provided at today's visit for inpatient medical follow up    I am available to discuss any issues related to the medical care of this patient on the unit, or by phone at [phone]
I have read and agree with above. Patient seen with Dr Jimenez on rounds this morning. Please see my progress note dated 9/14/20 for my findings as well

## 2020-09-14 NOTE — PROGRESS NOTE ADULT - SUBJECTIVE AND OBJECTIVE BOX
Patient is a 75y old  Male who presents with a chief complaint of R MCA with left sided weakness  01.1 Left Body Involvement (Right Brain) (14 Sep 2020 12:14)      HPI:  Patient is a 75 y.o. RH -dominant man with PMHx HTN not on antithrombotics, presented to [facility] ED 8/15/20 with 1 day history of headache and altered mental status. Patient suddenly became confused 5PM day before admission and c/o new onset nonradiating headache at the top/middle of hairline and some weakness in right lower extremity. Per patient's friend, his speech was "off" and face "did not look normal"; patient initially refused to go to ED at that time but eventually sent due to persistent headache and lightheadedness. Neuro exam showed mild dysarthria and mild left nasolabial flattening with left arm weakness.     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 Neuro consulted, felt infarct most likely as a result of large artery atherosclerosis, although an embolus with partial lysis could not be ruled out. Patient was started on Midodrine 10mg TID and IV fluids due to hypotension after admission to maintain //100. +UA but negative UCx and was treated with Ceftriaxone (8/16-8/23). TTE showed EF 73%, normal LA, HbA1c 5.8, . Patient was started on ASA81 and Plavix 75mg daily, Plavix to be discontinued after 3 months.    Cardio was consulted for hypotension, likely neurogenic and possibly infectious etiology. 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 Pulm consulted for incidental finding of apical paraseptal emphysema on CT, no interventions recommended. Neurosurgery consulted for stenosis and questionable 2mm outpouching at L supraclinoid ICA, possible aneurysm vs infundibulum. MRA NOVA showed R MCA stenosis vs occlusion. S/P diagnostic angiography 8/25/20. S/P cerebral angiography with stenting 8/26/20. Patient weaned off Midodrine 8/28/20.    Patient was stable for discharge to [place] 9/2/20 for multidisciplinary acute rehab for functional deficits and gait/ADLs deficits. Patient speaks some English and Cantonese. Patient seen and examined at bedside with [employer] phone  #[phone]. (02 Sep 2020 14:53)      PAST MEDICAL & SURGICAL HISTORY:  HTN (hypertension)        MEDICATIONS  (STANDING):  artificial  tears Solution 1 Drop(s) Both EYES daily  aspirin 325 milliGRAM(s) Oral daily  atorvastatin 40 milliGRAM(s) Oral at bedtime  clopidogrel Tablet 75 milliGRAM(s) Oral daily  enoxaparin Injectable 40 milliGRAM(s) SubCutaneous at bedtime  influenza   Vaccine 0.5 milliLiter(s) IntraMuscular once  polyethylene glycol 3350 17 Gram(s) Oral daily  senna 2 Tablet(s) Oral at bedtime    MEDICATIONS  (PRN):  acetaminophen   Tablet .. 650 milliGRAM(s) Oral every 6 hours PRN Temp greater or equal to 38C (100.4F), Mild Pain (1 - 3), Moderate Pain (4 - 6), Severe Pain (7 - 10)  artificial  tears Solution 1 Drop(s) Both EYES every 1 hour PRN Dry Eyes  bisacodyl Suppository 10 milliGRAM(s) Rectal daily PRN Constipation      Allergies    apple (Vomiting; Nausea)  No Known Drug Allergies    Intolerances          VITALS  75y  Vital Signs Last 24 Hrs  T(C): 36.4 (14 Sep 2020 08:17), Max: 36.4 (13 Sep 2020 20:51)  T(F): 97.6 (14 Sep 2020 08:17), Max: 97.6 (14 Sep 2020 08:17)  HR: 56 (14 Sep 2020 08:17) (56 - 60)  BP: 123/72 (14 Sep 2020 08:17) (123/72 - 143/66)  BP(mean): --  RR: 16 (14 Sep 2020 08:17) (15 - 16)  SpO2: 98% (14 Sep 2020 08:17) (95% - 98%)  Daily     Daily         RECENT LABS:                          13.3   5.12  )-----------( 260      ( 14 Sep 2020 05:20 )             39.6     09-14    138  |  104  |  15  ----------------------------<  100<H>  3.9   |  26  |  0.85    Ca    8.8      14 Sep 2020 05:20    TPro  7.1  /  Alb  3.2<L>  /  TBili  0.4  /  DBili  x   /  AST  31  /  ALT  69<H>  /  AlkPhos  79  09-14    LIVER FUNCTIONS - ( 14 Sep 2020 05:20 )  Alb: 3.2 g/dL / Pro: 7.1 g/dL / ALK PHOS: 79 U/L / ALT: 69 U/L / AST: 31 U/L / GGT: x                   CAPILLARY BLOOD GLUCOSE

## 2020-09-14 NOTE — PROGRESS NOTE ADULT - ASSESSMENT
RAFY KIRKPATRICK is a 75y with a history of HTN who presented to [facility] on 8/15/20 with headache and confusion and found to have severe stenosis of R M1 seen on initial CTA head and 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 Hospital course complicated by UTI and hypotension, both now resolved. Now admitted to [facility] after for initiation of a multidisciplinary rehab program consisting focused on functional mobility, transfers and ADLs (activities of daily living).    #Right-sided MCA Stroke, S/P R MCA stenting with L sided weakness, dysarthria, dysphagia, left visual field cut, incoordination  - Yearly CTA head to monitor L supraclinoid ICA infundibulum vs small aneurysm  - continue ASA, plavix, lipitor secondary PPX  - continue comprehensive rehab program, 3 hours a day, 5 days a week, PT, OT. hours increased to 90 minutes each with dc SLP  - Precautions: falls, cardiac, aspiration, left dilia-inattention, visual deficits  - F/u neurosurgery, neurology on dc    #left shoulder subluxation  -lap board/pillows for support. Patient educated on proper positioning  -lidoderm patch switched to lidocaine cream BID for pain    #HTN -  - Currently normotensive  - Monitor off of medications due to h/o hypotension  -PCP f/u on dc    #HLD  - continue lipitor 80    #Dysphagia  - tolerating regular solids and thin liquids   -continue enlive daily supplement    #GI/Bowel:  - Senna QHS, Miralax Daily  - dulcolax suppository PRN    #DVT PPX  - Lovenox, SCDs  - Last Doppler on 8/28/20    #Case discussed in IDT rounds 9/14:  -Madeline with ADLs, Madeline-CG with PT. PT/OT recommends around the clock supervision due to safety concerns with foot placement and visual field cut.   -caregiver training  -target: dc home 9/16 with [facility] home care, PT and OT  -will need medical and neurological follow up in community    #Labs:    CBC BMP 9/17    --------------    Outpatient Follow-up (Specialty/Name of physician):  Pierre Singleton)  Ophthalmology  [address] [place] [address], [address]  [place] [address]  Phone: [phone]  Fax: [phone]  Follow Up Time: 2 weeks    Otf Levi  Neurological Surgery  [address] [place] [address]  Phone: [phone]  Fax: [phone]    [facility]  Cardiology  [address] [place] [address]  Phone: [phone]  Fax:   Follow Up Time: 2 weeks    [facility]  Neurology  [address] [facility] - 3rd [place] [address]  Phone: [phone]  Fax:   Follow Up Time: 2 weeks    [facility] [place]  Internal Medicine  [address] [place] [address]  Phone: [phone]  Fax: [phone]  Follow Up Time: 2 weeks    --------------

## 2020-09-14 NOTE — PROGRESS NOTE ADULT - ASSESSMENT
RAFY KIRKPATRICK is a 75y with a history of HTN who presented to [facility] on 8/15/20 with headache and confusion and found to have severe stenosis of R M1 seen on initial CTA head and 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 Hospital course complicated by UTI and hypotension, both now resolved. Now admitted to [facility] after for initiation of a multidisciplinary rehab program consisting focused on functional mobility, transfers and ADLs (activities of daily living).    #Right-sided MCA Stroke, S/P R MCA stenting with L sided weakness, dysarthria, dysphagia, left visual field cut, incoordination  - Yearly CTA head to monitor L supraclinoid ICA infundibulum vs small aneurysm  - continue ASA, plavix, lipitor secondary PPX  - continue comprehensive rehab program, 3 hours a day, 5 days a week, PT, OT.  - Precautions: falls, cardiac, aspiration, left dilia-inattention, visual deficits  - F/u neurosurgery, neurology, neuro=ophtho on dc    #left shoulder subluxation  -lap board/pillows for support. Patient educated on proper positioning  -lidoderm patch--> lidocaine jelly q12 PRN in preparation for dc 9/114    #HTN -  - Currently normotensive, not on meds  -PCP f/u on dc    #HLD  - continue lipitor 80    #Dysphagia  - tolerating regular solids and thin liquids   -continue enlive daily supplement    #GI/Bowel:  - Senna QHS, Miralax Daily  - dulcolax suppository PRN    #DVT PPX  - Lovenox, SCDs  - Last Doppler on 8/28/20    #Case discussed in IDT rounds 9/14:  -min assist/CG transfers due to field cut, min assist LE dressing, CG UE dressing, min assist/CG transfers, min assist ADL. Reduced carryover  -caregiver training. Anticipated to require CG/min assist at home due to reduced carryover and significant left visual field cut  -target: dc home 9/17 with [facility] home care, Pt and OT  -will need medical and neurological follow up in community    #Labs:  caregiver training  [facility] 9/14 reviewed STABLE    --------------    Outpatient Follow-up (Specialty/Name of physician):  Pierre Singleton)  Ophthalmology  [address] [facility], [address]  [place] [address]  Phone: [phone]  Fax: [phone]  Follow Up Time: 2 weeks    Otf Levi  Neurological Surgery  [address] [place] [address]  Phone: [phone]  Fax: [phone]    [facility]  Cardiology  [address] [place] [address]  Phone: [phone]  Fax:   Follow Up Time: 2 weeks    [facility]  Neurology  [address] [facility] - [address]rd [place] [address]  Phone: [phone]  Fax:   Follow Up Time: 2 weeks    [facility] [place]  Internal Medicine  [address] [place] [address]  Phone: [phone]  Fax: [phone]  Follow Up Time: 2 weeks    --------------

## 2020-09-15 PROCEDURE — 99232 SBSQ HOSP IP/OBS MODERATE 35: CPT

## 2020-09-15 RX ADMIN — ATORVASTATIN CALCIUM 40 MILLIGRAM(S): 80 TABLET, FILM COATED ORAL at 21:36

## 2020-09-15 RX ADMIN — LIDOCAINE 1 APPLICATION(S): 4 CREAM TOPICAL at 17:36

## 2020-09-15 RX ADMIN — Medication 650 MILLIGRAM(S): at 11:23

## 2020-09-15 RX ADMIN — LIDOCAINE 1 PATCH: 4 CREAM TOPICAL at 00:34

## 2020-09-15 RX ADMIN — ENOXAPARIN SODIUM 40 MILLIGRAM(S): 100 INJECTION SUBCUTANEOUS at 21:36

## 2020-09-15 RX ADMIN — Medication 325 MILLIGRAM(S): at 11:23

## 2020-09-15 RX ADMIN — LIDOCAINE 1 APPLICATION(S): 4 CREAM TOPICAL at 05:36

## 2020-09-15 RX ADMIN — SENNA PLUS 2 TABLET(S): 8.6 TABLET ORAL at 21:36

## 2020-09-15 RX ADMIN — Medication 650 MILLIGRAM(S): at 12:15

## 2020-09-15 RX ADMIN — Medication 1 DROP(S): at 11:24

## 2020-09-15 RX ADMIN — CLOPIDOGREL BISULFATE 75 MILLIGRAM(S): 75 TABLET, FILM COATED ORAL at 11:23

## 2020-09-15 NOTE — PROGRESS NOTE ADULT - SUBJECTIVE AND OBJECTIVE BOX
Patient is a 75y old  Male who presents with a chief complaint of R MCA with left sided weakness  01.1 Left Body Involvement (Right Brain) (15 Sep 2020 13:05)      Patient seen and examined at bedside.  Patient states left shoulder pain.  Denies any chest pain or shortness of breath.    ALLERGIES:  apple (Vomiting; Nausea)  No Known Drug Allergies    MEDICATIONS:  atorvastatin 40 milliGRAM(s) Oral at bedtime  bisacodyl Suppository 10 milliGRAM(s) Rectal daily PRN  influenza   Vaccine 0.5 milliLiter(s) IntraMuscular once  lidocaine 5% Ointment 1 Application(s) Topical two times a day    Vital Signs Last 24 Hrs  T(F): 98 (15 Sep 2020 09:39), Max: 98 (15 Sep 2020 09:39)  HR: 60 (15 Sep 2020 09:39) (60 - 63)  BP: 130/70 (15 Sep 2020 09:39) (130/70 - 143/62)  RR: 16 (15 Sep 2020 09:39) (16 - 16)  SpO2: 98% (15 Sep 2020 09:39) (97% - 98%)  I&O's Summary    14 Sep 2020 07:01  -  15 Sep 2020 07:00  --------------------------------------------------------  IN: 240 mL / OUT: 0 mL / NET: 240 mL    15 Sep 2020 07:01  -  15 Sep 2020 13:17  --------------------------------------------------------  IN: 240 mL / OUT: 0 mL / NET: 240 mL        PHYSICAL EXAM:  General: NAD  ENT: MMM  Neck: Supple, No JVD  Lungs: Clear to auscultation bilaterally  Cardio: RRR, S1/S2, No murmurs  Abdomen: Soft, Nontender, Nondistended; Bowel sounds present  Extremities: No cyanosis, No edema    LABS:                        13.3   5.12  )-----------( 260      ( 14 Sep 2020 05:20 )             39.6     09-14    138  |  104  |  15  ----------------------------<  100  3.9   |  26  |  0.85    Ca    8.8      14 Sep 2020 05:20    TPro  7.1  /  Alb  3.2  /  TBili  0.4  /  DBili  x   /  AST  31  /  ALT  69  /  AlkPhos  79  09-14    eGFR if Non African American: 85 mL/min/1.73M2 (09-14-20 @ 05:20)  eGFR if African American: 98 mL/min/1.73M2 (09-14-20 @ 05:20)            08-15 Chol 220 mg/dL  mg/dL HDL 44 mg/dL Trig 98 mg/dL                        RADIOLOGY & ADDITIONAL TESTS:    Care Discussed with Consultants/Other Providers:

## 2020-09-15 NOTE — PROGRESS NOTE ADULT - ASSESSMENT
76 y/o M with PMH HTN who presented to [facility] on 8/15/20 with headache and confusion, found to have severe stenosis of R M1 seen on initial CTA head and 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 Hospital course complicated by UTI and hypotension, both now resolved. Now admitted to [facility] 9/2/20 for initiation of a multidisciplinary rehab program consisting focused on functional mobility, transfers and ADLs (activities of daily living).    Right-sided MCA Stroke, S/P R MCA stenting with residual deficit; L sided weakness, dysarthria, dysphagia  - continue comprehensive rehab program per primary team  - Patient recommended yearly CTA head to monitor L supraclinoid ICA infundibulum vs small aneurysm  - continue ASA, plavix, lipitor reduced to 40mg due to transaminitis   - , goal < 70  - F/u neurosurgery, neurology    Dysphagia  - improving  - passed speech and swallow eval, diet advanced to regular diet, continue ensure    Essential HTN   - Currently normotensive  - Monitor off of medications    HLD  - LDL goal of 70  - liptor decreased to 40mg due to transaminitis     Transaminitis   - Labs reviewed, LFTs wnl August 14 2020  - cont decreased dose lipitor 40mg and monitor LFTs     Constipation  - senna, dulcolax, miralax prn    VTE ppx:   - continue Lovenox

## 2020-09-15 NOTE — PROGRESS NOTE ADULT - SUBJECTIVE AND OBJECTIVE BOX
Patient is a 75y old  Male who presents with a chief complaint of R MCA with left sided weakness  01.1 Left Body Involvement (Right Brain) (14 Sep 2020 13:42)      HPI:  Patient is a 75 y.o. RH -dominant man with PMHx HTN not on antithrombotics, presented to [facility] ED 8/15/20 with 1 day history of headache and altered mental status. Patient suddenly became confused 5PM day before admission and c/o new onset nonradiating headache at the top/middle of hairline and some weakness in right lower extremity. Per patient's friend, his speech was "off" and face "did not look normal"; patient initially refused to go to ED at that time but eventually sent due to persistent headache and lightheadedness. Neuro exam showed mild dysarthria and mild left nasolabial flattening with left arm weakness.     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 Neuro consulted, felt infarct most likely as a result of large artery atherosclerosis, although an embolus with partial lysis could not be ruled out. Patient was started on Midodrine 10mg TID and IV fluids due to hypotension after admission to maintain //100. +UA but negative UCx and was treated with Ceftriaxone (8/16-8/23). TTE showed EF 73%, normal LA, HbA1c 5.8, . Patient was started on ASA81 and Plavix 75mg daily, Plavix to be discontinued after 3 months.    Cardio was consulted for hypotension, likely neurogenic and possibly infectious etiology. 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 Pulm consulted for incidental finding of apical paraseptal emphysema on CT, no interventions recommended. Neurosurgery consulted for stenosis and questionable 2mm outpouching at L supraclinoid ICA, possible aneurysm vs infundibulum. MRA NOVA showed R MCA stenosis vs occlusion. S/P diagnostic angiography 8/25/20. S/P cerebral angiography with stenting 8/26/20. Patient weaned off Midodrine 8/28/20.    Patient was stable for discharge to [place] 9/2/20 for multidisciplinary acute rehab for functional deficits and gait/ADLs deficits. Patient speaks some English and Cantonese. Patient seen and examined at bedside with [employer] phone  #[phone]. (02 Sep 2020 14:53)      PAST MEDICAL & SURGICAL HISTORY:  HTN (hypertension)        MEDICATIONS  (STANDING):  artificial  tears Solution 1 Drop(s) Both EYES daily  aspirin 325 milliGRAM(s) Oral daily  atorvastatin 40 milliGRAM(s) Oral at bedtime  clopidogrel Tablet 75 milliGRAM(s) Oral daily  enoxaparin Injectable 40 milliGRAM(s) SubCutaneous at bedtime  influenza   Vaccine 0.5 milliLiter(s) IntraMuscular once  lidocaine 5% Ointment 1 Application(s) Topical two times a day  polyethylene glycol 3350 17 Gram(s) Oral daily  senna 2 Tablet(s) Oral at bedtime    MEDICATIONS  (PRN):  acetaminophen   Tablet .. 650 milliGRAM(s) Oral every 6 hours PRN Temp greater or equal to 38C (100.4F), Mild Pain (1 - 3), Moderate Pain (4 - 6), Severe Pain (7 - 10)  bisacodyl Suppository 10 milliGRAM(s) Rectal daily PRN Constipation      Allergies    apple (Vomiting; Nausea)  No Known Drug Allergies    Intolerances          VITALS  75y  Vital Signs Last 24 Hrs  T(C): 36.7 (15 Sep 2020 09:39), Max: 36.7 (15 Sep 2020 09:39)  T(F): 98 (15 Sep 2020 09:39), Max: 98 (15 Sep 2020 09:39)  HR: 60 (15 Sep 2020 09:39) (60 - 63)  BP: 130/70 (15 Sep 2020 09:39) (130/70 - 143/62)  BP(mean): --  RR: 16 (15 Sep 2020 09:39) (16 - 16)  SpO2: 98% (15 Sep 2020 09:39) (97% - 98%)  Daily     Daily         RECENT LABS:                          13.3   5.12  )-----------( 260      ( 14 Sep 2020 05:20 )             39.6     09-14    138  |  104  |  15  ----------------------------<  100<H>  3.9   |  26  |  0.85    Ca    8.8      14 Sep 2020 05:20    TPro  7.1  /  Alb  3.2<L>  /  TBili  0.4  /  DBili  x   /  AST  31  /  ALT  69<H>  /  AlkPhos  79  09-14    LIVER FUNCTIONS - ( 14 Sep 2020 05:20 )  Alb: 3.2 g/dL / Pro: 7.1 g/dL / ALK PHOS: 79 U/L / ALT: 69 U/L / AST: 31 U/L / GGT: x                   CAPILLARY BLOOD GLUCOSE

## 2020-09-15 NOTE — PROGRESS NOTE ADULT - ASSESSMENT
RAFY KIRKPATRICK is a 75y with a history of HTN who presented to [facility] on 8/15/20 with headache and confusion and found to have severe stenosis of R M1 seen on initial CTA head and 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 Hospital course complicated by UTI and hypotension, both now resolved. Now admitted to [facility] after for initiation of a multidisciplinary rehab program consisting focused on functional mobility, transfers and ADLs (activities of daily living).    #Right-sided MCA Stroke, S/P R MCA stenting with L sided weakness, dysarthria, dysphagia, left visual field cut, incoordination  - Yearly CTA head to monitor L supraclinoid ICA infundibulum vs small aneurysm  - continue ASA, plavix, lipitor secondary PPX  - continue comprehensive rehab program, 3 hours a day, 5 days a week, PT, OT.  - Precautions: falls, cardiac, aspiration, left dilia-inattention, visual deficits  - F/u neurosurgery, neurology, neuro=ophtho on dc    #left shoulder subluxation  -lap board/pillows for support. Kineseotaping. Shoulder stabilization and strengthening exercises  -lidoderm patch--> lidocaine jelly q12 PRN  -tylenol PRn pain    #HTN -  - Currently normotensive, not on meds  -(130/70 - 143/62) 9/15  -PCP f/u on dc    #HLD  - continue lipitor 80    #Dysphagia  - tolerating regular solids and thin liquids   -continue enlive daily supplement    #GI/Bowel:  - Senna QHS, Miralax Daily  - dulcolax suppository PRN    #DVT PPX  - Lovenox, SCDs  - Last Doppler on 8/28/20    #Case discussed in IDT rounds 9/14:  -min assist/CG transfers due to field cut, min assist LE dressing, CG UE dressing, min assist/CG transfers, min assist ADL. Reduced carryover  -caregiver training. Anticipated to require CG/min assist at home due to reduced carryover and significant left visual field cut  -target: dc home 9/17 with [facility] home care, Pt and OT, caregiver supervision and support  -will need medical and neurology, neurological surgery, cardiology, PM+R follow up in community    #Labs:  caregiver training  [facility] 9/17    --------------    Outpatient Follow-up (Specialty/Name of physician):  Pierre Singleton)  Ophthalmology  [address] [place] [address], [address]  [place] [address]  Phone: [phone]  Fax: [phone]  Follow Up Time: 2 weeks    Otf Levi  Neurological Surgery  [address] [place] [address]  Phone: [phone]  Fax: [phone]    [facility]  Cardiology  [address] [place] [address]  Phone: [phone]  Fax:   Follow Up Time: 2 weeks    [facility]  Neurology  [address] [facility] - [address] [place] [address]  Phone: [phone]  Fax:   Follow Up Time: 2 weeks    [facility] [place]  Internal Medicine  [address] [place] [address]  Phone: [phone]  Fax: [phone]  Follow Up Time: 2 weeks    --------------

## 2020-09-16 PROCEDURE — 99232 SBSQ HOSP IP/OBS MODERATE 35: CPT

## 2020-09-16 PROCEDURE — 99231 SBSQ HOSP IP/OBS SF/LOW 25: CPT

## 2020-09-16 RX ADMIN — Medication 325 MILLIGRAM(S): at 11:52

## 2020-09-16 RX ADMIN — SENNA PLUS 2 TABLET(S): 8.6 TABLET ORAL at 21:30

## 2020-09-16 RX ADMIN — ENOXAPARIN SODIUM 40 MILLIGRAM(S): 100 INJECTION SUBCUTANEOUS at 21:30

## 2020-09-16 RX ADMIN — ATORVASTATIN CALCIUM 40 MILLIGRAM(S): 80 TABLET, FILM COATED ORAL at 21:30

## 2020-09-16 RX ADMIN — Medication 1 DROP(S): at 11:53

## 2020-09-16 RX ADMIN — CLOPIDOGREL BISULFATE 75 MILLIGRAM(S): 75 TABLET, FILM COATED ORAL at 11:52

## 2020-09-16 NOTE — PROGRESS NOTE ADULT - SUBJECTIVE AND OBJECTIVE BOX
Patient is a 75y old  Male who presents with a chief complaint of R MCA with left sided weakness  01.1 Left Body Involvement (Right Brain) (16 Sep 2020 11:42)      HPI:  Patient is a 75 y.o. RH -dominant man with PMHx HTN not on antithrombotics, presented to [facility] ED 8/15/20 with 1 day history of headache and altered mental status. Patient suddenly became confused 5PM day before admission and c/o new onset nonradiating headache at the top/middle of hairline and some weakness in right lower extremity. Per patient's friend, his speech was "off" and face "did not look normal"; patient initially refused to go to ED at that time but eventually sent due to persistent headache and lightheadedness. Neuro exam showed mild dysarthria and mild left nasolabial flattening with left arm weakness.     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 Neuro consulted, felt infarct most likely as a result of large artery atherosclerosis, although an embolus with partial lysis could not be ruled out. Patient was started on Midodrine 10mg TID and IV fluids due to hypotension after admission to maintain //100. +UA but negative UCx and was treated with Ceftriaxone (8/16-8/23). TTE showed EF 73%, normal LA, HbA1c 5.8, . Patient was started on ASA81 and Plavix 75mg daily, Plavix to be discontinued after 3 months.    Cardio was consulted for hypotension, likely neurogenic and possibly infectious etiology. 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 Pulm consulted for incidental finding of apical paraseptal emphysema on CT, no interventions recommended. Neurosurgery consulted for stenosis and questionable 2mm outpouching at L supraclinoid ICA, possible aneurysm vs infundibulum. MRA NOVA showed R MCA stenosis vs occlusion. S/P diagnostic angiography 8/25/20. S/P cerebral angiography with stenting 8/26/20. Patient weaned off Midodrine 8/28/20.    Patient was stable for discharge to [place] 9/2/20 for multidisciplinary acute rehab for functional deficits and gait/ADLs deficits. Patient speaks some English and Cantonese. Patient seen and examined at bedside with [employer] phone  #[phone]. (02 Sep 2020 14:53)      PAST MEDICAL & SURGICAL HISTORY:  HTN (hypertension)        MEDICATIONS  (STANDING):  artificial  tears Solution 1 Drop(s) Both EYES daily  aspirin 325 milliGRAM(s) Oral daily  atorvastatin 40 milliGRAM(s) Oral at bedtime  clopidogrel Tablet 75 milliGRAM(s) Oral daily  enoxaparin Injectable 40 milliGRAM(s) SubCutaneous at bedtime  influenza   Vaccine 0.5 milliLiter(s) IntraMuscular once  lidocaine 5% Ointment 1 Application(s) Topical two times a day  polyethylene glycol 3350 17 Gram(s) Oral daily  senna 2 Tablet(s) Oral at bedtime    MEDICATIONS  (PRN):  acetaminophen   Tablet .. 650 milliGRAM(s) Oral every 6 hours PRN Temp greater or equal to 38C (100.4F), Mild Pain (1 - 3), Moderate Pain (4 - 6), Severe Pain (7 - 10)  bisacodyl Suppository 10 milliGRAM(s) Rectal daily PRN Constipation      Allergies    apple (Vomiting; Nausea)  No Known Drug Allergies    Intolerances          VITALS  75y  Vital Signs Last 24 Hrs  T(C): 36.7 (16 Sep 2020 09:26), Max: 36.7 (16 Sep 2020 09:26)  T(F): 98 (16 Sep 2020 09:26), Max: 98 (16 Sep 2020 09:26)  HR: 74 (16 Sep 2020 10:45) (56 - 74)  BP: 109/54 (16 Sep 2020 10:45) (109/54 - 132/71)  BP(mean): --  RR: 16 (16 Sep 2020 09:26) (15 - 16)  SpO2: 97% (16 Sep 2020 09:26) (96% - 97%)  Daily     Daily         RECENT LABS:                      CAPILLARY BLOOD GLUCOSE

## 2020-09-16 NOTE — PROGRESS NOTE ADULT - RS GEN PE MLT RESP DETAILS PC
breath sounds equal/respirations non-labored/clear to auscultation bilaterally
clear to auscultation bilaterally/respirations non-labored
clear to auscultation bilaterally/respirations non-labored
respirations non-labored/clear to auscultation bilaterally/good air movement
respirations non-labored/clear to auscultation bilaterally
respirations non-labored/clear to auscultation bilaterally/good air movement
clear to auscultation bilaterally/good air movement/respirations non-labored

## 2020-09-16 NOTE — PROGRESS NOTE ADULT - ASSESSMENT
RAFY KIRKPATRICK is a 75y with a history of HTN who presented to [facility] on 8/15/20 with headache and confusion and found to have severe stenosis of R M1 seen on initial CTA head and 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 Hospital course complicated by UTI and hypotension, both now resolved. Now admitted to [facility] after for initiation of a multidisciplinary rehab program consisting focused on functional mobility, transfers and ADLs (activities of daily living).    #Right-sided MCA Stroke, S/P R MCA stenting with L sided weakness, dysarthria, dysphagia, left visual field cut, incoordination  - Yearly CTA head to monitor L supraclinoid ICA infundibulum vs small aneurysm  - continue ASA, plavix, lipitor secondary PPX  - continue comprehensive rehab program, 3 hours a day, 5 days a week, PT, OT.  - Precautions: falls, cardiac, aspiration, left dilia-inattention, visual deficits  - F/u neurosurgery, neurology, neuro=ophtho on dc    #left shoulder subluxation  -lap board/pillows for support. Kineseotaping. Shoulder stabilization and strengthening exercises  -lidoderm patch--> lidocaine jelly q12 PRN  -tylenol PRn pain    #HTN -  - Currently normotensive, not on meds  -PCP f/u on dc    #HLD  - continue lipitor 80    #Dysphagia  - tolerating regular solids and thin liquids   -continue enlive daily supplement    #GI/Bowel:  - Senna QHS, Miralax Daily  - dulcolax suppository PRN    #DVT PPX  - Lovenox, SCDs  - Last Doppler on 8/28/20    #Case discussed in IDT rounds 9/14:  -min assist/CG transfers due to field cut, min assist LE dressing, CG UE dressing, min assist/CG transfers, min assist ADL. Reduced carryover  -caregiver training. Anticipated to require CG/min assist at home due to reduced carryover and significant left visual field cut  -target: dc home 9/17 with [facility] home care, Pt and OT, caregiver supervision and support. DC may be delayed as still trying to arrange caregiver training and adequate supervision in community  -will need medical and neurology, neurological surgery, cardiology, PM+R follow up in community    #Labs:  caregiver training  CBC BMP 9/17    --------------    Outpatient Follow-up (Specialty/Name of physician):  Pierre Singleton)  Ophthalmology  [address] [place] [address], [address]  [place] [address]  Phone: [phone]  Fax: [phone]  Follow Up Time: 2 weeks    [facility]  Neurological Surgery  [address] [place] [address]  Phone: [phone]  Fax: [phone]    [facility]  Cardiology  [address] [place] [address]  Phone: [phone]  Fax:   Follow Up Time: 2 weeks    [facility]  Neurology  [address] - [address]rd [place] [address]  Phone: [phone]  Fax:   Follow Up Time: 2 weeks    [facility] [place]  Internal Medicine  [address] [place] [address]  Phone: [phone]  Fax: [phone]  Follow Up Time: 2 weeks    --------------

## 2020-09-16 NOTE — PROGRESS NOTE ADULT - ASSESSMENT
74 y/o M with PMH HTN who presented to [facility] on 8/15/20 with headache and confusion, found to have severe stenosis of R M1 seen on initial CTA head and 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 Hospital course complicated by UTI and hypotension, both now resolved. Now admitted to [facility] 9/2/20 for initiation of a multidisciplinary rehab program consisting focused on functional mobility, transfers and ADLs (activities of daily living).    Right-sided MCA Stroke, S/P R MCA stenting with residual deficit; L sided weakness, dysarthria, dysphagia  - continue comprehensive rehab program per primary team  - Patient recommended yearly CTA head to monitor L supraclinoid ICA infundibulum vs small aneurysm  - continue ASA, plavix, lipitor reduced to 40mg due to transaminitis   - , goal < 70  - F/u neurosurgery, neurology    Dysphagia  - improving  - passed speech and swallow eval, diet advanced to regular diet, continue ensure    Essential HTN   - Currently normotensive  - Monitor off of medications    HLD  - LDL goal of 70  - liptor decreased to 40mg due to transaminitis     Transaminitis   - Labs reviewed, LFTs wnl August 14 2020  - cont decreased dose lipitor 40mg and monitor LFTs     Constipation  - senna, dulcolax, miralax prn    VTE ppx:   - continue Lovenox

## 2020-09-16 NOTE — PROGRESS NOTE ADULT - SUBJECTIVE AND OBJECTIVE BOX
Patient is a 75y old  Male who presents with a chief complaint of R MCA with left sided weakness  01.1 Left Body Involvement (Right Brain) (15 Sep 2020 13:16)      Patient seen and examined at bedside.  Patient states left shoulder pain with some improvement.  No chest pain or shortness of breath.     ALLERGIES:  apple (Vomiting; Nausea)  No Known Drug Allergies    MEDICATIONS:  atorvastatin 40 milliGRAM(s) Oral at bedtime  bisacodyl Suppository 10 milliGRAM(s) Rectal daily PRN  influenza   Vaccine 0.5 milliLiter(s) IntraMuscular once  lidocaine 5% Ointment 1 Application(s) Topical two times a day    Vital Signs Last 24 Hrs  T(F): 98 (16 Sep 2020 09:26), Max: 98 (16 Sep 2020 09:26)  HR: 74 (16 Sep 2020 10:45) (56 - 74)  BP: 109/54 (16 Sep 2020 10:45) (109/54 - 132/71)  RR: 16 (16 Sep 2020 09:26) (15 - 16)  SpO2: 97% (16 Sep 2020 09:26) (96% - 97%)  I&O's Summary    15 Sep 2020 07:01  -  16 Sep 2020 07:00  --------------------------------------------------------  IN: 540 mL / OUT: 0 mL / NET: 540 mL    16 Sep 2020 07:01  -  16 Sep 2020 11:42  --------------------------------------------------------  IN: 360 mL / OUT: 0 mL / NET: 360 mL        PHYSICAL EXAM:  General: NAD  ENT: MMM  Neck: Supple, No JVD  Lungs: Clear to auscultation bilaterally  Cardio: RRR, S1/S2, 2/6 systolic murmur   Abdomen: Soft, Nontender, Nondistended; Bowel sounds present  Extremities: No cyanosis, No edema    LABS:                        13.3   5.12  )-----------( 260      ( 14 Sep 2020 05:20 )             39.6     09-14    138  |  104  |  15  ----------------------------<  100  3.9   |  26  |  0.85    Ca    8.8      14 Sep 2020 05:20    TPro  7.1  /  Alb  3.2  /  TBili  0.4  /  DBili  x   /  AST  31  /  ALT  69  /  AlkPhos  79  09-14    eGFR if Non African American: 85 mL/min/1.73M2 (09-14-20 @ 05:20)  eGFR if African American: 98 mL/min/1.73M2 (09-14-20 @ 05:20)            08-15 Chol 220 mg/dL  mg/dL HDL 44 mg/dL Trig 98 mg/dL                        RADIOLOGY & ADDITIONAL TESTS:    Care Discussed with Consultants/Other Providers:

## 2020-09-17 ENCOUNTER — TRANSCRIPTION ENCOUNTER (OUTPATIENT)
Age: 75
End: 2020-09-17

## 2020-09-17 LAB
HCT VFR BLD CALC: 41.3 % — SIGNIFICANT CHANGE UP (ref 39–50)
HGB BLD-MCNC: 13.6 G/DL — SIGNIFICANT CHANGE UP (ref 13–17)
MCHC RBC-ENTMCNC: 30.6 PG — SIGNIFICANT CHANGE UP (ref 27–34)
MCHC RBC-ENTMCNC: 32.9 GM/DL — SIGNIFICANT CHANGE UP (ref 32–36)
MCV RBC AUTO: 92.8 FL — SIGNIFICANT CHANGE UP (ref 80–100)
NRBC # BLD: 0 /100 WBCS — SIGNIFICANT CHANGE UP (ref 0–0)
PLATELET # BLD AUTO: 266 K/UL — SIGNIFICANT CHANGE UP (ref 150–400)
RBC # BLD: 4.45 M/UL — SIGNIFICANT CHANGE UP (ref 4.2–5.8)
RBC # FLD: 12.4 % — SIGNIFICANT CHANGE UP (ref 10.3–14.5)
WBC # BLD: 5.68 K/UL — SIGNIFICANT CHANGE UP (ref 3.8–10.5)
WBC # FLD AUTO: 5.68 K/UL — SIGNIFICANT CHANGE UP (ref 3.8–10.5)

## 2020-09-17 PROCEDURE — 99232 SBSQ HOSP IP/OBS MODERATE 35: CPT

## 2020-09-17 RX ADMIN — ENOXAPARIN SODIUM 40 MILLIGRAM(S): 100 INJECTION SUBCUTANEOUS at 21:30

## 2020-09-17 RX ADMIN — SENNA PLUS 2 TABLET(S): 8.6 TABLET ORAL at 21:30

## 2020-09-17 RX ADMIN — ATORVASTATIN CALCIUM 40 MILLIGRAM(S): 80 TABLET, FILM COATED ORAL at 21:30

## 2020-09-17 RX ADMIN — CLOPIDOGREL BISULFATE 75 MILLIGRAM(S): 75 TABLET, FILM COATED ORAL at 13:18

## 2020-09-17 RX ADMIN — Medication 325 MILLIGRAM(S): at 13:17

## 2020-09-17 RX ADMIN — LIDOCAINE 1 APPLICATION(S): 4 CREAM TOPICAL at 17:26

## 2020-09-17 RX ADMIN — Medication 1 DROP(S): at 13:16

## 2020-09-17 NOTE — PROGRESS NOTE ADULT - SUBJECTIVE AND OBJECTIVE BOX
Patient is a 75y old  Male who presents with a chief complaint of R MCA with left sided weakness  01.1 Left Body Involvement (Right Brain) (17 Sep 2020 12:34)      Patient seen and examined at bedside.  Patient reports no chest pain or shortness of breath.     ALLERGIES:  apple (Vomiting; Nausea)  No Known Drug Allergies    MEDICATIONS:  atorvastatin 40 milliGRAM(s) Oral at bedtime  bisacodyl Suppository 10 milliGRAM(s) Rectal daily PRN  influenza   Vaccine 0.5 milliLiter(s) IntraMuscular once  lidocaine 5% Ointment 1 Application(s) Topical two times a day    Vital Signs Last 24 Hrs  T(F): 99.4 (17 Sep 2020 08:22), Max: 99.4 (17 Sep 2020 08:22)  HR: 89 (17 Sep 2020 08:22) (62 - 89)  BP: 123/65 (17 Sep 2020 08:22) (119/68 - 123/65)  RR: 16 (17 Sep 2020 08:22) (16 - 16)  SpO2: 94% (17 Sep 2020 08:22) (94% - 97%)  I&O's Summary    16 Sep 2020 07:01  -  17 Sep 2020 07:00  --------------------------------------------------------  IN: 360 mL / OUT: 0 mL / NET: 360 mL    17 Sep 2020 07:01  -  17 Sep 2020 13:53  --------------------------------------------------------  IN: 480 mL / OUT: 0 mL / NET: 480 mL        PHYSICAL EXAM:  General: NAD, A/O x 3  ENT: MMM  Neck: Supple, No JVD  Lungs: Clear to auscultation bilaterally  Cardio: RRR, S1/S2, No murmurs  Abdomen: Soft, Nontender, Nondistended; Bowel sounds present  Extremities: No cyanosis, No edema    LABS:                        13.6   5.68  )-----------( 266      ( 17 Sep 2020 07:13 )             41.3     09-17    140  |  104  |  18  ----------------------------<  104  3.8   |  30  |  0.87    Ca    9.3      17 Sep 2020 07:13    TPro  7.3  /  Alb  3.4  /  TBili  0.4  /  DBili  x   /  AST  28  /  ALT  63  /  AlkPhos  81  09-17    eGFR if Non African American: 84 mL/min/1.73M2 (09-17-20 @ 07:13)  eGFR if African American: 97 mL/min/1.73M2 (09-17-20 @ 07:13)            08-15 Chol 220 mg/dL  mg/dL HDL 44 mg/dL Trig 98 mg/dL                        RADIOLOGY & ADDITIONAL TESTS:    Care Discussed with Consultants/Other Providers: Patient is a 75y old  Male who presents with a chief complaint of R MCA with left sided weakness  01.1 Left Body Involvement (Right Brain) (17 Sep 2020 12:34)      Patient seen and examined at bedside.  Patient reports no chest pain or shortness of breath.     ALLERGIES:  apple (Vomiting; Nausea)  No Known Drug Allergies    MEDICATIONS:  atorvastatin 40 milliGRAM(s) Oral at bedtime  bisacodyl Suppository 10 milliGRAM(s) Rectal daily PRN  influenza   Vaccine 0.5 milliLiter(s) IntraMuscular once  lidocaine 5% Ointment 1 Application(s) Topical two times a day    Vital Signs Last 24 Hrs  T(F): 99.4 (17 Sep 2020 08:22), Max: 99.4 (17 Sep 2020 08:22)  HR: 89 (17 Sep 2020 08:22) (62 - 89)  BP: 123/65 (17 Sep 2020 08:22) (119/68 - 123/65)  RR: 16 (17 Sep 2020 08:22) (16 - 16)  SpO2: 94% (17 Sep 2020 08:22) (94% - 97%)  I&O's Summary    16 Sep 2020 07:01  -  17 Sep 2020 07:00  --------------------------------------------------------  IN: 360 mL / OUT: 0 mL / NET: 360 mL    17 Sep 2020 07:01  -  17 Sep 2020 13:53  --------------------------------------------------------  IN: 480 mL / OUT: 0 mL / NET: 480 mL        PHYSICAL EXAM:  General: NAD  ENT: MMM  Neck: Supple, No JVD  Lungs: Clear to auscultation bilaterally  Cardio: RRR, S1/S2, No murmurs  Abdomen: Soft, Nontender, Nondistended; Bowel sounds present  Extremities: No cyanosis, No edema    LABS:                        13.6   5.68  )-----------( 266      ( 17 Sep 2020 07:13 )             41.3     09-17    140  |  104  |  18  ----------------------------<  104  3.8   |  30  |  0.87    Ca    9.3      17 Sep 2020 07:13    TPro  7.3  /  Alb  3.4  /  TBili  0.4  /  DBili  x   /  AST  28  /  ALT  63  /  AlkPhos  81  09-17    eGFR if Non African American: 84 mL/min/1.73M2 (09-17-20 @ 07:13)  eGFR if African American: 97 mL/min/1.73M2 (09-17-20 @ 07:13)            08-15 Chol 220 mg/dL  mg/dL HDL 44 mg/dL Trig 98 mg/dL                        RADIOLOGY & ADDITIONAL TESTS:    Care Discussed with Consultants/Other Providers:

## 2020-09-17 NOTE — PROGRESS NOTE ADULT - ASSESSMENT
74 y/o M with PMH HTN who presented to [facility] on 8/15/20 with headache and confusion, found to have severe stenosis of R M1 seen on initial CTA head and 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 Hospital course complicated by UTI and hypotension, both now resolved. Now admitted to [facility] 9/2/20 for initiation of a multidisciplinary rehab program consisting focused on functional mobility, transfers and ADLs (activities of daily living).    Right-sided MCA Stroke, S/P R MCA stenting with residual deficit; L sided weakness, dysarthria, dysphagia  - continue comprehensive rehab program per primary team  - Patient recommended yearly CTA head to monitor L supraclinoid ICA infundibulum vs small aneurysm  - continue ASA, plavix, lipitor reduced to 40mg due to transaminitis   - , goal < 70  - F/u neurosurgery, neurology    Dysphagia  - improving  - passed speech and swallow eval, diet advanced to regular diet, continue ensure    Essential HTN   - Currently normotensive  - Monitor off of medications    HLD  - LDL goal of 70  - liptor decreased to 40mg due to transaminitis     Transaminitis   - Labs reviewed, LFTs wnl August 14 2020  - cont decreased dose lipitor 40mg and monitor LFTs     Constipation  - senna, dulcolax, miralax prn    VTE ppx:   - continue Lovenox    Dispo: plan for patient to be discharged 9/18

## 2020-09-17 NOTE — PROGRESS NOTE ADULT - SUBJECTIVE AND OBJECTIVE BOX
Patient is a 75y old  Male who presents with a chief complaint of R MCA with left sided weakness  01.1 Left Body Involvement (Right Brain) (16 Sep 2020 14:05)      HPI:  Patient is a 75 y.o. RH -dominant man with PMHx HTN not on antithrombotics, presented to [facility] ED 8/15/20 with 1 day history of headache and altered mental status. Patient suddenly became confused 5PM day before admission and c/o new onset nonradiating headache at the top/middle of hairline and some weakness in right lower extremity. Per patient's friend, his speech was "off" and face "did not look normal"; patient initially refused to go to ED at that time but eventually sent due to persistent headache and lightheadedness. Neuro exam showed mild dysarthria and mild left nasolabial flattening with left arm weakness.     Hospital course was significant for severe stenosis of R M1 seen on initial CTA head and acute infarct in the R MCA territory on subsequent MRI brain 20. Neuro consulted, felt infarct most likely as a result of large artery atherosclerosis, although an embolus with partial lysis could not be ruled out. Patient was started on Midodrine 10mg TID and IV fluids due to hypotension after admission to maintain //100. +UA but negative UCx and was treated with Ceftriaxone (-). TTE showed EF 73%, normal LA, HbA1c 5.8, . Patient was started on ASA81 and Plavix 75mg daily, Plavix to be discontinued after 3 months.    Cardio was consulted for hypotension, likely neurogenic and possibly infectious etiology. 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 Pulm consulted for incidental finding of apical paraseptal emphysema on CT, no interventions recommended. Neurosurgery consulted for stenosis and questionable 2mm outpouching at L supraclinoid ICA, possible aneurysm vs infundibulum. MRA NOVA showed R MCA stenosis vs occlusion. S/P diagnostic angiography 20. S/P cerebral angiography with stenting 20. Patient weaned off Midodrine 20.    Patient was stable for discharge to [facility] for multidisciplinary acute rehab for functional deficits and gait/ADLs deficits. Patient speaks some English and Cantonese. Patient seen and examined at bedside with [employer] phone  #[phone]. (02 Sep 2020 14:53)    SUBJECTIVE/INTERVAL EVENTS  Patient seen and assessed at bedside. No acute events overnight. Patient endorses improvement of left shoulder pain overall since rehab stay and after lidocaine cream is applied. Otherwise, denies other pain or discomfort.     PAST MEDICAL & SURGICAL HISTORY:  HTN (hypertension)        Allergies    apple (Vomiting; Nausea)  No Known Drug Allergies    Intolerances          VITALS  75y  Vital Signs Last 24 Hrs  T(C): 37.4 (17 Sep 2020 08:22), Max: 37.4 (17 Sep 2020 08:22)  T(F): 99.4 (17 Sep 2020 08:22), Max: 99.4 (17 Sep 2020 08:22)  HR: 89 (17 Sep 2020 08:22) (62 - 89)  BP: 123/65 (17 Sep 2020 08:22) (119/68 - 123/65)  BP(mean): --  RR: 16 (17 Sep 2020 08:22) (16 - 16)  SpO2: 94% (17 Sep 2020 08:22) (94% - 97%)  Daily     Daily Weight in k.4 (17 Sep 2020 00:00)    Physical Exam:   · Constitutional	detailed exam  · Constitutional Comments	pleasant. reduced overall insight and problem-solving, O x 3 grossly and follows 1-2 step commands +reduced initiation  · Respiratory	detailed exam  · Respiratory Details	good air movement; respirations non-labored; clear to auscultation bilaterally  · Cardiovascular	detailed exam  · Cardiovascular Details	regular rate and rhythm  · Gastrointestinal	detailed exam  · GI Normal	soft; nontender  · Extremities	detailed exam  · Extremities Details	no pedal edema  · Extremities Comments	calves soft bilaterally  left pinch 4/5 reduced coordination  left shoulder 5-/5 +subluxation  elbow flexion/extension 5/5      RECENT LABS:                          13.6   5.68  )-----------( 266      ( 17 Sep 2020 07:13 )             41.3     09-17    140  |  104  |  18  ----------------------------<  104<H>  3.8   |  30  |  0.87    Ca    9.3      17 Sep 2020 07:13    TPro  7.3  /  Alb  3.4  /  TBili  0.4  /  DBili  x   /  AST  28  /  ALT  63<H>  /  AlkPhos  81  09-17    LIVER FUNCTIONS - ( 17 Sep 2020 07:13 )  Alb: 3.4 g/dL / Pro: 7.3 g/dL / ALK PHOS: 81 U/L / ALT: 63 U/L / AST: 28 U/L / GGT: x                   CAPILLARY BLOOD GLUCOSE          MEDICATIONS  (STANDING):  artificial  tears Solution 1 Drop(s) Both EYES daily  aspirin 325 milliGRAM(s) Oral daily  atorvastatin 40 milliGRAM(s) Oral at bedtime  clopidogrel Tablet 75 milliGRAM(s) Oral daily  enoxaparin Injectable 40 milliGRAM(s) SubCutaneous at bedtime  influenza   Vaccine 0.5 milliLiter(s) IntraMuscular once  lidocaine 5% Ointment 1 Application(s) Topical two times a day  polyethylene glycol 3350 17 Gram(s) Oral daily  senna 2 Tablet(s) Oral at bedtime    MEDICATIONS  (PRN):  acetaminophen   Tablet .. 650 milliGRAM(s) Oral every 6 hours PRN Temp greater or equal to 38C (100.4F), Mild Pain (1 - 3), Moderate Pain (4 - 6), Severe Pain (7 - 10)  bisacodyl Suppository 10 milliGRAM(s) Rectal daily PRN Constipation           Patient is a 75y old  Male who presents with a chief complaint of R MCA with left sided weakness  01.1 Left Body Involvement (Right Brain) (16 Sep 2020 14:05)      HPI:  Patient is a 75 y.o. RH -dominant man with PMHx HTN not on antithrombotics, presented to [facility] ED 8/15/20 with 1 day history of headache and altered mental status. Patient suddenly became confused 5PM day before admission and c/o new onset nonradiating headache at the top/middle of hairline and some weakness in right lower extremity. Per patient's friend, his speech was "off" and face "did not look normal"; patient initially refused to go to ED at that time but eventually sent due to persistent headache and lightheadedness. Neuro exam showed mild dysarthria and mild left nasolabial flattening with left arm weakness.     Hospital course was significant for severe stenosis of R M1 seen on initial CTA head and acute infarct in the R MCA territory on subsequent MRI brain 20. Neuro consulted, felt infarct most likely as a result of large artery atherosclerosis, although an embolus with partial lysis could not be ruled out. Patient was started on Midodrine 10mg TID and IV fluids due to hypotension after admission to maintain //100. +UA but negative UCx and was treated with Ceftriaxone (-). TTE showed EF 73%, normal LA, HbA1c 5.8, . Patient was started on ASA81 and Plavix 75mg daily, Plavix to be discontinued after 3 months.    Cardio was consulted for hypotension, likely neurogenic and possibly infectious etiology. 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 Pulm consulted for incidental finding of apical paraseptal emphysema on CT, no interventions recommended. Neurosurgery consulted for stenosis and questionable 2mm outpouching at L supraclinoid ICA, possible aneurysm vs infundibulum. MRA NOVA showed R MCA stenosis vs occlusion. S/P diagnostic angiography 20. S/P cerebral angiography with stenting 20. Patient weaned off Midodrine 20.    Patient was stable for discharge to [facility] for multidisciplinary acute rehab for functional deficits and gait/ADLs deficits. Patient speaks some English and Cantonese. Patient seen and examined at bedside with [employer] phone  #[phone]. (02 Sep 2020 14:53)    SUBJECTIVE/INTERVAL EVENTS  Patient seen and assessed at bedside. No acute events overnight. Patient endorses improvement of left shoulder pain overall since rehab stay and after lidocaine cream is applied. Otherwise, denies other pain or discomfort.     for caregiver training tomorrow.     PAST MEDICAL & SURGICAL HISTORY:  HTN (hypertension)        Allergies    apple (Vomiting; Nausea)  No Known Drug Allergies    Intolerances          VITALS  75y  Vital Signs Last 24 Hrs  T(C): 37.4 (17 Sep 2020 08:22), Max: 37.4 (17 Sep 2020 08:22)  T(F): 99.4 (17 Sep 2020 08:22), Max: 99.4 (17 Sep 2020 08:22)  HR: 89 (17 Sep 2020 08:22) (62 - 89)  BP: 123/65 (17 Sep 2020 08:22) (119/68 - 123/65)  BP(mean): --  RR: 16 (17 Sep 2020 08:22) (16 - 16)  SpO2: 94% (17 Sep 2020 08:22) (94% - 97%)  Daily     Daily Weight in k.4 (17 Sep 2020 00:00)    Physical Exam:   · Constitutional	detailed exam  · Constitutional Comments	pleasant. O x 3, follows commands NAD. Reduced recall  · Respiratory	detailed exam  · Respiratory Details	good air movement; respirations non-labored; clear to auscultation bilaterally  · Cardiovascular	detailed exam  · Cardiovascular Details	regular rate and rhythm  · Gastrointestinal	detailed exam  · GI Normal	soft; nontender  · Extremities	detailed exam  · Extremities Details	no pedal edema  · Extremities Comments	calves soft bilaterally  left pinch 4/5 reduced coordination  left shoulder 5-/5 +subluxation  elbow flexion/extension 5/5    +pain with end range AROm (>80 degrees FF/abduction)      RECENT LABS:                          13.6   5.68  )-----------( 266      ( 17 Sep 2020 07:13 )             41.3     09-17    140  |  104  |  18  ----------------------------<  104<H>  3.8   |  30  |  0.87    Ca    9.3      17 Sep 2020 07:13    TPro  7.3  /  Alb  3.4  /  TBili  0.4  /  DBili  x   /  AST  28  /  ALT  63<H>  /  AlkPhos  81  09-17    LIVER FUNCTIONS - ( 17 Sep 2020 07:13 )  Alb: 3.4 g/dL / Pro: 7.3 g/dL / ALK PHOS: 81 U/L / ALT: 63 U/L / AST: 28 U/L / GGT: x                   CAPILLARY BLOOD GLUCOSE          MEDICATIONS  (STANDING):  artificial  tears Solution 1 Drop(s) Both EYES daily  aspirin 325 milliGRAM(s) Oral daily  atorvastatin 40 milliGRAM(s) Oral at bedtime  clopidogrel Tablet 75 milliGRAM(s) Oral daily  enoxaparin Injectable 40 milliGRAM(s) SubCutaneous at bedtime  influenza   Vaccine 0.5 milliLiter(s) IntraMuscular once  lidocaine 5% Ointment 1 Application(s) Topical two times a day  polyethylene glycol 3350 17 Gram(s) Oral daily  senna 2 Tablet(s) Oral at bedtime    MEDICATIONS  (PRN):  acetaminophen   Tablet .. 650 milliGRAM(s) Oral every 6 hours PRN Temp greater or equal to 38C (100.4F), Mild Pain (1 - 3), Moderate Pain (4 - 6), Severe Pain (7 - 10)  bisacodyl Suppository 10 milliGRAM(s) Rectal daily PRN Constipation

## 2020-09-17 NOTE — CHART NOTE - NSCHARTNOTEFT_GEN_A_CORE
Assessment:   Patient seen for:  f/u     Source: [x] medical review, [x] patient, [ ] family member    Factors impacting intake: [x] none    Intake: >75%    Diet Prescription: Diet, Regular:   Supplement Feeding Modality:  Oral  Ensure Enlive Cans or Servings Per Day:  1       Frequency:  Daily (09-08-20 @ 14:44)      Current Weight: 128.7 Lbs (09/17)  % Weight Change: decreased 3.5 lbs in 12 days (2.6%)  132.2 Lbs (9/05)    Pt reports good appetite. PO intake >75%. Pt feed self, no chewing/swallowing difficulties. Denies N/V/C/D. Last BM: 09/16.     Pertinent Medications: MEDICATIONS  (STANDING):  artificial  tears Solution 1 Drop(s) Both EYES daily  aspirin 325 milliGRAM(s) Oral daily  atorvastatin 40 milliGRAM(s) Oral at bedtime  clopidogrel Tablet 75 milliGRAM(s) Oral daily  enoxaparin Injectable 40 milliGRAM(s) SubCutaneous at bedtime  influenza   Vaccine 0.5 milliLiter(s) IntraMuscular once  lidocaine 5% Ointment 1 Application(s) Topical two times a day  polyethylene glycol 3350 17 Gram(s) Oral daily  senna 2 Tablet(s) Oral at bedtime    MEDICATIONS  (PRN):  acetaminophen   Tablet .. 650 milliGRAM(s) Oral every 6 hours PRN Temp greater or equal to 38C (100.4F), Mild Pain (1 - 3), Moderate Pain (4 - 6), Severe Pain (7 - 10)  bisacodyl Suppository 10 milliGRAM(s) Rectal daily PRN Constipation    Pertinent Labs: 09-17 Na140 mmol/L Glu 104 mg/dL<H> K+ 3.8 mmol/L Cr  0.87 mg/dL BUN 18 mg/dL 09-17 Alb 3.4 g/dL    CAPILLARY BLOOD GLUCOSE    Skin: WDL    Edema: None    Estimated Needs:   [x] no change since previous assessment    Previous Nutrition Diagnosis:   [x] Swallowing difficulties    Nutrition Diagnosis is [x] resolved    New Nutrition Diagnosis: [x]     [x] Increased Nutrient Needs related to acute illness as evidence by pt consumes >75% of the meals and 1 supplement and lost 3.5 Lbs in 12 days.     Interventions:   Recommend  [x] Continue with current diet: Regular diet  [x] Nutrition Supplement: Add Ensure Enlive BID (provide 350 calories, 20 gm protein per 8 oz)   [x] Nutrition Support: Add double protein at breakfast.   [x] Other: Honor pt's food preferences as feasible with prescribed diet.   [x] Obtain and record PO intake and weights daily  [x] Observe tolerance to diet consistency.     Monitoring and Evaluation:   Continue to monitor Nutritional intake, Tolerance to diet prescription, weights, labs, skin integrity.  other:  RD remains available upon request and will follow up per protocol.

## 2020-09-17 NOTE — PROGRESS NOTE ADULT - ASSESSMENT
RAFY KIRKPATRICK is a 75y with a history of HTN who presented to [facility] on 8/15/20 with headache and confusion and found to have severe stenosis of R M1 seen on initial CTA head and 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 Hospital course complicated by UTI and hypotension, both now resolved. Now admitted to [facility] after for initiation of a multidisciplinary rehab program consisting focused on functional mobility, transfers and ADLs (activities of daily living).    #Right-sided MCA Stroke, S/P R MCA stenting with L sided weakness, dysarthria, dysphagia, left visual field cut, incoordination  - Yearly CTA head to monitor L supraclinoid ICA infundibulum vs small aneurysm  - continue ASA, plavix, lipitor secondary PPX  - continue comprehensive rehab program, 3 hours a day, 5 days a week, PT, OT.  - Precautions: falls, cardiac, aspiration, left dilia-inattention, visual deficits  - F/u neurosurgery, neurology, neuro-ophtho on dc    #left shoulder subluxation  -lap board/pillows for support. Kineseotaping. Shoulder stabilization and strengthening exercises  -lidoderm patch--> lidocaine cream q12 PRN  -tylenol PRN pain    #HTN -  -Currently normotensive, not on meds  -PCP f/u on dc    #HLD  -continue lipitor 80    #Dysphagia  - tolerating regular solids and thin liquids   -continue enlive daily supplement    #GI/Bowel:  - Senna QHS, Miralax Daily  - dulcolax suppository PRN    #DVT PPX  - Lovenox, SCDs  - Last Doppler on 8/28/20    #Case discussed in IDT rounds 9/14:  -min assist/CG transfers due to field cut, min assist LE dressing, CG UE dressing, min assist/CG transfers, min assist ADL. Reduced carryover  -caregiver training. Anticipated to require CG/min assist at home due to reduced carryover and significant left visual field cut  -target: dc home 9/18 with [facility] home care, Pt and OT, caregiver supervision and support. DC may be delayed as still trying to arrange caregiver training and adequate supervision in community.   -will need medical and neurology, neurological surgery, cardiology, PM+R follow up in community  - Per KVNG, patient's friend who he is staying with in [place] does not want home services to come to the house. Per friend, patient will be moving to [place] permanently in 2 weeks and requests that patient receives f/u and services in [place].     #Labs:  caregiver training  CBC BMP 9/17    --------------    Outpatient Follow-up (Specialty/Name of physician):  Pierre Singleton)  Ophthalmology  [address] [place] [address], [address]  [place] [address]  Phone: [phone]  Fax: [phone]  Follow Up Time: 2 weeks    O[facility] Levi  Neurological Surgery  [address] [place] [address]  Phone: [phone]  Fax: [phone]    [facility]  Cardiology  [address] [place] [address]  Phone: [phone]  Fax:   Follow Up Time: 2 weeks    [facility]  Neurology  [address] [facility] - [address]rd [place] [address]  Phone: [phone]  Fax:   Follow Up Time: 2 weeks    [facility] [place]  Internal Medicine  [address] [place] [address]  Phone: [phone]  Fax: [phone]  Follow Up Time: 2 weeks    --------------   RAFY KIRKPATRICK is a 75y with a history of HTN who presented to [facility] on 8/15/20 with headache and confusion and found to have severe stenosis of R M1 seen on initial CTA head and 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 Hospital course complicated by UTI and hypotension, both now resolved. Now admitted to [facility] after for initiation of a multidisciplinary rehab program consisting focused on functional mobility, transfers and ADLs (activities of daily living).    #Right-sided MCA Stroke, S/P R MCA stenting with L sided weakness, dysarthria, dysphagia, left visual field cut, incoordination  - Yearly CTA head to monitor L supraclinoid ICA infundibulum vs small aneurysm  - continue ASA, plavix, lipitor secondary PPX  - continue comprehensive rehab program, 3 hours a day, 5 days a week, PT, OT.  - Precautions: falls, cardiac, aspiration, left dilia-inattention, visual deficits  - F/u neurosurgery, neurology, neuro-ophtho on dc    #left shoulder subluxation  -lap board/pillows for support. Kineseotaping. Shoulder stabilization and strengthening exercises  -lidoderm patch--> lidocaine cream q12 PRN. slight improvement 9/17  -tylenol PRN pain    #HTN -  -Currently normotensive, not on meds  -PCP f/u on dc    #HLD  -continue lipitor 80    #Dysphagia  - tolerating regular solids and thin liquids   -continue enlive daily supplement    #GI/Bowel:  - Senna QHS, Miralax Daily  - dulcolax suppository PRN    #DVT PPX  - Lovenox, SCDs  - Last Doppler on 8/28/20    #Case discussed in IDT rounds 9/14:  -min assist/CG transfers due to field cut, min assist LE dressing, CG UE dressing, min assist/CG transfers, min assist ADL. Reduced carryover  -caregiver training. Anticipated to require CG/min assist at home due to reduced carryover and significant left visual field cut  -target: dc home 9/18 with [facility] home care, Pt and OT, caregiver supervision and support. DC may be delayed as still trying to arrange caregiver training and adequate supervision in community.   -will need medical and neurology, neurological surgery, cardiology, PM+R follow up in community  - Per KVNG, patient's friend who he is staying with in [place] does not want home services . Team recommendations discussed, will review again with training. Per friend, patient will be moving to [place] permanently in 2 weeks and requests that patient receives f/u and services in [place], however patient states it will be 2 months.   -Will arrange for PCP in [place] area and back up in [place]    #Labs:  caregiver training  [facility] 9/17    --------------    Outpatient Follow-up (Specialty/Name of physician):  Pierre Singleton)  Ophthalmology  [address] [place] [address], [address]  [place] [address]  Phone: [phone]  Fax: [phone]  Follow Up Time: 2 weeks    O[facility] Levi  Neurological Surgery  [address] [place] [address]  Phone: [phone]  Fax: [phone]    [facility]  Cardiology  [address] [place] [address]  Phone: [phone]  Fax:   Follow Up Time: 2 weeks    [facility]  Neurology  [address] [facility] - [address]rd [place] [address]  Phone: [phone]  Fax:   Follow Up Time: 2 weeks    [facility] [place]  Internal Medicine  [address] [place] [address]  Phone: [phone]  Fax: [phone]  Follow Up Time: 2 weeks    --------------

## 2020-09-17 NOTE — DISCHARGE NOTE NURSING/CASE MANAGEMENT/SOCIAL WORK - PATIENT PORTAL LINK FT
You can access the FollowMyHealth Patient Portal offered by [facility] by registering at the following website: [email or web address]. By joining [facility]’s FollowMyHealth portal, you will also be able to view your health information using other applications (apps) compatible with our system.

## 2020-09-17 NOTE — DISCHARGE NOTE NURSING/CASE MANAGEMENT/SOCIAL WORK - NSDCFUADDAPPT_GEN_ALL_CORE_FT
When you return to [place], please follow up with [facility]- [address] [place] [address], [place] [address]  [phone]  They can accept you as a sliding scale patient and have staff who speak Mandarin You have an appointment for Tuesday, 9/22/20 at 4:00 pm with a primary care physician, Dr. Real Canales at [facility]- [address] [place] [address] (phone # [phone])- bring your hospital discharge instructions. They will ask you to fill out paperwork when you arrive.    When you return to [place], please follow up with [facility]- [address] [place] [address]  [phone])  They can accept you as a sliding scale patient and have staff who speak Mandarin You have an appointment for Tuesday, 9/22/20 at 4:00 pm with a primary care physician, Dr. Real Canales at [facility]- [address] [place] [address] (phone # [phone])- bring your hospital discharge instructions. They will ask you to fill out paperwork when you arrive.    When you return to [place], please follow up with [facility]- [address] [place] [address]  ([phone])  They can accept you as a sliding scale patient and have staff who speak Mandarin    You have been provided resource information for Outpatient Rehabilitation Services. Please review and make a selection of your choice.

## 2020-09-18 VITALS
OXYGEN SATURATION: 95 % | SYSTOLIC BLOOD PRESSURE: 132 MMHG | RESPIRATION RATE: 16 BRPM | TEMPERATURE: 99 F | HEART RATE: 65 BPM | DIASTOLIC BLOOD PRESSURE: 75 MMHG

## 2020-09-18 PROCEDURE — 97535 SELF CARE MNGMENT TRAINING: CPT

## 2020-09-18 PROCEDURE — 74230 X-RAY XM SWLNG FUNCJ C+: CPT

## 2020-09-18 PROCEDURE — 92610 EVALUATE SWALLOWING FUNCTION: CPT

## 2020-09-18 PROCEDURE — 92526 ORAL FUNCTION THERAPY: CPT

## 2020-09-18 PROCEDURE — 99233 SBSQ HOSP IP/OBS HIGH 50: CPT

## 2020-09-18 PROCEDURE — 80053 COMPREHEN METABOLIC PANEL: CPT

## 2020-09-18 PROCEDURE — 84100 ASSAY OF PHOSPHORUS: CPT

## 2020-09-18 PROCEDURE — 92507 TX SP LANG VOICE COMM INDIV: CPT

## 2020-09-18 PROCEDURE — 97163 PT EVAL HIGH COMPLEX 45 MIN: CPT

## 2020-09-18 PROCEDURE — 97112 NEUROMUSCULAR REEDUCATION: CPT

## 2020-09-18 PROCEDURE — 83735 ASSAY OF MAGNESIUM: CPT

## 2020-09-18 PROCEDURE — 85027 COMPLETE CBC AUTOMATED: CPT

## 2020-09-18 PROCEDURE — 99239 HOSP IP/OBS DSCHRG MGMT >30: CPT

## 2020-09-18 PROCEDURE — 97110 THERAPEUTIC EXERCISES: CPT

## 2020-09-18 PROCEDURE — 97116 GAIT TRAINING THERAPY: CPT

## 2020-09-18 PROCEDURE — 92523 SPEECH SOUND LANG COMPREHEN: CPT

## 2020-09-18 PROCEDURE — 97530 THERAPEUTIC ACTIVITIES: CPT

## 2020-09-18 PROCEDURE — 92611 MOTION FLUOROSCOPY/SWALLOW: CPT

## 2020-09-18 PROCEDURE — 97167 OT EVAL HIGH COMPLEX 60 MIN: CPT

## 2020-09-18 RX ORDER — LIDOCAINE 4 G/100G
1 CREAM TOPICAL
Qty: 30 | Refills: 0
Start: 2020-09-18 | End: 2020-10-17

## 2020-09-18 RX ORDER — ATORVASTATIN CALCIUM 80 MG/1
1 TABLET, FILM COATED ORAL
Qty: 30 | Refills: 0
Start: 2020-09-18 | End: 2020-10-17

## 2020-09-18 RX ORDER — CLOPIDOGREL BISULFATE 75 MG/1
1 TABLET, FILM COATED ORAL
Qty: 30 | Refills: 0
Start: 2020-09-18 | End: 2020-10-17

## 2020-09-18 RX ORDER — ASPIRIN/CALCIUM CARB/MAGNESIUM 324 MG
1 TABLET ORAL
Qty: 30 | Refills: 0
Start: 2020-09-18 | End: 2020-10-17

## 2020-09-18 RX ADMIN — LIDOCAINE 1 APPLICATION(S): 4 CREAM TOPICAL at 05:41

## 2020-09-18 RX ADMIN — Medication 325 MILLIGRAM(S): at 11:37

## 2020-09-18 RX ADMIN — CLOPIDOGREL BISULFATE 75 MILLIGRAM(S): 75 TABLET, FILM COATED ORAL at 11:37

## 2020-09-18 NOTE — PROGRESS NOTE ADULT - PROVIDER SPECIALTY LIST ADULT
Hospitalist
Internal Medicine
Neurology
Neurology
Physiatry
Rehab Medicine
Rehab Medicine
Neurology
Hospitalist
Physiatry

## 2020-09-18 NOTE — PROGRESS NOTE ADULT - ASSESSMENT
RAFY KIRKPATRICK is a 75y with a history of HTN who presented to [facility] on 8/15/20 with headache and confusion and found to have severe stenosis of R M1 seen on initial CTA head and 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 Hospital course complicated by UTI and hypotension, both now resolved. Now admitted to [facility] after for initiation of a multidisciplinary rehab program consisting focused on functional mobility, transfers and ADLs (activities of daily living).    #Right-sided MCA Stroke, S/P R MCA stenting with L sided weakness, dysarthria, dysphagia, left visual field cut, incoordination  - Yearly CTA head to monitor L supraclinoid ICA infundibulum vs small aneurysm  - continue ASA, plavix, lipitor secondary PPX  - F/u neurosurgery, neurology, neuro-ophtho on dc  -outpatient PT, OT, SLP    #left shoulder subluxation  -Kineseotaping. Shoulder stabilization and strengthening exercises, positioning and support  -lidocaine cream q12 PRN.  -tylenol PRN pain    #HTN -  -Currently normotensive, not on meds  -PCP f/u on dc    #HLD  -continue lipitor 80  -neurology, PCP f/u on dc    #Diet:  - regular solids and thin liquids       #Case discussed in IDT rounds 9/14:  -min assist/CG transfers due to field cut, min assist LE dressing, CG UE dressing, min assist/CG transfers, min assist ADL. Reduced carryover  -caregiver training. Anticipated to require CG/min assist at home due to reduced carryover and significant left visual field cut  -target: dc home 9/18 with [facility] home care, Pt and OT, caregiver supervision and support. DC may be delayed as still trying to arrange caregiver training and adequate supervision in community.   -will need medical and neurology, neurological surgery, cardiology, PM+R follow up in community  -outpatient PT, OT, SLP services. Caregiver declines home care referral  -Will arrange for PCP in [facility] and back up in [place]  -for dc home after caregiver training. supervision/CG all waking hours on dc recommended      Patient medically cleared for dc home after training this afternoon with outpatient PT, OT, SLP and PCP, neurology, cardiology, ophthalmology, and PMR+ follow up. Time spent coordinating dc >30 min. [facility] [address], [place]  --------------    Outpatient Follow-up (Specialty/Name of physician):  Pierre Singleton)  Ophthalmology  [address] [place] [address], [address]  [place] [address]  Phone: [phone]  Fax: [phone]  Follow Up Time: 2 weeks    Otf Levi  Neurological Surgery  [address] [place] [address]  Phone: [phone]  Fax: [phone]    [facility]  Cardiology  [address] [place] [address]  Phone: [phone]  Fax:   Follow Up Time: 2 weeks    [facility]  Neurology  [address] [facility] - [address]rd [place] [address]  Phone: [phone]  Fax:   Follow Up Time: 2 weeks    [facility] [place]  Internal Medicine  [address] [place] [address]  Phone: [phone]  Fax: [phone]  Follow Up Time: 2 weeks    --------------   RAFY KIRKPATRICK is a 75y with a history of HTN who presented to [facility] on 8/15/20 with headache and confusion and found to have severe stenosis of R M1 seen on initial CTA head and 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 Hospital course complicated by UTI and hypotension, both now resolved. Now admitted to [facility] after for initiation of a multidisciplinary rehab program consisting focused on functional mobility, transfers and ADLs (activities of daily living).    #Right-sided MCA Stroke, S/P R MCA stenting with L sided weakness, dysarthria, dysphagia, left visual field cut, incoordination  - Yearly CTA head to monitor L supraclinoid ICA infundibulum vs small aneurysm  - continue ASA, plavix, lipitor secondary PPX  - F/u neurosurgery, neurology, neuro-ophtho on dc  -outpatient PT, OT, SLP    #left shoulder subluxation  -Kineseotaping. Shoulder stabilization and strengthening exercises, positioning and support  -lidocaine cream q12 PRN.  -tylenol PRN pain    #HTN -  -Currently normotensive, not on meds  -PCP f/u on dc    #HLD  -continue lipitor 80  -neurology, PCP f/u on dc    #Diet:  - regular solids and thin liquids       #Case discussed in IDT rounds 9/14:  -min assist/CG transfers due to field cut, min assist LE dressing, CG UE dressing, min assist/CG transfers, min assist ADL. Reduced carryover  -caregiver training. Anticipated to require CG/min assist at home due to reduced carryover and significant left visual field cut  -target: dc home 9/18 with [facility] home care, Pt and OT, caregiver supervision and support. DC may be delayed as still trying to arrange caregiver training and adequate supervision in community.   -will need medical and neurology, neurological surgery, cardiology, PM+R follow up in community  -outpatient PT, OT, SLP services. Caregiver declines home care referral  -Will arrange for PCP in [facility] and back up in [place]  -for dc home after caregiver training. supervision/CG all waking hours on dc recommended      Patient medically cleared for dc home after training this afternoon with outpatient PT, OT, SLP and PCP, neurology, cardiology, ophthalmology, and PMR+ follow up. Time spent coordinating dc >30 min. [facility] [address], [place]  --------------    Outpatient Follow-up (Specialty/Name of physician):  Pierre Singleton)  Ophthalmology  [address] [place] [address], [address]  [place] [address]  Phone: [phone]  Fax: [phone]  Follow Up Time: 2 weeks    Otf Levi  Neurological Surgery  [address] [place] [address]  Phone: [phone]  Fax: [phone]    [facility]  Cardiology  [address] [place] [address]  Phone: [phone]  Fax:   Follow Up Time: 2 weeks    [facility]  Neurology  [address] [facility] - [address]rd [place] [address]  Phone: [phone]  Fax:   Follow Up Time: 2 weeks    [facility] [place]  Internal Medicine  [address] [place] [address]  Phone: [phone]  Fax: [phone]  Follow Up Time: 2 weeks    --------------  addendum: 4:16 PM. Patient and caregivers now agreeable to home care referral. Home Pt, OT, SLP

## 2020-09-18 NOTE — PROGRESS NOTE ADULT - GASTROINTESTINAL DETAILS
nontender/soft
soft/nontender/bowel sounds normal/normal
bowel sounds normal/soft/nontender/normal
bowel sounds normal/soft/nontender/normal
normal/soft/nontender/no distention
soft/bowel sounds normal/nontender
bowel sounds normal/nontender/soft
nontender/bowel sounds normal/soft

## 2020-09-18 NOTE — PROGRESS NOTE ADULT - REASON FOR ADMISSION
R MCA with left sided weakness  01.1 Left Body Involvement (Right Brain)

## 2020-09-18 NOTE — PROGRESS NOTE ADULT - SUBJECTIVE AND OBJECTIVE BOX
Patient is a 75y old  Male who presents with a chief complaint of R MCA with left sided weakness  01.1 Left Body Involvement (Right Brain) (17 Sep 2020 13:53)      HPI:  Patient is a 75 y.o. RH -dominant man with PMHx HTN not on antithrombotics, presented to [facility] ED 8/15/20 with 1 day history of headache and altered mental status. Patient suddenly became confused 5PM day before admission and c/o new onset nonradiating headache at the top/middle of hairline and some weakness in right lower extremity. Per patient's friend, his speech was "off" and face "did not look normal"; patient initially refused to go to ED at that time but eventually sent due to persistent headache and lightheadedness. Neuro exam showed mild dysarthria and mild left nasolabial flattening with left arm weakness.     Hospital course was significant for severe stenosis of R M1 seen on initial CTA head and acute infarct in the R MCA territory on subsequent MRI brain 20. Neuro consulted, felt infarct most likely as a result of large artery atherosclerosis, although an embolus with partial lysis could not be ruled out. Patient was started on Midodrine 10mg TID and IV fluids due to hypotension after admission to maintain //100. +UA but negative UCx and was treated with Ceftriaxone (-). TTE showed EF 73%, normal LA, HbA1c 5.8, . Patient was started on ASA81 and Plavix 75mg daily, Plavix to be discontinued after 3 months.    Cardio was consulted for hypotension, likely neurogenic and possibly infectious etiology. 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 Pulm consulted for incidental finding of apical paraseptal emphysema on CT, no interventions recommended. Neurosurgery consulted for stenosis and questionable 2mm outpouching at L supraclinoid ICA, possible aneurysm vs infundibulum. MRA NOVA showed R MCA stenosis vs occlusion. S/P diagnostic angiography 20. S/P cerebral angiography with stenting 20. Patient weaned off Midodrine 20.    Patient was stable for discharge to [facility] for multidisciplinary acute rehab for functional deficits and gait/ADLs deficits. Patient speaks some English and Cantonese. Patient seen and examined at bedside with [facility]ET phone  #[phone]. (02 Sep 2020 14:53)      PAST MEDICAL & SURGICAL HISTORY:  HTN (hypertension)        MEDICATIONS  (STANDING):  artificial  tears Solution 1 Drop(s) Both EYES daily  aspirin 325 milliGRAM(s) Oral daily  atorvastatin 40 milliGRAM(s) Oral at bedtime  clopidogrel Tablet 75 milliGRAM(s) Oral daily  enoxaparin Injectable 40 milliGRAM(s) SubCutaneous at bedtime  influenza   Vaccine 0.5 milliLiter(s) IntraMuscular once  lidocaine 5% Ointment 1 Application(s) Topical two times a day  polyethylene glycol 3350 17 Gram(s) Oral daily  senna 2 Tablet(s) Oral at bedtime    MEDICATIONS  (PRN):  acetaminophen   Tablet .. 650 milliGRAM(s) Oral every 6 hours PRN Temp greater or equal to 38C (100.4F), Mild Pain (1 - 3), Moderate Pain (4 - 6), Severe Pain (7 - 10)  bisacodyl Suppository 10 milliGRAM(s) Rectal daily PRN Constipation      Allergies    apple (Vomiting; Nausea)  No Known Drug Allergies    Intolerances          VITALS  75y  Vital Signs Last 24 Hrs  T(C): 37.1 (18 Sep 2020 10:40), Max: 37.1 (18 Sep 2020 10:40)  T(F): 98.7 (18 Sep 2020 10:40), Max: 98.7 (18 Sep 2020 10:40)  HR: 65 (18 Sep 2020 10:40) (57 - 65)  BP: 132/75 (18 Sep 2020 10:40) (132/75 - 143/70)  BP(mean): --  RR: 16 (18 Sep 2020 10:40) (16 - 16)  SpO2: 95% (18 Sep 2020 10:40) (95% - 96%)  Daily     Daily Weight in k.4 (18 Sep 2020 00:00)        RECENT LABS:                          13.6   5.68  )-----------( 266      ( 17 Sep 2020 07:13 )             41.3     09-17    140  |  104  |  18  ----------------------------<  104<H>  3.8   |  30  |  0.87    Ca    9.3      17 Sep 2020 07:13    TPro  7.3  /  Alb  3.4  /  TBili  0.4  /  DBili  x   /  AST  28  /  ALT  63<H>  /  AlkPhos  81  09-17    LIVER FUNCTIONS - ( 17 Sep 2020 07:13 )  Alb: 3.4 g/dL / Pro: 7.3 g/dL / ALK PHOS: 81 U/L / ALT: 63 U/L / AST: 28 U/L / GGT: x                   CAPILLARY BLOOD GLUCOSE

## 2020-09-18 NOTE — PROGRESS NOTE ADULT - COMMENTS
Patient doing well. Had MBS this morning and cleared for regular consistency solids and thin liquids. Has some left shoulder pain but improved with patch. Still has visual inattention and field cut, and found in chair with left arm not completely on laptray, although can be cues to reposition.    Denies B/B issues, H/A
Patient reports that pain has significantly improved with lidocaine cream. No pain at rest, some discomfort with morvement but improved. Has family training today; caregiver has declined home care services, so will be set for outpatient PT and Ot, SLP on discharge    Stable mood, no H/a, no B/B issues
Patient stable. Complains of some left shoulder pain, was found in chair with left hand off lapboard. Reduced left sided awareness but was able to state there was a board on his left side    Denies cough, respiratory difficulties. continues on soft solids and nectar thick liquids for diet. Had BM yesterday
Patient stable. Has some relief left shoulder with lidoderm patch, hwoever has difficulty comprehending instructions for once daily use max 12 hours. Will switch to lidocaine jelly, in preparation for dc later this week. Denies H/A, dizziness, B/B complaints    Discussed case with team. Visual deficits continue to be significant, patient requires cues and will need supervision and cueing in community.
Patient stable. Still has discomfort in left shoudler but reports it is only with active movement. Had kineseotaping done for subluxation with improvement. Also receiving lidocaine jelly .    continues to have left dilia inattention and visual field cut, cognitive impairment that requires supervision and cues for safety. Awaiting caregiver training for discharge at supervision level. Denies H/A, dizziness, other pain, B/B complaints
Pateint stable. Still has left shoulder pain, seen doing finger/hand exercises with left hand and left UE is supported on lap tray. Most of items on bed stand are on the right side of his visual field; when prompted, can identify all 4 drinking items including ones that are later placed to left  no B/B complaints. States he has no family in the area but a friend, Evan, who can assist at home (but is not available round-the-clock)
Patient continues to do well, residual left shoulder pain, improved with lidoderm patch.  SLP recommending discontinuing therapies and therapies will be focused on OT and PT. no B/B complaints, no other pains. mood stable
Patient stable, no issues overnight. Pleasant, no complaints, slept well. No new pains, left shoulder pain stable, controlled

## 2020-09-18 NOTE — PROGRESS NOTE ADULT - EXTREMITIES COMMENTS
calves soft bialterally NT no erythema or warmth  improved motor strength: Left quad, ham and ankle PF and DF 5/5  left gross grasp 5-/5 shoulder 5-/5 due to pain, elbow 5/5
left shoulder subluxation  +apraxic movement left side  +reduced coordination left, FMC  calves soft, NT no erythema or warmth
calves soft bilaterally  left  pinch 4/5 reduced coordination  left shoulder 5-/5 +subluxation  elbow flexion/extension 5/5
improved left hand grasp (gross) and some mild improvement finger coordination  +left visual inattention, no neglect  no calf swelling +soft, NT bilaterally  left grasp 5-/5 otherwise motor 5/5 BUE and LE  normal tone
+1 finger shoudler subluxation left side  +TTP, discomfort with PROM  hypotonia  no hand swelling  bilateral calves soft, NT
+left shoulder 1 finger-breadth subluxation  hypotonia  no hand swelling  shoulder elevation 3/5 gross grasp 4-/5  no calf swellign or TTP
calves soft, NT no erythema or warmth bilaterally\  left gross grasp and shoulder 5-/5 otherwise motor strength 5/5
left shoulder 4+/5 due to pain +1/2 finger subluxation with tape  left grasp 5-/5,. elbow 5/5  left LE 5/5  no calf swelling +soft, TN

## 2020-10-11 ENCOUNTER — RX RENEWAL (OUTPATIENT)
Age: 75
End: 2020-10-11

## 2020-10-12 ENCOUNTER — RX RENEWAL (OUTPATIENT)
Age: 75
End: 2020-10-12

## 2020-10-12 DIAGNOSIS — I63.511 CEREBRAL INFARCTION DUE TO UNSPECIFIED OCCLUSION OR STENOSIS OF RIGHT MIDDLE CEREBRAL ARTERY: ICD-10-CM

## 2020-10-12 RX ORDER — ATORVASTATIN CALCIUM 40 MG/1
40 TABLET, FILM COATED ORAL
Qty: 30 | Refills: 2 | Status: ACTIVE | COMMUNITY
Start: 2020-10-12 | End: 1900-01-01

## 2020-10-29 PROCEDURE — 70551 MRI BRAIN STEM W/O DYE: CPT

## 2020-10-29 PROCEDURE — 86022 PLATELET ANTIBODIES: CPT

## 2020-10-29 PROCEDURE — 97116 GAIT TRAINING THERAPY: CPT

## 2020-10-29 PROCEDURE — 71045 X-RAY EXAM CHEST 1 VIEW: CPT

## 2020-10-29 PROCEDURE — 84100 ASSAY OF PHOSPHORUS: CPT

## 2020-10-29 PROCEDURE — 81001 URINALYSIS AUTO W/SCOPE: CPT

## 2020-10-29 PROCEDURE — 93970 EXTREMITY STUDY: CPT

## 2020-10-29 PROCEDURE — 86900 BLOOD TYPING SEROLOGIC ABO: CPT

## 2020-10-29 PROCEDURE — 97535 SELF CARE MNGMENT TRAINING: CPT

## 2020-10-29 PROCEDURE — 71250 CT THORAX DX C-: CPT

## 2020-10-29 PROCEDURE — 97162 PT EVAL MOD COMPLEX 30 MIN: CPT

## 2020-10-29 PROCEDURE — 36226 PLACE CATH VERTEBRAL ART: CPT

## 2020-10-29 PROCEDURE — 83880 ASSAY OF NATRIURETIC PEPTIDE: CPT

## 2020-10-29 PROCEDURE — C9399: CPT

## 2020-10-29 PROCEDURE — 85610 PROTHROMBIN TIME: CPT

## 2020-10-29 PROCEDURE — 80048 BASIC METABOLIC PNL TOTAL CA: CPT

## 2020-10-29 PROCEDURE — 36227 PLACE CATH XTRNL CAROTID: CPT

## 2020-10-29 PROCEDURE — 92610 EVALUATE SWALLOWING FUNCTION: CPT

## 2020-10-29 PROCEDURE — 85027 COMPLETE CBC AUTOMATED: CPT

## 2020-10-29 PROCEDURE — 70450 CT HEAD/BRAIN W/O DYE: CPT

## 2020-10-29 PROCEDURE — 61635 INTRACRAN ANGIOPLSTY W/STENT: CPT

## 2020-10-29 PROCEDURE — 92611 MOTION FLUOROSCOPY/SWALLOW: CPT

## 2020-10-29 PROCEDURE — 99285 EMERGENCY DEPT VISIT HI MDM: CPT | Mod: 25

## 2020-10-29 PROCEDURE — 85576 BLOOD PLATELET AGGREGATION: CPT

## 2020-10-29 PROCEDURE — 86901 BLOOD TYPING SEROLOGIC RH(D): CPT

## 2020-10-29 PROCEDURE — 92526 ORAL FUNCTION THERAPY: CPT

## 2020-10-29 PROCEDURE — 82550 ASSAY OF CK (CPK): CPT

## 2020-10-29 PROCEDURE — 84443 ASSAY THYROID STIM HORMONE: CPT

## 2020-10-29 PROCEDURE — 36224 PLACE CATH CAROTD ART: CPT

## 2020-10-29 PROCEDURE — U0003: CPT

## 2020-10-29 PROCEDURE — 84145 PROCALCITONIN (PCT): CPT

## 2020-10-29 PROCEDURE — 80074 ACUTE HEPATITIS PANEL: CPT

## 2020-10-29 PROCEDURE — 84480 ASSAY TRIIODOTHYRONINE (T3): CPT

## 2020-10-29 PROCEDURE — 97129 THER IVNTJ 1ST 15 MIN: CPT

## 2020-10-29 PROCEDURE — 82553 CREATINE MB FRACTION: CPT

## 2020-10-29 PROCEDURE — 84436 ASSAY OF TOTAL THYROXINE: CPT

## 2020-10-29 PROCEDURE — 70498 CT ANGIOGRAPHY NECK: CPT

## 2020-10-29 PROCEDURE — C1887: CPT

## 2020-10-29 PROCEDURE — 86850 RBC ANTIBODY SCREEN: CPT

## 2020-10-29 PROCEDURE — 83735 ASSAY OF MAGNESIUM: CPT

## 2020-10-29 PROCEDURE — 93306 TTE W/DOPPLER COMPLETE: CPT

## 2020-10-29 PROCEDURE — 92523 SPEECH SOUND LANG COMPREHEN: CPT

## 2020-10-29 PROCEDURE — C1894: CPT

## 2020-10-29 PROCEDURE — 80061 LIPID PANEL: CPT

## 2020-10-29 PROCEDURE — C1760: CPT

## 2020-10-29 PROCEDURE — 70496 CT ANGIOGRAPHY HEAD: CPT

## 2020-10-29 PROCEDURE — 86769 SARS-COV-2 COVID-19 ANTIBODY: CPT

## 2020-10-29 PROCEDURE — 82962 GLUCOSE BLOOD TEST: CPT

## 2020-10-29 PROCEDURE — 84484 ASSAY OF TROPONIN QUANT: CPT

## 2020-10-29 PROCEDURE — 83605 ASSAY OF LACTIC ACID: CPT

## 2020-10-29 PROCEDURE — 80053 COMPREHEN METABOLIC PANEL: CPT

## 2020-10-29 PROCEDURE — 87040 BLOOD CULTURE FOR BACTERIA: CPT

## 2020-10-29 PROCEDURE — 97130 THER IVNTJ EA ADDL 15 MIN: CPT

## 2020-10-29 PROCEDURE — 97530 THERAPEUTIC ACTIVITIES: CPT

## 2020-10-29 PROCEDURE — 70544 MR ANGIOGRAPHY HEAD W/O DYE: CPT

## 2020-10-29 PROCEDURE — 87086 URINE CULTURE/COLONY COUNT: CPT

## 2020-10-29 PROCEDURE — 74230 X-RAY XM SWLNG FUNCJ C+: CPT

## 2020-10-29 PROCEDURE — 85730 THROMBOPLASTIN TIME PARTIAL: CPT

## 2020-10-29 PROCEDURE — 97166 OT EVAL MOD COMPLEX 45 MIN: CPT

## 2020-10-29 PROCEDURE — 93005 ELECTROCARDIOGRAM TRACING: CPT

## 2020-10-29 PROCEDURE — 76380 CAT SCAN FOLLOW-UP STUDY: CPT | Mod: XU

## 2020-10-29 PROCEDURE — 92507 TX SP LANG VOICE COMM INDIV: CPT

## 2020-10-29 PROCEDURE — 97110 THERAPEUTIC EXERCISES: CPT

## 2020-10-29 PROCEDURE — C1769: CPT

## 2020-10-29 PROCEDURE — 83036 HEMOGLOBIN GLYCOSYLATED A1C: CPT

## 2020-11-18 ENCOUNTER — RX RENEWAL (OUTPATIENT)
Age: 75
End: 2020-11-18

## 2020-11-18 RX ORDER — CLOPIDOGREL BISULFATE 75 MG/1
75 TABLET, FILM COATED ORAL
Qty: 30 | Refills: 0 | Status: ACTIVE | COMMUNITY
Start: 2020-10-12 | End: 1900-01-01

## 2021-02-15 NOTE — DISCHARGE NOTE PROVIDER - NSFOLLOWUPCLINICS_GEN_ALL_ED_FT
2/15/2021 2:00 PM 
 
Mr. Hernandez Lot 
[address] [place] [id number] Dear Blade [place] NP Thank you for referring your patient Mechelle Lassiter Sr. , : 1971, to us for colonoscopy screening. In contacting the patient, he chose to schedule on 2021. Sincerely, Ilan Rojas MD 
 
 [facility]  Cardiology  [address] [place] [address]  Phone: [phone]  Fax:   Follow Up Time: 2 weeks    [facility]  Neurology  [address] [facility] - [address]rd [place] [address]  Phone: [phone]  Fax:   Follow Up Time: 2 weeks    [facility] [place]  Internal Medicine  [facility] [place] [address]  Phone: [phone]  Fax: [phone]  Follow Up Time: 2 weeks

## 2021-03-19 NOTE — SPEECH LANGUAGE PATHOLOGY EVALUATION - SLP PATIENT PROFILE REVIEW
[id number] [employer] [facility] [place] sent Rx request for the following:      Requested Prescriptions   Pending Prescriptions Disp Refills   TRINTELLIX 20 MG tablet [Pharmacy Med Name: TRINTELLIX 20 MG TABLET] 90 tablet 1    Sig: TAKE 1 TABLET BY MOUTH EVERY DAY      Last Prescription Date:   6/15/20  Last Fill Qty/Refills:         90, R-1  Last Office Visit:              2/1/21  Future Office visit:             Apr 07, 2021 10:20 AM  SHORT with Ramirez Cano MD  [facility] ([facility] ) [address]  [place] [address] [phone]        Noted upcoming appointment. Prescription approved per [facility] Refill Protocol for limited supply, to prevent break in medication. She was notified of this Kathleen Puckett RN .............. 3/19/2021  9:27 AM   yes

## 2021-12-23 NOTE — PROGRESS NOTE ADULT - SUBJECTIVE AND OBJECTIVE BOX
Patient is a 75y old  Male who presents with a chief complaint of R MCA with left sided weakness  01.1 Left Body Involvement (Right Brain) (03 Sep 2020 09:45)      Patient seen and examined at bedside.  No acute events overnight.    ALLERGIES:  apple (Vomiting; Nausea)  No Known Drug Allergies    MEDICATIONS  (STANDING):  artificial  tears Solution 1 Drop(s) Both EYES daily  aspirin 325 milliGRAM(s) Oral daily  atorvastatin 80 milliGRAM(s) Oral at bedtime  clopidogrel Tablet 75 milliGRAM(s) Oral daily  enoxaparin Injectable 40 milliGRAM(s) SubCutaneous at bedtime  influenza   Vaccine 0.5 milliLiter(s) IntraMuscular once  polyethylene glycol 3350 17 Gram(s) Oral daily  senna 2 Tablet(s) Oral at bedtime    MEDICATIONS  (PRN):  acetaminophen   Tablet .. 650 milliGRAM(s) Oral every 6 hours PRN Temp greater or equal to 38C (100.4F), Mild Pain (1 - 3), Moderate Pain (4 - 6), Severe Pain (7 - 10)  artificial  tears Solution 1 Drop(s) Both EYES every 1 hour PRN Dry Eyes  bisacodyl Suppository 10 milliGRAM(s) Rectal daily PRN Constipation    Vital Signs Last 24 Hrs  T(F): 98 (04 Sep 2020 07:42), Max: 98 (04 Sep 2020 07:42)  HR: 57 (04 Sep 2020 07:42) (55 - 57)  BP: 139/71 (04 Sep 2020 07:42) (134/63 - 139/71)  RR: 16 (04 Sep 2020 07:42) (16 - 16)  SpO2: 98% (04 Sep 2020 07:42) (96% - 98%)  I&O's Summary    03 Sep 2020 07:01  -  04 Sep 2020 07:00  --------------------------------------------------------  IN: 360 mL / OUT: 550 mL / NET: -190 mL      BMI (kg/m2): 22.2 (09-02-20 @ 20:54)  PHYSICAL EXAM:  General: NAD, A/O x 3  ENT: MMM  Neck: Supple, No JVD  Lungs: Clear to auscultation bilaterally  Cardio: RRR, S1/S2, No murmurs  Abdomen: Soft, Nontender, Nondistended; Bowel sounds present  Extremities: No calf tenderness, No pitting edema    LABS:                        13.3   6.12  )-----------( 297      ( 03 Sep 2020 06:09 )             39.9       09-03    139  |  105  |  17  ----------------------------<  101  4.1   |  23  |  0.68    Ca    8.9      03 Sep 2020 06:09  Phos  3.0     09-03  Mg     2.2     09-03    TPro  7.3  /  Alb  3.1  /  TBili  0.6  /  DBili  x   /  AST  54  /  ALT  84  /  AlkPhos  96  09-03     eGFR if Non : 93 mL/min/1.73M2 (09-03-20 @ 06:09)  eGFR if : 108 mL/min/1.73M2 (09-03-20 @ 06:09)             08-15 Chol 220 mg/dL  mg/dL HDL 44 mg/dL Trig 98 mg/dL                        RADIOLOGY & ADDITIONAL TESTS:    Care Discussed with Consultants/Other Providers: normal...

## 2022-11-10 NOTE — DIETITIAN INITIAL EVALUATION ADULT. - 25 CAL
Received VMail this AM from pt's wife Daisy Drummond - they have chosen [facility] for [facility] upon DC. [address] reserved via [facility]. TRACEE attempted to return Meme's call - no answer. SW left VMail confirming that [address] has been reserved as well as brief information on next steps in the process. TRACEE spoke to [facility] (complete care/max assist & O2) set on WILL CALL til 11/13. PCS completed and will need date added day of actual DC. PLAN: [facility]AS, Ambulance on WILL CALL, PCS completed (needs date) - pending rapid COVID & med clear    SW/DAILY to remain available for support and/or discharge planning.          Josette Flores, MSW, [address] [facility]

## 2023-06-07 NOTE — PATIENT PROFILE ADULT - ARRIVAL FROM
Mr. Mcintyre is a 52-year-old white male presenting for new patient evaluation of rectal bleeding.  Patient's last colonoscopy was done at [facility] by Dr. Mayes.  He states polyps were removed.  Records via care everywhere show that he would be due in 2025 for his next exam.  Patient states while he was in [place] he did some diving.  He got stuck in a diving hole.  He states he was straining to free himself.  The next day he noticed after having a bowel movement he had a fair amount of bright red blood after defecation.  That lasted all day that day.  He has not had any rectal bleeding since.  He denied any abdominal pain or rectal pain with the bleeding.  There is no known family history of colon cancer or polyps.  He denies any nausea vomiting or reflux.  He denies any NSAID use. Hospitals/Psychiatric Facilities

## 2023-12-18 NOTE — PROGRESS NOTE ADULT - ASSESSMENT
80 yo M  admitted to acute Rehabilitation with right MCA infarct    Pt. Stable    cont ASA plavix    dvt ppx: lovenox    Cont. comprehensive inpatient PT, OT and Speech    No acute issues,   Cont. current plan of care and medications as per primary team   Fair Fair Fair Fair Fair Fair Fair Fair Good Fair

## 2024-11-03 NOTE — PROVIDER CONTACT NOTE (OTHER) - ASSESSMENT
repeat /76, HR 61 after 500cc bolus DISPLAY PLAN FREE TEXT DISPLAY PLAN FREE TEXT DISPLAY PLAN FREE TEXT

## 2024-12-02 NOTE — PROGRESS NOTE ADULT - CONSTITUTIONAL COMMENTS
alert O x 3 . Reduced initiation and left side awareness. Follows commands, good- simple attention
alert O x 3 grossly NAD. Mood stable, pleasant, mildly constricted affect
alert O x3 grossly, follows simple commands, reduced sustained attention
alert grossly O x 3 follows simple commands. Reduced initiation and left attention
pleasant NAD Ox  3
alert pleasant NAD. Improved left hand function noted, still difficulties with fine motor coordination and gross corodination, motivated for HEP
alert, pleasant, follows simple commands NAD
pleasant. reduced overall insight and problem-solving, O x 3 grossly and follows 1-2 step commands +reduced initiation
02-Dec-2024 00:40
